# Patient Record
Sex: MALE | Employment: OTHER | URBAN - METROPOLITAN AREA
[De-identification: names, ages, dates, MRNs, and addresses within clinical notes are randomized per-mention and may not be internally consistent; named-entity substitution may affect disease eponyms.]

---

## 2019-01-01 ENCOUNTER — NURSE NAVIGATOR (OUTPATIENT)
Dept: OTHER | Age: 84
End: 2019-01-01

## 2019-01-22 ENCOUNTER — HOSPITAL ENCOUNTER (OUTPATIENT)
Dept: RADIATION THERAPY | Age: 84
Discharge: HOME | End: 2019-01-22
Payer: OTHER GOVERNMENT

## 2019-01-22 PROCEDURE — 99211 OFF/OP EST MAY X REQ PHY/QHP: CPT

## 2019-01-22 PROCEDURE — G0463 HOSPITAL OUTPT CLINIC VISIT: HCPCS

## 2019-01-31 ENCOUNTER — ANESTHESIA (OUTPATIENT)
Dept: RADIATION THERAPY | Age: 84
End: 2019-01-31
Payer: OTHER GOVERNMENT

## 2019-01-31 ENCOUNTER — HOSPITAL ENCOUNTER (OUTPATIENT)
Dept: RADIATION THERAPY | Age: 84
Discharge: HOME | End: 2019-01-31
Payer: OTHER GOVERNMENT

## 2019-01-31 ENCOUNTER — ANESTHESIA EVENT (OUTPATIENT)
Dept: RADIATION THERAPY | Age: 84
End: 2019-01-31
Payer: OTHER GOVERNMENT

## 2019-01-31 VITALS
RESPIRATION RATE: 18 BRPM | TEMPERATURE: 97.7 F | OXYGEN SATURATION: 100 % | SYSTOLIC BLOOD PRESSURE: 116 MMHG | BODY MASS INDEX: 28.49 KG/M2 | DIASTOLIC BLOOD PRESSURE: 54 MMHG | WEIGHT: 181.56 LBS | HEART RATE: 75 BPM | HEIGHT: 67 IN

## 2019-01-31 LAB
GLUCOSE BLD STRIP.AUTO-MCNC: 128 MG/DL (ref 70–110)
GLUCOSE BLD STRIP.AUTO-MCNC: 132 MG/DL (ref 70–110)

## 2019-01-31 PROCEDURE — 82962 GLUCOSE BLOOD TEST: CPT

## 2019-01-31 PROCEDURE — 74011250636 HC RX REV CODE- 250/636

## 2019-01-31 PROCEDURE — 73290000068 HC RAD ONC TIME 0.5 TO 1 HR

## 2019-01-31 PROCEDURE — 76060000032 HC ANESTHESIA 0.5 TO 1 HR

## 2019-01-31 PROCEDURE — 74011250636 HC RX REV CODE- 250/636: Performed by: RADIOLOGY

## 2019-01-31 PROCEDURE — 77030012510 HC MSK AIRWY LMA TELE -B: Performed by: RADIOLOGY

## 2019-01-31 PROCEDURE — 77030015736 HC BLLN ENDOCAV CIVC -B

## 2019-01-31 PROCEDURE — 77030018846 HC SOL IRR STRL H20 ICUM -A

## 2019-01-31 PROCEDURE — A4648 IMPLANTABLE TISSUE MARKER: HCPCS

## 2019-01-31 RX ORDER — TRAMADOL HYDROCHLORIDE 50 MG/1
50 TABLET ORAL 4 TIMES DAILY
COMMUNITY
End: 2020-01-01

## 2019-01-31 RX ORDER — LIDOCAINE HYDROCHLORIDE 20 MG/ML
INJECTION, SOLUTION EPIDURAL; INFILTRATION; INTRACAUDAL; PERINEURAL AS NEEDED
Status: DISCONTINUED | OUTPATIENT
Start: 2019-01-31 | End: 2019-01-31 | Stop reason: HOSPADM

## 2019-01-31 RX ORDER — FLUTICASONE PROPIONATE 50 MCG
2 SPRAY, SUSPENSION (ML) NASAL DAILY
COMMUNITY
End: 2020-01-01

## 2019-01-31 RX ORDER — LOSARTAN POTASSIUM 50 MG/1
100 TABLET ORAL DAILY
COMMUNITY
End: 2020-01-01

## 2019-01-31 RX ORDER — CEFAZOLIN SODIUM 2 G/50ML
SOLUTION INTRAVENOUS
Status: DISPENSED
Start: 2019-01-31 | End: 2019-01-31

## 2019-01-31 RX ORDER — ONDANSETRON 2 MG/ML
INJECTION INTRAMUSCULAR; INTRAVENOUS AS NEEDED
Status: DISCONTINUED | OUTPATIENT
Start: 2019-01-31 | End: 2019-01-31 | Stop reason: HOSPADM

## 2019-01-31 RX ORDER — CALCITRIOL 0.25 UG/1
0.25 CAPSULE ORAL DAILY
COMMUNITY
End: 2020-01-01

## 2019-01-31 RX ORDER — FAMOTIDINE 20 MG/1
20 TABLET, FILM COATED ORAL ONCE
Status: ACTIVE | OUTPATIENT
Start: 2019-01-31 | End: 2019-01-31

## 2019-01-31 RX ORDER — PROPOFOL 10 MG/ML
INJECTION, EMULSION INTRAVENOUS AS NEEDED
Status: DISCONTINUED | OUTPATIENT
Start: 2019-01-31 | End: 2019-01-31 | Stop reason: HOSPADM

## 2019-01-31 RX ORDER — FENTANYL CITRATE 50 UG/ML
INJECTION, SOLUTION INTRAMUSCULAR; INTRAVENOUS AS NEEDED
Status: DISCONTINUED | OUTPATIENT
Start: 2019-01-31 | End: 2019-01-31 | Stop reason: HOSPADM

## 2019-01-31 RX ORDER — ASPIRIN 325 MG
325 TABLET ORAL DAILY
COMMUNITY
End: 2020-01-01

## 2019-01-31 RX ORDER — SODIUM CHLORIDE 0.9 % (FLUSH) 0.9 %
5-10 SYRINGE (ML) INJECTION ONCE
Status: COMPLETED | OUTPATIENT
Start: 2019-01-31 | End: 2019-01-31

## 2019-01-31 RX ORDER — GLIPIZIDE 10 MG/1
10 TABLET ORAL 2 TIMES DAILY
COMMUNITY
End: 2020-01-01

## 2019-01-31 RX ORDER — FINASTERIDE 5 MG/1
5 TABLET, FILM COATED ORAL DAILY
COMMUNITY
End: 2020-01-01

## 2019-01-31 RX ORDER — BICALUTAMIDE 50 MG/1
50 TABLET, FILM COATED ORAL DAILY
COMMUNITY
End: 2020-01-01

## 2019-01-31 RX ORDER — OMEPRAZOLE 20 MG/1
20 CAPSULE, DELAYED RELEASE ORAL DAILY
COMMUNITY
End: 2020-01-01

## 2019-01-31 RX ORDER — SODIUM CHLORIDE 9 MG/ML
75 INJECTION, SOLUTION INTRAVENOUS CONTINUOUS
Status: DISCONTINUED | OUTPATIENT
Start: 2019-01-31 | End: 2019-02-04 | Stop reason: HOSPADM

## 2019-01-31 RX ORDER — ALBUTEROL SULFATE 90 UG/1
1 AEROSOL, METERED RESPIRATORY (INHALATION) DAILY
COMMUNITY
End: 2020-01-01

## 2019-01-31 RX ORDER — ATENOLOL 50 MG/1
50 TABLET ORAL DAILY
COMMUNITY
End: 2020-01-01

## 2019-01-31 RX ORDER — HYDROCHLOROTHIAZIDE 25 MG/1
25 TABLET ORAL DAILY
COMMUNITY
End: 2020-01-01

## 2019-01-31 RX ORDER — CEFAZOLIN SODIUM 2 G/50ML
2 SOLUTION INTRAVENOUS ONCE
Status: COMPLETED | OUTPATIENT
Start: 2019-01-31 | End: 2019-01-31

## 2019-01-31 RX ORDER — ATORVASTATIN CALCIUM 80 MG/1
80 TABLET, FILM COATED ORAL DAILY
COMMUNITY
End: 2020-01-01

## 2019-01-31 RX ORDER — FAMOTIDINE 20 MG/1
TABLET, FILM COATED ORAL
Status: DISPENSED
Start: 2019-01-31 | End: 2019-01-31

## 2019-01-31 RX ADMIN — PROPOFOL 100 MG: 10 INJECTION, EMULSION INTRAVENOUS at 08:26

## 2019-01-31 RX ADMIN — FENTANYL CITRATE 50 MCG: 50 INJECTION, SOLUTION INTRAMUSCULAR; INTRAVENOUS at 08:20

## 2019-01-31 RX ADMIN — SODIUM CHLORIDE 75 ML/HR: 900 INJECTION, SOLUTION INTRAVENOUS at 08:12

## 2019-01-31 RX ADMIN — LIDOCAINE HYDROCHLORIDE 40 MG: 20 INJECTION, SOLUTION EPIDURAL; INFILTRATION; INTRACAUDAL; PERINEURAL at 08:26

## 2019-01-31 RX ADMIN — CEFAZOLIN SODIUM 2 G: 2 SOLUTION INTRAVENOUS at 08:30

## 2019-01-31 RX ADMIN — Medication 10 ML: at 08:13

## 2019-01-31 RX ADMIN — ONDANSETRON 4 MG: 2 INJECTION INTRAMUSCULAR; INTRAVENOUS at 08:50

## 2019-01-31 RX ADMIN — FENTANYL CITRATE 50 MCG: 50 INJECTION, SOLUTION INTRAMUSCULAR; INTRAVENOUS at 08:26

## 2019-01-31 NOTE — ANESTHESIA POSTPROCEDURE EVALUATION
* No procedures listed *. Anesthesia Post Evaluation Multimodal analgesia: multimodal analgesia used between 6 hours prior to anesthesia start to PACU discharge Patient location during evaluation: bedside Patient participation: complete - patient participated Level of consciousness: awake Pain management: adequate Airway patency: patent Anesthetic complications: no 
Cardiovascular status: stable Respiratory status: acceptable Hydration status: acceptable Post anesthesia nausea and vomiting:  controlled Visit Vitals /54 (BP 1 Location: Right arm, BP Patient Position: Head of bed elevated (Comment degrees)) Pulse 75 Temp 36.5 °C (97.7 °F) Resp 18 Ht 5' 7\" (1.702 m) Wt 82.4 kg (181 lb 9 oz) SpO2 100% BMI 28.44 kg/m²

## 2019-01-31 NOTE — DISCHARGE INSTRUCTIONS
Fiducial Markers Instructions    During the first few days you may have some bruising on the perineum (the place between your anus and your scrotum) or on the scrotum itself. This is caused by the needles (usually 2-3) which are used to place the Fiducial Markers. This will resolve on its own and usually does not cause any discomfort. For about a week after treatment, you may have some pain or swelling in the area between your scrotum and rectum, and your urine may be reddish-brown. Rarely a larger hematoma may form on the perineum and this will cause some discomfort with sitting. This should be brought to the doctors attention, but it will resolve on its own. You can alternate between an ice pack and heat pack for 10 minutes interval to assist with the reduction of inflammation and pain to perineum. Side Effects    Immediately post procedure: blood in the urine, bruising/tenderness/discoloration at the implant site, and/or swelling at the implant site. These symptoms should subside after a few days. Diet:      Regular, unless you are on a special diet for other reasons. Activity:     Avoid heavy lifting or strenuous physical activity for the first two days after the procedure. At that time you may return to your normal activity level if the urine is clear and you feel fine. If the urine is still bloody you should rest and drink plenty of fluids until it is clear, and then you may resume normal activity. Depending on the demands of your job, you may return to work any time during the week after the procedure, noting the precaution about prolonged sitting. You may return to a regular diet as tolerated following the procedure. You will most likely experience a reddish color in your ejaculate for a few weeks following the procedure. This is normal and will improve as time  passes, if it persists, see your doctor.     Follow up     Your follow up imaging will be at [address] [place] [address]  at [facility]  on ______at _____ am.    Please call us if you have any questions: [phone]    Radiation Therapy       DISCHARGE SUMMARY from Nurse    PATIENT INSTRUCTIONS:    After general anesthesia or intravenous sedation, for 24 hours or while taking prescription Narcotics:  · Limit your activities  · Do not drive and operate hazardous machinery  · Do not make important personal or business decisions  · Do  not drink alcoholic beverages  · If you have not urinated within 8 hours after discharge, please contact your surgeon on call. Report the following to your surgeon:  · Excessive pain, swelling, redness or odor of or around the surgical area  · Temperature over 100.5  · Nausea and vomiting lasting longer than 4 hours or if unable to take medications  · Any signs of decreased circulation or nerve impairment to extremity: change in color, persistent  numbness, tingling, coldness or increase pain  · Any questions    What to do at Home:  Recommended activity: Activity as tolerated and no driving for today,     If you experience any of the following symptoms, please follow up with Emergency department. *  Please give a list of your current medications to your Primary Care Provider. *  Please update this list whenever your medications are discontinued, doses are      changed, or new medications (including over-the-counter products) are added. *  Please carry medication information at all times in case of emergency situations. These are general instructions for a healthy lifestyle:    No smoking/ No tobacco products/ Avoid exposure to second hand smoke  Surgeon General's Warning:  Quitting smoking now greatly reduces serious risk to your health.     Obesity, smoking, and sedentary lifestyle greatly increases your risk for illness    A healthy diet, regular physical exercise & weight monitoring are important for maintaining a healthy lifestyle    You may be retaining fluid if you have a history of heart failure or if you experience any of the following symptoms:  Weight gain of 3 pounds or more overnight or 5 pounds in a week, increased swelling in our hands or feet or shortness of breath while lying flat in bed. Please call your doctor as soon as you notice any of these symptoms; do not wait until your next office visit. Recognize signs and symptoms of STROKE:    F-face looks uneven    A-arms unable to move or move unevenly    S-speech slurred or non-existent    T-time-call 911 as soon as signs and symptoms begin-DO NOT go       Back to bed or wait to see if you get better-TIME IS BRAIN. Warning Signs of HEART ATTACK     Call 911 if you have these symptoms:   Chest discomfort. Most heart attacks involve discomfort in the center of the chest that lasts more than a few minutes, or that goes away and comes back. It can feel like uncomfortable pressure, squeezing, fullness, or pain.  Discomfort in other areas of the upper body. Symptoms can include pain or discomfort in one or both arms, the back, neck, jaw, or stomach.  Shortness of breath with or without chest discomfort.  Other signs may include breaking out in a cold sweat, nausea, or lightheadedness. Don't wait more than five minutes to call 911 - MINUTES MATTER! Fast action can save your life. Calling 911 is almost always the fastest way to get lifesaving treatment. Emergency Medical Services staff can begin treatment when they arrive -- up to an hour sooner than if someone gets to the hospital by car. The discharge information has been reviewed with the patient and spouse. The patient and spouse verbalized understanding. Discharge medications reviewed with the patient and spouse and appropriate educational materials and side effects teaching were provided.   ___________________________________________________________________________________________________________________________________

## 2019-01-31 NOTE — PROGRESS NOTES
PROCEDURE NOTE 
 
0700: Patient arrived for procedure. A&Ox4. Per patients report bowel prep complete. NPO since midnight. Reviewed Allergies and PTA medications. Consent verified and in chart. Denies pain or any other discomfort. Denies delusions, hallucinations, mood swings, depression, euphoria or suicidal ideation.

## 2019-01-31 NOTE — PROGRESS NOTES
POST PROCEDURE NOTE Pt arrived to post procedure area A at 0901, pt in supine with head of bed elevated, monitors placed. Patient voided 100ml of rojelio colored urine. Bladder scan performed with the patient having 0ml of residual urine in the bladder. Patient discharged from procedure area A: 1104

## 2019-01-31 NOTE — PROGRESS NOTES
PROCEDURE NOTE Pt arrived to procedure room at 0820, pt placed supine, monitors placed. Body aligned SCDs placed SKIP LE, Pt arms on armboard with eggcrate for pressure support, head remains aligned Right & Left Leg placed in Yellow Fin Stirrups, eggcrates & gelpads for pressure points Time-out performed: 4465 Procedure Start: 9084 Procedure stop: 9229

## 2019-01-31 NOTE — ANESTHESIA PREPROCEDURE EVALUATION
Anesthetic History No history of anesthetic complications Review of Systems / Medical History Patient summary reviewed, nursing notes reviewed and pertinent labs reviewed Pulmonary Within defined limits Neuro/Psych Within defined limits Cardiovascular Hypertension: well controlled Exercise tolerance: >4 METS 
  
GI/Hepatic/Renal 
Within defined limits Endo/Other Within defined limits Other Findings Physical Exam 
 
Airway Mallampati: II 
TM Distance: 4 - 6 cm Neck ROM: normal range of motion Mouth opening: Normal 
 
 Cardiovascular Rhythm: regular Rate: normal 
 
 
 
 Dental 
No notable dental hx Pulmonary Breath sounds clear to auscultation Abdominal 
Abdominal exam normal 
 
 
 Other Findings Anesthetic Plan ASA: 2 Anesthesia type: general 
 
 
 
 
Induction: Intravenous

## 2019-02-05 ENCOUNTER — HOSPITAL ENCOUNTER (OUTPATIENT)
Dept: RADIATION THERAPY | Age: 84
Discharge: HOME | End: 2019-02-05
Payer: OTHER GOVERNMENT

## 2019-02-05 ENCOUNTER — HOSPITAL ENCOUNTER (OUTPATIENT)
Dept: CT IMAGING | Age: 84
Discharge: HOME | End: 2019-02-05
Attending: RADIOLOGY
Payer: OTHER GOVERNMENT

## 2019-02-05 DIAGNOSIS — C61 MALIGNANT NEOPLASM OF PROSTATE (HCC): ICD-10-CM

## 2019-02-05 PROCEDURE — 77014 CT GUIDED THERAPY PLAN/PLACEMENT: CPT

## 2019-02-05 PROCEDURE — 77334 RADIATION TREATMENT AID(S): CPT

## 2019-02-06 ENCOUNTER — HOSPITAL ENCOUNTER (OUTPATIENT)
Dept: RADIATION THERAPY | Age: 84
Discharge: HOME | End: 2019-02-06
Payer: OTHER GOVERNMENT

## 2019-02-07 ENCOUNTER — HOSPITAL ENCOUNTER (OUTPATIENT)
Dept: RADIATION THERAPY | Age: 84
Discharge: HOME | End: 2019-02-07
Payer: OTHER GOVERNMENT

## 2019-02-08 ENCOUNTER — HOSPITAL ENCOUNTER (OUTPATIENT)
Dept: RADIATION THERAPY | Age: 84
Discharge: HOME | End: 2019-02-08
Payer: OTHER GOVERNMENT

## 2019-02-08 PROCEDURE — 77338 DESIGN MLC DEVICE FOR IMRT: CPT

## 2019-02-08 PROCEDURE — 77301 RADIOTHERAPY DOSE PLAN IMRT: CPT

## 2019-02-08 PROCEDURE — 77300 RADIATION THERAPY DOSE PLAN: CPT

## 2019-02-11 ENCOUNTER — HOSPITAL ENCOUNTER (OUTPATIENT)
Dept: RADIATION THERAPY | Age: 84
Discharge: HOME | End: 2019-02-11
Payer: OTHER GOVERNMENT

## 2019-02-11 PROCEDURE — 77385 HC IMRT TRMT DLVR SMPL: CPT

## 2019-02-12 ENCOUNTER — HOSPITAL ENCOUNTER (OUTPATIENT)
Dept: RADIATION THERAPY | Age: 84
Discharge: HOME | End: 2019-02-12
Payer: OTHER GOVERNMENT

## 2019-02-12 PROCEDURE — 77385 HC IMRT TRMT DLVR SMPL: CPT

## 2019-02-13 ENCOUNTER — HOSPITAL ENCOUNTER (OUTPATIENT)
Dept: RADIATION THERAPY | Age: 84
Discharge: HOME | End: 2019-02-13
Payer: OTHER GOVERNMENT

## 2019-02-13 PROCEDURE — 77385 HC IMRT TRMT DLVR SMPL: CPT

## 2019-02-14 ENCOUNTER — HOSPITAL ENCOUNTER (OUTPATIENT)
Dept: RADIATION THERAPY | Age: 84
Discharge: HOME | End: 2019-02-14
Payer: OTHER GOVERNMENT

## 2019-02-14 PROCEDURE — 77385 HC IMRT TRMT DLVR SMPL: CPT

## 2019-02-15 ENCOUNTER — HOSPITAL ENCOUNTER (OUTPATIENT)
Dept: RADIATION THERAPY | Age: 84
Discharge: HOME | End: 2019-02-15
Payer: OTHER GOVERNMENT

## 2019-02-15 PROCEDURE — 77336 RADIATION PHYSICS CONSULT: CPT

## 2019-02-15 PROCEDURE — 77385 HC IMRT TRMT DLVR SMPL: CPT

## 2019-02-18 ENCOUNTER — HOSPITAL ENCOUNTER (OUTPATIENT)
Dept: RADIATION THERAPY | Age: 84
Discharge: HOME | End: 2019-02-18
Payer: OTHER GOVERNMENT

## 2019-02-18 PROCEDURE — 77385 HC IMRT TRMT DLVR SMPL: CPT

## 2019-02-19 ENCOUNTER — HOSPITAL ENCOUNTER (OUTPATIENT)
Dept: RADIATION THERAPY | Age: 84
Discharge: HOME | End: 2019-02-19
Payer: OTHER GOVERNMENT

## 2019-02-19 PROCEDURE — 77385 HC IMRT TRMT DLVR SMPL: CPT

## 2019-02-20 ENCOUNTER — HOSPITAL ENCOUNTER (OUTPATIENT)
Dept: RADIATION THERAPY | Age: 84
Discharge: HOME | End: 2019-02-20
Payer: OTHER GOVERNMENT

## 2019-02-20 PROCEDURE — 77385 HC IMRT TRMT DLVR SMPL: CPT

## 2019-02-21 ENCOUNTER — HOSPITAL ENCOUNTER (OUTPATIENT)
Dept: RADIATION THERAPY | Age: 84
Discharge: HOME | End: 2019-02-21
Payer: OTHER GOVERNMENT

## 2019-02-21 PROCEDURE — 77338 DESIGN MLC DEVICE FOR IMRT: CPT

## 2019-02-21 PROCEDURE — 77385 HC IMRT TRMT DLVR SMPL: CPT

## 2019-02-21 PROCEDURE — 77300 RADIATION THERAPY DOSE PLAN: CPT

## 2019-02-22 ENCOUNTER — HOSPITAL ENCOUNTER (OUTPATIENT)
Dept: RADIATION THERAPY | Age: 84
Discharge: HOME | End: 2019-02-22
Payer: OTHER GOVERNMENT

## 2019-02-22 PROCEDURE — 77336 RADIATION PHYSICS CONSULT: CPT

## 2019-02-22 PROCEDURE — 77385 HC IMRT TRMT DLVR SMPL: CPT

## 2019-02-25 ENCOUNTER — HOSPITAL ENCOUNTER (OUTPATIENT)
Dept: RADIATION THERAPY | Age: 84
Discharge: HOME | End: 2019-02-25
Payer: OTHER GOVERNMENT

## 2019-02-25 PROCEDURE — 77385 HC IMRT TRMT DLVR SMPL: CPT

## 2019-02-26 ENCOUNTER — HOSPITAL ENCOUNTER (OUTPATIENT)
Dept: RADIATION THERAPY | Age: 84
Discharge: HOME | End: 2019-02-26
Payer: OTHER GOVERNMENT

## 2019-02-26 PROCEDURE — 77300 RADIATION THERAPY DOSE PLAN: CPT

## 2019-02-26 PROCEDURE — 77338 DESIGN MLC DEVICE FOR IMRT: CPT

## 2019-02-26 PROCEDURE — 77385 HC IMRT TRMT DLVR SMPL: CPT

## 2019-02-27 ENCOUNTER — HOSPITAL ENCOUNTER (OUTPATIENT)
Dept: RADIATION THERAPY | Age: 84
Discharge: HOME | End: 2019-02-27
Payer: OTHER GOVERNMENT

## 2019-02-27 PROCEDURE — 77385 HC IMRT TRMT DLVR SMPL: CPT

## 2019-02-28 ENCOUNTER — HOSPITAL ENCOUNTER (OUTPATIENT)
Dept: RADIATION THERAPY | Age: 84
Discharge: HOME | End: 2019-02-28
Payer: OTHER GOVERNMENT

## 2019-02-28 PROCEDURE — 77385 HC IMRT TRMT DLVR SMPL: CPT

## 2019-03-01 ENCOUNTER — HOSPITAL ENCOUNTER (OUTPATIENT)
Dept: RADIATION THERAPY | Age: 84
Discharge: HOME | End: 2019-03-01
Payer: OTHER GOVERNMENT

## 2019-03-01 PROCEDURE — 77336 RADIATION PHYSICS CONSULT: CPT

## 2019-03-01 PROCEDURE — 77385 HC IMRT TRMT DLVR SMPL: CPT

## 2019-03-04 ENCOUNTER — HOSPITAL ENCOUNTER (OUTPATIENT)
Dept: RADIATION THERAPY | Age: 84
Discharge: HOME | End: 2019-03-04
Payer: OTHER GOVERNMENT

## 2019-03-04 PROCEDURE — 77385 HC IMRT TRMT DLVR SMPL: CPT

## 2019-03-05 ENCOUNTER — HOSPITAL ENCOUNTER (OUTPATIENT)
Dept: RADIATION THERAPY | Age: 84
Discharge: HOME | End: 2019-03-05
Payer: OTHER GOVERNMENT

## 2019-03-05 PROCEDURE — 77385 HC IMRT TRMT DLVR SMPL: CPT

## 2019-03-06 ENCOUNTER — HOSPITAL ENCOUNTER (OUTPATIENT)
Dept: RADIATION THERAPY | Age: 84
Discharge: HOME | End: 2019-03-06
Payer: OTHER GOVERNMENT

## 2019-03-06 PROCEDURE — 77385 HC IMRT TRMT DLVR SMPL: CPT

## 2019-03-07 ENCOUNTER — HOSPITAL ENCOUNTER (OUTPATIENT)
Dept: RADIATION THERAPY | Age: 84
Discharge: HOME | End: 2019-03-07
Payer: OTHER GOVERNMENT

## 2019-03-07 PROCEDURE — 77385 HC IMRT TRMT DLVR SMPL: CPT

## 2019-03-08 ENCOUNTER — HOSPITAL ENCOUNTER (OUTPATIENT)
Dept: RADIATION THERAPY | Age: 84
Discharge: HOME | End: 2019-03-08
Payer: OTHER GOVERNMENT

## 2019-03-08 PROCEDURE — 77336 RADIATION PHYSICS CONSULT: CPT

## 2019-03-08 PROCEDURE — 77385 HC IMRT TRMT DLVR SMPL: CPT

## 2019-03-11 ENCOUNTER — HOSPITAL ENCOUNTER (OUTPATIENT)
Dept: RADIATION THERAPY | Age: 84
Discharge: HOME | End: 2019-03-11
Payer: OTHER GOVERNMENT

## 2019-03-11 PROCEDURE — 77385 HC IMRT TRMT DLVR SMPL: CPT

## 2019-03-12 ENCOUNTER — HOSPITAL ENCOUNTER (OUTPATIENT)
Dept: RADIATION THERAPY | Age: 84
Discharge: HOME | End: 2019-03-12
Payer: OTHER GOVERNMENT

## 2019-03-12 PROCEDURE — 77385 HC IMRT TRMT DLVR SMPL: CPT

## 2019-03-13 ENCOUNTER — HOSPITAL ENCOUNTER (OUTPATIENT)
Dept: RADIATION THERAPY | Age: 84
Discharge: HOME | End: 2019-03-13
Payer: OTHER GOVERNMENT

## 2019-03-13 PROCEDURE — 77385 HC IMRT TRMT DLVR SMPL: CPT

## 2019-03-14 ENCOUNTER — HOSPITAL ENCOUNTER (OUTPATIENT)
Dept: RADIATION THERAPY | Age: 84
Discharge: HOME | End: 2019-03-14
Payer: OTHER GOVERNMENT

## 2019-03-14 PROCEDURE — 77385 HC IMRT TRMT DLVR SMPL: CPT

## 2019-03-15 ENCOUNTER — HOSPITAL ENCOUNTER (OUTPATIENT)
Dept: RADIATION THERAPY | Age: 84
Discharge: HOME | End: 2019-03-15
Payer: OTHER GOVERNMENT

## 2019-03-15 PROCEDURE — 77336 RADIATION PHYSICS CONSULT: CPT

## 2019-03-15 PROCEDURE — 77385 HC IMRT TRMT DLVR SMPL: CPT

## 2019-03-18 ENCOUNTER — HOSPITAL ENCOUNTER (OUTPATIENT)
Dept: RADIATION THERAPY | Age: 84
Discharge: HOME | End: 2019-03-18
Payer: OTHER GOVERNMENT

## 2019-03-18 PROCEDURE — 77385 HC IMRT TRMT DLVR SMPL: CPT

## 2019-03-19 ENCOUNTER — HOSPITAL ENCOUNTER (OUTPATIENT)
Dept: RADIATION THERAPY | Age: 84
Discharge: HOME | End: 2019-03-19
Payer: OTHER GOVERNMENT

## 2019-03-19 PROCEDURE — 77385 HC IMRT TRMT DLVR SMPL: CPT

## 2019-03-20 ENCOUNTER — HOSPITAL ENCOUNTER (OUTPATIENT)
Dept: RADIATION THERAPY | Age: 84
Discharge: HOME | End: 2019-03-20
Payer: OTHER GOVERNMENT

## 2019-03-20 PROCEDURE — 77385 HC IMRT TRMT DLVR SMPL: CPT

## 2019-03-21 ENCOUNTER — HOSPITAL ENCOUNTER (OUTPATIENT)
Dept: RADIATION THERAPY | Age: 84
Discharge: HOME | End: 2019-03-21
Payer: OTHER GOVERNMENT

## 2019-03-21 PROCEDURE — 77385 HC IMRT TRMT DLVR SMPL: CPT

## 2019-03-22 ENCOUNTER — HOSPITAL ENCOUNTER (OUTPATIENT)
Dept: RADIATION THERAPY | Age: 84
Discharge: HOME | End: 2019-03-22
Payer: OTHER GOVERNMENT

## 2019-03-22 PROCEDURE — 77385 HC IMRT TRMT DLVR SMPL: CPT

## 2019-03-22 PROCEDURE — 77336 RADIATION PHYSICS CONSULT: CPT

## 2019-03-25 ENCOUNTER — HOSPITAL ENCOUNTER (OUTPATIENT)
Dept: RADIATION THERAPY | Age: 84
Discharge: HOME | End: 2019-03-25
Payer: OTHER GOVERNMENT

## 2019-03-25 PROCEDURE — 77385 HC IMRT TRMT DLVR SMPL: CPT

## 2019-03-26 ENCOUNTER — HOSPITAL ENCOUNTER (OUTPATIENT)
Dept: RADIATION THERAPY | Age: 84
Discharge: HOME | End: 2019-03-26
Payer: OTHER GOVERNMENT

## 2019-03-26 PROCEDURE — 77385 HC IMRT TRMT DLVR SMPL: CPT

## 2019-03-27 ENCOUNTER — HOSPITAL ENCOUNTER (OUTPATIENT)
Dept: RADIATION THERAPY | Age: 84
Discharge: HOME | End: 2019-03-27
Payer: OTHER GOVERNMENT

## 2019-03-27 PROCEDURE — 77385 HC IMRT TRMT DLVR SMPL: CPT

## 2019-03-28 ENCOUNTER — HOSPITAL ENCOUNTER (OUTPATIENT)
Dept: RADIATION THERAPY | Age: 84
Discharge: HOME | End: 2019-03-28
Payer: OTHER GOVERNMENT

## 2019-03-28 PROCEDURE — 77385 HC IMRT TRMT DLVR SMPL: CPT

## 2019-03-29 ENCOUNTER — HOSPITAL ENCOUNTER (OUTPATIENT)
Dept: RADIATION THERAPY | Age: 84
Discharge: HOME | End: 2019-03-29
Payer: OTHER GOVERNMENT

## 2019-03-29 PROCEDURE — 77336 RADIATION PHYSICS CONSULT: CPT

## 2019-03-29 PROCEDURE — 77385 HC IMRT TRMT DLVR SMPL: CPT

## 2019-04-01 ENCOUNTER — HOSPITAL ENCOUNTER (OUTPATIENT)
Dept: RADIATION THERAPY | Age: 84
Discharge: HOME | End: 2019-04-01
Payer: OTHER GOVERNMENT

## 2019-04-01 PROCEDURE — 77385 HC IMRT TRMT DLVR SMPL: CPT

## 2019-04-02 ENCOUNTER — HOSPITAL ENCOUNTER (OUTPATIENT)
Dept: RADIATION THERAPY | Age: 84
Discharge: HOME | End: 2019-04-02
Payer: OTHER GOVERNMENT

## 2019-04-02 PROCEDURE — 77385 HC IMRT TRMT DLVR SMPL: CPT

## 2019-04-03 ENCOUNTER — HOSPITAL ENCOUNTER (OUTPATIENT)
Dept: RADIATION THERAPY | Age: 84
Discharge: HOME | End: 2019-04-03
Payer: OTHER GOVERNMENT

## 2019-04-03 PROCEDURE — 77385 HC IMRT TRMT DLVR SMPL: CPT

## 2019-04-04 ENCOUNTER — HOSPITAL ENCOUNTER (OUTPATIENT)
Dept: RADIATION THERAPY | Age: 84
Discharge: HOME | End: 2019-04-04
Payer: OTHER GOVERNMENT

## 2019-04-04 PROCEDURE — 77385 HC IMRT TRMT DLVR SMPL: CPT

## 2019-04-05 ENCOUNTER — HOSPITAL ENCOUNTER (OUTPATIENT)
Dept: RADIATION THERAPY | Age: 84
Discharge: HOME | End: 2019-04-05
Payer: OTHER GOVERNMENT

## 2019-04-05 PROCEDURE — 77385 HC IMRT TRMT DLVR SMPL: CPT

## 2019-04-05 PROCEDURE — 77336 RADIATION PHYSICS CONSULT: CPT

## 2019-04-08 ENCOUNTER — HOSPITAL ENCOUNTER (OUTPATIENT)
Dept: RADIATION THERAPY | Age: 84
Discharge: HOME | End: 2019-04-08
Payer: OTHER GOVERNMENT

## 2019-04-08 PROCEDURE — 77385 HC IMRT TRMT DLVR SMPL: CPT

## 2019-04-09 ENCOUNTER — HOSPITAL ENCOUNTER (OUTPATIENT)
Dept: RADIATION THERAPY | Age: 84
Discharge: HOME | End: 2019-04-09
Payer: OTHER GOVERNMENT

## 2019-04-09 PROCEDURE — 77385 HC IMRT TRMT DLVR SMPL: CPT

## 2019-04-10 ENCOUNTER — HOSPITAL ENCOUNTER (OUTPATIENT)
Dept: RADIATION THERAPY | Age: 84
Discharge: HOME | End: 2019-04-10
Payer: OTHER GOVERNMENT

## 2019-04-10 PROCEDURE — 77385 HC IMRT TRMT DLVR SMPL: CPT

## 2019-04-11 ENCOUNTER — HOSPITAL ENCOUNTER (OUTPATIENT)
Dept: RADIATION THERAPY | Age: 84
Discharge: HOME | End: 2019-04-11
Payer: OTHER GOVERNMENT

## 2019-04-11 PROCEDURE — 77385 HC IMRT TRMT DLVR SMPL: CPT

## 2019-04-12 ENCOUNTER — HOSPITAL ENCOUNTER (OUTPATIENT)
Dept: RADIATION THERAPY | Age: 84
Discharge: HOME | End: 2019-04-12
Payer: OTHER GOVERNMENT

## 2019-04-12 PROCEDURE — 77385 HC IMRT TRMT DLVR SMPL: CPT

## 2019-04-12 PROCEDURE — 77336 RADIATION PHYSICS CONSULT: CPT

## 2019-04-15 ENCOUNTER — HOSPITAL ENCOUNTER (OUTPATIENT)
Dept: RADIATION THERAPY | Age: 84
Discharge: HOME | End: 2019-04-15
Payer: OTHER GOVERNMENT

## 2019-05-24 ENCOUNTER — HOSPITAL ENCOUNTER (OUTPATIENT)
Dept: RADIATION THERAPY | Age: 84
Discharge: HOME | End: 2019-05-24
Payer: OTHER GOVERNMENT

## 2019-05-24 PROCEDURE — G0463 HOSPITAL OUTPT CLINIC VISIT: HCPCS

## 2019-05-24 PROCEDURE — 99211 OFF/OP EST MAY X REQ PHY/QHP: CPT

## 2020-01-01 ENCOUNTER — HOME CARE VISIT (OUTPATIENT)
Dept: HOSPICE | Facility: HOSPICE | Age: 85
End: 2020-01-01
Payer: MEDICARE

## 2020-01-01 ENCOUNTER — APPOINTMENT (OUTPATIENT)
Dept: GENERAL RADIOLOGY | Age: 85
DRG: 329 | End: 2020-01-01
Attending: SURGERY
Payer: MEDICARE

## 2020-01-01 ENCOUNTER — APPOINTMENT (OUTPATIENT)
Dept: GENERAL RADIOLOGY | Age: 85
DRG: 870 | End: 2020-01-01
Attending: EMERGENCY MEDICINE
Payer: MEDICARE

## 2020-01-01 ENCOUNTER — APPOINTMENT (OUTPATIENT)
Dept: GENERAL RADIOLOGY | Age: 85
DRG: 870 | End: 2020-01-01
Attending: HOSPITALIST
Payer: MEDICARE

## 2020-01-01 ENCOUNTER — APPOINTMENT (OUTPATIENT)
Dept: GENERAL RADIOLOGY | Age: 85
DRG: 870 | End: 2020-01-01
Attending: INTERNAL MEDICINE
Payer: MEDICARE

## 2020-01-01 ENCOUNTER — HOSPITAL ENCOUNTER (INPATIENT)
Age: 85
LOS: 9 days | Discharge: HOSPICE | DRG: 870 | End: 2020-07-25
Attending: EMERGENCY MEDICINE | Admitting: HOSPITALIST
Payer: MEDICARE

## 2020-01-01 ENCOUNTER — HOSPICE ADMISSION (OUTPATIENT)
Dept: HOSPICE | Facility: HOSPICE | Age: 85
End: 2020-01-01
Payer: MEDICARE

## 2020-01-01 ENCOUNTER — ANESTHESIA (OUTPATIENT)
Dept: ICU | Age: 85
DRG: 870 | End: 2020-01-01
Payer: MEDICARE

## 2020-01-01 ENCOUNTER — APPOINTMENT (OUTPATIENT)
Dept: CT IMAGING | Age: 85
DRG: 870 | End: 2020-01-01
Attending: EMERGENCY MEDICINE
Payer: MEDICARE

## 2020-01-01 ENCOUNTER — APPOINTMENT (OUTPATIENT)
Dept: CT IMAGING | Age: 85
DRG: 329 | End: 2020-01-01
Attending: EMERGENCY MEDICINE
Payer: MEDICARE

## 2020-01-01 ENCOUNTER — HOSPITAL ENCOUNTER (INPATIENT)
Dept: CARDIAC CATH/INVASIVE PROCEDURES | Age: 85
Discharge: HOME | DRG: 329 | End: 2020-07-09
Attending: HOSPITALIST
Payer: MEDICARE

## 2020-01-01 ENCOUNTER — APPOINTMENT (OUTPATIENT)
Dept: GENERAL RADIOLOGY | Age: 85
DRG: 329 | End: 2020-01-01
Attending: EMERGENCY MEDICINE
Payer: MEDICARE

## 2020-01-01 ENCOUNTER — APPOINTMENT (OUTPATIENT)
Dept: NON INVASIVE DIAGNOSTICS | Age: 85
DRG: 329 | End: 2020-01-01
Attending: HOSPITALIST
Payer: MEDICARE

## 2020-01-01 ENCOUNTER — ANESTHESIA (OUTPATIENT)
Dept: SURGERY | Age: 85
DRG: 329 | End: 2020-01-01
Payer: MEDICARE

## 2020-01-01 ENCOUNTER — ANESTHESIA EVENT (OUTPATIENT)
Dept: ICU | Age: 85
DRG: 870 | End: 2020-01-01
Payer: MEDICARE

## 2020-01-01 ENCOUNTER — APPOINTMENT (OUTPATIENT)
Dept: GENERAL RADIOLOGY | Age: 85
DRG: 870 | End: 2020-01-01
Attending: SURGERY
Payer: MEDICARE

## 2020-01-01 ENCOUNTER — HOSPITAL ENCOUNTER (INPATIENT)
Age: 85
LOS: 10 days | Discharge: NURSING, REHAB OR LONG-TERM CARE | DRG: 329 | End: 2020-07-13
Attending: EMERGENCY MEDICINE | Admitting: HOSPITALIST
Payer: MEDICARE

## 2020-01-01 ENCOUNTER — HOME CARE VISIT (OUTPATIENT)
Dept: SCHEDULING | Facility: HOME HEALTH | Age: 85
End: 2020-01-01
Payer: MEDICARE

## 2020-01-01 ENCOUNTER — ANESTHESIA EVENT (OUTPATIENT)
Dept: SURGERY | Age: 85
DRG: 329 | End: 2020-01-01
Payer: MEDICARE

## 2020-01-01 VITALS
WEIGHT: 183.86 LBS | HEIGHT: 67 IN | HEART RATE: 91 BPM | OXYGEN SATURATION: 97 % | RESPIRATION RATE: 20 BRPM | BODY MASS INDEX: 28.86 KG/M2 | DIASTOLIC BLOOD PRESSURE: 76 MMHG | TEMPERATURE: 98.7 F | SYSTOLIC BLOOD PRESSURE: 150 MMHG

## 2020-01-01 VITALS
SYSTOLIC BLOOD PRESSURE: 134 MMHG | TEMPERATURE: 98.3 F | HEART RATE: 123 BPM | OXYGEN SATURATION: 100 % | HEIGHT: 67 IN | WEIGHT: 206.9 LBS | RESPIRATION RATE: 19 BRPM | DIASTOLIC BLOOD PRESSURE: 35 MMHG | BODY MASS INDEX: 32.47 KG/M2

## 2020-01-01 VITALS
SYSTOLIC BLOOD PRESSURE: 100 MMHG | DIASTOLIC BLOOD PRESSURE: 62 MMHG | HEART RATE: 90 BPM | TEMPERATURE: 97.2 F | RESPIRATION RATE: 16 BRPM

## 2020-01-01 VITALS
WEIGHT: 182 LBS | HEIGHT: 68 IN | RESPIRATION RATE: 30 BRPM | OXYGEN SATURATION: 96 % | HEART RATE: 108 BPM | BODY MASS INDEX: 27.58 KG/M2 | SYSTOLIC BLOOD PRESSURE: 154 MMHG | TEMPERATURE: 96.2 F | DIASTOLIC BLOOD PRESSURE: 62 MMHG

## 2020-01-01 VITALS
RESPIRATION RATE: 18 BRPM | OXYGEN SATURATION: 98 % | TEMPERATURE: 96.8 F | SYSTOLIC BLOOD PRESSURE: 188 MMHG | DIASTOLIC BLOOD PRESSURE: 64 MMHG | HEART RATE: 100 BPM

## 2020-01-01 DIAGNOSIS — K56.609 SBO (SMALL BOWEL OBSTRUCTION) (HCC): Primary | ICD-10-CM

## 2020-01-01 DIAGNOSIS — K56.7 ILEUS (HCC): ICD-10-CM

## 2020-01-01 DIAGNOSIS — N17.9 ACUTE KIDNEY INJURY (HCC): ICD-10-CM

## 2020-01-01 DIAGNOSIS — R11.2 NON-INTRACTABLE VOMITING WITH NAUSEA, UNSPECIFIED VOMITING TYPE: ICD-10-CM

## 2020-01-01 DIAGNOSIS — Z85.46 HISTORY OF PROSTATE CANCER: ICD-10-CM

## 2020-01-01 DIAGNOSIS — R10.84 ABDOMINAL PAIN, GENERALIZED: Primary | ICD-10-CM

## 2020-01-01 DIAGNOSIS — E16.2 HYPOGLYCEMIA: ICD-10-CM

## 2020-01-01 DIAGNOSIS — J69.0 ASPIRATION PNEUMONIA OF RIGHT LOWER LOBE, UNSPECIFIED ASPIRATION PNEUMONIA TYPE (HCC): ICD-10-CM

## 2020-01-01 DIAGNOSIS — N30.90 CYSTITIS: ICD-10-CM

## 2020-01-01 DIAGNOSIS — K56.1 INTUSSUSCEPTION OF INTESTINE (HCC): ICD-10-CM

## 2020-01-01 DIAGNOSIS — K56.609 SBO (SMALL BOWEL OBSTRUCTION) (HCC): ICD-10-CM

## 2020-01-01 DIAGNOSIS — Z51.5 HOSPICE CARE: ICD-10-CM

## 2020-01-01 LAB
ABO + RH BLD: NORMAL
ALBUMIN SERPL-MCNC: 1.6 G/DL (ref 3.4–5)
ALBUMIN SERPL-MCNC: 1.6 G/DL (ref 3.4–5)
ALBUMIN SERPL-MCNC: 1.8 G/DL (ref 3.4–5)
ALBUMIN SERPL-MCNC: 2 G/DL (ref 3.4–5)
ALBUMIN SERPL-MCNC: 2.1 G/DL (ref 3.4–5)
ALBUMIN SERPL-MCNC: 2.2 G/DL (ref 3.4–5)
ALBUMIN SERPL-MCNC: 2.3 G/DL (ref 3.4–5)
ALBUMIN SERPL-MCNC: 4.3 G/DL (ref 3.4–5)
ALBUMIN/GLOB SERPL: 0.5 {RATIO} (ref 0.8–1.7)
ALBUMIN/GLOB SERPL: 0.5 {RATIO} (ref 0.8–1.7)
ALBUMIN/GLOB SERPL: 0.6 {RATIO} (ref 0.8–1.7)
ALBUMIN/GLOB SERPL: 0.6 {RATIO} (ref 0.8–1.7)
ALBUMIN/GLOB SERPL: 0.9 {RATIO} (ref 0.8–1.7)
ALP SERPL-CCNC: 64 U/L (ref 45–117)
ALP SERPL-CCNC: 74 U/L (ref 45–117)
ALP SERPL-CCNC: 78 U/L (ref 45–117)
ALP SERPL-CCNC: 80 U/L (ref 45–117)
ALP SERPL-CCNC: 82 U/L (ref 45–117)
ALT SERPL-CCNC: 15 U/L (ref 16–61)
ALT SERPL-CCNC: 15 U/L (ref 16–61)
ALT SERPL-CCNC: 17 U/L (ref 16–61)
ALT SERPL-CCNC: 17 U/L (ref 16–61)
ALT SERPL-CCNC: 22 U/L (ref 16–61)
AMORPH CRY URNS QL MICRO: ABNORMAL
AMORPH CRY URNS QL MICRO: ABNORMAL
ANION GAP SERPL CALC-SCNC: 10 MMOL/L (ref 3–18)
ANION GAP SERPL CALC-SCNC: 3 MMOL/L (ref 3–18)
ANION GAP SERPL CALC-SCNC: 3 MMOL/L (ref 3–18)
ANION GAP SERPL CALC-SCNC: 4 MMOL/L (ref 3–18)
ANION GAP SERPL CALC-SCNC: 5 MMOL/L (ref 3–18)
ANION GAP SERPL CALC-SCNC: 5 MMOL/L (ref 3–18)
ANION GAP SERPL CALC-SCNC: 6 MMOL/L (ref 3–18)
ANION GAP SERPL CALC-SCNC: 7 MMOL/L (ref 3–18)
ANION GAP SERPL CALC-SCNC: 8 MMOL/L (ref 3–18)
ANION GAP SERPL CALC-SCNC: 9 MMOL/L (ref 3–18)
APPEARANCE UR: ABNORMAL
APPEARANCE UR: ABNORMAL
ARTERIAL PATENCY WRIST A: YES
AST SERPL-CCNC: 24 U/L (ref 10–38)
AST SERPL-CCNC: 29 U/L (ref 10–38)
AST SERPL-CCNC: 30 U/L (ref 10–38)
AST SERPL-CCNC: 31 U/L (ref 10–38)
AST SERPL-CCNC: 32 U/L (ref 10–38)
ATRIAL RATE: 104 BPM
ATRIAL RATE: 170 BPM
ATRIAL RATE: 88 BPM
AV PEAK GRADIENT: 69.46 MMHG
BACTERIA SPEC CULT: NORMAL
BACTERIA URNS QL MICRO: ABNORMAL /HPF
BACTERIA URNS QL MICRO: NEGATIVE /HPF
BASE DEFICIT BLD-SCNC: 10 MMOL/L
BASE DEFICIT BLD-SCNC: 10 MMOL/L
BASE DEFICIT BLD-SCNC: 12 MMOL/L
BASE DEFICIT BLD-SCNC: 3 MMOL/L
BASE DEFICIT BLD-SCNC: 7 MMOL/L
BASE DEFICIT BLD-SCNC: 9 MMOL/L
BASE DEFICIT BLD-SCNC: 9 MMOL/L
BASOPHILS # BLD: 0 K/UL (ref 0–0.1)
BASOPHILS NFR BLD: 0 % (ref 0–2)
BDY SITE: ABNORMAL
BILIRUB DIRECT SERPL-MCNC: 0.2 MG/DL (ref 0–0.2)
BILIRUB SERPL-MCNC: 0.3 MG/DL (ref 0.2–1)
BILIRUB SERPL-MCNC: 0.4 MG/DL (ref 0.2–1)
BILIRUB SERPL-MCNC: 0.9 MG/DL (ref 0.2–1)
BILIRUB UR QL: ABNORMAL
BILIRUB UR QL: ABNORMAL
BLD PROD TYP BPU: NORMAL
BLOOD GROUP ANTIBODIES SERPL: NORMAL
BNP SERPL-MCNC: 2716 PG/ML (ref 0–1800)
BNP SERPL-MCNC: 3903 PG/ML (ref 0–1800)
BNP SERPL-MCNC: ABNORMAL PG/ML (ref 0–1800)
BODY TEMPERATURE: 101.3
BODY TEMPERATURE: 97.6
BODY TEMPERATURE: 98
BODY TEMPERATURE: 98.1
BODY TEMPERATURE: 98.2
BPU ID: NORMAL
BUN SERPL-MCNC: 25 MG/DL (ref 7–18)
BUN SERPL-MCNC: 27 MG/DL (ref 7–18)
BUN SERPL-MCNC: 32 MG/DL (ref 7–18)
BUN SERPL-MCNC: 35 MG/DL (ref 7–18)
BUN SERPL-MCNC: 37 MG/DL (ref 7–18)
BUN SERPL-MCNC: 37 MG/DL (ref 7–18)
BUN SERPL-MCNC: 43 MG/DL (ref 7–18)
BUN SERPL-MCNC: 43 MG/DL (ref 7–18)
BUN SERPL-MCNC: 45 MG/DL (ref 7–18)
BUN SERPL-MCNC: 45 MG/DL (ref 7–18)
BUN SERPL-MCNC: 46 MG/DL (ref 7–18)
BUN SERPL-MCNC: 49 MG/DL (ref 7–18)
BUN SERPL-MCNC: 49 MG/DL (ref 7–18)
BUN SERPL-MCNC: 50 MG/DL (ref 7–18)
BUN SERPL-MCNC: 52 MG/DL (ref 7–18)
BUN SERPL-MCNC: 57 MG/DL (ref 7–18)
BUN SERPL-MCNC: 61 MG/DL (ref 7–18)
BUN SERPL-MCNC: 63 MG/DL (ref 7–18)
BUN SERPL-MCNC: 68 MG/DL (ref 7–18)
BUN SERPL-MCNC: 71 MG/DL (ref 7–18)
BUN SERPL-MCNC: 80 MG/DL (ref 7–18)
BUN SERPL-MCNC: 82 MG/DL (ref 7–18)
BUN/CREAT SERPL: 11 (ref 12–20)
BUN/CREAT SERPL: 12 (ref 12–20)
BUN/CREAT SERPL: 14 (ref 12–20)
BUN/CREAT SERPL: 14 (ref 12–20)
BUN/CREAT SERPL: 15 (ref 12–20)
BUN/CREAT SERPL: 18 (ref 12–20)
BUN/CREAT SERPL: 18 (ref 12–20)
BUN/CREAT SERPL: 23 (ref 12–20)
BUN/CREAT SERPL: 24 (ref 12–20)
BUN/CREAT SERPL: 24 (ref 12–20)
BUN/CREAT SERPL: 26 (ref 12–20)
BUN/CREAT SERPL: 28 (ref 12–20)
BUN/CREAT SERPL: 31 (ref 12–20)
BUN/CREAT SERPL: 34 (ref 12–20)
BUN/CREAT SERPL: 35 (ref 12–20)
BUN/CREAT SERPL: 36 (ref 12–20)
BUN/CREAT SERPL: 9 (ref 12–20)
CALCIUM SERPL-MCNC: 6.6 MG/DL (ref 8.5–10.1)
CALCIUM SERPL-MCNC: 6.7 MG/DL (ref 8.5–10.1)
CALCIUM SERPL-MCNC: 6.7 MG/DL (ref 8.5–10.1)
CALCIUM SERPL-MCNC: 6.9 MG/DL (ref 8.5–10.1)
CALCIUM SERPL-MCNC: 7.1 MG/DL (ref 8.5–10.1)
CALCIUM SERPL-MCNC: 7.1 MG/DL (ref 8.5–10.1)
CALCIUM SERPL-MCNC: 7.3 MG/DL (ref 8.5–10.1)
CALCIUM SERPL-MCNC: 7.4 MG/DL (ref 8.5–10.1)
CALCIUM SERPL-MCNC: 7.4 MG/DL (ref 8.5–10.1)
CALCIUM SERPL-MCNC: 7.6 MG/DL (ref 8.5–10.1)
CALCIUM SERPL-MCNC: 7.7 MG/DL (ref 8.5–10.1)
CALCIUM SERPL-MCNC: 7.7 MG/DL (ref 8.5–10.1)
CALCIUM SERPL-MCNC: 7.8 MG/DL (ref 8.5–10.1)
CALCIUM SERPL-MCNC: 7.9 MG/DL (ref 8.5–10.1)
CALCIUM SERPL-MCNC: 8.2 MG/DL (ref 8.5–10.1)
CALCIUM SERPL-MCNC: 8.3 MG/DL (ref 8.5–10.1)
CALCIUM SERPL-MCNC: 8.3 MG/DL (ref 8.5–10.1)
CALCIUM SERPL-MCNC: 8.7 MG/DL (ref 8.5–10.1)
CALCIUM SERPL-MCNC: 9 MG/DL (ref 8.5–10.1)
CALCIUM SERPL-MCNC: 9.1 MG/DL (ref 8.5–10.1)
CALCIUM SERPL-MCNC: 9.1 MG/DL (ref 8.5–10.1)
CALCIUM SERPL-MCNC: 9.9 MG/DL (ref 8.5–10.1)
CALCULATED P AXIS, ECG09: 33 DEGREES
CALCULATED P AXIS, ECG09: 41 DEGREES
CALCULATED R AXIS, ECG10: -2 DEGREES
CALCULATED R AXIS, ECG10: 35 DEGREES
CALCULATED R AXIS, ECG10: 49 DEGREES
CALCULATED T AXIS, ECG11: -128 DEGREES
CALCULATED T AXIS, ECG11: 107 DEGREES
CALCULATED T AXIS, ECG11: 108 DEGREES
CALLED TO:,BCALL1: NORMAL
CALLED TO:,BCALL2: NORMAL
CHLORIDE SERPL-SCNC: 103 MMOL/L (ref 100–111)
CHLORIDE SERPL-SCNC: 105 MMOL/L (ref 100–111)
CHLORIDE SERPL-SCNC: 106 MMOL/L (ref 100–111)
CHLORIDE SERPL-SCNC: 108 MMOL/L (ref 100–111)
CHLORIDE SERPL-SCNC: 110 MMOL/L (ref 100–111)
CHLORIDE SERPL-SCNC: 111 MMOL/L (ref 100–111)
CHLORIDE SERPL-SCNC: 112 MMOL/L (ref 100–111)
CHLORIDE SERPL-SCNC: 113 MMOL/L (ref 100–111)
CHLORIDE SERPL-SCNC: 114 MMOL/L (ref 100–111)
CHLORIDE SERPL-SCNC: 114 MMOL/L (ref 100–111)
CHLORIDE SERPL-SCNC: 115 MMOL/L (ref 100–111)
CHLORIDE SERPL-SCNC: 115 MMOL/L (ref 100–111)
CHLORIDE SERPL-SCNC: 98 MMOL/L (ref 100–111)
CK MB CFR SERPL CALC: 0.5 % (ref 0–4)
CK MB CFR SERPL CALC: 0.5 % (ref 0–4)
CK MB CFR SERPL CALC: 0.7 % (ref 0–4)
CK MB CFR SERPL CALC: 1.1 % (ref 0–4)
CK MB CFR SERPL CALC: 1.2 % (ref 0–4)
CK MB CFR SERPL CALC: 1.3 % (ref 0–4)
CK MB CFR SERPL CALC: 3.6 % (ref 0–4)
CK MB SERPL-MCNC: 1.1 NG/ML (ref 5–25)
CK MB SERPL-MCNC: 3 NG/ML (ref 5–25)
CK MB SERPL-MCNC: 3.1 NG/ML (ref 5–25)
CK MB SERPL-MCNC: 3.2 NG/ML (ref 5–25)
CK MB SERPL-MCNC: 3.3 NG/ML (ref 5–25)
CK MB SERPL-MCNC: 4.2 NG/ML (ref 5–25)
CK MB SERPL-MCNC: 4.5 NG/ML (ref 5–25)
CK SERPL-CCNC: 215 U/L (ref 39–308)
CK SERPL-CCNC: 289 U/L (ref 39–308)
CK SERPL-CCNC: 337 U/L (ref 39–308)
CK SERPL-CCNC: 352 U/L (ref 39–308)
CK SERPL-CCNC: 418 U/L (ref 39–308)
CK SERPL-CCNC: 640 U/L (ref 39–308)
CK SERPL-CCNC: 90 U/L (ref 39–308)
CO2 SERPL-SCNC: 18 MMOL/L (ref 21–32)
CO2 SERPL-SCNC: 18 MMOL/L (ref 21–32)
CO2 SERPL-SCNC: 19 MMOL/L (ref 21–32)
CO2 SERPL-SCNC: 20 MMOL/L (ref 21–32)
CO2 SERPL-SCNC: 21 MMOL/L (ref 21–32)
CO2 SERPL-SCNC: 23 MMOL/L (ref 21–32)
CO2 SERPL-SCNC: 23 MMOL/L (ref 21–32)
CO2 SERPL-SCNC: 24 MMOL/L (ref 21–32)
CO2 SERPL-SCNC: 25 MMOL/L (ref 21–32)
CO2 SERPL-SCNC: 26 MMOL/L (ref 21–32)
CO2 SERPL-SCNC: 27 MMOL/L (ref 21–32)
CO2 SERPL-SCNC: 27 MMOL/L (ref 21–32)
CO2 SERPL-SCNC: 28 MMOL/L (ref 21–32)
CO2 SERPL-SCNC: 29 MMOL/L (ref 21–32)
CO2 SERPL-SCNC: 29 MMOL/L (ref 21–32)
COLOR UR: ABNORMAL
COLOR UR: ABNORMAL
COVID-19 RAPID TEST, COVR: NOT DETECTED
CREAT SERPL-MCNC: 1.11 MG/DL (ref 0.6–1.3)
CREAT SERPL-MCNC: 1.13 MG/DL (ref 0.6–1.3)
CREAT SERPL-MCNC: 1.14 MG/DL (ref 0.6–1.3)
CREAT SERPL-MCNC: 1.38 MG/DL (ref 0.6–1.3)
CREAT SERPL-MCNC: 1.39 MG/DL (ref 0.6–1.3)
CREAT SERPL-MCNC: 1.84 MG/DL (ref 0.6–1.3)
CREAT SERPL-MCNC: 1.94 MG/DL (ref 0.6–1.3)
CREAT SERPL-MCNC: 1.95 MG/DL (ref 0.6–1.3)
CREAT SERPL-MCNC: 1.98 MG/DL (ref 0.6–1.3)
CREAT SERPL-MCNC: 2.4 MG/DL (ref 0.6–1.3)
CREAT SERPL-MCNC: 2.7 MG/DL (ref 0.6–1.3)
CREAT SERPL-MCNC: 3.24 MG/DL (ref 0.6–1.3)
CREAT SERPL-MCNC: 3.38 MG/DL (ref 0.6–1.3)
CREAT SERPL-MCNC: 3.49 MG/DL (ref 0.6–1.3)
CREAT SERPL-MCNC: 3.56 MG/DL (ref 0.6–1.3)
CREAT SERPL-MCNC: 4.07 MG/DL (ref 0.6–1.3)
CREAT SERPL-MCNC: 4.88 MG/DL (ref 0.6–1.3)
CREAT SERPL-MCNC: 5.07 MG/DL (ref 0.6–1.3)
CREAT SERPL-MCNC: 5.45 MG/DL (ref 0.6–1.3)
CREAT SERPL-MCNC: 5.92 MG/DL (ref 0.6–1.3)
CREAT SERPL-MCNC: 6.7 MG/DL (ref 0.6–1.3)
CREAT SERPL-MCNC: 7.37 MG/DL (ref 0.6–1.3)
CROSSMATCH RESULT,%XM: NORMAL
CRP SERPL-MCNC: 22.6 MG/DL (ref 0–0.3)
DIAGNOSIS, 93000: NORMAL
DIFFERENTIAL METHOD BLD: ABNORMAL
ECHO AO ASC DIAM: 3.58 CM
ECHO AO ROOT DIAM: 3.08 CM
ECHO AR MAX VEL PISA: 416.72 CM/S
ECHO AV REGURGITANT PHT: 482.9 CM
ECHO IVC PROX: 1.73 CM
ECHO IVC SNIFF: 1.73 CM
ECHO LA MAJOR AXIS: 2.32 CM
ECHO LA MINOR AXIS: 1.16 CM
ECHO LA TO AORTIC ROOT RATIO: 0.75
ECHO LV EDV TEICHHOLZ: 0.5 ML
ECHO LV ESV TEICHHOLZ: 0.25 ML
ECHO LV INTERNAL DIMENSION DIASTOLIC: 4.34 CM (ref 4.2–5.9)
ECHO LV INTERNAL DIMENSION SYSTOLIC: 3.25 CM
ECHO LV IVSD: 1.38 CM (ref 0.6–1)
ECHO LV MASS 2D: 181.7 G (ref 88–224)
ECHO LV MASS INDEX 2D: 91.2 G/M2 (ref 49–115)
ECHO LV POSTERIOR WALL DIASTOLIC: 0.97 CM (ref 0.6–1)
ECHO LVOT DIAM: 2.26 CM
ECHO RA MINOR AXIS: 3.02 CM
EOSINOPHIL # BLD: 0 K/UL (ref 0–0.4)
EOSINOPHIL # BLD: 0.1 K/UL (ref 0–0.4)
EOSINOPHIL # BLD: 0.2 K/UL (ref 0–0.4)
EOSINOPHIL NFR BLD: 0 % (ref 0–5)
EOSINOPHIL NFR BLD: 1 % (ref 0–5)
EOSINOPHIL NFR BLD: 2 % (ref 0–5)
EPITH CASTS URNS QL MICRO: ABNORMAL /LPF (ref 0–5)
EPITH CASTS URNS QL MICRO: ABNORMAL /LPF (ref 0–5)
ERYTHROCYTE [DISTWIDTH] IN BLOOD BY AUTOMATED COUNT: 11.3 % (ref 11.6–14.5)
ERYTHROCYTE [DISTWIDTH] IN BLOOD BY AUTOMATED COUNT: 11.6 % (ref 11.6–14.5)
ERYTHROCYTE [DISTWIDTH] IN BLOOD BY AUTOMATED COUNT: 11.6 % (ref 11.6–14.5)
ERYTHROCYTE [DISTWIDTH] IN BLOOD BY AUTOMATED COUNT: 11.7 % (ref 11.6–14.5)
ERYTHROCYTE [DISTWIDTH] IN BLOOD BY AUTOMATED COUNT: 11.9 % (ref 11.6–14.5)
ERYTHROCYTE [DISTWIDTH] IN BLOOD BY AUTOMATED COUNT: 12 % (ref 11.6–14.5)
ERYTHROCYTE [DISTWIDTH] IN BLOOD BY AUTOMATED COUNT: 12 % (ref 11.6–14.5)
ERYTHROCYTE [DISTWIDTH] IN BLOOD BY AUTOMATED COUNT: 12.1 % (ref 11.6–14.5)
ERYTHROCYTE [DISTWIDTH] IN BLOOD BY AUTOMATED COUNT: 12.2 % (ref 11.6–14.5)
ERYTHROCYTE [DISTWIDTH] IN BLOOD BY AUTOMATED COUNT: 12.3 % (ref 11.6–14.5)
ERYTHROCYTE [DISTWIDTH] IN BLOOD BY AUTOMATED COUNT: 12.3 % (ref 11.6–14.5)
ERYTHROCYTE [DISTWIDTH] IN BLOOD BY AUTOMATED COUNT: 12.4 % (ref 11.6–14.5)
ERYTHROCYTE [DISTWIDTH] IN BLOOD BY AUTOMATED COUNT: 12.7 % (ref 11.6–14.5)
ERYTHROCYTE [DISTWIDTH] IN BLOOD BY AUTOMATED COUNT: 15.3 % (ref 11.6–14.5)
ERYTHROCYTE [DISTWIDTH] IN BLOOD BY AUTOMATED COUNT: 15.7 % (ref 11.6–14.5)
EST. AVERAGE GLUCOSE BLD GHB EST-MCNC: 166 MG/DL
GAS FLOW.O2 O2 DELIVERY SYS: ABNORMAL L/MIN
GAS FLOW.O2 SETTING OXYMISER: 16 BPM
GAS FLOW.O2 SETTING OXYMISER: 20 BPM
GAS FLOW.O2 SETTING OXYMISER: 5 L/M
GLOBULIN SER CALC-MCNC: 3 G/DL (ref 2–4)
GLOBULIN SER CALC-MCNC: 3.3 G/DL (ref 2–4)
GLOBULIN SER CALC-MCNC: 3.4 G/DL (ref 2–4)
GLOBULIN SER CALC-MCNC: 4 G/DL (ref 2–4)
GLOBULIN SER CALC-MCNC: 4.7 G/DL (ref 2–4)
GLUCOSE BLD STRIP.AUTO-MCNC: 101 MG/DL (ref 70–110)
GLUCOSE BLD STRIP.AUTO-MCNC: 111 MG/DL (ref 70–110)
GLUCOSE BLD STRIP.AUTO-MCNC: 114 MG/DL (ref 70–110)
GLUCOSE BLD STRIP.AUTO-MCNC: 114 MG/DL (ref 70–110)
GLUCOSE BLD STRIP.AUTO-MCNC: 115 MG/DL (ref 70–110)
GLUCOSE BLD STRIP.AUTO-MCNC: 115 MG/DL (ref 70–110)
GLUCOSE BLD STRIP.AUTO-MCNC: 116 MG/DL (ref 70–110)
GLUCOSE BLD STRIP.AUTO-MCNC: 116 MG/DL (ref 70–110)
GLUCOSE BLD STRIP.AUTO-MCNC: 117 MG/DL (ref 70–110)
GLUCOSE BLD STRIP.AUTO-MCNC: 118 MG/DL (ref 70–110)
GLUCOSE BLD STRIP.AUTO-MCNC: 119 MG/DL (ref 70–110)
GLUCOSE BLD STRIP.AUTO-MCNC: 120 MG/DL (ref 70–110)
GLUCOSE BLD STRIP.AUTO-MCNC: 122 MG/DL (ref 70–110)
GLUCOSE BLD STRIP.AUTO-MCNC: 123 MG/DL (ref 70–110)
GLUCOSE BLD STRIP.AUTO-MCNC: 125 MG/DL (ref 70–110)
GLUCOSE BLD STRIP.AUTO-MCNC: 126 MG/DL (ref 70–110)
GLUCOSE BLD STRIP.AUTO-MCNC: 127 MG/DL (ref 70–110)
GLUCOSE BLD STRIP.AUTO-MCNC: 128 MG/DL (ref 70–110)
GLUCOSE BLD STRIP.AUTO-MCNC: 131 MG/DL (ref 70–110)
GLUCOSE BLD STRIP.AUTO-MCNC: 132 MG/DL (ref 70–110)
GLUCOSE BLD STRIP.AUTO-MCNC: 136 MG/DL (ref 70–110)
GLUCOSE BLD STRIP.AUTO-MCNC: 137 MG/DL (ref 70–110)
GLUCOSE BLD STRIP.AUTO-MCNC: 137 MG/DL (ref 70–110)
GLUCOSE BLD STRIP.AUTO-MCNC: 138 MG/DL (ref 70–110)
GLUCOSE BLD STRIP.AUTO-MCNC: 138 MG/DL (ref 70–110)
GLUCOSE BLD STRIP.AUTO-MCNC: 140 MG/DL (ref 70–110)
GLUCOSE BLD STRIP.AUTO-MCNC: 140 MG/DL (ref 70–110)
GLUCOSE BLD STRIP.AUTO-MCNC: 144 MG/DL (ref 70–110)
GLUCOSE BLD STRIP.AUTO-MCNC: 145 MG/DL (ref 70–110)
GLUCOSE BLD STRIP.AUTO-MCNC: 146 MG/DL (ref 70–110)
GLUCOSE BLD STRIP.AUTO-MCNC: 146 MG/DL (ref 70–110)
GLUCOSE BLD STRIP.AUTO-MCNC: 147 MG/DL (ref 70–110)
GLUCOSE BLD STRIP.AUTO-MCNC: 148 MG/DL (ref 70–110)
GLUCOSE BLD STRIP.AUTO-MCNC: 150 MG/DL (ref 70–110)
GLUCOSE BLD STRIP.AUTO-MCNC: 151 MG/DL (ref 70–110)
GLUCOSE BLD STRIP.AUTO-MCNC: 152 MG/DL (ref 70–110)
GLUCOSE BLD STRIP.AUTO-MCNC: 153 MG/DL (ref 70–110)
GLUCOSE BLD STRIP.AUTO-MCNC: 153 MG/DL (ref 70–110)
GLUCOSE BLD STRIP.AUTO-MCNC: 154 MG/DL (ref 70–110)
GLUCOSE BLD STRIP.AUTO-MCNC: 155 MG/DL (ref 70–110)
GLUCOSE BLD STRIP.AUTO-MCNC: 157 MG/DL (ref 70–110)
GLUCOSE BLD STRIP.AUTO-MCNC: 158 MG/DL (ref 70–110)
GLUCOSE BLD STRIP.AUTO-MCNC: 160 MG/DL (ref 70–110)
GLUCOSE BLD STRIP.AUTO-MCNC: 160 MG/DL (ref 70–110)
GLUCOSE BLD STRIP.AUTO-MCNC: 161 MG/DL (ref 70–110)
GLUCOSE BLD STRIP.AUTO-MCNC: 163 MG/DL (ref 70–110)
GLUCOSE BLD STRIP.AUTO-MCNC: 165 MG/DL (ref 70–110)
GLUCOSE BLD STRIP.AUTO-MCNC: 166 MG/DL (ref 70–110)
GLUCOSE BLD STRIP.AUTO-MCNC: 168 MG/DL (ref 70–110)
GLUCOSE BLD STRIP.AUTO-MCNC: 171 MG/DL (ref 70–110)
GLUCOSE BLD STRIP.AUTO-MCNC: 171 MG/DL (ref 70–110)
GLUCOSE BLD STRIP.AUTO-MCNC: 174 MG/DL (ref 70–110)
GLUCOSE BLD STRIP.AUTO-MCNC: 174 MG/DL (ref 70–110)
GLUCOSE BLD STRIP.AUTO-MCNC: 175 MG/DL (ref 70–110)
GLUCOSE BLD STRIP.AUTO-MCNC: 176 MG/DL (ref 70–110)
GLUCOSE BLD STRIP.AUTO-MCNC: 178 MG/DL (ref 70–110)
GLUCOSE BLD STRIP.AUTO-MCNC: 208 MG/DL (ref 70–110)
GLUCOSE BLD STRIP.AUTO-MCNC: 225 MG/DL (ref 70–110)
GLUCOSE BLD STRIP.AUTO-MCNC: 227 MG/DL (ref 70–110)
GLUCOSE BLD STRIP.AUTO-MCNC: 230 MG/DL (ref 70–110)
GLUCOSE BLD STRIP.AUTO-MCNC: 234 MG/DL (ref 70–110)
GLUCOSE BLD STRIP.AUTO-MCNC: 237 MG/DL (ref 70–110)
GLUCOSE BLD STRIP.AUTO-MCNC: 242 MG/DL (ref 70–110)
GLUCOSE BLD STRIP.AUTO-MCNC: 247 MG/DL (ref 70–110)
GLUCOSE BLD STRIP.AUTO-MCNC: 248 MG/DL (ref 70–110)
GLUCOSE BLD STRIP.AUTO-MCNC: 250 MG/DL (ref 70–110)
GLUCOSE BLD STRIP.AUTO-MCNC: 260 MG/DL (ref 70–110)
GLUCOSE BLD STRIP.AUTO-MCNC: 88 MG/DL (ref 70–110)
GLUCOSE SERPL-MCNC: 115 MG/DL (ref 74–99)
GLUCOSE SERPL-MCNC: 127 MG/DL (ref 74–99)
GLUCOSE SERPL-MCNC: 129 MG/DL (ref 74–99)
GLUCOSE SERPL-MCNC: 129 MG/DL (ref 74–99)
GLUCOSE SERPL-MCNC: 131 MG/DL (ref 74–99)
GLUCOSE SERPL-MCNC: 132 MG/DL (ref 74–99)
GLUCOSE SERPL-MCNC: 134 MG/DL (ref 74–99)
GLUCOSE SERPL-MCNC: 141 MG/DL (ref 74–99)
GLUCOSE SERPL-MCNC: 143 MG/DL (ref 74–99)
GLUCOSE SERPL-MCNC: 146 MG/DL (ref 74–99)
GLUCOSE SERPL-MCNC: 149 MG/DL (ref 74–99)
GLUCOSE SERPL-MCNC: 155 MG/DL (ref 74–99)
GLUCOSE SERPL-MCNC: 169 MG/DL (ref 74–99)
GLUCOSE SERPL-MCNC: 194 MG/DL (ref 74–99)
GLUCOSE SERPL-MCNC: 203 MG/DL (ref 74–99)
GLUCOSE SERPL-MCNC: 208 MG/DL (ref 74–99)
GLUCOSE SERPL-MCNC: 215 MG/DL (ref 74–99)
GLUCOSE SERPL-MCNC: 314 MG/DL (ref 74–99)
GLUCOSE SERPL-MCNC: 52 MG/DL (ref 74–99)
GLUCOSE SERPL-MCNC: 98 MG/DL (ref 74–99)
GLUCOSE UR STRIP.AUTO-MCNC: NEGATIVE MG/DL
GLUCOSE UR STRIP.AUTO-MCNC: NEGATIVE MG/DL
GRAN CASTS URNS QL MICRO: ABNORMAL /LPF
HBA1C MFR BLD: 7.4 % (ref 4.2–5.6)
HBV SURFACE AB SER QL IA: POSITIVE
HBV SURFACE AB SERPL IA-ACNC: 31.71 MIU/ML
HBV SURFACE AG SER QL: <0.1 INDEX
HBV SURFACE AG SER QL: NEGATIVE
HCO3 BLD-SCNC: 16.2 MMOL/L (ref 22–26)
HCO3 BLD-SCNC: 17 MMOL/L (ref 22–26)
HCO3 BLD-SCNC: 17.3 MMOL/L (ref 22–26)
HCO3 BLD-SCNC: 17.5 MMOL/L (ref 22–26)
HCO3 BLD-SCNC: 18.6 MMOL/L (ref 22–26)
HCO3 BLD-SCNC: 22.6 MMOL/L (ref 22–26)
HCO3 BLD-SCNC: 26.2 MMOL/L (ref 22–26)
HCT VFR BLD AUTO: 14.2 % (ref 36–48)
HCT VFR BLD AUTO: 20 % (ref 36–48)
HCT VFR BLD AUTO: 20.5 % (ref 36–48)
HCT VFR BLD AUTO: 22.5 % (ref 36–48)
HCT VFR BLD AUTO: 24 % (ref 36–48)
HCT VFR BLD AUTO: 24.1 % (ref 36–48)
HCT VFR BLD AUTO: 24.7 % (ref 36–48)
HCT VFR BLD AUTO: 25 % (ref 36–48)
HCT VFR BLD AUTO: 25.5 % (ref 36–48)
HCT VFR BLD AUTO: 27.1 % (ref 36–48)
HCT VFR BLD AUTO: 28.1 % (ref 36–48)
HCT VFR BLD AUTO: 29.8 % (ref 36–48)
HCT VFR BLD AUTO: 30.2 % (ref 36–48)
HCT VFR BLD AUTO: 30.7 % (ref 36–48)
HCT VFR BLD AUTO: 31.5 % (ref 36–48)
HCT VFR BLD AUTO: 31.7 % (ref 36–48)
HCT VFR BLD AUTO: 32.2 % (ref 36–48)
HCT VFR BLD AUTO: 32.5 % (ref 36–48)
HCT VFR BLD AUTO: 36 % (ref 36–48)
HCT VFR BLD AUTO: 40 % (ref 36–48)
HCT VFR BLD AUTO: 40.7 % (ref 36–48)
HCT VFR BLD AUTO: 40.7 % (ref 36–48)
HCT VFR BLD AUTO: 41.4 % (ref 36–48)
HCT VFR BLD AUTO: 42.5 % (ref 36–48)
HEP BS AB COMMENT,HBSAC: NORMAL
HGB BLD-MCNC: 10 G/DL (ref 13–16)
HGB BLD-MCNC: 10.4 G/DL (ref 13–16)
HGB BLD-MCNC: 10.6 G/DL (ref 13–16)
HGB BLD-MCNC: 11.1 G/DL (ref 13–16)
HGB BLD-MCNC: 11.9 G/DL (ref 13–16)
HGB BLD-MCNC: 13.6 G/DL (ref 13–16)
HGB BLD-MCNC: 13.7 G/DL (ref 13–16)
HGB BLD-MCNC: 13.7 G/DL (ref 13–16)
HGB BLD-MCNC: 13.8 G/DL (ref 13–16)
HGB BLD-MCNC: 14.4 G/DL (ref 13–16)
HGB BLD-MCNC: 4.6 G/DL (ref 13–16)
HGB BLD-MCNC: 6.6 G/DL (ref 13–16)
HGB BLD-MCNC: 6.9 G/DL (ref 13–16)
HGB BLD-MCNC: 7.2 G/DL (ref 13–16)
HGB BLD-MCNC: 7.7 G/DL (ref 13–16)
HGB BLD-MCNC: 8 G/DL (ref 13–16)
HGB BLD-MCNC: 8.1 G/DL (ref 13–16)
HGB BLD-MCNC: 8.5 G/DL (ref 13–16)
HGB BLD-MCNC: 9.2 G/DL (ref 13–16)
HGB BLD-MCNC: 9.2 G/DL (ref 13–16)
HGB BLD-MCNC: 9.7 G/DL (ref 13–16)
HGB BLD-MCNC: 9.8 G/DL (ref 13–16)
HGB UR QL STRIP: NEGATIVE
HGB UR QL STRIP: NEGATIVE
HYALINE CASTS URNS QL MICRO: ABNORMAL /LPF (ref 0–2)
INR PPP: 1.2 (ref 0.8–1.2)
INSPIRATION.DURATION SETTING TIME VENT: 0.9 SEC
INSPIRATION.DURATION SETTING TIME VENT: 1.25 SEC
INSPIRATION.DURATION SETTING TIME VENT: 1024 SEC
KETONES UR QL STRIP.AUTO: 15 MG/DL
KETONES UR QL STRIP.AUTO: ABNORMAL MG/DL
LACTATE SERPL-SCNC: 0.4 MMOL/L (ref 0.4–2)
LACTATE SERPL-SCNC: 0.8 MMOL/L (ref 0.4–2)
LACTATE SERPL-SCNC: 1.4 MMOL/L (ref 0.4–2)
LEUKOCYTE ESTERASE UR QL STRIP.AUTO: ABNORMAL
LEUKOCYTE ESTERASE UR QL STRIP.AUTO: NEGATIVE
LIPASE SERPL-CCNC: 409 U/L (ref 73–393)
LIPASE SERPL-CCNC: 528 U/L (ref 73–393)
LIPASE SERPL-CCNC: 882 U/L (ref 73–393)
LVFS 2D: 25.28 %
LVSV (TEICH): 20.99 ML
LYMPHOCYTES # BLD: 0.4 K/UL (ref 0.9–3.6)
LYMPHOCYTES # BLD: 0.5 K/UL (ref 0.9–3.6)
LYMPHOCYTES # BLD: 0.6 K/UL (ref 0.9–3.6)
LYMPHOCYTES # BLD: 0.7 K/UL (ref 0.9–3.6)
LYMPHOCYTES # BLD: 0.8 K/UL (ref 0.9–3.6)
LYMPHOCYTES # BLD: 0.9 K/UL (ref 0.9–3.6)
LYMPHOCYTES # BLD: 1.3 K/UL (ref 0.9–3.6)
LYMPHOCYTES NFR BLD: 10 % (ref 21–52)
LYMPHOCYTES NFR BLD: 13 % (ref 21–52)
LYMPHOCYTES NFR BLD: 13 % (ref 21–52)
LYMPHOCYTES NFR BLD: 16 % (ref 21–52)
LYMPHOCYTES NFR BLD: 4 % (ref 21–52)
LYMPHOCYTES NFR BLD: 5 % (ref 21–52)
LYMPHOCYTES NFR BLD: 6 % (ref 21–52)
LYMPHOCYTES NFR BLD: 7 % (ref 21–52)
LYMPHOCYTES NFR BLD: 7 % (ref 21–52)
LYMPHOCYTES NFR BLD: 8 % (ref 21–52)
LYMPHOCYTES NFR BLD: 9 % (ref 21–52)
MAGNESIUM SERPL-MCNC: 1.4 MG/DL (ref 1.6–2.6)
MAGNESIUM SERPL-MCNC: 1.6 MG/DL (ref 1.6–2.6)
MAGNESIUM SERPL-MCNC: 1.8 MG/DL (ref 1.6–2.6)
MAGNESIUM SERPL-MCNC: 1.8 MG/DL (ref 1.6–2.6)
MAGNESIUM SERPL-MCNC: 2.2 MG/DL (ref 1.6–2.6)
MAGNESIUM SERPL-MCNC: 2.3 MG/DL (ref 1.6–2.6)
MCH RBC QN AUTO: 29.5 PG (ref 24–34)
MCH RBC QN AUTO: 30.1 PG (ref 24–34)
MCH RBC QN AUTO: 31.7 PG (ref 24–34)
MCH RBC QN AUTO: 31.8 PG (ref 24–34)
MCH RBC QN AUTO: 31.9 PG (ref 24–34)
MCH RBC QN AUTO: 31.9 PG (ref 24–34)
MCH RBC QN AUTO: 32 PG (ref 24–34)
MCH RBC QN AUTO: 32.1 PG (ref 24–34)
MCH RBC QN AUTO: 32.3 PG (ref 24–34)
MCH RBC QN AUTO: 32.4 PG (ref 24–34)
MCH RBC QN AUTO: 32.6 PG (ref 24–34)
MCH RBC QN AUTO: 32.6 PG (ref 24–34)
MCH RBC QN AUTO: 32.7 PG (ref 24–34)
MCH RBC QN AUTO: 32.7 PG (ref 24–34)
MCH RBC QN AUTO: 32.8 PG (ref 24–34)
MCH RBC QN AUTO: 32.9 PG (ref 24–34)
MCH RBC QN AUTO: 33 PG (ref 24–34)
MCH RBC QN AUTO: 33.2 PG (ref 24–34)
MCH RBC QN AUTO: 33.6 PG (ref 24–34)
MCHC RBC AUTO-ENTMCNC: 30.6 G/DL (ref 31–37)
MCHC RBC AUTO-ENTMCNC: 30.9 G/DL (ref 31–37)
MCHC RBC AUTO-ENTMCNC: 31.4 G/DL (ref 31–37)
MCHC RBC AUTO-ENTMCNC: 31.8 G/DL (ref 31–37)
MCHC RBC AUTO-ENTMCNC: 31.9 G/DL (ref 31–37)
MCHC RBC AUTO-ENTMCNC: 32 G/DL (ref 31–37)
MCHC RBC AUTO-ENTMCNC: 32.4 G/DL (ref 31–37)
MCHC RBC AUTO-ENTMCNC: 32.4 G/DL (ref 31–37)
MCHC RBC AUTO-ENTMCNC: 32.7 G/DL (ref 31–37)
MCHC RBC AUTO-ENTMCNC: 32.9 G/DL (ref 31–37)
MCHC RBC AUTO-ENTMCNC: 33 G/DL (ref 31–37)
MCHC RBC AUTO-ENTMCNC: 33.1 G/DL (ref 31–37)
MCHC RBC AUTO-ENTMCNC: 33.1 G/DL (ref 31–37)
MCHC RBC AUTO-ENTMCNC: 33.3 G/DL (ref 31–37)
MCHC RBC AUTO-ENTMCNC: 33.3 G/DL (ref 31–37)
MCHC RBC AUTO-ENTMCNC: 33.7 G/DL (ref 31–37)
MCHC RBC AUTO-ENTMCNC: 33.9 G/DL (ref 31–37)
MCHC RBC AUTO-ENTMCNC: 34 G/DL (ref 31–37)
MCHC RBC AUTO-ENTMCNC: 34.2 G/DL (ref 31–37)
MCV RBC AUTO: 100 FL (ref 74–97)
MCV RBC AUTO: 100 FL (ref 74–97)
MCV RBC AUTO: 101.3 FL (ref 74–97)
MCV RBC AUTO: 101.5 FL (ref 74–97)
MCV RBC AUTO: 101.6 FL (ref 74–97)
MCV RBC AUTO: 105.7 FL (ref 74–97)
MCV RBC AUTO: 106 FL (ref 74–97)
MCV RBC AUTO: 90.2 FL (ref 74–97)
MCV RBC AUTO: 91 FL (ref 74–97)
MCV RBC AUTO: 94.9 FL (ref 74–97)
MCV RBC AUTO: 96.4 FL (ref 74–97)
MCV RBC AUTO: 96.6 FL (ref 74–97)
MCV RBC AUTO: 97.3 FL (ref 74–97)
MCV RBC AUTO: 98.3 FL (ref 74–97)
MCV RBC AUTO: 98.4 FL (ref 74–97)
MCV RBC AUTO: 98.5 FL (ref 74–97)
MCV RBC AUTO: 98.7 FL (ref 74–97)
MCV RBC AUTO: 98.9 FL (ref 74–97)
MCV RBC AUTO: 98.9 FL (ref 74–97)
MCV RBC AUTO: 99.2 FL (ref 74–97)
MCV RBC AUTO: 99.2 FL (ref 74–97)
MCV RBC AUTO: 99.6 FL (ref 74–97)
MCV RBC AUTO: 99.8 FL (ref 74–97)
MONOCYTES # BLD: 0.2 K/UL (ref 0.05–1.2)
MONOCYTES # BLD: 0.3 K/UL (ref 0.05–1.2)
MONOCYTES # BLD: 0.3 K/UL (ref 0.05–1.2)
MONOCYTES # BLD: 0.4 K/UL (ref 0.05–1.2)
MONOCYTES # BLD: 0.4 K/UL (ref 0.05–1.2)
MONOCYTES # BLD: 0.5 K/UL (ref 0.05–1.2)
MONOCYTES # BLD: 0.5 K/UL (ref 0.05–1.2)
MONOCYTES # BLD: 0.7 K/UL (ref 0.05–1.2)
MONOCYTES # BLD: 0.7 K/UL (ref 0.05–1.2)
MONOCYTES # BLD: 0.8 K/UL (ref 0.05–1.2)
MONOCYTES # BLD: 0.9 K/UL (ref 0.05–1.2)
MONOCYTES # BLD: 1 K/UL (ref 0.05–1.2)
MONOCYTES # BLD: 1.1 K/UL (ref 0.05–1.2)
MONOCYTES # BLD: 1.2 K/UL (ref 0.05–1.2)
MONOCYTES # BLD: 1.2 K/UL (ref 0.05–1.2)
MONOCYTES # BLD: 1.3 K/UL (ref 0.05–1.2)
MONOCYTES # BLD: 1.3 K/UL (ref 0.05–1.2)
MONOCYTES # BLD: 1.4 K/UL (ref 0.05–1.2)
MONOCYTES # BLD: 1.5 K/UL (ref 0.05–1.2)
MONOCYTES NFR BLD: 10 % (ref 3–10)
MONOCYTES NFR BLD: 11 % (ref 3–10)
MONOCYTES NFR BLD: 12 % (ref 3–10)
MONOCYTES NFR BLD: 16 % (ref 3–10)
MONOCYTES NFR BLD: 17 % (ref 3–10)
MONOCYTES NFR BLD: 19 % (ref 3–10)
MONOCYTES NFR BLD: 2 % (ref 3–10)
MONOCYTES NFR BLD: 2 % (ref 3–10)
MONOCYTES NFR BLD: 20 % (ref 3–10)
MONOCYTES NFR BLD: 3 % (ref 3–10)
MONOCYTES NFR BLD: 4 % (ref 3–10)
MONOCYTES NFR BLD: 4 % (ref 3–10)
MONOCYTES NFR BLD: 5 % (ref 3–10)
MONOCYTES NFR BLD: 6 % (ref 3–10)
MONOCYTES NFR BLD: 7 % (ref 3–10)
MONOCYTES NFR BLD: 8 % (ref 3–10)
MONOCYTES NFR BLD: 8 % (ref 3–10)
MONOCYTES NFR BLD: 9 % (ref 3–10)
MONOCYTES NFR BLD: 9 % (ref 3–10)
NEUTS BAND NFR BLD MANUAL: 6 %
NEUTS SEG # BLD: 10.3 K/UL (ref 1.8–8)
NEUTS SEG # BLD: 10.5 K/UL (ref 1.8–8)
NEUTS SEG # BLD: 11.2 K/UL (ref 1.8–8)
NEUTS SEG # BLD: 11.4 K/UL (ref 1.8–8)
NEUTS SEG # BLD: 11.4 K/UL (ref 1.8–8)
NEUTS SEG # BLD: 13.4 K/UL (ref 1.8–8)
NEUTS SEG # BLD: 19.4 K/UL (ref 1.8–8)
NEUTS SEG # BLD: 3.4 K/UL (ref 1.8–8)
NEUTS SEG # BLD: 3.9 K/UL (ref 1.8–8)
NEUTS SEG # BLD: 5.4 K/UL (ref 1.8–8)
NEUTS SEG # BLD: 5.7 K/UL (ref 1.8–8)
NEUTS SEG # BLD: 6.1 K/UL (ref 1.8–8)
NEUTS SEG # BLD: 6.5 K/UL (ref 1.8–8)
NEUTS SEG # BLD: 6.6 K/UL (ref 1.8–8)
NEUTS SEG # BLD: 7 K/UL (ref 1.8–8)
NEUTS SEG # BLD: 7.1 K/UL (ref 1.8–8)
NEUTS SEG # BLD: 7.7 K/UL (ref 1.8–8)
NEUTS SEG # BLD: 8.3 K/UL (ref 1.8–8)
NEUTS SEG # BLD: 9.1 K/UL (ref 1.8–8)
NEUTS SEG NFR BLD: 65 % (ref 40–73)
NEUTS SEG NFR BLD: 67 % (ref 40–73)
NEUTS SEG NFR BLD: 75 % (ref 40–73)
NEUTS SEG NFR BLD: 75 % (ref 40–73)
NEUTS SEG NFR BLD: 78 % (ref 40–73)
NEUTS SEG NFR BLD: 83 % (ref 40–73)
NEUTS SEG NFR BLD: 84 % (ref 40–73)
NEUTS SEG NFR BLD: 84 % (ref 40–73)
NEUTS SEG NFR BLD: 85 % (ref 40–73)
NEUTS SEG NFR BLD: 85 % (ref 40–73)
NEUTS SEG NFR BLD: 86 % (ref 40–73)
NEUTS SEG NFR BLD: 87 % (ref 40–73)
NEUTS SEG NFR BLD: 90 % (ref 40–73)
NEUTS SEG NFR BLD: 91 % (ref 40–73)
NEUTS SEG NFR BLD: 91 % (ref 40–73)
NEUTS SEG NFR BLD: 94 % (ref 40–73)
NEUTS SEG NFR BLD: 94 % (ref 40–73)
NITRIC:PPM ISTAT,INITR: 0 PPM
NITRIC:PPM ISTAT,INITR: 0 PPM
NITRITE UR QL STRIP.AUTO: NEGATIVE
NITRITE UR QL STRIP.AUTO: NEGATIVE
O2/TOTAL GAS SETTING VFR VENT: 0.3 %
O2/TOTAL GAS SETTING VFR VENT: 100 %
O2/TOTAL GAS SETTING VFR VENT: 40 %
O2/TOTAL GAS SETTING VFR VENT: 80 %
P-R INTERVAL, ECG05: 150 MS
P-R INTERVAL, ECG05: 156 MS
PCO2 BLD: 34 MMHG (ref 35–45)
PCO2 BLD: 36.6 MMHG (ref 35–45)
PCO2 BLD: 38.6 MMHG (ref 35–45)
PCO2 BLD: 41.2 MMHG (ref 35–45)
PCO2 BLD: 41.8 MMHG (ref 35–45)
PCO2 BLD: 77.8 MMHG (ref 35–45)
PCO2 BLD: 92 MMHG (ref 35–45)
PEEP RESPIRATORY: 8 CMH2O
PH BLD: 7 [PH] (ref 7.35–7.45)
PH BLD: 7.13 [PH] (ref 7.35–7.45)
PH BLD: 7.2 [PH] (ref 7.35–7.45)
PH BLD: 7.24 [PH] (ref 7.35–7.45)
PH BLD: 7.25 [PH] (ref 7.35–7.45)
PH BLD: 7.28 [PH] (ref 7.35–7.45)
PH BLD: 7.35 [PH] (ref 7.35–7.45)
PH UR STRIP: 5 [PH] (ref 5–8)
PH UR STRIP: 5 [PH] (ref 5–8)
PHOSPHATE SERPL-MCNC: 1.2 MG/DL (ref 2.5–4.9)
PHOSPHATE SERPL-MCNC: 2.4 MG/DL (ref 2.5–4.9)
PHOSPHATE SERPL-MCNC: 4 MG/DL (ref 2.5–4.9)
PHOSPHATE SERPL-MCNC: 4.3 MG/DL (ref 2.5–4.9)
PHOSPHATE SERPL-MCNC: 5.9 MG/DL (ref 2.5–4.9)
PHOSPHATE SERPL-MCNC: 6.4 MG/DL (ref 2.5–4.9)
PHOSPHATE SERPL-MCNC: 6.4 MG/DL (ref 2.5–4.9)
PHOSPHATE SERPL-MCNC: 6.9 MG/DL (ref 2.5–4.9)
PHOSPHATE SERPL-MCNC: 7.1 MG/DL (ref 2.5–4.9)
PHOSPHATE SERPL-MCNC: 8.1 MG/DL (ref 2.5–4.9)
PIP ISTAT,IPIP: 25
PIP ISTAT,IPIP: 27
PIP ISTAT,IPIP: 29
PIP ISTAT,IPIP: 34
PIP ISTAT,IPIP: 35
PISA AR MAX VEL: 416.72 CM/S
PLATELET # BLD AUTO: 150 K/UL (ref 135–420)
PLATELET # BLD AUTO: 176 K/UL (ref 135–420)
PLATELET # BLD AUTO: 183 K/UL (ref 135–420)
PLATELET # BLD AUTO: 184 K/UL (ref 135–420)
PLATELET # BLD AUTO: 188 K/UL (ref 135–420)
PLATELET # BLD AUTO: 193 K/UL (ref 135–420)
PLATELET # BLD AUTO: 209 K/UL (ref 135–420)
PLATELET # BLD AUTO: 210 K/UL (ref 135–420)
PLATELET # BLD AUTO: 212 K/UL (ref 135–420)
PLATELET # BLD AUTO: 213 K/UL (ref 135–420)
PLATELET # BLD AUTO: 219 K/UL (ref 135–420)
PLATELET # BLD AUTO: 224 K/UL (ref 135–420)
PLATELET # BLD AUTO: 229 K/UL (ref 135–420)
PLATELET # BLD AUTO: 262 K/UL (ref 135–420)
PLATELET # BLD AUTO: 281 K/UL (ref 135–420)
PLATELET # BLD AUTO: 283 K/UL (ref 135–420)
PLATELET # BLD AUTO: 285 K/UL (ref 135–420)
PLATELET # BLD AUTO: 294 K/UL (ref 135–420)
PLATELET # BLD AUTO: 320 K/UL (ref 135–420)
PLATELET # BLD AUTO: 331 K/UL (ref 135–420)
PLATELET # BLD AUTO: 356 K/UL (ref 135–420)
PLATELET # BLD AUTO: 383 K/UL (ref 135–420)
PLATELET # BLD AUTO: 398 K/UL (ref 135–420)
PLATELET COMMENTS,PCOM: ABNORMAL
PLATELET COMMENTS,PCOM: ADEQUATE
PMV BLD AUTO: 10 FL (ref 9.2–11.8)
PMV BLD AUTO: 10.1 FL (ref 9.2–11.8)
PMV BLD AUTO: 10.2 FL (ref 9.2–11.8)
PMV BLD AUTO: 10.2 FL (ref 9.2–11.8)
PMV BLD AUTO: 10.5 FL (ref 9.2–11.8)
PMV BLD AUTO: 10.7 FL (ref 9.2–11.8)
PMV BLD AUTO: 10.8 FL (ref 9.2–11.8)
PMV BLD AUTO: 11 FL (ref 9.2–11.8)
PMV BLD AUTO: 11.1 FL (ref 9.2–11.8)
PMV BLD AUTO: 11.2 FL (ref 9.2–11.8)
PMV BLD AUTO: 11.3 FL (ref 9.2–11.8)
PMV BLD AUTO: 11.4 FL (ref 9.2–11.8)
PMV BLD AUTO: 9.7 FL (ref 9.2–11.8)
PMV BLD AUTO: 9.9 FL (ref 9.2–11.8)
PO2 BLD: 105 MMHG (ref 80–100)
PO2 BLD: 110 MMHG (ref 80–100)
PO2 BLD: 117 MMHG (ref 80–100)
PO2 BLD: 117 MMHG (ref 80–100)
PO2 BLD: 138 MMHG (ref 80–100)
PO2 BLD: 75 MMHG (ref 80–100)
PO2 BLD: 90 MMHG (ref 80–100)
POTASSIUM SERPL-SCNC: 3.3 MMOL/L (ref 3.5–5.5)
POTASSIUM SERPL-SCNC: 3.4 MMOL/L (ref 3.5–5.5)
POTASSIUM SERPL-SCNC: 3.5 MMOL/L (ref 3.5–5.5)
POTASSIUM SERPL-SCNC: 3.5 MMOL/L (ref 3.5–5.5)
POTASSIUM SERPL-SCNC: 3.6 MMOL/L (ref 3.5–5.5)
POTASSIUM SERPL-SCNC: 3.7 MMOL/L (ref 3.5–5.5)
POTASSIUM SERPL-SCNC: 3.9 MMOL/L (ref 3.5–5.5)
POTASSIUM SERPL-SCNC: 4 MMOL/L (ref 3.5–5.5)
POTASSIUM SERPL-SCNC: 4 MMOL/L (ref 3.5–5.5)
POTASSIUM SERPL-SCNC: 4.2 MMOL/L (ref 3.5–5.5)
POTASSIUM SERPL-SCNC: 4.2 MMOL/L (ref 3.5–5.5)
POTASSIUM SERPL-SCNC: 4.3 MMOL/L (ref 3.5–5.5)
POTASSIUM SERPL-SCNC: 4.4 MMOL/L (ref 3.5–5.5)
POTASSIUM SERPL-SCNC: 4.7 MMOL/L (ref 3.5–5.5)
POTASSIUM SERPL-SCNC: 4.8 MMOL/L (ref 3.5–5.5)
POTASSIUM SERPL-SCNC: 4.8 MMOL/L (ref 3.5–5.5)
POTASSIUM SERPL-SCNC: 5 MMOL/L (ref 3.5–5.5)
POTASSIUM SERPL-SCNC: 5.1 MMOL/L (ref 3.5–5.5)
POTASSIUM SERPL-SCNC: 5.1 MMOL/L (ref 3.5–5.5)
POTASSIUM SERPL-SCNC: 5.2 MMOL/L (ref 3.5–5.5)
PREALB SERPL-MCNC: 7.3 MG/DL (ref 20–40)
PROCALCITONIN SERPL-MCNC: 24.01 NG/ML
PROT SERPL-MCNC: 4.8 G/DL (ref 6.4–8.2)
PROT SERPL-MCNC: 4.9 G/DL (ref 6.4–8.2)
PROT SERPL-MCNC: 5 G/DL (ref 6.4–8.2)
PROT SERPL-MCNC: 6.3 G/DL (ref 6.4–8.2)
PROT SERPL-MCNC: 9 G/DL (ref 6.4–8.2)
PROT UR STRIP-MCNC: 100 MG/DL
PROT UR STRIP-MCNC: 300 MG/DL
PROTHROMBIN TIME: 15.1 SEC (ref 11.5–15.2)
Q-T INTERVAL, ECG07: 278 MS
Q-T INTERVAL, ECG07: 340 MS
Q-T INTERVAL, ECG07: 380 MS
QRS DURATION, ECG06: 88 MS
QRS DURATION, ECG06: 88 MS
QRS DURATION, ECG06: 90 MS
QTC CALCULATION (BEZET), ECG08: 447 MS
QTC CALCULATION (BEZET), ECG08: 459 MS
QTC CALCULATION (BEZET), ECG08: 468 MS
RBC # BLD AUTO: 1.56 M/UL (ref 4.7–5.5)
RBC # BLD AUTO: 2.16 M/UL (ref 4.7–5.5)
RBC # BLD AUTO: 2.26 M/UL (ref 4.7–5.5)
RBC # BLD AUTO: 2.43 M/UL (ref 4.7–5.5)
RBC # BLD AUTO: 2.49 M/UL (ref 4.7–5.5)
RBC # BLD AUTO: 2.5 M/UL (ref 4.7–5.5)
RBC # BLD AUTO: 2.51 M/UL (ref 4.7–5.5)
RBC # BLD AUTO: 2.66 M/UL (ref 4.7–5.5)
RBC # BLD AUTO: 2.67 M/UL (ref 4.7–5.5)
RBC # BLD AUTO: 2.81 M/UL (ref 4.7–5.5)
RBC # BLD AUTO: 2.84 M/UL (ref 4.7–5.5)
RBC # BLD AUTO: 3 M/UL (ref 4.7–5.5)
RBC # BLD AUTO: 3.03 M/UL (ref 4.7–5.5)
RBC # BLD AUTO: 3.07 M/UL (ref 4.7–5.5)
RBC # BLD AUTO: 3.19 M/UL (ref 4.7–5.5)
RBC # BLD AUTO: 3.22 M/UL (ref 4.7–5.5)
RBC # BLD AUTO: 3.37 M/UL (ref 4.7–5.5)
RBC # BLD AUTO: 3.64 M/UL (ref 4.7–5.5)
RBC # BLD AUTO: 4.08 M/UL (ref 4.7–5.5)
RBC # BLD AUTO: 4.13 M/UL (ref 4.7–5.5)
RBC # BLD AUTO: 4.14 M/UL (ref 4.7–5.5)
RBC # BLD AUTO: 4.21 M/UL (ref 4.7–5.5)
RBC # BLD AUTO: 4.37 M/UL (ref 4.7–5.5)
RBC #/AREA URNS HPF: ABNORMAL /HPF (ref 0–5)
RBC #/AREA URNS HPF: NEGATIVE /HPF (ref 0–5)
RBC MORPH BLD: ABNORMAL
SAO2 % BLD: 89 % (ref 92–97)
SAO2 % BLD: 90 % (ref 92–97)
SAO2 % BLD: 96 % (ref 92–97)
SAO2 % BLD: 97 % (ref 92–97)
SAO2 % BLD: 98 % (ref 92–97)
SAO2 % BLD: 98 % (ref 92–97)
SAO2 % BLD: 99 % (ref 92–97)
SARS-COV-2, COV2NT: NOT DETECTED
SERVICE CMNT-IMP: ABNORMAL
SERVICE CMNT-IMP: NORMAL
SODIUM SERPL-SCNC: 134 MMOL/L (ref 136–145)
SODIUM SERPL-SCNC: 139 MMOL/L (ref 136–145)
SODIUM SERPL-SCNC: 140 MMOL/L (ref 136–145)
SODIUM SERPL-SCNC: 141 MMOL/L (ref 136–145)
SODIUM SERPL-SCNC: 142 MMOL/L (ref 136–145)
SODIUM SERPL-SCNC: 143 MMOL/L (ref 136–145)
SODIUM SERPL-SCNC: 144 MMOL/L (ref 136–145)
SODIUM SERPL-SCNC: 145 MMOL/L (ref 136–145)
SOURCE, COVRS: NORMAL
SOURCE, COVRS: NORMAL
SP GR UR REFRACTOMETRY: 1.02 (ref 1–1.03)
SP GR UR REFRACTOMETRY: 1.03 (ref 1–1.03)
SPECIMEN EXP DATE BLD: NORMAL
SPECIMEN TYPE: ABNORMAL
STATUS OF UNIT,%ST: NORMAL
TOTAL RESP. RATE, ITRR: 16
TOTAL RESP. RATE, ITRR: 17
TOTAL RESP. RATE, ITRR: 20
TOTAL RESP. RATE, ITRR: 32
TROPONIN I SERPL-MCNC: 0.05 NG/ML (ref 0–0.04)
TROPONIN I SERPL-MCNC: 0.06 NG/ML (ref 0–0.04)
TROPONIN I SERPL-MCNC: 0.07 NG/ML (ref 0–0.04)
TROPONIN I SERPL-MCNC: 0.08 NG/ML (ref 0–0.04)
TROPONIN I SERPL-MCNC: 0.08 NG/ML (ref 0–0.04)
TROPONIN I SERPL-MCNC: 0.09 NG/ML (ref 0–0.04)
TROPONIN I SERPL-MCNC: 0.13 NG/ML (ref 0–0.04)
TROPONIN I SERPL-MCNC: 0.17 NG/ML (ref 0–0.04)
TROPONIN I SERPL-MCNC: NORMAL NG/ML (ref 0–0.04)
UNIT DIVISION, %UDIV: 0
UROBILINOGEN UR QL STRIP.AUTO: 1 EU/DL (ref 0.2–1)
UROBILINOGEN UR QL STRIP.AUTO: 1 EU/DL (ref 0.2–1)
VENTILATION MODE VENT: ABNORMAL
VENTRICULAR RATE, ECG03: 104 BPM
VENTRICULAR RATE, ECG03: 171 BPM
VENTRICULAR RATE, ECG03: 88 BPM
VOLUME CONTROL PLUS IVLCP: YES
VT SETTING VENT: 500 ML
WBC # BLD AUTO: 10 K/UL (ref 4.6–13.2)
WBC # BLD AUTO: 10.2 K/UL (ref 4.6–13.2)
WBC # BLD AUTO: 10.6 K/UL (ref 4.6–13.2)
WBC # BLD AUTO: 11.5 K/UL (ref 4.6–13.2)
WBC # BLD AUTO: 11.8 K/UL (ref 4.6–13.2)
WBC # BLD AUTO: 12.2 K/UL (ref 4.6–13.2)
WBC # BLD AUTO: 13.3 K/UL (ref 4.6–13.2)
WBC # BLD AUTO: 13.3 K/UL (ref 4.6–13.2)
WBC # BLD AUTO: 14.3 K/UL (ref 4.6–13.2)
WBC # BLD AUTO: 21.6 K/UL (ref 4.6–13.2)
WBC # BLD AUTO: 5.3 K/UL (ref 4.6–13.2)
WBC # BLD AUTO: 5.9 K/UL (ref 4.6–13.2)
WBC # BLD AUTO: 6.5 K/UL (ref 4.6–13.2)
WBC # BLD AUTO: 7.3 K/UL (ref 4.6–13.2)
WBC # BLD AUTO: 7.4 K/UL (ref 4.6–13.2)
WBC # BLD AUTO: 7.7 K/UL (ref 4.6–13.2)
WBC # BLD AUTO: 7.9 K/UL (ref 4.6–13.2)
WBC # BLD AUTO: 8 K/UL (ref 4.6–13.2)
WBC # BLD AUTO: 8.4 K/UL (ref 4.6–13.2)
WBC # BLD AUTO: 8.6 K/UL (ref 4.6–13.2)
WBC # BLD AUTO: 8.7 K/UL (ref 4.6–13.2)
WBC # BLD AUTO: 9.4 K/UL (ref 4.6–13.2)
WBC # BLD AUTO: 9.9 K/UL (ref 4.6–13.2)
WBC URNS QL MICRO: ABNORMAL /HPF (ref 0–4)
WBC URNS QL MICRO: ABNORMAL /HPF (ref 0–4)

## 2020-01-01 PROCEDURE — 74011636637 HC RX REV CODE- 636/637: Performed by: SURGERY

## 2020-01-01 PROCEDURE — 85018 HEMOGLOBIN: CPT

## 2020-01-01 PROCEDURE — 77030018842 HC SOL IRR SOD CL 9% BAXT -A: Performed by: SURGERY

## 2020-01-01 PROCEDURE — 94762 N-INVAS EAR/PLS OXIMTRY CONT: CPT

## 2020-01-01 PROCEDURE — 82803 BLOOD GASES ANY COMBINATION: CPT

## 2020-01-01 PROCEDURE — 77030040829 HC CATH EXT URINE MDII -B

## 2020-01-01 PROCEDURE — 93005 ELECTROCARDIOGRAM TRACING: CPT

## 2020-01-01 PROCEDURE — 71045 X-RAY EXAM CHEST 1 VIEW: CPT

## 2020-01-01 PROCEDURE — 77030008771 HC TU NG SALEM SUMP -A

## 2020-01-01 PROCEDURE — 74011250636 HC RX REV CODE- 250/636: Performed by: INTERNAL MEDICINE

## 2020-01-01 PROCEDURE — 0DNU0ZZ RELEASE OMENTUM, OPEN APPROACH: ICD-10-PCS | Performed by: SURGERY

## 2020-01-01 PROCEDURE — 74011250636 HC RX REV CODE- 250/636: Performed by: PHYSICIAN ASSISTANT

## 2020-01-01 PROCEDURE — 77030008477 HC STYL SATN SLP COVD -A: Performed by: NURSE ANESTHETIST, CERTIFIED REGISTERED

## 2020-01-01 PROCEDURE — 74011000258 HC RX REV CODE- 258: Performed by: NURSE ANESTHETIST, CERTIFIED REGISTERED

## 2020-01-01 PROCEDURE — 83735 ASSAY OF MAGNESIUM: CPT

## 2020-01-01 PROCEDURE — 77030040361 HC SLV COMPR DVT MDII -B

## 2020-01-01 PROCEDURE — 36600 WITHDRAWAL OF ARTERIAL BLOOD: CPT

## 2020-01-01 PROCEDURE — 80069 RENAL FUNCTION PANEL: CPT

## 2020-01-01 PROCEDURE — HHS10554 SHAMPOO/BODY WASH 8 OZ ALOE VESTA

## 2020-01-01 PROCEDURE — 65610000006 HC RM INTENSIVE CARE

## 2020-01-01 PROCEDURE — 74011636637 HC RX REV CODE- 636/637: Performed by: PHYSICIAN ASSISTANT

## 2020-01-01 PROCEDURE — 0YHH33Z INSERTION OF INFUSION DEVICE INTO RIGHT LOWER LEG, PERCUTANEOUS APPROACH: ICD-10-PCS | Performed by: EMERGENCY MEDICINE

## 2020-01-01 PROCEDURE — 74011000258 HC RX REV CODE- 258: Performed by: INTERNAL MEDICINE

## 2020-01-01 PROCEDURE — 80048 BASIC METABOLIC PNL TOTAL CA: CPT

## 2020-01-01 PROCEDURE — 84100 ASSAY OF PHOSPHORUS: CPT

## 2020-01-01 PROCEDURE — 74011000250 HC RX REV CODE- 250: Performed by: EMERGENCY MEDICINE

## 2020-01-01 PROCEDURE — 0651 HSPC ROUTINE HOME CARE

## 2020-01-01 PROCEDURE — 74011000258 HC RX REV CODE- 258: Performed by: SURGERY

## 2020-01-01 PROCEDURE — 5A1955Z RESPIRATORY VENTILATION, GREATER THAN 96 CONSECUTIVE HOURS: ICD-10-PCS | Performed by: HOSPITALIST

## 2020-01-01 PROCEDURE — 74011000250 HC RX REV CODE- 250: Performed by: INTERNAL MEDICINE

## 2020-01-01 PROCEDURE — 77010033678 HC OXYGEN DAILY

## 2020-01-01 PROCEDURE — 64450 NJX AA&/STRD OTHER PN/BRANCH: CPT | Performed by: ANESTHESIOLOGY

## 2020-01-01 PROCEDURE — 85027 COMPLETE CBC AUTOMATED: CPT

## 2020-01-01 PROCEDURE — 36573 INSJ PICC RS&I 5 YR+: CPT

## 2020-01-01 PROCEDURE — T4541 LARGE DISPOSABLE UNDERPAD: HCPCS

## 2020-01-01 PROCEDURE — 80053 COMPREHEN METABOLIC PANEL: CPT

## 2020-01-01 PROCEDURE — 82550 ASSAY OF CK (CPK): CPT

## 2020-01-01 PROCEDURE — 85025 COMPLETE CBC W/AUTO DIFF WBC: CPT

## 2020-01-01 PROCEDURE — 94640 AIRWAY INHALATION TREATMENT: CPT

## 2020-01-01 PROCEDURE — 74011250636 HC RX REV CODE- 250/636: Performed by: HOSPITALIST

## 2020-01-01 PROCEDURE — 0BH17EZ INSERTION OF ENDOTRACHEAL AIRWAY INTO TRACHEA, VIA NATURAL OR ARTIFICIAL OPENING: ICD-10-PCS | Performed by: EMERGENCY MEDICINE

## 2020-01-01 PROCEDURE — 74011636637 HC RX REV CODE- 636/637: Performed by: INTERNAL MEDICINE

## 2020-01-01 PROCEDURE — 86140 C-REACTIVE PROTEIN: CPT

## 2020-01-01 PROCEDURE — A6216 NON-STERILE GAUZE<=16 SQ IN: HCPCS

## 2020-01-01 PROCEDURE — 65660000000 HC RM CCU STEPDOWN

## 2020-01-01 PROCEDURE — 74011250636 HC RX REV CODE- 250/636: Performed by: EMERGENCY MEDICINE

## 2020-01-01 PROCEDURE — 84484 ASSAY OF TROPONIN QUANT: CPT

## 2020-01-01 PROCEDURE — 74011250637 HC RX REV CODE- 250/637: Performed by: INTERNAL MEDICINE

## 2020-01-01 PROCEDURE — 94003 VENT MGMT INPAT SUBQ DAY: CPT

## 2020-01-01 PROCEDURE — 87635 SARS-COV-2 COVID-19 AMP PRB: CPT

## 2020-01-01 PROCEDURE — 86900 BLOOD TYPING SEROLOGIC ABO: CPT

## 2020-01-01 PROCEDURE — 74011250637 HC RX REV CODE- 250/637: Performed by: HOSPITALIST

## 2020-01-01 PROCEDURE — HOSPICE MEDICATION HC HH HOSPICE MEDICATION

## 2020-01-01 PROCEDURE — 74011000250 HC RX REV CODE- 250: Performed by: HOSPITALIST

## 2020-01-01 PROCEDURE — 90935 HEMODIALYSIS ONE EVALUATION: CPT

## 2020-01-01 PROCEDURE — P9016 RBC LEUKOCYTES REDUCED: HCPCS

## 2020-01-01 PROCEDURE — 83036 HEMOGLOBIN GLYCOSYLATED A1C: CPT

## 2020-01-01 PROCEDURE — 77030040392 HC DRSG OPTIFOAM MDII -A

## 2020-01-01 PROCEDURE — 82962 GLUCOSE BLOOD TEST: CPT

## 2020-01-01 PROCEDURE — 99291 CRITICAL CARE FIRST HOUR: CPT

## 2020-01-01 PROCEDURE — 3331090004 HSPC SERVICE INTENSITY ADD-ON

## 2020-01-01 PROCEDURE — 77030003028 HC SUT VCRL J&J -A: Performed by: SURGERY

## 2020-01-01 PROCEDURE — T4524 ADULT SIZE BRIEF/DIAPER XL: HCPCS

## 2020-01-01 PROCEDURE — 36430 TRANSFUSION BLD/BLD COMPNT: CPT

## 2020-01-01 PROCEDURE — 65270000029 HC RM PRIVATE

## 2020-01-01 PROCEDURE — P9047 ALBUMIN (HUMAN), 25%, 50ML: HCPCS | Performed by: INTERNAL MEDICINE

## 2020-01-01 PROCEDURE — 77030040831 HC BAG URINE DRNG MDII -A

## 2020-01-01 PROCEDURE — 36415 COLL VENOUS BLD VENIPUNCTURE: CPT

## 2020-01-01 PROCEDURE — 02HV33Z INSERTION OF INFUSION DEVICE INTO SUPERIOR VENA CAVA, PERCUTANEOUS APPROACH: ICD-10-PCS | Performed by: EMERGENCY MEDICINE

## 2020-01-01 PROCEDURE — 77030018846 HC SOL IRR STRL H20 ICUM -A: Performed by: SURGERY

## 2020-01-01 PROCEDURE — 77030018846 HC SOL IRR STRL H20 ICUM -A

## 2020-01-01 PROCEDURE — 74011250636 HC RX REV CODE- 250/636: Performed by: SURGERY

## 2020-01-01 PROCEDURE — 97535 SELF CARE MNGMENT TRAINING: CPT

## 2020-01-01 PROCEDURE — 75810000455 HC PLCMT CENT VENOUS CATH LVL 2 5182

## 2020-01-01 PROCEDURE — C9113 INJ PANTOPRAZOLE SODIUM, VIA: HCPCS | Performed by: HOSPITALIST

## 2020-01-01 PROCEDURE — 74018 RADEX ABDOMEN 1 VIEW: CPT

## 2020-01-01 PROCEDURE — 96375 TX/PRO/DX INJ NEW DRUG ADDON: CPT

## 2020-01-01 PROCEDURE — G0299 HHS/HOSPICE OF RN EA 15 MIN: HCPCS

## 2020-01-01 PROCEDURE — 77030002986 HC SUT PROL J&J -A: Performed by: SURGERY

## 2020-01-01 PROCEDURE — 77030036731 HC STPLR ENDOSC J&J -F: Performed by: SURGERY

## 2020-01-01 PROCEDURE — 77030009979 HC RELD STPLR TCR J&J -C: Performed by: SURGERY

## 2020-01-01 PROCEDURE — 74011636637 HC RX REV CODE- 636/637: Performed by: HOSPITALIST

## 2020-01-01 PROCEDURE — 74011000250 HC RX REV CODE- 250

## 2020-01-01 PROCEDURE — A4927 NON-STERILE GLOVES: HCPCS

## 2020-01-01 PROCEDURE — 81001 URINALYSIS AUTO W/SCOPE: CPT

## 2020-01-01 PROCEDURE — 74011250636 HC RX REV CODE- 250/636: Performed by: NURSE ANESTHETIST, CERTIFIED REGISTERED

## 2020-01-01 PROCEDURE — 74011250636 HC RX REV CODE- 250/636

## 2020-01-01 PROCEDURE — A6260 WOUND CLEANSER ANY TYPE/SIZE: HCPCS

## 2020-01-01 PROCEDURE — 77030041247 HC PROTECTOR HEEL HEELMEDIX MDII -B

## 2020-01-01 PROCEDURE — 74011250637 HC RX REV CODE- 250/637: Performed by: SURGERY

## 2020-01-01 PROCEDURE — 0DB80ZZ EXCISION OF SMALL INTESTINE, OPEN APPROACH: ICD-10-PCS | Performed by: SURGERY

## 2020-01-01 PROCEDURE — 74019 RADEX ABDOMEN 2 VIEWS: CPT

## 2020-01-01 PROCEDURE — 83880 ASSAY OF NATRIURETIC PEPTIDE: CPT

## 2020-01-01 PROCEDURE — 99285 EMERGENCY DEPT VISIT HI MDM: CPT

## 2020-01-01 PROCEDURE — 87340 HEPATITIS B SURFACE AG IA: CPT

## 2020-01-01 PROCEDURE — A4649 SURGICAL SUPPLIES: HCPCS

## 2020-01-01 PROCEDURE — 74011000272 HC RX REV CODE- 272: Performed by: SURGERY

## 2020-01-01 PROCEDURE — 76450000000

## 2020-01-01 PROCEDURE — 74011636637 HC RX REV CODE- 636/637

## 2020-01-01 PROCEDURE — 83690 ASSAY OF LIPASE: CPT

## 2020-01-01 PROCEDURE — 77030011278 HC ELECTRD LIG IMPT COVD -F: Performed by: SURGERY

## 2020-01-01 PROCEDURE — 86706 HEP B SURFACE ANTIBODY: CPT

## 2020-01-01 PROCEDURE — 51798 US URINE CAPACITY MEASURE: CPT

## 2020-01-01 PROCEDURE — 77030021352 HC CBL LD SYS DISP COVD -B

## 2020-01-01 PROCEDURE — 06HY33Z INSERTION OF INFUSION DEVICE INTO LOWER VEIN, PERCUTANEOUS APPROACH: ICD-10-PCS | Performed by: HOSPITALIST

## 2020-01-01 PROCEDURE — 76010000153 HC OR TIME 1.5 TO 2 HR: Performed by: SURGERY

## 2020-01-01 PROCEDURE — 74011000250 HC RX REV CODE- 250: Performed by: SURGERY

## 2020-01-01 PROCEDURE — 87040 BLOOD CULTURE FOR BACTERIA: CPT

## 2020-01-01 PROCEDURE — C1751 CATH, INF, PER/CENT/MIDLINE: HCPCS

## 2020-01-01 PROCEDURE — 31500 INSERT EMERGENCY AIRWAY: CPT

## 2020-01-01 PROCEDURE — 86923 COMPATIBILITY TEST ELECTRIC: CPT

## 2020-01-01 PROCEDURE — 97116 GAIT TRAINING THERAPY: CPT

## 2020-01-01 PROCEDURE — 83605 ASSAY OF LACTIC ACID: CPT

## 2020-01-01 PROCEDURE — 30233N1 TRANSFUSION OF NONAUTOLOGOUS RED BLOOD CELLS INTO PERIPHERAL VEIN, PERCUTANEOUS APPROACH: ICD-10-PCS | Performed by: HOSPITALIST

## 2020-01-01 PROCEDURE — 77030011267 HC ELECTRD BLD COVD -A: Performed by: SURGERY

## 2020-01-01 PROCEDURE — 74176 CT ABD & PELVIS W/O CONTRAST: CPT

## 2020-01-01 PROCEDURE — 77030018836 HC SOL IRR NACL ICUM -A

## 2020-01-01 PROCEDURE — G0155 HHCP-SVS OF CSW,EA 15 MIN: HCPCS

## 2020-01-01 PROCEDURE — 0DN80ZZ RELEASE SMALL INTESTINE, OPEN APPROACH: ICD-10-PCS | Performed by: SURGERY

## 2020-01-01 PROCEDURE — A9270 NON-COVERED ITEM OR SERVICE: HCPCS

## 2020-01-01 PROCEDURE — 3E0436Z INTRODUCTION OF NUTRITIONAL SUBSTANCE INTO CENTRAL VEIN, PERCUTANEOUS APPROACH: ICD-10-PCS | Performed by: HOSPITALIST

## 2020-01-01 PROCEDURE — 0D9670Z DRAINAGE OF STOMACH WITH DRAINAGE DEVICE, VIA NATURAL OR ARTIFICIAL OPENING: ICD-10-PCS | Performed by: EMERGENCY MEDICINE

## 2020-01-01 PROCEDURE — A4452 WATERPROOF TAPE: HCPCS

## 2020-01-01 PROCEDURE — 84145 PROCALCITONIN (PCT): CPT

## 2020-01-01 PROCEDURE — 77030033827 HC SLV COMPR SCD THGH COVD -B: Performed by: SURGERY

## 2020-01-01 PROCEDURE — 94002 VENT MGMT INPAT INIT DAY: CPT

## 2020-01-01 PROCEDURE — 77030003029 HC SUT VCRL J&J -B: Performed by: SURGERY

## 2020-01-01 PROCEDURE — 76210000017 HC OR PH I REC 1.5 TO 2 HR: Performed by: SURGERY

## 2020-01-01 PROCEDURE — 77030034850: Performed by: SURGERY

## 2020-01-01 PROCEDURE — 36591 DRAW BLOOD OFF VENOUS DEVICE: CPT

## 2020-01-01 PROCEDURE — 0BH17EZ INSERTION OF ENDOTRACHEAL AIRWAY INTO TRACHEA, VIA NATURAL OR ARTIFICIAL OPENING: ICD-10-PCS | Performed by: HOSPITALIST

## 2020-01-01 PROCEDURE — 5A1D70Z PERFORMANCE OF URINARY FILTRATION, INTERMITTENT, LESS THAN 6 HOURS PER DAY: ICD-10-PCS | Performed by: HOSPITALIST

## 2020-01-01 PROCEDURE — 74011000250 HC RX REV CODE- 250: Performed by: PHYSICIAN ASSISTANT

## 2020-01-01 PROCEDURE — 80076 HEPATIC FUNCTION PANEL: CPT

## 2020-01-01 PROCEDURE — 02HV33Z INSERTION OF INFUSION DEVICE INTO SUPERIOR VENA CAVA, PERCUTANEOUS APPROACH: ICD-10-PCS | Performed by: PHYSICIAN ASSISTANT

## 2020-01-01 PROCEDURE — 74011000250 HC RX REV CODE- 250: Performed by: NURSE ANESTHETIST, CERTIFIED REGISTERED

## 2020-01-01 PROCEDURE — 89220 SPUTUM SPECIMEN COLLECTION: CPT

## 2020-01-01 PROCEDURE — 77030008462 HC STPLR SKN PROX J&J -A: Performed by: SURGERY

## 2020-01-01 PROCEDURE — 77030002966 HC SUT PDS J&J -A: Performed by: SURGERY

## 2020-01-01 PROCEDURE — 3336500001 HSPC ELECTION

## 2020-01-01 PROCEDURE — 88307 TISSUE EXAM BY PATHOLOGIST: CPT

## 2020-01-01 PROCEDURE — 77030031139 HC SUT VCRL2 J&J -A: Performed by: SURGERY

## 2020-01-01 PROCEDURE — 96374 THER/PROPH/DIAG INJ IV PUSH: CPT

## 2020-01-01 PROCEDURE — 85610 PROTHROMBIN TIME: CPT

## 2020-01-01 PROCEDURE — 77030008771 HC TU NG SALEM SUMP -A: Performed by: NURSE ANESTHETIST, CERTIFIED REGISTERED

## 2020-01-01 PROCEDURE — 77030008683 HC TU ET CUF COVD -A: Performed by: NURSE ANESTHETIST, CERTIFIED REGISTERED

## 2020-01-01 PROCEDURE — 3E0A3GC INTRODUCTION OF OTHER THERAPEUTIC SUBSTANCE INTO BONE MARROW, PERCUTANEOUS APPROACH: ICD-10-PCS | Performed by: EMERGENCY MEDICINE

## 2020-01-01 PROCEDURE — 70450 CT HEAD/BRAIN W/O DYE: CPT

## 2020-01-01 PROCEDURE — 74011250636 HC RX REV CODE- 250/636: Performed by: ANESTHESIOLOGY

## 2020-01-01 PROCEDURE — 76060000034 HC ANESTHESIA 1.5 TO 2 HR: Performed by: SURGERY

## 2020-01-01 PROCEDURE — 74011000258 HC RX REV CODE- 258: Performed by: HOSPITALIST

## 2020-01-01 PROCEDURE — C8929 TTE W OR WO FOL WCON,DOPPLER: HCPCS

## 2020-01-01 PROCEDURE — 84134 ASSAY OF PREALBUMIN: CPT

## 2020-01-01 PROCEDURE — 97166 OT EVAL MOD COMPLEX 45 MIN: CPT

## 2020-01-01 PROCEDURE — 77030018723 HC ELCTRD BLD COVD -A: Performed by: SURGERY

## 2020-01-01 PROCEDURE — 74011636637 HC RX REV CODE- 636/637: Performed by: NURSE ANESTHETIST, CERTIFIED REGISTERED

## 2020-01-01 PROCEDURE — 97162 PT EVAL MOD COMPLEX 30 MIN: CPT

## 2020-01-01 RX ORDER — MORPHINE SULFATE 2 MG/ML
1 INJECTION, SOLUTION INTRAMUSCULAR; INTRAVENOUS
Status: DISCONTINUED | OUTPATIENT
Start: 2020-01-01 | End: 2020-01-01

## 2020-01-01 RX ORDER — KETAMINE HYDROCHLORIDE 50 MG/ML
80 INJECTION, SOLUTION INTRAMUSCULAR; INTRAVENOUS ONCE
Status: COMPLETED | OUTPATIENT
Start: 2020-01-01 | End: 2020-01-01

## 2020-01-01 RX ORDER — SODIUM CHLORIDE 0.9 % (FLUSH) 0.9 %
5-40 SYRINGE (ML) INJECTION EVERY 8 HOURS
Status: DISCONTINUED | OUTPATIENT
Start: 2020-01-01 | End: 2020-01-01

## 2020-01-01 RX ORDER — SODIUM CHLORIDE 9 MG/ML
250 INJECTION, SOLUTION INTRAVENOUS AS NEEDED
Status: DISCONTINUED | OUTPATIENT
Start: 2020-01-01 | End: 2020-01-01

## 2020-01-01 RX ORDER — CEFAZOLIN SODIUM 1 G/3ML
INJECTION, POWDER, FOR SOLUTION INTRAMUSCULAR; INTRAVENOUS AS NEEDED
Status: DISCONTINUED | OUTPATIENT
Start: 2020-01-01 | End: 2020-01-01 | Stop reason: HOSPADM

## 2020-01-01 RX ORDER — MAGNESIUM SULFATE 100 %
4 CRYSTALS MISCELLANEOUS AS NEEDED
Status: DISCONTINUED | OUTPATIENT
Start: 2020-01-01 | End: 2020-01-01

## 2020-01-01 RX ORDER — INSULIN LISPRO 100 [IU]/ML
INJECTION, SOLUTION INTRAVENOUS; SUBCUTANEOUS EVERY 6 HOURS
Status: DISCONTINUED | OUTPATIENT
Start: 2020-01-01 | End: 2020-01-01

## 2020-01-01 RX ORDER — MIDAZOLAM HYDROCHLORIDE 1 MG/ML
4 INJECTION, SOLUTION INTRAMUSCULAR; INTRAVENOUS ONCE
Status: COMPLETED | OUTPATIENT
Start: 2020-01-01 | End: 2020-01-01

## 2020-01-01 RX ORDER — HEPARIN SODIUM 1000 [USP'U]/ML
1000 INJECTION, SOLUTION INTRAVENOUS; SUBCUTANEOUS
Status: DISCONTINUED | OUTPATIENT
Start: 2020-01-01 | End: 2020-01-01

## 2020-01-01 RX ORDER — ONDANSETRON 2 MG/ML
4 INJECTION INTRAMUSCULAR; INTRAVENOUS
Status: COMPLETED | OUTPATIENT
Start: 2020-01-01 | End: 2020-01-01

## 2020-01-01 RX ORDER — NALOXONE HYDROCHLORIDE 0.4 MG/ML
0.4 INJECTION, SOLUTION INTRAMUSCULAR; INTRAVENOUS; SUBCUTANEOUS AS NEEDED
Status: DISCONTINUED | OUTPATIENT
Start: 2020-01-01 | End: 2020-01-01

## 2020-01-01 RX ORDER — MORPHINE SULFATE 2 MG/ML
2-4 INJECTION, SOLUTION INTRAMUSCULAR; INTRAVENOUS
Status: DISCONTINUED | OUTPATIENT
Start: 2020-01-01 | End: 2020-01-01 | Stop reason: SDUPTHER

## 2020-01-01 RX ORDER — HALOPERIDOL 5 MG/ML
2.5 INJECTION INTRAMUSCULAR
Status: DISCONTINUED | OUTPATIENT
Start: 2020-01-01 | End: 2020-01-01

## 2020-01-01 RX ORDER — SUCCINYLCHOLINE CHLORIDE 100 MG/5ML
SYRINGE (ML) INTRAVENOUS AS NEEDED
Status: DISCONTINUED | OUTPATIENT
Start: 2020-01-01 | End: 2020-01-01 | Stop reason: HOSPADM

## 2020-01-01 RX ORDER — DEXTROSE MONOHYDRATE 100 MG/ML
125-250 INJECTION, SOLUTION INTRAVENOUS AS NEEDED
Status: DISCONTINUED | OUTPATIENT
Start: 2020-01-01 | End: 2020-01-01

## 2020-01-01 RX ORDER — POTASSIUM CHLORIDE 20 MEQ/1
40 TABLET, EXTENDED RELEASE ORAL
Status: COMPLETED | OUTPATIENT
Start: 2020-01-01 | End: 2020-01-01

## 2020-01-01 RX ORDER — INSULIN GLARGINE 100 [IU]/ML
5 INJECTION, SOLUTION SUBCUTANEOUS DAILY
Status: DISCONTINUED | OUTPATIENT
Start: 2020-01-01 | End: 2020-01-01

## 2020-01-01 RX ORDER — LORAZEPAM 2 MG/ML
INJECTION INTRAMUSCULAR
Status: DISPENSED
Start: 2020-01-01 | End: 2020-01-01

## 2020-01-01 RX ORDER — SCOLOPAMINE TRANSDERMAL SYSTEM 1 MG/1
1 PATCH, EXTENDED RELEASE TRANSDERMAL
Qty: 3 PATCH | Refills: 0 | Status: SHIPPED | OUTPATIENT
Start: 2020-01-01

## 2020-01-01 RX ORDER — HYDROMORPHONE HYDROCHLORIDE 1 MG/ML
INJECTION, SOLUTION INTRAMUSCULAR; INTRAVENOUS; SUBCUTANEOUS
Status: DISPENSED
Start: 2020-01-01 | End: 2020-01-01

## 2020-01-01 RX ORDER — NEOSTIGMINE METHYLSULFATE 1 MG/ML
INJECTION, SOLUTION INTRAVENOUS AS NEEDED
Status: DISCONTINUED | OUTPATIENT
Start: 2020-01-01 | End: 2020-01-01 | Stop reason: HOSPADM

## 2020-01-01 RX ORDER — NOREPINEPHRINE BIT/0.9 % NACL 8 MG/250ML
.5-3 INFUSION BOTTLE (ML) INTRAVENOUS
Status: DISCONTINUED | OUTPATIENT
Start: 2020-01-01 | End: 2020-01-01

## 2020-01-01 RX ORDER — DEXTROSE MONOHYDRATE 100 MG/ML
125-250 INJECTION, SOLUTION INTRAVENOUS AS NEEDED
Status: DISCONTINUED | OUTPATIENT
Start: 2020-01-01 | End: 2020-01-01 | Stop reason: HOSPADM

## 2020-01-01 RX ORDER — DEXAMETHASONE SODIUM PHOSPHATE 4 MG/ML
INJECTION, SOLUTION INTRA-ARTICULAR; INTRALESIONAL; INTRAMUSCULAR; INTRAVENOUS; SOFT TISSUE
Status: DISCONTINUED | OUTPATIENT
Start: 2020-01-01 | End: 2020-01-01 | Stop reason: HOSPADM

## 2020-01-01 RX ORDER — INSULIN LISPRO 100 [IU]/ML
INJECTION, SOLUTION INTRAVENOUS; SUBCUTANEOUS
Status: COMPLETED
Start: 2020-01-01 | End: 2020-01-01

## 2020-01-01 RX ORDER — MORPHINE SULFATE 4 MG/ML
4 INJECTION, SOLUTION INTRAMUSCULAR; INTRAVENOUS
Status: COMPLETED | OUTPATIENT
Start: 2020-01-01 | End: 2020-01-01

## 2020-01-01 RX ORDER — SUCCINYLCHOLINE CHLORIDE 20 MG/ML
INJECTION INTRAMUSCULAR; INTRAVENOUS
Status: COMPLETED
Start: 2020-01-01 | End: 2020-01-01

## 2020-01-01 RX ORDER — SODIUM CHLORIDE 0.9 % (FLUSH) 0.9 %
5-10 SYRINGE (ML) INJECTION AS NEEDED
Status: DISCONTINUED | OUTPATIENT
Start: 2020-01-01 | End: 2020-01-01

## 2020-01-01 RX ORDER — SUCCINYLCHOLINE CHLORIDE 20 MG/ML
120 INJECTION INTRAMUSCULAR; INTRAVENOUS
Status: COMPLETED | OUTPATIENT
Start: 2020-01-01 | End: 2020-01-01

## 2020-01-01 RX ORDER — INSULIN GLARGINE 100 [IU]/ML
3 INJECTION, SOLUTION SUBCUTANEOUS ONCE
Status: COMPLETED | OUTPATIENT
Start: 2020-01-01 | End: 2020-01-01

## 2020-01-01 RX ORDER — METOPROLOL TARTRATE 25 MG/1
25 TABLET, FILM COATED ORAL EVERY 12 HOURS
Status: DISCONTINUED | OUTPATIENT
Start: 2020-01-01 | End: 2020-01-01

## 2020-01-01 RX ORDER — SODIUM CHLORIDE 9 MG/ML
75 INJECTION, SOLUTION INTRAVENOUS CONTINUOUS
Status: DISCONTINUED | OUTPATIENT
Start: 2020-01-01 | End: 2020-01-01

## 2020-01-01 RX ORDER — MAGNESIUM SULFATE HEPTAHYDRATE 40 MG/ML
2 INJECTION, SOLUTION INTRAVENOUS
Status: COMPLETED | OUTPATIENT
Start: 2020-01-01 | End: 2020-01-01

## 2020-01-01 RX ORDER — MAGNESIUM SULFATE 100 %
4 CRYSTALS MISCELLANEOUS AS NEEDED
Status: DISCONTINUED | OUTPATIENT
Start: 2020-01-01 | End: 2020-01-01 | Stop reason: HOSPADM

## 2020-01-01 RX ORDER — INSULIN GLARGINE 100 [IU]/ML
5 INJECTION, SOLUTION SUBCUTANEOUS
Status: DISCONTINUED | OUTPATIENT
Start: 2020-01-01 | End: 2020-01-01

## 2020-01-01 RX ORDER — ONDANSETRON 2 MG/ML
6 INJECTION INTRAMUSCULAR; INTRAVENOUS
Status: DISCONTINUED | OUTPATIENT
Start: 2020-01-01 | End: 2020-01-01 | Stop reason: HOSPADM

## 2020-01-01 RX ORDER — HEPARIN SODIUM 200 [USP'U]/100ML
500 INJECTION, SOLUTION INTRAVENOUS ONCE
Status: COMPLETED | OUTPATIENT
Start: 2020-01-01 | End: 2020-01-01

## 2020-01-01 RX ORDER — VECURONIUM BROMIDE FOR INJECTION 1 MG/ML
INJECTION, POWDER, LYOPHILIZED, FOR SOLUTION INTRAVENOUS AS NEEDED
Status: DISCONTINUED | OUTPATIENT
Start: 2020-01-01 | End: 2020-01-01 | Stop reason: HOSPADM

## 2020-01-01 RX ORDER — POTASSIUM CHLORIDE 7.45 MG/ML
10 INJECTION INTRAVENOUS
Status: COMPLETED | OUTPATIENT
Start: 2020-01-01 | End: 2020-01-01

## 2020-01-01 RX ORDER — HYDROMORPHONE HYDROCHLORIDE 1 MG/ML
1 INJECTION, SOLUTION INTRAMUSCULAR; INTRAVENOUS; SUBCUTANEOUS
Status: DISCONTINUED | OUTPATIENT
Start: 2020-01-01 | End: 2020-01-01

## 2020-01-01 RX ORDER — HEPARIN SODIUM 5000 [USP'U]/ML
5000 INJECTION, SOLUTION INTRAVENOUS; SUBCUTANEOUS EVERY 8 HOURS
Status: DISCONTINUED | OUTPATIENT
Start: 2020-01-01 | End: 2020-01-01

## 2020-01-01 RX ORDER — METOPROLOL TARTRATE 5 MG/5ML
INJECTION INTRAVENOUS
Status: COMPLETED
Start: 2020-01-01 | End: 2020-01-01

## 2020-01-01 RX ORDER — LIDOCAINE HYDROCHLORIDE 10 MG/ML
1-90 INJECTION, SOLUTION EPIDURAL; INFILTRATION; INTRACAUDAL; PERINEURAL
Status: DISCONTINUED | OUTPATIENT
Start: 2020-01-01 | End: 2020-01-01 | Stop reason: HOSPADM

## 2020-01-01 RX ORDER — METOPROLOL TARTRATE 5 MG/5ML
5 INJECTION INTRAVENOUS ONCE
Status: DISPENSED | OUTPATIENT
Start: 2020-01-01 | End: 2020-01-01

## 2020-01-01 RX ORDER — SODIUM CHLORIDE 9 MG/ML
1000 INJECTION, SOLUTION INTRAVENOUS ONCE
Status: COMPLETED | OUTPATIENT
Start: 2020-01-01 | End: 2020-01-01

## 2020-01-01 RX ORDER — NALOXONE HYDROCHLORIDE 1 MG/ML
INJECTION INTRAMUSCULAR; INTRAVENOUS; SUBCUTANEOUS
Status: COMPLETED
Start: 2020-01-01 | End: 2020-01-01

## 2020-01-01 RX ORDER — INSULIN GLARGINE 100 [IU]/ML
5 INJECTION, SOLUTION SUBCUTANEOUS ONCE
Status: COMPLETED | OUTPATIENT
Start: 2020-01-01 | End: 2020-01-01

## 2020-01-01 RX ORDER — INSULIN GLARGINE 100 [IU]/ML
10 INJECTION, SOLUTION SUBCUTANEOUS
Status: DISCONTINUED | OUTPATIENT
Start: 2020-01-01 | End: 2020-01-01

## 2020-01-01 RX ORDER — LIDOCAINE HYDROCHLORIDE 20 MG/ML
JELLY TOPICAL AS NEEDED
Status: DISCONTINUED | OUTPATIENT
Start: 2020-01-01 | End: 2020-01-01

## 2020-01-01 RX ORDER — GLYCOPYRROLATE 0.2 MG/ML
INJECTION INTRAMUSCULAR; INTRAVENOUS AS NEEDED
Status: DISCONTINUED | OUTPATIENT
Start: 2020-01-01 | End: 2020-01-01 | Stop reason: HOSPADM

## 2020-01-01 RX ORDER — SODIUM CHLORIDE 450 MG/100ML
75 INJECTION, SOLUTION INTRAVENOUS CONTINUOUS
Status: DISCONTINUED | OUTPATIENT
Start: 2020-01-01 | End: 2020-01-01

## 2020-01-01 RX ORDER — IPRATROPIUM BROMIDE AND ALBUTEROL SULFATE 2.5; .5 MG/3ML; MG/3ML
3 SOLUTION RESPIRATORY (INHALATION)
Status: DISCONTINUED | OUTPATIENT
Start: 2020-01-01 | End: 2020-01-01

## 2020-01-01 RX ORDER — METOPROLOL TARTRATE 25 MG/1
25 TABLET, FILM COATED ORAL
Status: DISCONTINUED | OUTPATIENT
Start: 2020-01-01 | End: 2020-01-01

## 2020-01-01 RX ORDER — MIDAZOLAM HYDROCHLORIDE 1 MG/ML
2 INJECTION, SOLUTION INTRAMUSCULAR; INTRAVENOUS
Status: DISCONTINUED | OUTPATIENT
Start: 2020-01-01 | End: 2020-01-01

## 2020-01-01 RX ORDER — FENTANYL CITRATE 50 UG/ML
50 INJECTION, SOLUTION INTRAMUSCULAR; INTRAVENOUS
Status: DISCONTINUED | OUTPATIENT
Start: 2020-01-01 | End: 2020-01-01 | Stop reason: HOSPADM

## 2020-01-01 RX ORDER — FENTANYL CITRATE 50 UG/ML
50 INJECTION, SOLUTION INTRAMUSCULAR; INTRAVENOUS
Status: DISCONTINUED | OUTPATIENT
Start: 2020-01-01 | End: 2020-01-01 | Stop reason: SDUPTHER

## 2020-01-01 RX ORDER — MORPHINE SULFATE ORAL SOLUTION 10 MG/5ML
5 SOLUTION ORAL
Qty: 1 BOTTLE | Refills: 0 | Status: SHIPPED | OUTPATIENT
Start: 2020-01-01 | End: 2020-01-01

## 2020-01-01 RX ORDER — FAMOTIDINE 20 MG/1
20 TABLET, FILM COATED ORAL ONCE
Status: DISCONTINUED | OUTPATIENT
Start: 2020-01-01 | End: 2020-01-01 | Stop reason: HOSPADM

## 2020-01-01 RX ORDER — SODIUM CHLORIDE, SODIUM LACTATE, POTASSIUM CHLORIDE, CALCIUM CHLORIDE 600; 310; 30; 20 MG/100ML; MG/100ML; MG/100ML; MG/100ML
50 INJECTION, SOLUTION INTRAVENOUS CONTINUOUS
Status: DISCONTINUED | OUTPATIENT
Start: 2020-01-01 | End: 2020-01-01

## 2020-01-01 RX ORDER — ONDANSETRON 2 MG/ML
8 INJECTION INTRAMUSCULAR; INTRAVENOUS
Status: COMPLETED | OUTPATIENT
Start: 2020-01-01 | End: 2020-01-01

## 2020-01-01 RX ORDER — ACETAMINOPHEN 650 MG/1
650 SUPPOSITORY RECTAL
Status: DISCONTINUED | OUTPATIENT
Start: 2020-01-01 | End: 2020-01-01 | Stop reason: HOSPADM

## 2020-01-01 RX ORDER — FENTANYL CITRATE 50 UG/ML
INJECTION, SOLUTION INTRAMUSCULAR; INTRAVENOUS AS NEEDED
Status: DISCONTINUED | OUTPATIENT
Start: 2020-01-01 | End: 2020-01-01 | Stop reason: HOSPADM

## 2020-01-01 RX ORDER — GUAIFENESIN 100 MG/5ML
81 LIQUID (ML) ORAL DAILY
Qty: 30 TAB | Refills: 0 | Status: SHIPPED
Start: 2020-01-01 | End: 2020-01-01

## 2020-01-01 RX ORDER — SODIUM CHLORIDE 0.9 % (FLUSH) 0.9 %
5-40 SYRINGE (ML) INJECTION AS NEEDED
Status: DISCONTINUED | OUTPATIENT
Start: 2020-01-01 | End: 2020-01-01 | Stop reason: HOSPADM

## 2020-01-01 RX ORDER — SODIUM CHLORIDE, SODIUM LACTATE, POTASSIUM CHLORIDE, CALCIUM CHLORIDE 600; 310; 30; 20 MG/100ML; MG/100ML; MG/100ML; MG/100ML
25 INJECTION, SOLUTION INTRAVENOUS CONTINUOUS
Status: DISCONTINUED | OUTPATIENT
Start: 2020-01-01 | End: 2020-01-01 | Stop reason: HOSPADM

## 2020-01-01 RX ORDER — DEXTROSE, SODIUM CHLORIDE, AND POTASSIUM CHLORIDE 5; .45; .15 G/100ML; G/100ML; G/100ML
100 INJECTION INTRAVENOUS CONTINUOUS
Status: CANCELLED | OUTPATIENT
Start: 2020-01-01

## 2020-01-01 RX ORDER — LIDOCAINE HYDROCHLORIDE 10 MG/ML
0.1 INJECTION, SOLUTION EPIDURAL; INFILTRATION; INTRACAUDAL; PERINEURAL AS NEEDED
Status: DISCONTINUED | OUTPATIENT
Start: 2020-01-01 | End: 2020-01-01 | Stop reason: HOSPADM

## 2020-01-01 RX ORDER — POTASSIUM CHLORIDE AND SODIUM CHLORIDE 450; 150 MG/100ML; MG/100ML
INJECTION, SOLUTION INTRAVENOUS CONTINUOUS
Status: DISCONTINUED | OUTPATIENT
Start: 2020-01-01 | End: 2020-01-01

## 2020-01-01 RX ORDER — INSULIN LISPRO 100 [IU]/ML
INJECTION, SOLUTION INTRAVENOUS; SUBCUTANEOUS ONCE
Status: COMPLETED | OUTPATIENT
Start: 2020-01-01 | End: 2020-01-01

## 2020-01-01 RX ORDER — HYDROMORPHONE HYDROCHLORIDE 1 MG/ML
0.2 INJECTION, SOLUTION INTRAMUSCULAR; INTRAVENOUS; SUBCUTANEOUS AS NEEDED
Status: DISCONTINUED | OUTPATIENT
Start: 2020-01-01 | End: 2020-01-01 | Stop reason: HOSPADM

## 2020-01-01 RX ORDER — DIPHENHYDRAMINE HYDROCHLORIDE 50 MG/ML
12.5 INJECTION, SOLUTION INTRAMUSCULAR; INTRAVENOUS
Status: DISCONTINUED | OUTPATIENT
Start: 2020-01-01 | End: 2020-01-01 | Stop reason: HOSPADM

## 2020-01-01 RX ORDER — LORAZEPAM 2 MG/ML
1 CONCENTRATE ORAL
Qty: 1 BOTTLE | Refills: 0 | Status: SHIPPED | OUTPATIENT
Start: 2020-01-01

## 2020-01-01 RX ORDER — PROPOFOL 10 MG/ML
10 VIAL (ML) INTRAVENOUS
Status: DISCONTINUED | OUTPATIENT
Start: 2020-01-01 | End: 2020-01-01

## 2020-01-01 RX ORDER — NALOXONE HYDROCHLORIDE 0.4 MG/ML
0.4 INJECTION, SOLUTION INTRAMUSCULAR; INTRAVENOUS; SUBCUTANEOUS AS NEEDED
Status: DISCONTINUED | OUTPATIENT
Start: 2020-01-01 | End: 2020-01-01 | Stop reason: HOSPADM

## 2020-01-01 RX ORDER — ZIPRASIDONE MESYLATE 20 MG/ML
INJECTION, POWDER, LYOPHILIZED, FOR SOLUTION INTRAMUSCULAR
Status: COMPLETED
Start: 2020-01-01 | End: 2020-01-01

## 2020-01-01 RX ORDER — VASOPRESSIN 20 U/ML
INJECTION PARENTERAL AS NEEDED
Status: DISCONTINUED | OUTPATIENT
Start: 2020-01-01 | End: 2020-01-01 | Stop reason: HOSPADM

## 2020-01-01 RX ORDER — LIDOCAINE HYDROCHLORIDE 10 MG/ML
5 INJECTION INFILTRATION; PERINEURAL ONCE
Status: ACTIVE | OUTPATIENT
Start: 2020-01-01 | End: 2020-01-01

## 2020-01-01 RX ORDER — GUAIFENESIN 100 MG/5ML
81 LIQUID (ML) ORAL DAILY
Status: DISCONTINUED | OUTPATIENT
Start: 2020-01-01 | End: 2020-01-01

## 2020-01-01 RX ORDER — PROPOFOL 10 MG/ML
0-50 INJECTION, EMULSION INTRAVENOUS
Status: DISCONTINUED | OUTPATIENT
Start: 2020-01-01 | End: 2020-01-01

## 2020-01-01 RX ORDER — INSULIN LISPRO 100 [IU]/ML
INJECTION, SOLUTION INTRAVENOUS; SUBCUTANEOUS
Status: DISCONTINUED | OUTPATIENT
Start: 2020-01-01 | End: 2020-01-01

## 2020-01-01 RX ORDER — BENZONATATE 100 MG/1
100 CAPSULE ORAL
COMMUNITY
End: 2020-01-01

## 2020-01-01 RX ORDER — METOPROLOL TARTRATE 5 MG/5ML
5 INJECTION INTRAVENOUS
Status: DISCONTINUED | OUTPATIENT
Start: 2020-01-01 | End: 2020-01-01 | Stop reason: HOSPADM

## 2020-01-01 RX ORDER — SODIUM CHLORIDE 450 MG/100ML
50 INJECTION, SOLUTION INTRAVENOUS CONTINUOUS
Status: DISCONTINUED | OUTPATIENT
Start: 2020-01-01 | End: 2020-01-01

## 2020-01-01 RX ORDER — INSULIN GLARGINE 100 [IU]/ML
14 INJECTION, SOLUTION SUBCUTANEOUS
Status: DISCONTINUED | OUTPATIENT
Start: 2020-01-01 | End: 2020-01-01

## 2020-01-01 RX ORDER — MAGNESIUM SULFATE 1 G/100ML
1 INJECTION INTRAVENOUS ONCE
Status: COMPLETED | OUTPATIENT
Start: 2020-01-01 | End: 2020-01-01

## 2020-01-01 RX ORDER — SODIUM CHLORIDE 0.9 % (FLUSH) 0.9 %
5-40 SYRINGE (ML) INJECTION EVERY 8 HOURS
Status: DISCONTINUED | OUTPATIENT
Start: 2020-01-01 | End: 2020-01-01 | Stop reason: HOSPADM

## 2020-01-01 RX ORDER — INSULIN LISPRO 100 [IU]/ML
INJECTION, SOLUTION INTRAVENOUS; SUBCUTANEOUS ONCE
Status: DISCONTINUED | OUTPATIENT
Start: 2020-01-01 | End: 2020-01-01 | Stop reason: HOSPADM

## 2020-01-01 RX ORDER — HYDROMORPHONE HYDROCHLORIDE 1 MG/ML
INJECTION, SOLUTION INTRAMUSCULAR; INTRAVENOUS; SUBCUTANEOUS AS NEEDED
Status: DISCONTINUED | OUTPATIENT
Start: 2020-01-01 | End: 2020-01-01 | Stop reason: HOSPADM

## 2020-01-01 RX ORDER — SODIUM CHLORIDE 9 MG/ML
250 INJECTION, SOLUTION INTRAVENOUS AS NEEDED
Status: DISCONTINUED | OUTPATIENT
Start: 2020-01-01 | End: 2020-01-01 | Stop reason: SDUPTHER

## 2020-01-01 RX ORDER — SODIUM CHLORIDE 0.9 % (FLUSH) 0.9 %
5-40 SYRINGE (ML) INJECTION AS NEEDED
Status: DISCONTINUED | OUTPATIENT
Start: 2020-01-01 | End: 2020-01-01

## 2020-01-01 RX ORDER — NALOXONE HYDROCHLORIDE 0.4 MG/ML
2 INJECTION, SOLUTION INTRAMUSCULAR; INTRAVENOUS; SUBCUTANEOUS ONCE
Status: DISPENSED | OUTPATIENT
Start: 2020-01-01 | End: 2020-01-01

## 2020-01-01 RX ORDER — INSULIN LISPRO 100 [IU]/ML
INJECTION, SOLUTION INTRAVENOUS; SUBCUTANEOUS EVERY 6 HOURS
Status: DISCONTINUED | OUTPATIENT
Start: 2020-01-01 | End: 2020-01-01 | Stop reason: HOSPADM

## 2020-01-01 RX ORDER — ETOMIDATE 2 MG/ML
INJECTION INTRAVENOUS
Status: COMPLETED
Start: 2020-01-01 | End: 2020-01-01

## 2020-01-01 RX ORDER — EPINEPHRINE 0.1 MG/ML
0.5 INJECTION INTRACARDIAC; INTRAVENOUS
Status: COMPLETED | OUTPATIENT
Start: 2020-01-01 | End: 2020-01-01

## 2020-01-01 RX ORDER — SODIUM CHLORIDE 9 MG/ML
100 INJECTION, SOLUTION INTRAVENOUS CONTINUOUS
Status: DISCONTINUED | OUTPATIENT
Start: 2020-01-01 | End: 2020-01-01

## 2020-01-01 RX ORDER — CLINDAMYCIN PHOSPHATE 600 MG/50ML
600 INJECTION INTRAVENOUS EVERY 8 HOURS
Status: DISCONTINUED | OUTPATIENT
Start: 2020-01-01 | End: 2020-01-01 | Stop reason: SDUPTHER

## 2020-01-01 RX ORDER — METOPROLOL TARTRATE 5 MG/5ML
5 INJECTION INTRAVENOUS EVERY 6 HOURS
Status: DISCONTINUED | OUTPATIENT
Start: 2020-01-01 | End: 2020-01-01

## 2020-01-01 RX ORDER — SODIUM CHLORIDE 9 MG/ML
50 INJECTION, SOLUTION INTRAVENOUS
Status: DISCONTINUED | OUTPATIENT
Start: 2020-01-01 | End: 2020-01-01

## 2020-01-01 RX ORDER — LIDOCAINE HYDROCHLORIDE 10 MG/ML
50 INJECTION, SOLUTION EPIDURAL; INFILTRATION; INTRACAUDAL; PERINEURAL ONCE
Status: COMPLETED | OUTPATIENT
Start: 2020-01-01 | End: 2020-01-01

## 2020-01-01 RX ORDER — LORAZEPAM 2 MG/ML
1 INJECTION INTRAMUSCULAR
Status: COMPLETED | OUTPATIENT
Start: 2020-01-01 | End: 2020-01-01

## 2020-01-01 RX ORDER — ALBUMIN HUMAN 250 G/1000ML
50 SOLUTION INTRAVENOUS ONCE
Status: COMPLETED | OUTPATIENT
Start: 2020-01-01 | End: 2020-01-01

## 2020-01-01 RX ORDER — METOPROLOL TARTRATE 25 MG/1
25 TABLET, FILM COATED ORAL EVERY 12 HOURS
Qty: 60 TAB | Refills: 0 | Status: SHIPPED
Start: 2020-01-01 | End: 2020-01-01

## 2020-01-01 RX ORDER — ROPIVACAINE HYDROCHLORIDE 2 MG/ML
INJECTION, SOLUTION EPIDURAL; INFILTRATION; PERINEURAL
Status: DISCONTINUED | OUTPATIENT
Start: 2020-01-01 | End: 2020-01-01 | Stop reason: HOSPADM

## 2020-01-01 RX ORDER — OXYCODONE AND ACETAMINOPHEN 5; 325 MG/1; MG/1
1 TABLET ORAL AS NEEDED
Status: DISCONTINUED | OUTPATIENT
Start: 2020-01-01 | End: 2020-01-01 | Stop reason: HOSPADM

## 2020-01-01 RX ORDER — METOPROLOL TARTRATE 5 MG/5ML
5 INJECTION INTRAVENOUS
Status: COMPLETED | OUTPATIENT
Start: 2020-01-01 | End: 2020-01-01

## 2020-01-01 RX ORDER — NOREPINEPHRINE BIT/0.9 % NACL 8 MG/250ML
.5-16 INFUSION BOTTLE (ML) INTRAVENOUS
Status: DISCONTINUED | OUTPATIENT
Start: 2020-01-01 | End: 2020-01-01

## 2020-01-01 RX ORDER — MIDAZOLAM HYDROCHLORIDE 1 MG/ML
1 INJECTION, SOLUTION INTRAMUSCULAR; INTRAVENOUS
Status: DISCONTINUED | OUTPATIENT
Start: 2020-01-01 | End: 2020-01-01

## 2020-01-01 RX ORDER — HYDROMORPHONE HYDROCHLORIDE 1 MG/ML
0.5 INJECTION, SOLUTION INTRAMUSCULAR; INTRAVENOUS; SUBCUTANEOUS
Status: DISCONTINUED | OUTPATIENT
Start: 2020-01-01 | End: 2020-01-01 | Stop reason: HOSPADM

## 2020-01-01 RX ORDER — ETOMIDATE 2 MG/ML
20 INJECTION INTRAVENOUS
Status: COMPLETED | OUTPATIENT
Start: 2020-01-01 | End: 2020-01-01

## 2020-01-01 RX ORDER — NOREPINEPHRINE BITARTRATE/D5W 8 MG/250ML
.5-3 PLASTIC BAG, INJECTION (ML) INTRAVENOUS
Status: DISCONTINUED | OUTPATIENT
Start: 2020-01-01 | End: 2020-01-01

## 2020-01-01 RX ORDER — ATORVASTATIN CALCIUM 80 MG/1
80 TABLET, FILM COATED ORAL DAILY
Status: DISCONTINUED | OUTPATIENT
Start: 2020-01-01 | End: 2020-01-01

## 2020-01-01 RX ORDER — SODIUM CHLORIDE, SODIUM LACTATE, POTASSIUM CHLORIDE, CALCIUM CHLORIDE 600; 310; 30; 20 MG/100ML; MG/100ML; MG/100ML; MG/100ML
100 INJECTION, SOLUTION INTRAVENOUS CONTINUOUS
Status: DISCONTINUED | OUTPATIENT
Start: 2020-01-01 | End: 2020-01-01 | Stop reason: HOSPADM

## 2020-01-01 RX ORDER — PROPOFOL 10 MG/ML
INJECTION, EMULSION INTRAVENOUS AS NEEDED
Status: DISCONTINUED | OUTPATIENT
Start: 2020-01-01 | End: 2020-01-01 | Stop reason: HOSPADM

## 2020-01-01 RX ORDER — CEFAZOLIN SODIUM 2 G/50ML
2 SOLUTION INTRAVENOUS
Status: DISCONTINUED | OUTPATIENT
Start: 2020-01-01 | End: 2020-01-01 | Stop reason: HOSPADM

## 2020-01-01 RX ORDER — INSULIN GLARGINE 100 [IU]/ML
8 INJECTION, SOLUTION SUBCUTANEOUS DAILY
Status: DISCONTINUED | OUTPATIENT
Start: 2020-01-01 | End: 2020-01-01

## 2020-01-01 RX ORDER — DEXTROSE, SODIUM CHLORIDE, AND POTASSIUM CHLORIDE 5; .45; .15 G/100ML; G/100ML; G/100ML
75 INJECTION INTRAVENOUS CONTINUOUS
Status: DISCONTINUED | OUTPATIENT
Start: 2020-01-01 | End: 2020-01-01

## 2020-01-01 RX ORDER — ONDANSETRON 2 MG/ML
4 INJECTION INTRAMUSCULAR; INTRAVENOUS
Status: DISCONTINUED | OUTPATIENT
Start: 2020-01-01 | End: 2020-01-01 | Stop reason: HOSPADM

## 2020-01-01 RX ORDER — NALOXONE HYDROCHLORIDE 4 MG/.1ML
SPRAY NASAL
Qty: 3 EACH | Refills: 0 | Status: SHIPPED | OUTPATIENT
Start: 2020-01-01

## 2020-01-01 RX ORDER — HYDRALAZINE HYDROCHLORIDE 20 MG/ML
10 INJECTION INTRAMUSCULAR; INTRAVENOUS
Status: DISCONTINUED | OUTPATIENT
Start: 2020-01-01 | End: 2020-01-01 | Stop reason: HOSPADM

## 2020-01-01 RX ORDER — OXYCODONE AND ACETAMINOPHEN 5; 325 MG/1; MG/1
1 TABLET ORAL
Status: DISCONTINUED | OUTPATIENT
Start: 2020-01-01 | End: 2020-01-01 | Stop reason: HOSPADM

## 2020-01-01 RX ORDER — FENTANYL CITRATE 50 UG/ML
50 INJECTION, SOLUTION INTRAMUSCULAR; INTRAVENOUS
Status: DISCONTINUED | OUTPATIENT
Start: 2020-01-01 | End: 2020-01-01

## 2020-01-01 RX ORDER — MAGNESIUM SULFATE HEPTAHYDRATE 40 MG/ML
2 INJECTION, SOLUTION INTRAVENOUS ONCE
Status: COMPLETED | OUTPATIENT
Start: 2020-01-01 | End: 2020-01-01

## 2020-01-01 RX ORDER — METOPROLOL TARTRATE 5 MG/5ML
5 INJECTION INTRAVENOUS
Status: DISCONTINUED | OUTPATIENT
Start: 2020-01-01 | End: 2020-01-01

## 2020-01-01 RX ORDER — DIGOXIN 0.25 MG/ML
500 INJECTION INTRAMUSCULAR; INTRAVENOUS
Status: COMPLETED | OUTPATIENT
Start: 2020-01-01 | End: 2020-01-01

## 2020-01-01 RX ORDER — ONDANSETRON 2 MG/ML
INJECTION INTRAMUSCULAR; INTRAVENOUS AS NEEDED
Status: DISCONTINUED | OUTPATIENT
Start: 2020-01-01 | End: 2020-01-01 | Stop reason: HOSPADM

## 2020-01-01 RX ORDER — SODIUM CHLORIDE, SODIUM LACTATE, POTASSIUM CHLORIDE, CALCIUM CHLORIDE 600; 310; 30; 20 MG/100ML; MG/100ML; MG/100ML; MG/100ML
INJECTION, SOLUTION INTRAVENOUS
Status: DISCONTINUED | OUTPATIENT
Start: 2020-01-01 | End: 2020-01-01 | Stop reason: HOSPADM

## 2020-01-01 RX ORDER — PROPOFOL 10 MG/ML
INJECTION, EMULSION INTRAVENOUS
Status: DISPENSED
Start: 2020-01-01 | End: 2020-01-01

## 2020-01-01 RX ORDER — SODIUM CHLORIDE 450 MG/100ML
50 INJECTION, SOLUTION INTRAVENOUS CONTINUOUS
Status: DISCONTINUED | OUTPATIENT
Start: 2020-01-01 | End: 2020-01-01 | Stop reason: HOSPADM

## 2020-01-01 RX ORDER — ZOLPIDEM TARTRATE 5 MG/1
5 TABLET ORAL
Status: CANCELLED | OUTPATIENT
Start: 2020-01-01

## 2020-01-01 RX ORDER — LIDOCAINE HYDROCHLORIDE 20 MG/ML
INJECTION, SOLUTION EPIDURAL; INFILTRATION; INTRACAUDAL; PERINEURAL AS NEEDED
Status: DISCONTINUED | OUTPATIENT
Start: 2020-01-01 | End: 2020-01-01 | Stop reason: HOSPADM

## 2020-01-01 RX ORDER — DEXTROSE, SODIUM CHLORIDE, AND POTASSIUM CHLORIDE 5; .45; .15 G/100ML; G/100ML; G/100ML
50 INJECTION INTRAVENOUS CONTINUOUS
Status: DISCONTINUED | OUTPATIENT
Start: 2020-01-01 | End: 2020-01-01 | Stop reason: SDUPTHER

## 2020-01-01 RX ORDER — ALBUMIN HUMAN 250 G/1000ML
25 SOLUTION INTRAVENOUS ONCE
Status: COMPLETED | OUTPATIENT
Start: 2020-01-01 | End: 2020-01-01

## 2020-01-01 RX ORDER — METOPROLOL TARTRATE 25 MG/1
25 TABLET, FILM COATED ORAL EVERY 12 HOURS
Status: DISCONTINUED | OUTPATIENT
Start: 2020-01-01 | End: 2020-01-01 | Stop reason: HOSPADM

## 2020-01-01 RX ADMIN — ALBUMIN (HUMAN) 25 G: 0.25 INJECTION, SOLUTION INTRAVENOUS at 20:58

## 2020-01-01 RX ADMIN — MIDAZOLAM HYDROCHLORIDE 1 MG: 2 INJECTION, SOLUTION INTRAMUSCULAR; INTRAVENOUS at 00:00

## 2020-01-01 RX ADMIN — HEPARIN SODIUM 5000 UNITS: 5000 INJECTION INTRAVENOUS; SUBCUTANEOUS at 04:38

## 2020-01-01 RX ADMIN — ASPIRIN 81 MG 81 MG: 81 TABLET ORAL at 09:04

## 2020-01-01 RX ADMIN — SODIUM CHLORIDE 150 ML/HR: 900 INJECTION, SOLUTION INTRAVENOUS at 20:28

## 2020-01-01 RX ADMIN — INSULIN LISPRO 3 UNITS: 100 INJECTION, SOLUTION INTRAVENOUS; SUBCUTANEOUS at 18:00

## 2020-01-01 RX ADMIN — PIPERACILLIN AND TAZOBACTAM 3.38 G: 3; .375 INJECTION, POWDER, LYOPHILIZED, FOR SOLUTION INTRAVENOUS at 15:00

## 2020-01-01 RX ADMIN — MIDAZOLAM HYDROCHLORIDE 2 MG: 2 INJECTION, SOLUTION INTRAMUSCULAR; INTRAVENOUS at 17:03

## 2020-01-01 RX ADMIN — IPRATROPIUM BROMIDE AND ALBUTEROL SULFATE 3 ML: .5; 3 SOLUTION RESPIRATORY (INHALATION) at 20:43

## 2020-01-01 RX ADMIN — METOPROLOL TARTRATE 25 MG: 25 TABLET, FILM COATED ORAL at 18:55

## 2020-01-01 RX ADMIN — HEPARIN SODIUM 5000 UNITS: 5000 INJECTION INTRAVENOUS; SUBCUTANEOUS at 12:06

## 2020-01-01 RX ADMIN — INSULIN LISPRO 2 UNITS: 100 INJECTION, SOLUTION INTRAVENOUS; SUBCUTANEOUS at 01:30

## 2020-01-01 RX ADMIN — HEPARIN SODIUM 5000 UNITS: 5000 INJECTION INTRAVENOUS; SUBCUTANEOUS at 11:26

## 2020-01-01 RX ADMIN — METOPROLOL TARTRATE 5 MG: 5 INJECTION INTRAVENOUS at 09:49

## 2020-01-01 RX ADMIN — MORPHINE SULFATE 1 MG: 2 INJECTION, SOLUTION INTRAMUSCULAR; INTRAVENOUS at 09:04

## 2020-01-01 RX ADMIN — SODIUM CHLORIDE 10 MCG/MIN: 900 INJECTION, SOLUTION INTRAVENOUS at 06:17

## 2020-01-01 RX ADMIN — METOROPROLOL TARTRATE 5 MG: 5 INJECTION, SOLUTION INTRAVENOUS at 08:14

## 2020-01-01 RX ADMIN — IPRATROPIUM BROMIDE AND ALBUTEROL SULFATE 3 ML: .5; 3 SOLUTION RESPIRATORY (INHALATION) at 09:41

## 2020-01-01 RX ADMIN — HYDROMORPHONE HYDROCHLORIDE 1 MG: 1 INJECTION, SOLUTION INTRAMUSCULAR; INTRAVENOUS; SUBCUTANEOUS at 22:29

## 2020-01-01 RX ADMIN — FAMOTIDINE 20 MG: 10 INJECTION, SOLUTION INTRAVENOUS at 08:17

## 2020-01-01 RX ADMIN — WATER 30 MG: 1 INJECTION INTRAMUSCULAR; INTRAVENOUS; SUBCUTANEOUS at 10:57

## 2020-01-01 RX ADMIN — ACETAMINOPHEN 650 MG: 160 SOLUTION ORAL at 04:38

## 2020-01-01 RX ADMIN — SODIUM CHLORIDE 150 ML/HR: 900 INJECTION, SOLUTION INTRAVENOUS at 11:28

## 2020-01-01 RX ADMIN — METHYLPREDNISOLONE SODIUM SUCCINATE 40 MG: 40 INJECTION, POWDER, FOR SOLUTION INTRAMUSCULAR; INTRAVENOUS at 08:19

## 2020-01-01 RX ADMIN — INSULIN LISPRO 3 UNITS: 100 INJECTION, SOLUTION INTRAVENOUS; SUBCUTANEOUS at 06:00

## 2020-01-01 RX ADMIN — HEPARIN SODIUM 5000 UNITS: 5000 INJECTION INTRAVENOUS; SUBCUTANEOUS at 05:20

## 2020-01-01 RX ADMIN — SODIUM CHLORIDE 100 ML/HR: 900 INJECTION, SOLUTION INTRAVENOUS at 00:46

## 2020-01-01 RX ADMIN — ALBUMIN (HUMAN) 50 G: 0.25 INJECTION, SOLUTION INTRAVENOUS at 11:42

## 2020-01-01 RX ADMIN — INSULIN GLARGINE 3 UNITS: 100 INJECTION, SOLUTION SUBCUTANEOUS at 22:26

## 2020-01-01 RX ADMIN — INSULIN LISPRO 8 UNITS: 100 INJECTION, SOLUTION INTRAVENOUS; SUBCUTANEOUS at 14:46

## 2020-01-01 RX ADMIN — FAMOTIDINE 20 MG: 10 INJECTION, SOLUTION INTRAVENOUS at 21:00

## 2020-01-01 RX ADMIN — KETAMINE HYDROCHLORIDE 80 MG: 50 INJECTION, SOLUTION INTRAMUSCULAR; INTRAVENOUS at 20:46

## 2020-01-01 RX ADMIN — SODIUM CHLORIDE 1000 ML: 900 INJECTION, SOLUTION INTRAVENOUS at 10:04

## 2020-01-01 RX ADMIN — METOROPROLOL TARTRATE 5 MG: 5 INJECTION, SOLUTION INTRAVENOUS at 15:48

## 2020-01-01 RX ADMIN — INSULIN GLARGINE 5 UNITS: 100 INJECTION, SOLUTION SUBCUTANEOUS at 08:37

## 2020-01-01 RX ADMIN — SODIUM CHLORIDE: 234 INJECTION INTRAMUSCULAR; INTRAVENOUS; SUBCUTANEOUS at 20:24

## 2020-01-01 RX ADMIN — DEXTROSE MONOHYDRATE, SODIUM CHLORIDE, AND POTASSIUM CHLORIDE 125 ML/HR: 50; 4.5; 1.49 INJECTION, SOLUTION INTRAVENOUS at 18:19

## 2020-01-01 RX ADMIN — MAGNESIUM SULFATE HEPTAHYDRATE 1 G: 1 INJECTION, SOLUTION INTRAVENOUS at 00:03

## 2020-01-01 RX ADMIN — DEXTROSE MONOHYDRATE, SODIUM CHLORIDE, AND POTASSIUM CHLORIDE 125 ML/HR: 50; 4.5; 1.49 INJECTION, SOLUTION INTRAVENOUS at 03:22

## 2020-01-01 RX ADMIN — LORAZEPAM 1 MG: 2 INJECTION, SOLUTION INTRAMUSCULAR; INTRAVENOUS at 04:36

## 2020-01-01 RX ADMIN — SODIUM CHLORIDE AND POTASSIUM CHLORIDE: 4.5; 1.49 INJECTION, SOLUTION INTRAVENOUS at 23:31

## 2020-01-01 RX ADMIN — IPRATROPIUM BROMIDE AND ALBUTEROL SULFATE 3 ML: .5; 3 SOLUTION RESPIRATORY (INHALATION) at 09:20

## 2020-01-01 RX ADMIN — METHYLPREDNISOLONE SODIUM SUCCINATE 40 MG: 40 INJECTION, POWDER, FOR SOLUTION INTRAMUSCULAR; INTRAVENOUS at 20:01

## 2020-01-01 RX ADMIN — OXYCODONE HYDROCHLORIDE AND ACETAMINOPHEN 1 TABLET: 5; 325 TABLET ORAL at 21:40

## 2020-01-01 RX ADMIN — SODIUM CHLORIDE 150 ML/HR: 900 INJECTION, SOLUTION INTRAVENOUS at 09:44

## 2020-01-01 RX ADMIN — ASPIRIN 81 MG 81 MG: 81 TABLET ORAL at 08:17

## 2020-01-01 RX ADMIN — FENTANYL CITRATE 175 MCG/HR: 50 INJECTION, SOLUTION INTRAMUSCULAR; INTRAVENOUS at 18:05

## 2020-01-01 RX ADMIN — ATORVASTATIN CALCIUM 80 MG: 80 TABLET, FILM COATED ORAL at 09:04

## 2020-01-01 RX ADMIN — METOPROLOL TARTRATE 25 MG: 25 TABLET, FILM COATED ORAL at 09:10

## 2020-01-01 RX ADMIN — INSULIN GLARGINE 8 UNITS: 100 INJECTION, SOLUTION SUBCUTANEOUS at 09:00

## 2020-01-01 RX ADMIN — ETOMIDATE 20 MG: 2 INJECTION INTRAVENOUS at 19:37

## 2020-01-01 RX ADMIN — IPRATROPIUM BROMIDE AND ALBUTEROL SULFATE 3 ML: .5; 3 SOLUTION RESPIRATORY (INHALATION) at 09:58

## 2020-01-01 RX ADMIN — LIDOCAINE HYDROCHLORIDE 4 ML: 10 INJECTION, SOLUTION EPIDURAL; INFILTRATION; INTRACAUDAL; PERINEURAL at 11:25

## 2020-01-01 RX ADMIN — HEPARIN SODIUM 5000 UNITS: 5000 INJECTION INTRAVENOUS; SUBCUTANEOUS at 12:52

## 2020-01-01 RX ADMIN — PHENYLEPHRINE HYDROCHLORIDE 100 MCG: 10 INJECTION INTRAVENOUS at 12:32

## 2020-01-01 RX ADMIN — SUGAMMADEX 400 MG: 100 INJECTION, SOLUTION INTRAVENOUS at 13:31

## 2020-01-01 RX ADMIN — FENTANYL CITRATE 50 MCG: 50 INJECTION, SOLUTION INTRAMUSCULAR; INTRAVENOUS at 04:39

## 2020-01-01 RX ADMIN — SODIUM CHLORIDE 50 ML/HR: 450 INJECTION, SOLUTION INTRAVENOUS at 19:03

## 2020-01-01 RX ADMIN — PIPERACILLIN AND TAZOBACTAM 3.38 G: 3; .375 INJECTION, POWDER, LYOPHILIZED, FOR SOLUTION INTRAVENOUS at 05:34

## 2020-01-01 RX ADMIN — Medication 3 MG: at 13:31

## 2020-01-01 RX ADMIN — SODIUM CHLORIDE 29 MCG/MIN: 900 INJECTION, SOLUTION INTRAVENOUS at 00:15

## 2020-01-01 RX ADMIN — VECURONIUM BROMIDE FOR INJECTION 3 MG: 1 INJECTION, POWDER, LYOPHILIZED, FOR SOLUTION INTRAVENOUS at 12:52

## 2020-01-01 RX ADMIN — SODIUM CHLORIDE 75 ML/HR: 450 INJECTION, SOLUTION INTRAVENOUS at 23:02

## 2020-01-01 RX ADMIN — INSULIN LISPRO 3 UNITS: 100 INJECTION, SOLUTION INTRAVENOUS; SUBCUTANEOUS at 06:45

## 2020-01-01 RX ADMIN — MORPHINE SULFATE 4 MG: 4 INJECTION, SOLUTION INTRAMUSCULAR; INTRAVENOUS at 16:15

## 2020-01-01 RX ADMIN — METOROPROLOL TARTRATE 5 MG: 5 INJECTION, SOLUTION INTRAVENOUS at 14:50

## 2020-01-01 RX ADMIN — PIPERACILLIN AND TAZOBACTAM 3.38 G: 3; .375 INJECTION, POWDER, LYOPHILIZED, FOR SOLUTION INTRAVENOUS at 02:32

## 2020-01-01 RX ADMIN — IPRATROPIUM BROMIDE AND ALBUTEROL SULFATE 3 ML: .5; 3 SOLUTION RESPIRATORY (INHALATION) at 09:38

## 2020-01-01 RX ADMIN — ACETAMINOPHEN 650 MG: 650 SUPPOSITORY RECTAL at 00:14

## 2020-01-01 RX ADMIN — FAMOTIDINE 20 MG: 10 INJECTION, SOLUTION INTRAVENOUS at 09:00

## 2020-01-01 RX ADMIN — SODIUM CHLORIDE 75 ML/HR: 900 INJECTION, SOLUTION INTRAVENOUS at 09:26

## 2020-01-01 RX ADMIN — INSULIN LISPRO 2 UNITS: 100 INJECTION, SOLUTION INTRAVENOUS; SUBCUTANEOUS at 06:24

## 2020-01-01 RX ADMIN — IPRATROPIUM BROMIDE AND ALBUTEROL SULFATE 3 ML: .5; 3 SOLUTION RESPIRATORY (INHALATION) at 11:37

## 2020-01-01 RX ADMIN — PHENYLEPHRINE HYDROCHLORIDE 100 MCG: 10 INJECTION INTRAVENOUS at 12:15

## 2020-01-01 RX ADMIN — FENTANYL CITRATE 50 MCG: 50 INJECTION, SOLUTION INTRAMUSCULAR; INTRAVENOUS at 22:08

## 2020-01-01 RX ADMIN — POTASSIUM CHLORIDE 10 MEQ: 10 INJECTION, SOLUTION INTRAVENOUS at 20:32

## 2020-01-01 RX ADMIN — DEXTROSE MONOHYDRATE 250 ML: 10 INJECTION, SOLUTION INTRAVENOUS at 17:00

## 2020-01-01 RX ADMIN — HYDROMORPHONE HYDROCHLORIDE 1 MG: 1 INJECTION, SOLUTION INTRAMUSCULAR; INTRAVENOUS; SUBCUTANEOUS at 22:21

## 2020-01-01 RX ADMIN — SODIUM CHLORIDE 100 ML/HR: 900 INJECTION, SOLUTION INTRAVENOUS at 13:51

## 2020-01-01 RX ADMIN — FENTANYL CITRATE 200 MCG/HR: 50 INJECTION, SOLUTION INTRAMUSCULAR; INTRAVENOUS at 02:07

## 2020-01-01 RX ADMIN — INSULIN LISPRO 6 UNITS: 100 INJECTION, SOLUTION INTRAVENOUS; SUBCUTANEOUS at 00:33

## 2020-01-01 RX ADMIN — FENTANYL CITRATE 50 MCG: 50 INJECTION, SOLUTION INTRAMUSCULAR; INTRAVENOUS at 09:50

## 2020-01-01 RX ADMIN — IPRATROPIUM BROMIDE AND ALBUTEROL SULFATE 3 ML: .5; 3 SOLUTION RESPIRATORY (INHALATION) at 15:01

## 2020-01-01 RX ADMIN — METHYLPREDNISOLONE SODIUM SUCCINATE 40 MG: 40 INJECTION, POWDER, FOR SOLUTION INTRAMUSCULAR; INTRAVENOUS at 23:00

## 2020-01-01 RX ADMIN — Medication 5 ML: at 22:49

## 2020-01-01 RX ADMIN — INSULIN GLARGINE 5 UNITS: 100 INJECTION, SOLUTION SUBCUTANEOUS at 09:00

## 2020-01-01 RX ADMIN — SODIUM CHLORIDE 1000 ML: 900 INJECTION, SOLUTION INTRAVENOUS at 16:14

## 2020-01-01 RX ADMIN — METHYLPREDNISOLONE SODIUM SUCCINATE 40 MG: 40 INJECTION, POWDER, FOR SOLUTION INTRAMUSCULAR; INTRAVENOUS at 08:21

## 2020-01-01 RX ADMIN — PIPERACILLIN AND TAZOBACTAM 3.38 G: 3; .375 INJECTION, POWDER, LYOPHILIZED, FOR SOLUTION INTRAVENOUS at 01:27

## 2020-01-01 RX ADMIN — MIDAZOLAM HYDROCHLORIDE 1 MG: 2 INJECTION, SOLUTION INTRAMUSCULAR; INTRAVENOUS at 07:49

## 2020-01-01 RX ADMIN — SODIUM CHLORIDE, SODIUM LACTATE, POTASSIUM CHLORIDE, AND CALCIUM CHLORIDE 1000 ML: 600; 310; 30; 20 INJECTION, SOLUTION INTRAVENOUS at 14:50

## 2020-01-01 RX ADMIN — IPRATROPIUM BROMIDE AND ALBUTEROL SULFATE 3 ML: .5; 3 SOLUTION RESPIRATORY (INHALATION) at 16:37

## 2020-01-01 RX ADMIN — ATORVASTATIN CALCIUM 80 MG: 80 TABLET, FILM COATED ORAL at 09:00

## 2020-01-01 RX ADMIN — SODIUM CHLORIDE 30 MCG/MIN: 900 INJECTION, SOLUTION INTRAVENOUS at 08:00

## 2020-01-01 RX ADMIN — METHYLPREDNISOLONE SODIUM SUCCINATE 40 MG: 40 INJECTION, POWDER, FOR SOLUTION INTRAMUSCULAR; INTRAVENOUS at 08:16

## 2020-01-01 RX ADMIN — HYDROMORPHONE HYDROCHLORIDE 0.5 MG: 2 INJECTION INTRAMUSCULAR; INTRAVENOUS; SUBCUTANEOUS at 14:05

## 2020-01-01 RX ADMIN — SODIUM CHLORIDE 28 MCG/MIN: 900 INJECTION, SOLUTION INTRAVENOUS at 12:45

## 2020-01-01 RX ADMIN — HEPARIN SODIUM 5000 UNITS: 5000 INJECTION INTRAVENOUS; SUBCUTANEOUS at 12:45

## 2020-01-01 RX ADMIN — SODIUM CHLORIDE 1000 ML: 900 INJECTION, SOLUTION INTRAVENOUS at 22:43

## 2020-01-01 RX ADMIN — MIDAZOLAM HYDROCHLORIDE 1 MG: 2 INJECTION, SOLUTION INTRAMUSCULAR; INTRAVENOUS at 07:59

## 2020-01-01 RX ADMIN — SODIUM CHLORIDE 30 MMOL: 900 INJECTION, SOLUTION INTRAVENOUS at 13:32

## 2020-01-01 RX ADMIN — ASPIRIN 81 MG 81 MG: 81 TABLET ORAL at 09:00

## 2020-01-01 RX ADMIN — MAGNESIUM SULFATE HEPTAHYDRATE 2 G: 40 INJECTION, SOLUTION INTRAVENOUS at 09:10

## 2020-01-01 RX ADMIN — DEXMEDETOMIDINE 0.4 MCG/KG/HR: 100 INJECTION, SOLUTION, CONCENTRATE INTRAVENOUS at 10:50

## 2020-01-01 RX ADMIN — METHYLPREDNISOLONE SODIUM SUCCINATE 40 MG: 40 INJECTION, POWDER, FOR SOLUTION INTRAMUSCULAR; INTRAVENOUS at 09:04

## 2020-01-01 RX ADMIN — PHENYLEPHRINE HYDROCHLORIDE 100 MCG: 10 INJECTION INTRAVENOUS at 12:12

## 2020-01-01 RX ADMIN — FENTANYL CITRATE 50 MCG/HR: 50 INJECTION, SOLUTION INTRAMUSCULAR; INTRAVENOUS at 10:23

## 2020-01-01 RX ADMIN — INSULIN LISPRO 3 UNITS: 100 INJECTION, SOLUTION INTRAVENOUS; SUBCUTANEOUS at 12:28

## 2020-01-01 RX ADMIN — SODIUM CHLORIDE 4 MCG/MIN: 900 INJECTION, SOLUTION INTRAVENOUS at 03:05

## 2020-01-01 RX ADMIN — HYDROMORPHONE HYDROCHLORIDE 1 MG: 1 INJECTION, SOLUTION INTRAMUSCULAR; INTRAVENOUS; SUBCUTANEOUS at 12:05

## 2020-01-01 RX ADMIN — METOROPROLOL TARTRATE 5 MG: 5 INJECTION, SOLUTION INTRAVENOUS at 06:40

## 2020-01-01 RX ADMIN — SODIUM CHLORIDE 75 ML/HR: 450 INJECTION, SOLUTION INTRAVENOUS at 11:04

## 2020-01-01 RX ADMIN — MIDAZOLAM HYDROCHLORIDE 1 MG: 2 INJECTION, SOLUTION INTRAMUSCULAR; INTRAVENOUS at 09:00

## 2020-01-01 RX ADMIN — PIPERACILLIN AND TAZOBACTAM 3.38 G: 3; .375 INJECTION, POWDER, LYOPHILIZED, FOR SOLUTION INTRAVENOUS at 14:43

## 2020-01-01 RX ADMIN — MORPHINE SULFATE 1 MG: 2 INJECTION, SOLUTION INTRAMUSCULAR; INTRAVENOUS at 20:48

## 2020-01-01 RX ADMIN — HYDROMORPHONE HYDROCHLORIDE 1 MG: 1 INJECTION, SOLUTION INTRAMUSCULAR; INTRAVENOUS; SUBCUTANEOUS at 06:07

## 2020-01-01 RX ADMIN — CEFAZOLIN 2 G: 1 INJECTION, POWDER, FOR SOLUTION INTRAVENOUS at 12:19

## 2020-01-01 RX ADMIN — FENTANYL CITRATE 50 MCG: 50 INJECTION, SOLUTION INTRAMUSCULAR; INTRAVENOUS at 12:52

## 2020-01-01 RX ADMIN — ONDANSETRON 4 MG: 2 SOLUTION INTRAMUSCULAR; INTRAVENOUS at 13:30

## 2020-01-01 RX ADMIN — METOPROLOL TARTRATE 25 MG: 25 TABLET, FILM COATED ORAL at 10:24

## 2020-01-01 RX ADMIN — INSULIN LISPRO 9 UNITS: 100 INJECTION, SOLUTION INTRAVENOUS; SUBCUTANEOUS at 14:12

## 2020-01-01 RX ADMIN — CLINDAMYCIN PHOSPHATE 600 MG: 150 INJECTION, SOLUTION INTRAVENOUS at 09:00

## 2020-01-01 RX ADMIN — VECURONIUM BROMIDE FOR INJECTION 4 MG: 1 INJECTION, POWDER, LYOPHILIZED, FOR SOLUTION INTRAVENOUS at 12:17

## 2020-01-01 RX ADMIN — INSULIN GLARGINE 8 UNITS: 100 INJECTION, SOLUTION SUBCUTANEOUS at 14:46

## 2020-01-01 RX ADMIN — HYDROMORPHONE HYDROCHLORIDE 1 MG: 1 INJECTION, SOLUTION INTRAMUSCULAR; INTRAVENOUS; SUBCUTANEOUS at 01:06

## 2020-01-01 RX ADMIN — METOROPROLOL TARTRATE 5 MG: 5 INJECTION, SOLUTION INTRAVENOUS at 20:16

## 2020-01-01 RX ADMIN — IPRATROPIUM BROMIDE AND ALBUTEROL SULFATE 3 ML: .5; 3 SOLUTION RESPIRATORY (INHALATION) at 20:26

## 2020-01-01 RX ADMIN — SODIUM CHLORIDE, SODIUM LACTATE, POTASSIUM CHLORIDE, AND CALCIUM CHLORIDE: 600; 310; 30; 20 INJECTION, SOLUTION INTRAVENOUS at 12:37

## 2020-01-01 RX ADMIN — DIGOXIN 500 MCG: 0.25 INJECTION INTRAMUSCULAR; INTRAVENOUS at 08:10

## 2020-01-01 RX ADMIN — METHYLPREDNISOLONE SODIUM SUCCINATE 40 MG: 40 INJECTION, POWDER, FOR SOLUTION INTRAMUSCULAR; INTRAVENOUS at 08:37

## 2020-01-01 RX ADMIN — INSULIN GLARGINE 8 UNITS: 100 INJECTION, SOLUTION SUBCUTANEOUS at 08:16

## 2020-01-01 RX ADMIN — INSULIN LISPRO 3 UNITS: 100 INJECTION, SOLUTION INTRAVENOUS; SUBCUTANEOUS at 14:47

## 2020-01-01 RX ADMIN — HEPARIN SODIUM 5000 UNITS: 5000 INJECTION INTRAVENOUS; SUBCUTANEOUS at 14:46

## 2020-01-01 RX ADMIN — VASOPRESSIN 1 UNITS: 20 INJECTION INTRAVENOUS at 12:33

## 2020-01-01 RX ADMIN — IPRATROPIUM BROMIDE AND ALBUTEROL SULFATE 3 ML: .5; 3 SOLUTION RESPIRATORY (INHALATION) at 12:34

## 2020-01-01 RX ADMIN — HEPARIN SODIUM 5000 UNITS: 5000 INJECTION INTRAVENOUS; SUBCUTANEOUS at 21:54

## 2020-01-01 RX ADMIN — FAMOTIDINE 20 MG: 10 INJECTION, SOLUTION INTRAVENOUS at 21:02

## 2020-01-01 RX ADMIN — INSULIN LISPRO 6 UNITS: 100 INJECTION, SOLUTION INTRAVENOUS; SUBCUTANEOUS at 11:47

## 2020-01-01 RX ADMIN — INSULIN LISPRO 2 UNITS: 100 INJECTION, SOLUTION INTRAVENOUS; SUBCUTANEOUS at 18:36

## 2020-01-01 RX ADMIN — INSULIN GLARGINE 5 UNITS: 100 INJECTION, SOLUTION SUBCUTANEOUS at 22:21

## 2020-01-01 RX ADMIN — ONDANSETRON 4 MG: 2 INJECTION INTRAMUSCULAR; INTRAVENOUS at 11:47

## 2020-01-01 RX ADMIN — INSULIN LISPRO 3 UNITS: 100 INJECTION, SOLUTION INTRAVENOUS; SUBCUTANEOUS at 01:09

## 2020-01-01 RX ADMIN — IPRATROPIUM BROMIDE AND ALBUTEROL SULFATE 3 ML: .5; 3 SOLUTION RESPIRATORY (INHALATION) at 11:15

## 2020-01-01 RX ADMIN — SODIUM CHLORIDE 75 ML/HR: 450 INJECTION, SOLUTION INTRAVENOUS at 01:34

## 2020-01-01 RX ADMIN — GLYCOPYRROLATE 0.2 MG: 0.2 INJECTION INTRAMUSCULAR; INTRAVENOUS at 13:31

## 2020-01-01 RX ADMIN — LORAZEPAM 1 MG: 2 INJECTION, SOLUTION INTRAMUSCULAR; INTRAVENOUS at 01:47

## 2020-01-01 RX ADMIN — DEXTROSE MONOHYDRATE, SODIUM CHLORIDE, AND POTASSIUM CHLORIDE 75 ML/HR: 50; 4.5; 1.49 INJECTION, SOLUTION INTRAVENOUS at 21:55

## 2020-01-01 RX ADMIN — ROPIVACAINE HYDROCHLORIDE 80 MG: 2 INJECTION, SOLUTION EPIDURAL; INFILTRATION at 14:18

## 2020-01-01 RX ADMIN — LIDOCAINE HYDROCHLORIDE 2 ML: 10 INJECTION, SOLUTION EPIDURAL; INFILTRATION; INTRACAUDAL; PERINEURAL at 11:11

## 2020-01-01 RX ADMIN — INSULIN GLARGINE 5 UNITS: 100 INJECTION, SOLUTION SUBCUTANEOUS at 03:33

## 2020-01-01 RX ADMIN — FENTANYL CITRATE 50 MCG: 50 INJECTION, SOLUTION INTRAMUSCULAR; INTRAVENOUS at 14:51

## 2020-01-01 RX ADMIN — HEPARIN SODIUM 5000 UNITS: 5000 INJECTION INTRAVENOUS; SUBCUTANEOUS at 03:33

## 2020-01-01 RX ADMIN — FENTANYL CITRATE 100 MCG: 50 INJECTION, SOLUTION INTRAMUSCULAR; INTRAVENOUS at 13:03

## 2020-01-01 RX ADMIN — CLINDAMYCIN PHOSPHATE 600 MG: 150 INJECTION, SOLUTION INTRAVENOUS at 17:00

## 2020-01-01 RX ADMIN — POTASSIUM CHLORIDE 40 MEQ: 1500 TABLET, EXTENDED RELEASE ORAL at 09:10

## 2020-01-01 RX ADMIN — ONDANSETRON 6 MG: 2 INJECTION INTRAMUSCULAR; INTRAVENOUS at 11:12

## 2020-01-01 RX ADMIN — SODIUM CHLORIDE 1000 ML: 900 INJECTION, SOLUTION INTRAVENOUS at 21:26

## 2020-01-01 RX ADMIN — FAMOTIDINE 20 MG: 10 INJECTION, SOLUTION INTRAVENOUS at 20:40

## 2020-01-01 RX ADMIN — LIDOCAINE HYDROCHLORIDE 5 ML: 10 INJECTION, SOLUTION EPIDURAL; INFILTRATION; INTRACAUDAL; PERINEURAL at 12:33

## 2020-01-01 RX ADMIN — FENTANYL CITRATE 150 MCG/HR: 50 INJECTION, SOLUTION INTRAMUSCULAR; INTRAVENOUS at 23:27

## 2020-01-01 RX ADMIN — METHYLPREDNISOLONE SODIUM SUCCINATE 40 MG: 40 INJECTION, POWDER, FOR SOLUTION INTRAMUSCULAR; INTRAVENOUS at 20:45

## 2020-01-01 RX ADMIN — PIPERACILLIN AND TAZOBACTAM 3.38 G: 3; .375 INJECTION, POWDER, LYOPHILIZED, FOR SOLUTION INTRAVENOUS at 02:42

## 2020-01-01 RX ADMIN — HYDROMORPHONE HYDROCHLORIDE 1 MG: 1 INJECTION, SOLUTION INTRAMUSCULAR; INTRAVENOUS; SUBCUTANEOUS at 10:14

## 2020-01-01 RX ADMIN — PIPERACILLIN AND TAZOBACTAM 3.38 G: 3; .375 INJECTION, POWDER, LYOPHILIZED, FOR SOLUTION INTRAVENOUS at 21:56

## 2020-01-01 RX ADMIN — IPRATROPIUM BROMIDE AND ALBUTEROL SULFATE 3 ML: .5; 3 SOLUTION RESPIRATORY (INHALATION) at 20:04

## 2020-01-01 RX ADMIN — HALOPERIDOL LACTATE 2.5 MG: 5 INJECTION, SOLUTION INTRAMUSCULAR at 14:27

## 2020-01-01 RX ADMIN — INSULIN GLARGINE 5 UNITS: 100 INJECTION, SOLUTION SUBCUTANEOUS at 22:03

## 2020-01-01 RX ADMIN — HEPARIN SODIUM 5000 UNITS: 5000 INJECTION INTRAVENOUS; SUBCUTANEOUS at 20:40

## 2020-01-01 RX ADMIN — PIPERACILLIN AND TAZOBACTAM 3.38 G: 3; .375 INJECTION, POWDER, LYOPHILIZED, FOR SOLUTION INTRAVENOUS at 08:57

## 2020-01-01 RX ADMIN — MIDAZOLAM HYDROCHLORIDE 1 MG: 2 INJECTION, SOLUTION INTRAMUSCULAR; INTRAVENOUS at 02:26

## 2020-01-01 RX ADMIN — ACETAMINOPHEN 650 MG: 650 SUPPOSITORY RECTAL at 15:09

## 2020-01-01 RX ADMIN — IPRATROPIUM BROMIDE AND ALBUTEROL SULFATE 3 ML: .5; 3 SOLUTION RESPIRATORY (INHALATION) at 20:46

## 2020-01-01 RX ADMIN — NALOXONE HYDROCHLORIDE 1 MG: 1 INJECTION PARENTERAL at 17:07

## 2020-01-01 RX ADMIN — SODIUM CHLORIDE, SODIUM LACTATE, POTASSIUM CHLORIDE, AND CALCIUM CHLORIDE: 600; 310; 30; 20 INJECTION, SOLUTION INTRAVENOUS at 12:00

## 2020-01-01 RX ADMIN — HEPARIN SODIUM 5000 UNITS: 5000 INJECTION INTRAVENOUS; SUBCUTANEOUS at 20:55

## 2020-01-01 RX ADMIN — INSULIN LISPRO 2 UNITS: 100 INJECTION, SOLUTION INTRAVENOUS; SUBCUTANEOUS at 06:59

## 2020-01-01 RX ADMIN — INSULIN LISPRO 2 UNITS: 100 INJECTION, SOLUTION INTRAVENOUS; SUBCUTANEOUS at 05:57

## 2020-01-01 RX ADMIN — MIDAZOLAM HYDROCHLORIDE 1 MG: 2 INJECTION, SOLUTION INTRAMUSCULAR; INTRAVENOUS at 06:04

## 2020-01-01 RX ADMIN — INSULIN LISPRO 3 UNITS: 100 INJECTION, SOLUTION INTRAVENOUS; SUBCUTANEOUS at 13:02

## 2020-01-01 RX ADMIN — Medication 100 MG: at 12:09

## 2020-01-01 RX ADMIN — MIDAZOLAM HYDROCHLORIDE 1 MG: 2 INJECTION, SOLUTION INTRAMUSCULAR; INTRAVENOUS at 19:54

## 2020-01-01 RX ADMIN — INSULIN LISPRO 6 UNITS: 100 INJECTION, SOLUTION INTRAVENOUS; SUBCUTANEOUS at 00:00

## 2020-01-01 RX ADMIN — PROPOFOL 100 MG: 10 INJECTION, EMULSION INTRAVENOUS at 12:09

## 2020-01-01 RX ADMIN — VASOPRESSIN 1 UNITS: 20 INJECTION INTRAVENOUS at 12:34

## 2020-01-01 RX ADMIN — LACTULOSE 60 ML: 20 SOLUTION ORAL at 22:34

## 2020-01-01 RX ADMIN — NALOXONE HYDROCHLORIDE 4 MG: 1 INJECTION PARENTERAL at 18:42

## 2020-01-01 RX ADMIN — DEXMEDETOMIDINE 0.4 MCG/KG/HR: 100 INJECTION, SOLUTION, CONCENTRATE INTRAVENOUS at 11:23

## 2020-01-01 RX ADMIN — HEPARIN SODIUM 1000 UNITS: 200 INJECTION, SOLUTION INTRAVENOUS at 10:59

## 2020-01-01 RX ADMIN — INSULIN LISPRO 4 UNITS: 100 INJECTION, SOLUTION INTRAVENOUS; SUBCUTANEOUS at 00:13

## 2020-01-01 RX ADMIN — HYDROMORPHONE HYDROCHLORIDE 1 MG: 1 INJECTION, SOLUTION INTRAMUSCULAR; INTRAVENOUS; SUBCUTANEOUS at 13:51

## 2020-01-01 RX ADMIN — PERFLUTREN 1 ML: 6.52 INJECTION, SUSPENSION INTRAVENOUS at 10:18

## 2020-01-01 RX ADMIN — FAMOTIDINE 20 MG: 10 INJECTION, SOLUTION INTRAVENOUS at 08:37

## 2020-01-01 RX ADMIN — IPRATROPIUM BROMIDE AND ALBUTEROL SULFATE 3 ML: .5; 3 SOLUTION RESPIRATORY (INHALATION) at 13:05

## 2020-01-01 RX ADMIN — INSULIN LISPRO 9 UNITS: 100 INJECTION, SOLUTION INTRAVENOUS; SUBCUTANEOUS at 18:55

## 2020-01-01 RX ADMIN — PHENYLEPHRINE HYDROCHLORIDE 100 MCG: 10 INJECTION INTRAVENOUS at 12:21

## 2020-01-01 RX ADMIN — SODIUM CHLORIDE 100 ML/HR: 900 INJECTION, SOLUTION INTRAVENOUS at 05:36

## 2020-01-01 RX ADMIN — NOREPINEPHRINE BITARTRATE 4 MCG/MIN: 1 INJECTION INTRAVENOUS at 19:04

## 2020-01-01 RX ADMIN — METHYLPREDNISOLONE SODIUM SUCCINATE 40 MG: 40 INJECTION, POWDER, FOR SOLUTION INTRAMUSCULAR; INTRAVENOUS at 21:02

## 2020-01-01 RX ADMIN — FENTANYL CITRATE 50 MCG: 50 INJECTION, SOLUTION INTRAMUSCULAR; INTRAVENOUS at 11:00

## 2020-01-01 RX ADMIN — WATER 30 MG: 1 INJECTION INTRAMUSCULAR; INTRAVENOUS; SUBCUTANEOUS at 21:27

## 2020-01-01 RX ADMIN — LIDOCAINE HYDROCHLORIDE 100 MG: 20 INJECTION, SOLUTION INTRAVENOUS at 12:09

## 2020-01-01 RX ADMIN — FENTANYL CITRATE 175 MCG/HR: 50 INJECTION, SOLUTION INTRAMUSCULAR; INTRAVENOUS at 09:28

## 2020-01-01 RX ADMIN — PHENYLEPHRINE HYDROCHLORIDE 100 MCG: 10 INJECTION INTRAVENOUS at 12:19

## 2020-01-01 RX ADMIN — HYDROMORPHONE HYDROCHLORIDE 1 MG: 1 INJECTION, SOLUTION INTRAMUSCULAR; INTRAVENOUS; SUBCUTANEOUS at 18:23

## 2020-01-01 RX ADMIN — FAMOTIDINE 20 MG: 10 INJECTION, SOLUTION INTRAVENOUS at 20:55

## 2020-01-01 RX ADMIN — IPRATROPIUM BROMIDE AND ALBUTEROL SULFATE 3 ML: .5; 3 SOLUTION RESPIRATORY (INHALATION) at 16:26

## 2020-01-01 RX ADMIN — HEPARIN SODIUM 5000 UNITS: 5000 INJECTION INTRAVENOUS; SUBCUTANEOUS at 21:02

## 2020-01-01 RX ADMIN — INSULIN LISPRO 3 UNITS: 100 INJECTION, SOLUTION INTRAVENOUS; SUBCUTANEOUS at 15:10

## 2020-01-01 RX ADMIN — FAMOTIDINE 20 MG: 10 INJECTION, SOLUTION INTRAVENOUS at 09:03

## 2020-01-01 RX ADMIN — METHYLPREDNISOLONE SODIUM SUCCINATE 40 MG: 40 INJECTION, POWDER, FOR SOLUTION INTRAMUSCULAR; INTRAVENOUS at 21:27

## 2020-01-01 RX ADMIN — DEXTROSE MONOHYDRATE, SODIUM CHLORIDE, AND POTASSIUM CHLORIDE 75 ML/HR: 50; 4.5; 1.49 INJECTION, SOLUTION INTRAVENOUS at 08:27

## 2020-01-01 RX ADMIN — HEPARIN SODIUM 5000 UNITS: 5000 INJECTION INTRAVENOUS; SUBCUTANEOUS at 04:40

## 2020-01-01 RX ADMIN — INSULIN GLARGINE 5 UNITS: 100 INJECTION, SOLUTION SUBCUTANEOUS at 11:46

## 2020-01-01 RX ADMIN — METOPROLOL TARTRATE 25 MG: 25 TABLET, FILM COATED ORAL at 20:53

## 2020-01-01 RX ADMIN — METHYLPREDNISOLONE SODIUM SUCCINATE 40 MG: 40 INJECTION, POWDER, FOR SOLUTION INTRAMUSCULAR; INTRAVENOUS at 20:40

## 2020-01-01 RX ADMIN — SODIUM CHLORIDE 75 ML/HR: 450 INJECTION, SOLUTION INTRAVENOUS at 09:56

## 2020-01-01 RX ADMIN — HYDROMORPHONE HYDROCHLORIDE 1 MG: 1 INJECTION, SOLUTION INTRAMUSCULAR; INTRAVENOUS; SUBCUTANEOUS at 17:47

## 2020-01-01 RX ADMIN — PIPERACILLIN AND TAZOBACTAM 3.38 G: 3; .375 INJECTION, POWDER, LYOPHILIZED, FOR SOLUTION INTRAVENOUS at 15:23

## 2020-01-01 RX ADMIN — HYDROMORPHONE HYDROCHLORIDE 0.5 MG: 2 INJECTION INTRAMUSCULAR; INTRAVENOUS; SUBCUTANEOUS at 14:15

## 2020-01-01 RX ADMIN — METHYLPREDNISOLONE SODIUM SUCCINATE 40 MG: 40 INJECTION, POWDER, FOR SOLUTION INTRAMUSCULAR; INTRAVENOUS at 09:00

## 2020-01-01 RX ADMIN — METOROPROLOL TARTRATE 5 MG: 5 INJECTION, SOLUTION INTRAVENOUS at 18:13

## 2020-01-01 RX ADMIN — PIPERACILLIN AND TAZOBACTAM 3.38 G: 3; .375 INJECTION, POWDER, LYOPHILIZED, FOR SOLUTION INTRAVENOUS at 18:51

## 2020-01-01 RX ADMIN — METOROPROLOL TARTRATE 5 MG: 5 INJECTION, SOLUTION INTRAVENOUS at 11:51

## 2020-01-01 RX ADMIN — SODIUM CHLORIDE 150 ML/HR: 900 INJECTION, SOLUTION INTRAVENOUS at 02:00

## 2020-01-01 RX ADMIN — HEPARIN SODIUM 5000 UNITS: 5000 INJECTION INTRAVENOUS; SUBCUTANEOUS at 20:44

## 2020-01-01 RX ADMIN — HYDROMORPHONE HYDROCHLORIDE 1 MG: 1 INJECTION, SOLUTION INTRAMUSCULAR; INTRAVENOUS; SUBCUTANEOUS at 13:06

## 2020-01-01 RX ADMIN — INSULIN GLARGINE 10 UNITS: 100 INJECTION, SOLUTION SUBCUTANEOUS at 21:31

## 2020-01-01 RX ADMIN — SODIUM CHLORIDE, SODIUM LACTATE, POTASSIUM CHLORIDE, AND CALCIUM CHLORIDE 50 ML/HR: 600; 310; 30; 20 INJECTION, SOLUTION INTRAVENOUS at 21:08

## 2020-01-01 RX ADMIN — FENTANYL CITRATE 50 MCG: 50 INJECTION, SOLUTION INTRAMUSCULAR; INTRAVENOUS at 17:36

## 2020-01-01 RX ADMIN — PIPERACILLIN AND TAZOBACTAM 3.38 G: 3; .375 INJECTION, POWDER, LYOPHILIZED, FOR SOLUTION INTRAVENOUS at 13:48

## 2020-01-01 RX ADMIN — METOROPROLOL TARTRATE 5 MG: 5 INJECTION, SOLUTION INTRAVENOUS at 13:11

## 2020-01-01 RX ADMIN — HEPARIN SODIUM 5000 UNITS: 5000 INJECTION INTRAVENOUS; SUBCUTANEOUS at 03:31

## 2020-01-01 RX ADMIN — SODIUM CHLORIDE 15 ML/HR: 900 INJECTION, SOLUTION INTRAVENOUS at 10:04

## 2020-01-01 RX ADMIN — INSULIN LISPRO 3 UNITS: 100 INJECTION, SOLUTION INTRAVENOUS; SUBCUTANEOUS at 18:02

## 2020-01-01 RX ADMIN — HEPARIN SODIUM 5000 UNITS: 5000 INJECTION INTRAVENOUS; SUBCUTANEOUS at 19:54

## 2020-01-01 RX ADMIN — MORPHINE SULFATE 4 MG: 2 INJECTION, SOLUTION INTRAMUSCULAR; INTRAVENOUS at 02:37

## 2020-01-01 RX ADMIN — HYDROMORPHONE HYDROCHLORIDE 1 MG: 1 INJECTION, SOLUTION INTRAMUSCULAR; INTRAVENOUS; SUBCUTANEOUS at 20:06

## 2020-01-01 RX ADMIN — PHENYLEPHRINE HYDROCHLORIDE 200 MCG: 10 INJECTION INTRAVENOUS at 12:31

## 2020-01-01 RX ADMIN — SODIUM CHLORIDE AND POTASSIUM CHLORIDE: 4.5; 1.49 INJECTION, SOLUTION INTRAVENOUS at 09:19

## 2020-01-01 RX ADMIN — INSULIN GLARGINE 5 UNITS: 100 INJECTION, SOLUTION SUBCUTANEOUS at 21:45

## 2020-01-01 RX ADMIN — MORPHINE SULFATE 2 MG: 2 INJECTION, SOLUTION INTRAMUSCULAR; INTRAVENOUS at 23:21

## 2020-01-01 RX ADMIN — ONDANSETRON 8 MG: 2 INJECTION INTRAMUSCULAR; INTRAVENOUS at 16:15

## 2020-01-01 RX ADMIN — SODIUM CHLORIDE: 234 INJECTION INTRAMUSCULAR; INTRAVENOUS; SUBCUTANEOUS at 22:11

## 2020-01-01 RX ADMIN — SUCCINYLCHOLINE CHLORIDE 120 MG: 20 INJECTION INTRAMUSCULAR; INTRAVENOUS at 19:37

## 2020-01-01 RX ADMIN — DEXAMETHASONE SODIUM PHOSPHATE 10 MG: 4 INJECTION, SOLUTION INTRA-ARTICULAR; INTRALESIONAL; INTRAMUSCULAR; INTRAVENOUS; SOFT TISSUE at 14:18

## 2020-01-01 RX ADMIN — IPRATROPIUM BROMIDE AND ALBUTEROL SULFATE 3 ML: .5; 3 SOLUTION RESPIRATORY (INHALATION) at 20:21

## 2020-01-01 RX ADMIN — FENTANYL CITRATE 100 MCG/HR: 50 INJECTION, SOLUTION INTRAMUSCULAR; INTRAVENOUS at 23:38

## 2020-01-01 RX ADMIN — HYDROMORPHONE HYDROCHLORIDE 1 MG: 1 INJECTION, SOLUTION INTRAMUSCULAR; INTRAVENOUS; SUBCUTANEOUS at 02:46

## 2020-01-01 RX ADMIN — FAMOTIDINE 20 MG: 10 INJECTION, SOLUTION INTRAVENOUS at 01:24

## 2020-01-01 RX ADMIN — PIPERACILLIN AND TAZOBACTAM 3.38 G: 3; .375 INJECTION, POWDER, LYOPHILIZED, FOR SOLUTION INTRAVENOUS at 03:31

## 2020-01-01 RX ADMIN — PIPERACILLIN AND TAZOBACTAM 3.38 G: 3; .375 INJECTION, POWDER, LYOPHILIZED, FOR SOLUTION INTRAVENOUS at 14:08

## 2020-01-01 RX ADMIN — SUCCINYLCHOLINE CHLORIDE 120 MG: 20 INJECTION, SOLUTION INTRAMUSCULAR; INTRAVENOUS at 19:37

## 2020-01-01 RX ADMIN — IPRATROPIUM BROMIDE AND ALBUTEROL SULFATE 3 ML: .5; 3 SOLUTION RESPIRATORY (INHALATION) at 09:06

## 2020-01-01 RX ADMIN — METOROPROLOL TARTRATE 5 MG: 5 INJECTION, SOLUTION INTRAVENOUS at 00:27

## 2020-01-01 RX ADMIN — SODIUM CHLORIDE, SODIUM LACTATE, POTASSIUM CHLORIDE, AND CALCIUM CHLORIDE 100 ML/HR: 600; 310; 30; 20 INJECTION, SOLUTION INTRAVENOUS at 14:33

## 2020-01-01 RX ADMIN — DEXMEDETOMIDINE 0.4 MCG/KG/HR: 100 INJECTION, SOLUTION, CONCENTRATE INTRAVENOUS at 23:30

## 2020-01-01 RX ADMIN — METHYLPREDNISOLONE SODIUM SUCCINATE 40 MG: 40 INJECTION, POWDER, FOR SOLUTION INTRAMUSCULAR; INTRAVENOUS at 20:55

## 2020-01-01 RX ADMIN — WATER 30 MG: 1 INJECTION INTRAMUSCULAR; INTRAVENOUS; SUBCUTANEOUS at 01:21

## 2020-01-01 RX ADMIN — CLINDAMYCIN PHOSPHATE 600 MG: 150 INJECTION, SOLUTION INTRAVENOUS at 23:44

## 2020-01-01 RX ADMIN — HEPARIN SODIUM 5000 UNITS: 5000 INJECTION INTRAVENOUS; SUBCUTANEOUS at 13:37

## 2020-01-01 RX ADMIN — INSULIN LISPRO 2 UNITS: 100 INJECTION, SOLUTION INTRAVENOUS; SUBCUTANEOUS at 18:41

## 2020-01-01 RX ADMIN — INSULIN GLARGINE 5 UNITS: 100 INJECTION, SOLUTION SUBCUTANEOUS at 08:20

## 2020-01-01 RX ADMIN — MORPHINE SULFATE 1 MG: 2 INJECTION, SOLUTION INTRAMUSCULAR; INTRAVENOUS at 09:11

## 2020-01-01 RX ADMIN — INSULIN LISPRO 2 UNITS: 100 INJECTION, SOLUTION INTRAVENOUS; SUBCUTANEOUS at 23:55

## 2020-01-01 RX ADMIN — SODIUM CHLORIDE 10 MCG/MIN: 900 INJECTION, SOLUTION INTRAVENOUS at 22:21

## 2020-01-01 RX ADMIN — PIPERACILLIN AND TAZOBACTAM 3.38 G: 3; .375 INJECTION, POWDER, LYOPHILIZED, FOR SOLUTION INTRAVENOUS at 03:24

## 2020-01-01 RX ADMIN — MIDAZOLAM HYDROCHLORIDE 1 MG: 2 INJECTION, SOLUTION INTRAMUSCULAR; INTRAVENOUS at 21:14

## 2020-01-01 RX ADMIN — ONDANSETRON 6 MG: 2 INJECTION INTRAMUSCULAR; INTRAVENOUS at 16:31

## 2020-01-01 RX ADMIN — FAMOTIDINE 20 MG: 10 INJECTION, SOLUTION INTRAVENOUS at 09:04

## 2020-01-01 RX ADMIN — FAMOTIDINE 20 MG: 10 INJECTION, SOLUTION INTRAVENOUS at 08:19

## 2020-01-01 RX ADMIN — SODIUM CHLORIDE 30 MCG/MIN: 900 INJECTION, SOLUTION INTRAVENOUS at 03:25

## 2020-01-01 RX ADMIN — PHENYLEPHRINE HYDROCHLORIDE 200 MCG: 10 INJECTION INTRAVENOUS at 12:13

## 2020-01-01 RX ADMIN — MORPHINE SULFATE 1 MG: 2 INJECTION, SOLUTION INTRAMUSCULAR; INTRAVENOUS at 13:03

## 2020-01-01 RX ADMIN — FAMOTIDINE 20 MG: 10 INJECTION, SOLUTION INTRAVENOUS at 08:20

## 2020-01-01 RX ADMIN — IPRATROPIUM BROMIDE AND ALBUTEROL SULFATE 3 ML: .5; 3 SOLUTION RESPIRATORY (INHALATION) at 15:27

## 2020-01-01 RX ADMIN — PIPERACILLIN AND TAZOBACTAM 3.38 G: 3; .375 INJECTION, POWDER, LYOPHILIZED, FOR SOLUTION INTRAVENOUS at 15:28

## 2020-01-01 RX ADMIN — FAMOTIDINE 20 MG: 10 INJECTION, SOLUTION INTRAVENOUS at 21:55

## 2020-01-01 RX ADMIN — ZIPRASIDONE MESYLATE 30 MG: 20 INJECTION, POWDER, LYOPHILIZED, FOR SOLUTION INTRAMUSCULAR at 18:25

## 2020-01-01 RX ADMIN — INSULIN LISPRO 3 UNITS: 100 INJECTION, SOLUTION INTRAVENOUS; SUBCUTANEOUS at 17:52

## 2020-01-01 RX ADMIN — METOROPROLOL TARTRATE 5 MG: 5 INJECTION, SOLUTION INTRAVENOUS at 07:44

## 2020-01-01 RX ADMIN — PIPERACILLIN AND TAZOBACTAM 3.38 G: 3; .375 INJECTION, POWDER, LYOPHILIZED, FOR SOLUTION INTRAVENOUS at 03:19

## 2020-01-01 RX ADMIN — ATORVASTATIN CALCIUM 80 MG: 80 TABLET, FILM COATED ORAL at 08:29

## 2020-01-01 RX ADMIN — INSULIN LISPRO 3 UNITS: 100 INJECTION, SOLUTION INTRAVENOUS; SUBCUTANEOUS at 11:42

## 2020-01-01 RX ADMIN — MORPHINE SULFATE 1 MG: 2 INJECTION, SOLUTION INTRAMUSCULAR; INTRAVENOUS at 06:03

## 2020-01-01 RX ADMIN — FENTANYL CITRATE 125 MCG/HR: 50 INJECTION, SOLUTION INTRAMUSCULAR; INTRAVENOUS at 12:46

## 2020-01-01 RX ADMIN — IPRATROPIUM BROMIDE AND ALBUTEROL SULFATE 3 ML: .5; 3 SOLUTION RESPIRATORY (INHALATION) at 08:22

## 2020-01-01 RX ADMIN — ASPIRIN 81 MG 81 MG: 81 TABLET ORAL at 08:22

## 2020-01-01 RX ADMIN — INSULIN LISPRO 3 UNITS: 100 INJECTION, SOLUTION INTRAVENOUS; SUBCUTANEOUS at 23:21

## 2020-01-01 RX ADMIN — SODIUM CHLORIDE 40 MG: 9 INJECTION, SOLUTION INTRAMUSCULAR; INTRAVENOUS; SUBCUTANEOUS at 09:19

## 2020-01-01 RX ADMIN — INSULIN GLARGINE 5 UNITS: 100 INJECTION, SOLUTION SUBCUTANEOUS at 22:45

## 2020-01-01 RX ADMIN — INSULIN LISPRO 2 UNITS: 100 INJECTION, SOLUTION INTRAVENOUS; SUBCUTANEOUS at 19:00

## 2020-01-01 RX ADMIN — METHYLPREDNISOLONE SODIUM SUCCINATE 40 MG: 40 INJECTION, POWDER, FOR SOLUTION INTRAMUSCULAR; INTRAVENOUS at 09:20

## 2020-01-01 RX ADMIN — HEPARIN SODIUM 5000 UNITS: 5000 INJECTION INTRAVENOUS; SUBCUTANEOUS at 21:27

## 2020-01-01 RX ADMIN — INSULIN GLARGINE 5 UNITS: 100 INJECTION, SOLUTION SUBCUTANEOUS at 23:07

## 2020-01-01 RX ADMIN — POTASSIUM CHLORIDE 10 MEQ: 10 INJECTION, SOLUTION INTRAVENOUS at 19:11

## 2020-01-01 RX ADMIN — IPRATROPIUM BROMIDE AND ALBUTEROL SULFATE 3 ML: .5; 3 SOLUTION RESPIRATORY (INHALATION) at 12:32

## 2020-01-01 RX ADMIN — FENTANYL CITRATE 50 MCG: 50 INJECTION, SOLUTION INTRAMUSCULAR; INTRAVENOUS at 12:34

## 2020-01-01 RX ADMIN — IPRATROPIUM BROMIDE AND ALBUTEROL SULFATE 3 ML: .5; 3 SOLUTION RESPIRATORY (INHALATION) at 12:05

## 2020-01-01 RX ADMIN — INSULIN LISPRO 6 UNITS: 100 INJECTION, SOLUTION INTRAVENOUS; SUBCUTANEOUS at 06:38

## 2020-01-01 RX ADMIN — ATORVASTATIN CALCIUM 80 MG: 80 TABLET, FILM COATED ORAL at 08:17

## 2020-01-01 RX ADMIN — SODIUM CHLORIDE AND POTASSIUM CHLORIDE: 4.5; 1.49 INJECTION, SOLUTION INTRAVENOUS at 15:35

## 2020-01-01 RX ADMIN — INSULIN GLARGINE 5 UNITS: 100 INJECTION, SOLUTION SUBCUTANEOUS at 23:10

## 2020-01-01 RX ADMIN — MIDAZOLAM HYDROCHLORIDE 1 MG: 2 INJECTION, SOLUTION INTRAMUSCULAR; INTRAVENOUS at 16:07

## 2020-01-01 RX ADMIN — OXYCODONE HYDROCHLORIDE AND ACETAMINOPHEN 1 TABLET: 5; 325 TABLET ORAL at 20:53

## 2020-01-01 RX ADMIN — INSULIN LISPRO 4 UNITS: 100 INJECTION, SOLUTION INTRAVENOUS; SUBCUTANEOUS at 06:25

## 2020-01-01 RX ADMIN — SODIUM CHLORIDE, SODIUM LACTATE, POTASSIUM CHLORIDE, AND CALCIUM CHLORIDE 50 ML/HR: 600; 310; 30; 20 INJECTION, SOLUTION INTRAVENOUS at 13:26

## 2020-01-01 RX ADMIN — MIDAZOLAM HYDROCHLORIDE 1 MG: 2 INJECTION, SOLUTION INTRAMUSCULAR; INTRAVENOUS at 13:37

## 2020-01-01 RX ADMIN — HALOPERIDOL LACTATE 2.5 MG: 5 INJECTION, SOLUTION INTRAMUSCULAR at 23:58

## 2020-01-01 RX ADMIN — INSULIN GLARGINE 5 UNITS: 100 INJECTION, SOLUTION SUBCUTANEOUS at 22:20

## 2020-01-01 RX ADMIN — DEXMEDETOMIDINE 0.5 MCG/KG/HR: 100 INJECTION, SOLUTION, CONCENTRATE INTRAVENOUS at 21:27

## 2020-01-01 RX ADMIN — MORPHINE SULFATE 1 MG: 2 INJECTION, SOLUTION INTRAMUSCULAR; INTRAVENOUS at 16:17

## 2020-01-01 RX ADMIN — SODIUM CHLORIDE 75 ML/HR: 450 INJECTION, SOLUTION INTRAVENOUS at 12:00

## 2020-01-01 RX ADMIN — SODIUM CHLORIDE 75 ML/HR: 450 INJECTION, SOLUTION INTRAVENOUS at 00:38

## 2020-01-01 RX ADMIN — POTASSIUM CHLORIDE 10 MEQ: 10 INJECTION, SOLUTION INTRAVENOUS at 18:23

## 2020-01-01 RX ADMIN — SODIUM CHLORIDE AND POTASSIUM CHLORIDE: 4.5; 1.49 INJECTION, SOLUTION INTRAVENOUS at 22:45

## 2020-01-01 RX ADMIN — MAGNESIUM SULFATE HEPTAHYDRATE 2 G: 40 INJECTION, SOLUTION INTRAVENOUS at 22:58

## 2020-01-01 RX ADMIN — Medication 10 ML: at 18:32

## 2020-01-01 RX ADMIN — IPRATROPIUM BROMIDE AND ALBUTEROL SULFATE 3 ML: .5; 3 SOLUTION RESPIRATORY (INHALATION) at 15:47

## 2020-01-01 RX ADMIN — IPRATROPIUM BROMIDE AND ALBUTEROL SULFATE 3 ML: .5; 3 SOLUTION RESPIRATORY (INHALATION) at 20:44

## 2020-01-01 RX ADMIN — EPINEPHRINE 0.5 MG: 0.1 INJECTION, SOLUTION ENDOTRACHEAL; INTRACARDIAC; INTRAVENOUS at 21:45

## 2020-01-01 RX ADMIN — INSULIN GLARGINE 5 UNITS: 100 INJECTION, SOLUTION SUBCUTANEOUS at 09:19

## 2020-01-01 RX ADMIN — FENTANYL CITRATE 200 MCG/HR: 50 INJECTION, SOLUTION INTRAMUSCULAR; INTRAVENOUS at 09:44

## 2020-01-01 RX ADMIN — IPRATROPIUM BROMIDE AND ALBUTEROL SULFATE 3 ML: .5; 3 SOLUTION RESPIRATORY (INHALATION) at 08:13

## 2020-01-01 RX ADMIN — CLINDAMYCIN PHOSPHATE 600 MG: 150 INJECTION, SOLUTION INTRAVENOUS at 18:27

## 2020-07-03 PROBLEM — R77.8 ELEVATED TROPONIN: Status: ACTIVE | Noted: 2020-01-01

## 2020-07-03 PROBLEM — K56.609 SBO (SMALL BOWEL OBSTRUCTION) (HCC): Status: ACTIVE | Noted: 2020-01-01

## 2020-07-03 PROBLEM — N17.9 AKI (ACUTE KIDNEY INJURY) (HCC): Status: ACTIVE | Noted: 2020-01-01

## 2020-07-03 PROBLEM — E11.9 DIABETES (HCC): Status: ACTIVE | Noted: 2020-01-01

## 2020-07-03 PROBLEM — I10 HTN (HYPERTENSION): Status: ACTIVE | Noted: 2020-01-01

## 2020-07-03 NOTE — PROGRESS NOTES
TRANSFER - IN REPORT: 
 
Verbal report received from Venancio RN(name) on Meryl Tang  being received from ED(unit) for routine progression of care Report consisted of patients Situation, Background, Assessment and  
Recommendations(SBAR). Information from the following report(s) SBAR and Kardex was reviewed with the receiving nurse. Opportunity for questions and clarification was provided. Assessment completed upon patients arrival to unit and care assumed. Patient has a salem slump to the right nare for decompression, suction set to low intermittent suction with greenish drainage. Patient is complaining of nausea. Per report patient received Zofran at approximately 1200. MD notified, will continue to monitor.

## 2020-07-03 NOTE — H&P
Internal Medicine History and Physical 
   
 
 
Subjective HPI: Misael Barnett is a 80 y.o. male with a PMHx of DM, HTN who presented to the ED with multiple days of constipation and abd pain. He states the last two day the pain has been so severe that he's been vomiting as well. Imaging reveals SBO with transition point in left midabdomen where there is a suspected short segment intussusception. Patient reports hx of bowel resection per ER notes. GS was consulted from the ED. NGT to be placed. Labs also reveal elevated creatinine 3.24, no prior labs, so unsure if acute or chronic. Patient will be admitted for further evaluation and treatment. PMHx: 
Past Medical History:  
Diagnosis Date  Diabetes ([facility] [address]  Hypertension PSurgHx: 
History reviewed. No pertinent surgical history. SocialHx: 
Social History Socioeconomic History  Marital status:  Spouse name: Not on file  Number of children: Not on file  Years of education: Not on file  Highest education level: Not on file Occupational History  Not on file Social Needs  Financial resource strain: Not on file  Food insecurity Worry: Not on file Inability: Not on file  Transportation needs Medical: Not on file Non-medical: Not on file Tobacco Use  Smoking status: Never Smoker  Smokeless tobacco: Never Used Substance and Sexual Activity  Alcohol use: Not on file  Drug use: Not on file  Sexual activity: Not on file Lifestyle  Physical activity Days per week: Not on file Minutes per session: Not on file  Stress: Not on file Relationships  Social connections Talks on phone: Not on file Gets together: Not on file Attends Restoration service: Not on file Active member of club or organization: Not on file Attends meetings of clubs or organizations: Not on file Relationship status: Not on file  Intimate partner violence Fear of current or ex partner: Not on file Emotionally abused: Not on file Physically abused: Not on file Forced sexual activity: Not on file Other Topics Concern  Not on file Social History Narrative  Not on file FamilyHx: 
History reviewed. No pertinent family history. Home Medications: 
Prior to Admission Medications Prescriptions Last Dose Informant Patient Reported? Taking? Cetirizine 10 mg cap   Yes Yes Sig: Take  by mouth. albuterol (PROVENTIL HFA) 90 mcg/actuation inhaler   Yes No  
Sig: Take 1 Puff by inhalation daily. atenolol (TENORMIN) 50 mg tablet 7/3/2020 at Unknown time  Yes Yes Sig: Take 50 mg by mouth daily. atorvastatin (LIPITOR) 80 mg tablet 7/3/2020 at Unknown time  Yes Yes Sig: Take 80 mg by mouth daily. benzonatate (TESSALON) 100 mg capsule   Yes Yes Sig: Take 100 mg by mouth three (3) times daily as needed for Cough. glipiZIDE (GLUCOTROL) 10 mg tablet 7/3/2020 at Unknown time  Yes Yes Sig: Take 10 mg by mouth two (2) times a day. hydroCHLOROthiazide (HYDRODIURIL) 25 mg tablet 7/3/2020 at Unknown time  Yes Yes Sig: Take 25 mg by mouth daily. insulin NPH (NOVOLIN N NPH U-100 INSULIN) 100 unit/mL injection   Yes No  
Si Units by SubCUTAneous route daily. losartan (COZAAR) 50 mg tablet 7/3/2020 at Unknown time  Yes Yes Sig: Take  by mouth daily. Facility-Administered Medications: None Allergies: Allergies Allergen Reactions  Iodinated Contrast Media Swelling  Lisinopril Swelling Review of Systems: 
CONST: Fever, weight loss, fatigue or chills HEENT: Recent changes in vision, vertigo, epistaxis, dysphagia and hoarseness CV: Chest pain, palpitations, HTN, edema and varicosities RESP: Cough, shortness of breath, wheezing, hemoptysis, snoring and reactive airway disease GI: Nausea, vomiting, abdominal pain, change in bowel habits, hematochezia, melena, and GERD  
 : Hematuria, dysuria, frequency, urgency, nocturia and stress urinary incontinence MS: Weakness, joint pain and arthritis ENDO: Diabetes, thyroid disease, polyuria, polydipsia, polyphagia, poor wound healing, heat intolerance, cold intolerance LYMPH/HEME: Anemia, bruising and history of blood transfusions INTEG: Dermatitis, abnormal moles NEURO: Dizziness, headache, fainting, seizures and stroke PSYCH: Anxiety and depression Objective Visit Vitals /57 (BP 1 Location: Left arm, BP Patient Position: At rest) Pulse 88 Temp 98 °F (36.7 °C) Resp 15 Ht 5' 7\" (1.702 m) Wt 79.4 kg (175 lb) SpO2 100% BMI 27.41 kg/m² Physical Exam: 
General Appearance: NAD, conversant HENT: normocephalic/atraumatic, moist mucus membranes Lungs: CTA with normal respiratory effort Cardiovascular: RRR, no m/r/g, capillary refill < 2 sec, B/L DP/PT pulses +3/4 Abdomen: soft, LLQ and epigastric TTP, no guarding, +bowel sounds Extremities: no cyanosis, no peripheral edema Neuro: moves all extremities, no focal deficits Psych: appropriate affect, alert and oriented to person, place and time Laboratory Studies: 
CMP:  
Lab Results Component Value Date/Time  (L) 07/03/2020 09:36 AM  
 K 4.3 07/03/2020 09:36 AM  
 CL 98 (L) 07/03/2020 09:36 AM  
 CO2 29 07/03/2020 09:36 AM  
 AGAP 7 07/03/2020 09:36 AM  
  (H) 07/03/2020 09:36 AM  
 BUN 45 (H) 07/03/2020 09:36 AM  
 CREA 3.24 (H) 07/03/2020 09:36 AM  
 GFRAA 22 (L) 07/03/2020 09:36 AM  
 GFRNA 18 (L) 07/03/2020 09:36 AM  
 CA 9.9 07/03/2020 09:36 AM  
 ALB 4.3 07/03/2020 09:36 AM  
 TP 9.0 (H) 07/03/2020 09:36 AM  
 GLOB 4.7 (H) 07/03/2020 09:36 AM  
 AGRAT 0.9 07/03/2020 09:36 AM  
 ALT 22 07/03/2020 09:36 AM  
 
CBC:  
Lab Results Component Value Date/Time  WBC 8.6 07/03/2020 09:36 AM  
 HGB 14.4 07/03/2020 09:36 AM  
 HCT 42.5 07/03/2020 09:36 AM  
  07/03/2020 09:36 AM  
 
 All Cardiac Markers in the last 24 hours:  
Lab Results Component Value Date/Time  (H) 07/03/2020 09:36 AM  
 CKMB 4.2 (H) 07/03/2020 09:36 AM  
 CKND1 1.2 07/03/2020 09:36 AM  
 TROIQ 0.05 (H) 07/03/2020 09:36 AM  
 
 
Imaging Reviewed: 
Ct Abd Pelv Wo Cont Result Date: 7/3/2020 EXAM: CT of the abdomen and pelvis CLINICAL INDICATION/HISTORY: Abdominal pain. Left lower quadrant pain. Concern for diverticulitis versus obstruction   > Additional: None. COMPARISON: None. > Reference Exam: None. TECHNIQUE: Axial CT imaging of the abdomen and pelvis was performed without intravenous contrast. Multiplanar reformats were generated. One or more dose reduction techniques were used on this CT: automated exposure control, adjustment of the mAs and/or kVp according to patient size, and iterative reconstruction techniques. The specific techniques used on this CT exam have been documented in the patient's electronic medical record. Digital Imaging and Communications in Medicine (DICOM) format image data are available to nonaffiliated external healthcare facilities or entities on a secure, media free, reciprocally searchable basis with patient authorization for at least a 12-month period after this study. _______________ FINDINGS: LOWER CHEST: There is bronchiectasis and scarring in the bilateral lung bases with scattered subsegmental atelectasis. There are centrilobular nodular opacities in the medial right lung base and to a lesser extent on the left. Additionally there are calcified pleural plaques bilaterally. No pleural effusion. Multivessel coronary artery calcifications are noted. No pericardial effusion. LIVER, BILIARY: Liver is normal. No biliary dilation. Multiple dependent gallstones are noted within the lumen of the gallbladder.  PANCREAS: Normal. SPLEEN: Normal. ADRENALS: Normal. KIDNEYS/URETERS/BLADDER: Bilateral renal cysts including a dominant exophytic cyst arising from the upper pole of the right kidney which measures up to 7.4 cm in diameter. No hydronephrosis. LYMPH NODES: No enlarged lymph nodes. GASTROINTESTINAL TRACT: There are multiple dilated and fluid-filled loops of small bowel in the mid to lower abdomen. Dilated loops of small bowel measure up to approximately 4 cm in diameter with scattered air-fluid levels. The stomach is also distended and fluid-filled. There is an apparent transition point in the left mid abdomen where the dilated loops of small bowel rapidly taper to a decompressed loop of bowel with decompressed distal loops best appreciated on coronal series image 46 and 47 and axial series images 113-108, potentially a subtle intussusception on image 113. No discrete mass is appreciated. Normal appendix. PELVIC ORGANS: Unremarkable. VASCULATURE: Scattered aortic atherosclerosis. BONES: No acute or aggressive osseous abnormalities identified. Degenerative changes are noted throughout the spine with multilevel spondylosis and facet arthrosis. Severe degenerative disc disease is noted throughout the lower thoracic and lumbar spine. Grade 1 anterolisthesis of L4 on L5. OTHER: No ascites. Subcutaneous nodular density along the right anterolateral abdominal wall is noted and to a lesser extent of left of midline, potentially sites of subcutaneous medication injection. _______________ IMPRESSION: 1. Intermediate grade partial small bowel obstruction with an apparent transition point in the left midabdomen where there is a suspected short segment intussusception resulting in proximal dilated loops of small bowel and stomach and distal decompressed loops. No evidence of pneumatosis or free air. 2. Cholelithiasis. 3. 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 Scattered centrilobular nodular opacities suggest superimposed infectious or inflammatory bronchiolitis.  Bilateral renal cysts and additional chronic ancillary findings as above. Xr Chest [facility] Result Date: 7/3/2020 EXAM: XR CHEST PORT CLINICAL INDICATION/HISTORY: abd pain   > Additional: None. COMPARISON: None. TECHNIQUE: Portable chest _______________ FINDINGS: SUPPORT DEVICES: None. HEART AND MEDIASTINUM: Normal size and contour. Normal pulmonary vasculature. LUNGS AND PLEURAL SPACES: Bibasilar bronchiectasis. Prominent calcified pleural plaques predominantly along the left lung are noted. No focal consolidation. BONY THORAX AND SOFT TISSUES: No acute osseous abnormality. 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 No consolidation. EKG:  
Normal sinus rhythm Moderate voltage criteria for LVH, may be normal variant Assessment/Plan Principal Problem: 
  SBO (small bowel obstruction) ([facility] [address] (7/3/2020) Active Problems: 
  MARCI (acute kidney injury) ([facility] [address] (7/3/2020) Diabetes ([facility] [address] (7/3/2020) HTN (hypertension) (7/3/2020) Elevated troponin (7/3/2020) SBO 
- appreciate GS 
- IV fluids 
- NGT 
- NPO 
- PRN pain control MARCI 
- vs chronic, unsure of baseline - IV fluids 
- monitor BMP 
- hold nephrotoxic medications DM 
- lantus/ssi 
- monitor BG 
 
HTN 
- PRN hydralazine while NPO Elevated trop 
- 0.05 
- trend 
- EKG without concern for ACS, denies cardiac sx 
 
- Cont acceptable home medications for chronic conditions  
- DVT protocol I have personally reviewed all pertinent labs, films and EKGs that have officially resulted. I reviewed available electronic documentation outlining the initial presentation as well as the emergency room physician's encounter. J Luis Grant PA-C [facility] Hospitalist Division Office:  [phone] Pager: [phone]

## 2020-07-03 NOTE — ED NOTES
TRANSFER - ED to INPATIENT REPORT: 
 
Verbal report given to Zoey harrington RN(name) on Giovany Greene  being transferred to 2200(unit) for routine progression of care Report consisted of patients Situation, Background, Assessment and  
Recommendations(SBAR). SBAR report made available to receiving floor on this patient being transferred to [address] [place] [address] (2200)  for routine progression of care Admitting diagnosis SBO (small bowel obstruction) ([facility] [address] [C34.329] HTN (hypertension) [I10] Diabetes ([facility] [address] [E11.9] Information from the following report(s) ED Summary and MAR was made available to receiving floor. Lines:  
Peripheral IV 07/03/20 Left Wrist (Active) Site Assessment Clean, dry, & intact 7/3/2020  9:34 AM  
Phlebitis Assessment 0 7/3/2020  9:34 AM  
Infiltration Assessment 0 7/3/2020  9:34 AM  
Dressing Status Clean, dry, & intact 7/3/2020  9:34 AM  
Dressing Type Transparent 7/3/2020  9:34 AM  
Hub Color/Line Status Pink;Patent; Flushed 7/3/2020  9:34 AM  
  
 
HCG Status for ALL Females under 53 y/o: NO Medication list confirmed with patient Opportunity for questions and clarification was provided. Patient is oriented to time, place, person and situation Patient is  continent and ambulatory without assist 
  
Valuables transported with patient Patient transported with: 
 Tech 
 
MAP (Monitor): 85 =Monitored (most recent) Vitals w/ MEWS Score (last day) Date/Time MEWS Score Pulse Resp Temp BP Level of Consciousness SpO2  
 07/03/20 12:44:52  1  86  16  98 °F (36.7 °C)  128/72  Alert  96 % 07/03/20 1200          128/72    99 % 07/03/20 0927  1  88  15  98 °F (36.7 °C)  120/57  Alert  100 % Septic Patients:  
 
Lactic Acid No results found for: LACPOC (Most recent on top) Repeat drawn: NA Time: NA  
 
ALL LACTIC ACIDS GREATER THAN 2 MUST BE REPEATED POC WITHIN 4 HOURS OR PRIOR TO ADMISSION          
 
 
 Total Fluid Bolus initiated and documented on MAR: NA All ordered antibiotics initiated within first 3 hours of TIME ZERO?   NA

## 2020-07-03 NOTE — CONSULTS
Gastroenterology Consult Patient: Vivien Valderrama MRN: [id number]  SSN: [id number] YOB: 1935  Age: 80 y.o. Sex: male Assessment:  
1) Small bowel obstruction with possible intussusception 2) Nausea and vomiting secondary to above 3) Elevated creatinine 3.24; unclear baseline 4) Mild lipase elevation, likely secondary to bowel obstruction/vomiting 5) Mild troponin elevation 6) Chronic intermittent abd pain with constipation 7) Hematochezia 81 y/o M admitted with small bowel obstruction. Question of possible intussusception on CT scan. This may be related to adhesions or radiation changes but intussusception (if truly present) in adults is typically a result of a specific lead point and could be related to inflammatory process, stricture, polyp, mass. He does have some chronic abd pain which seems to be related to constipation and he has intermittent blood in the stool. It's possible that these additional symptoms are secondary to whatever process is causing his bowel obstruction. Defer management of the bowel obstruction to Dr Dorothea Fabian and appreciate his input. If surgery is not pursued and his symptoms resolve, would suggest small bowel follow through to evaluate the possible intussusception. He should ultimately have a colonoscopy to evaluate the hematochezia. Plan:  
-Defer to Dr Dorothea Fabian for evaluation and management of the small bowel obstruction -IV fluids 
-If he does not undergo surgery, will consider small bowel follow through once resolved to evaluate the question of intussusception  
-Will eventually need a colonoscopy to evaluate his hematochezia; likely as outpatient unless he has significant bleeding here in the hospital 
 
Subjective:  
  
Vivien Valderrama is a 80 y.o. male with history of ASCAD, DM, CKD, Hypertension who is being seen for small bowel obstruction. He has had his prior medical care at the [place] and limited records are available.  He apparently has a history of diverticulitis in the past and had a sigmoid colectomy in 1989. He also has a history of prostate cancer treated with external radiation therapy in 2019. He reports intermittent lower abdominal pain for the past year or so. Usually occurs when he is constipated and sometimes goes a day or two without a bowel movements. In the past 4 days, he has had worsening lower abdominal pain associated with nausea/vomiting and he hasn't had a BM in 4 days and hasn't been passing flatus, prompting ER evaluation. His abd pain has improved since admission but still present. He had 1300ml through the NGT per nursing report and still had a couple large volume episodes of vomiting in addition. In the ER, labwork shows unremarkable CBC with BUN/Creat 45/3.24, normal liver enzymes, mild lipase elevation (409), mild troponin elevation (0.05) CT abd/pelvis shows small bowel obstruction with transition point in the left mid-abdomen with possible subtle intussusception without discrete mass; cholelithiasis, asbestos related pleural and lung disease and scattered centrilobular nodular opacities are noted as well. He had an NG tube placed in the ER and Dr Lizz Watt has seen the patient in consultation. He has no prior history of bowel obstruction. He does note intermittent blood on the stool every couple/few days on a chronic basis. Reports at least a couple prior colonoscopies, unsure of the results. Believes his last was about 10 years ago or more. Past Medical History Past Medical History:  
Diagnosis Date  Diabetes ([address]  Hypertension ASCAD 
CKD Prosate CA s/p radiation 2019 Past Surgical History Sigmoid colectomy Family History History reviewed. No pertinent family history. Social History Social History Socioeconomic History  Marital status:  Spouse name: Not on file  Number of children: Not on file  Years of education: Not on file  Highest education level: Not on file Occupational History  Not on file Social Needs  Financial resource strain: Not on file  Food insecurity Worry: Not on file Inability: Not on file  Transportation needs Medical: Not on file Non-medical: Not on file Tobacco Use  Smoking status: Never Smoker  Smokeless tobacco: Never Used Substance and Sexual Activity  Alcohol use: Not on file  Drug use: Not on file  Sexual activity: Not on file Lifestyle  Physical activity Days per week: Not on file Minutes per session: Not on file  Stress: Not on file Relationships  Social connections Talks on phone: Not on file Gets together: Not on file Attends Congregation service: Not on file Active member of club or organization: Not on file Attends meetings of clubs or organizations: Not on file Relationship status: Not on file  Intimate partner violence Fear of current or ex partner: Not on file Emotionally abused: Not on file Physically abused: Not on file Forced sexual activity: Not on file Other Topics Concern  Not on file Social History Narrative  Not on file Medications Current Facility-Administered Medications Medication Dose Route Frequency  0.9% sodium chloride infusion  100 mL/hr IntraVENous CONTINUOUS  
 insulin lispro (HUMALOG) injection   SubCUTAneous Q6H  
 glucose chewable tablet 16 g  4 Tab Oral PRN  
 glucagon (GLUCAGEN) injection 1 mg  1 mg IntraMUSCular PRN  
 dextrose 10% infusion 125-250 mL  125-250 mL IntraVENous PRN  
 morphine injection 1 mg  1 mg IntraVENous Q4H PRN  
 insulin glargine (LANTUS) injection 5 Units  5 Units SubCUTAneous QHS  hydrALAZINE (APRESOLINE) 20 mg/mL injection 10 mg  10 mg IntraVENous Q6H PRN  
 naloxone (NARCAN) injection 0.4 mg  0.4 mg IntraVENous PRN  
 ondansetron (ZOFRAN) injection 6 mg  6 mg IntraVENous Q6H PRN  
  
 
 Hospital Problems  Never Reviewed Codes Class Noted POA Diabetes ([facility] ICD-10-CM: E11.9 ICD-9-CM: 250.00  7/3/2020 Unknown * (Principal) SBO (small bowel obstruction) ([facility] ICD-10-CM: E19.385 ICD-9-CM: 560.9  7/3/2020 Unknown HTN (hypertension) ICD-10-CM: I10 
ICD-9-CM: 401.9  7/3/2020 Unknown MARCI (acute kidney injury) ([facility] ICD-10-CM: N17.9 ICD-9-CM: 584.9  7/3/2020 Unknown Elevated troponin ICD-10-CM: R79.89 ICD-9-CM: 790.6  7/3/2020 Unknown Allergies Allergen Reactions  Iodinated Contrast Media Swelling  Lisinopril Swelling Review of Systems: 
Pertinent items are noted in the History of Present Illness. Objective:  
 
Physical Exam: 
Visit Vitals BP 98/77 (BP 1 Location: Left arm, BP Patient Position: At rest;Head of bed elevated (Comment degrees)) Pulse 98 Temp 97.9 °F (36.6 °C) Resp 18 Ht 5' 7\" (1.702 m) Wt 79.4 kg (175 lb) SpO2 100% BMI 27.41 kg/m² General appearance: alert, cooperative, no distress, appears stated age Head: Normocephalic, without obvious abnormality, atraumatic Eyes: negative for pallor/icterus Nose: NGT in place draining brown/green fluid Neck: supple, symmetrical, trachea midline, no adenopathy, thyroid: not enlarged,   
Lungs: clear to auscultation bilaterally Heart: regular rate and rhythm, S1, S2 normal, no murmur, click, rub or gallop Abdomen: soft, tender in the lower abdomen with voluntary guarding without rebound. Bowel sounds absent. No masses,  no organomegaly Extremities: extremities normal, atraumatic, no cyanosis or edema Skin: Skin color, texture, turgor normal. No rashes or lesions Neurologic: Grossly normal 
 
Recent Results (from the past 24 hour(s)) EKG, 12 LEAD, INITIAL Collection Time: 07/03/20  9:25 AM  
Result Value Ref Range Ventricular Rate 88 BPM  
 Atrial Rate 88 BPM  
 P-R Interval 150 ms QRS Duration 88 ms Q-T Interval 380 ms QTC Calculation (Bezet) 459 ms Calculated P Axis 33 degrees Calculated R Axis 49 degrees Calculated T Axis 107 degrees Diagnosis Normal sinus rhythm Moderate voltage criteria for LVH, may be normal variant ( R in aVL , Yaakov 
 product ) Possible Inferior infarct , age undetermined Abnormal ECG No previous ECGs available CBC WITH AUTOMATED DIFF Collection Time: 07/03/20  9:36 AM  
Result Value Ref Range WBC 8.6 4.6 - 13.2 K/uL  
 RBC 4.37 (L) 4.70 - 5.50 M/uL  
 HGB 14.4 13.0 - 16.0 g/dL HCT 42.5 36.0 - 48.0 % MCV 97.3 (H) 74.0 - 97.0 FL  
 MCH 33.0 24.0 - 34.0 PG  
 MCHC 33.9 31.0 - 37.0 g/dL  
 RDW 12.1 11.6 - 14.5 % PLATELET 143 624 - 537 K/uL MPV 11.3 9.2 - 11.8 FL  
 NEUTROPHILS 75 (H) 40 - 73 % LYMPHOCYTES 9 (L) 21 - 52 % MONOCYTES 16 (H) 3 - 10 % EOSINOPHILS 0 0 - 5 % BASOPHILS 0 0 - 2 %  
 ABS. NEUTROPHILS 6.5 1.8 - 8.0 K/UL  
 ABS. LYMPHOCYTES 0.8 (L) 0.9 - 3.6 K/UL  
 ABS. MONOCYTES 1.4 (H) 0.05 - 1.2 K/UL  
 ABS. EOSINOPHILS 0.0 0.0 - 0.4 K/UL  
 ABS. BASOPHILS 0.0 0.0 - 0.1 K/UL  
 DF AUTOMATED METABOLIC PANEL, BASIC Collection Time: 07/03/20  9:36 AM  
Result Value Ref Range Sodium 134 (L) 136 - 145 mmol/L Potassium 4.3 3.5 - 5.5 mmol/L Chloride 98 (L) 100 - 111 mmol/L  
 CO2 29 21 - 32 mmol/L Anion gap 7 3.0 - 18 mmol/L Glucose 314 (H) 74 - 99 mg/dL BUN 45 (H) 7.0 - 18 MG/DL Creatinine 3.24 (H) 0.6 - 1.3 MG/DL  
 BUN/Creatinine ratio 14 12 - 20 GFR est AA 22 (L) >60 ml/min/1.73m2 GFR est non-AA 18 (L) >60 ml/min/1.73m2 Calcium 9.9 8.5 - 10.1 MG/DL  
CARDIAC PANEL,(CK, CKMB & TROPONIN) Collection Time: 07/03/20  9:36 AM  
Result Value Ref Range CK - MB 4.2 (H) <3.6 ng/ml CK-MB Index 1.2 0.0 - 4.0 %  (H) 39 - 308 U/L Troponin-I, QT 0.05 (H) 0.0 - 0.045 NG/ML  
LIPASE Collection Time: 07/03/20  9:36 AM  
Result Value Ref Range  Lipase 409 (H) 73 - 393 U/L  
HEPATIC FUNCTION PANEL  
 Collection Time: 07/03/20  9:36 AM  
Result Value Ref Range Protein, total 9.0 (H) 6.4 - 8.2 g/dL Albumin 4.3 3.4 - 5.0 g/dL Globulin 4.7 (H) 2.0 - 4.0 g/dL A-G Ratio 0.9 0.8 - 1.7 Bilirubin, total 0.9 0.2 - 1.0 MG/DL Bilirubin, direct 0.2 0.0 - 0.2 MG/DL Alk. phosphatase 78 45 - 117 U/L  
 AST (SGOT) 30 10 - 38 U/L  
 ALT (SGPT) 22 16 - 61 U/L  
URINALYSIS W/ RFLX MICROSCOPIC Collection Time: 07/03/20 12:10 PM  
Result Value Ref Range Color DARK YELLOW Appearance CLOUDY Specific gravity 1.028 1.005 - 1.030    
 pH (UA) 5.0 5.0 - 8.0 Protein 300 (A) NEG mg/dL Glucose Negative NEG mg/dL Ketone 15 (A) NEG mg/dL Bilirubin LARGE (A) NEG Blood Negative NEG Urobilinogen 1.0 0.2 - 1.0 EU/dL Nitrites Negative NEG Leukocyte Esterase TRACE (A) NEG URINE MICROSCOPIC ONLY Collection Time: 07/03/20 12:10 PM  
Result Value Ref Range WBC 4 to 10 0 - 4 /hpf  
 RBC Negative 0 - 5 /hpf Epithelial cells 1+ 0 - 5 /lpf Bacteria Negative NEG /hpf Amorphous Crystals 2+ (A) NEG Hyaline cast 11 to 20 0 - 2 /lpf Granular cast 0 to 3 NEG /lpf  
GLUCOSE, POC Collection Time: 07/03/20  2:08 PM  
Result Value Ref Range Glucose (POC) 260 (H) 70 - 110 mg/dL GLUCOSE, POC Collection Time: 07/03/20  5:14 PM  
Result Value Ref Range Glucose (POC) 158 (H) 70 - 110 mg/dL Signed By: Janina Closs, MD   
 July 3, 2020

## 2020-07-03 NOTE — ED TRIAGE NOTES
Constipated x 3 days. Started vomiting 2 days ago. everytime eats stomach hurts. Now having some diarrhea.

## 2020-07-03 NOTE — CONSULTS
General Surgery Consult Carlene Crockett  Admit date: 7/3/2020 MRN: [id number]     : 1935     Age: 80 y.o. Attending Physician: Celestino Hylton MD, FACS Subjective:  
 
Carlene Crockett is a 80 y.o. male who is admitted to the medicine service with a picture of abdominal pain and possible small bowel obstruction. The patient has stated that he is a [facility] patient and he had an open sigmoid colectomy in  at the [facility] for some type of diverticulitis. He stated that for the past 1 year or so he has been having chronic abdominal pain mainly in the suprapubic area and lower abdomen. He was diagnosed with prostate cancer for which she had radiation therapy. He also stated that he has been having some constipation as well as what he feels as a blockage for the past year or so. Though his pain has been there for 1 year over the last month or few weeks his pain is getting worse and over the last 3 days he started having worsening of his pain that he could not tolerate and it was associated with some nausea and vomiting which made him come to the emergency room. His CT scan showed a small bowel obstruction with transition point with suspicious of intussusception. An NG tube was already placed and he was admitted to the floor. Patient Active Problem List  
 Diagnosis Date Noted  Diabetes ([address] 2020  
 SBO (small bowel obstruction) ([address] 2020  
 HTN (hypertension) 2020  MARCI (acute kidney injury) ([facility] 2020  Elevated troponin 2020 Past Medical History:  
Diagnosis Date  Diabetes ([address]  Hypertension History reviewed. No pertinent surgical history. Social History Tobacco Use  Smoking status: Never Smoker  Smokeless tobacco: Never Used Substance Use Topics  Alcohol use: Not on file Social History Tobacco Use Smoking Status Never Smoker Smokeless Tobacco Never Used History reviewed. No pertinent family history. Current Facility-Administered Medications Medication Dose Route Frequency  0.9% sodium chloride infusion  100 mL/hr IntraVENous CONTINUOUS  
 insulin lispro (HUMALOG) injection   SubCUTAneous Q6H  
 glucose chewable tablet 16 g  4 Tab Oral PRN  
 glucagon (GLUCAGEN) injection 1 mg  1 mg IntraMUSCular PRN  
 dextrose 10% infusion 125-250 mL  125-250 mL IntraVENous PRN  
 morphine injection 1 mg  1 mg IntraVENous Q4H PRN  
 insulin glargine (LANTUS) injection 5 Units  5 Units SubCUTAneous QHS  hydrALAZINE (APRESOLINE) 20 mg/mL injection 10 mg  10 mg IntraVENous Q6H PRN  
 naloxone (NARCAN) injection 0.4 mg  0.4 mg IntraVENous PRN  
 ondansetron (ZOFRAN) injection 6 mg  6 mg IntraVENous Q6H PRN Allergies Allergen Reactions  Iodinated Contrast Media Swelling  Lisinopril Swelling Review of Systems: 
Constitutional: negative Eyes: negative Ears, Nose, Mouth, Throat, and Face: negative Respiratory: negative Cardiovascular: negative Gastrointestinal: positive for nausea, vomiting and abdominal pain Genitourinary:negative Integument/Breast: negative Hematologic/Lymphatic: negative Musculoskeletal:negative Neurological: negative Behavioral/Psychiatric: negative Endocrine: negative Allergic/Immunologic: negative Objective:  
 
Visit Vitals /88 Pulse 99 Temp 98.2 °F (36.8 °C) Resp 18 Ht 5' 7\" (1.702 m) Wt 79.4 kg (175 lb) SpO2 93% BMI 27.41 kg/m² Physical Exam:   
 
General:  in no apparent distress, alert, oriented times 3 and cooperative Eyes:  conjunctivae and sclerae normal, pupils equal, round, reactive to light Throat & Neck: no erythema or exudates noted and neck supple and symmetrical; no palpable masses Lungs:   clear to auscultation bilaterally Heart:  Regular rate and rhythm Abdomen:   rounded, soft, nontender, nondistended, no masses or organomegaly. There is a midline laparotomy that is well-healed with no evidence of abdominal wall hernia. Extremities: extremities normal, atraumatic, no cyanosis or edema Skin: Normal.  
   
 
Imaging and Lab Review: CBC:  
Lab Results Component Value Date/Time WBC 8.6 07/03/2020 09:36 AM  
 RBC 4.37 (L) 07/03/2020 09:36 AM  
 HGB 14.4 07/03/2020 09:36 AM  
 HCT 42.5 07/03/2020 09:36 AM  
 PLATELET 773 91/42/5300 09:36 AM  
 
BMP:  
Lab Results Component Value Date/Time Glucose 314 (H) 07/03/2020 09:36 AM  
 Sodium 134 (L) 07/03/2020 09:36 AM  
 Potassium 4.3 07/03/2020 09:36 AM  
 Chloride 98 (L) 07/03/2020 09:36 AM  
 CO2 29 07/03/2020 09:36 AM  
 BUN 45 (H) 07/03/2020 09:36 AM  
 Creatinine 3.24 (H) 07/03/2020 09:36 AM  
 Calcium 9.9 07/03/2020 09:36 AM  
 
CMP: 
Lab Results Component Value Date/Time Glucose 314 (H) 07/03/2020 09:36 AM  
 Sodium 134 (L) 07/03/2020 09:36 AM  
 Potassium 4.3 07/03/2020 09:36 AM  
 Chloride 98 (L) 07/03/2020 09:36 AM  
 CO2 29 07/03/2020 09:36 AM  
 BUN 45 (H) 07/03/2020 09:36 AM  
 Creatinine 3.24 (H) 07/03/2020 09:36 AM  
 Calcium 9.9 07/03/2020 09:36 AM  
 Anion gap 7 07/03/2020 09:36 AM  
 BUN/Creatinine ratio 14 07/03/2020 09:36 AM  
 Alk. phosphatase 78 07/03/2020 09:36 AM  
 Protein, total 9.0 (H) 07/03/2020 09:36 AM  
 Albumin 4.3 07/03/2020 09:36 AM  
 Globulin 4.7 (H) 07/03/2020 09:36 AM  
 A-G Ratio 0.9 07/03/2020 09:36 AM  
 
 
Recent Results (from the past 24 hour(s)) EKG, 12 LEAD, INITIAL Collection Time: 07/03/20  9:25 AM  
Result Value Ref Range Ventricular Rate 88 BPM  
 Atrial Rate 88 BPM  
 P-R Interval 150 ms QRS Duration 88 ms Q-T Interval 380 ms QTC Calculation (Bezet) 459 ms Calculated P Axis 33 degrees Calculated R Axis 49 degrees Calculated T Axis 107 degrees Diagnosis Normal sinus rhythm Moderate voltage criteria for LVH, may be normal variant ( R in aVL , Yaakov 
 product ) Possible Inferior infarct , age undetermined Abnormal ECG No previous ECGs available CBC WITH AUTOMATED DIFF Collection Time: 07/03/20  9:36 AM  
Result Value Ref Range WBC 8.6 4.6 - 13.2 K/uL  
 RBC 4.37 (L) 4.70 - 5.50 M/uL  
 HGB 14.4 13.0 - 16.0 g/dL HCT 42.5 36.0 - 48.0 % MCV 97.3 (H) 74.0 - 97.0 FL  
 MCH 33.0 24.0 - 34.0 PG  
 MCHC 33.9 31.0 - 37.0 g/dL  
 RDW 12.1 11.6 - 14.5 % PLATELET 224 462 - 145 K/uL MPV 11.3 9.2 - 11.8 FL  
 NEUTROPHILS 75 (H) 40 - 73 % LYMPHOCYTES 9 (L) 21 - 52 % MONOCYTES 16 (H) 3 - 10 % EOSINOPHILS 0 0 - 5 % BASOPHILS 0 0 - 2 %  
 ABS. NEUTROPHILS 6.5 1.8 - 8.0 K/UL  
 ABS. LYMPHOCYTES 0.8 (L) 0.9 - 3.6 K/UL  
 ABS. MONOCYTES 1.4 (H) 0.05 - 1.2 K/UL  
 ABS. EOSINOPHILS 0.0 0.0 - 0.4 K/UL  
 ABS. BASOPHILS 0.0 0.0 - 0.1 K/UL  
 DF AUTOMATED METABOLIC PANEL, BASIC Collection Time: 07/03/20  9:36 AM  
Result Value Ref Range Sodium 134 (L) 136 - 145 mmol/L Potassium 4.3 3.5 - 5.5 mmol/L Chloride 98 (L) 100 - 111 mmol/L  
 CO2 29 21 - 32 mmol/L Anion gap 7 3.0 - 18 mmol/L Glucose 314 (H) 74 - 99 mg/dL BUN 45 (H) 7.0 - 18 MG/DL Creatinine 3.24 (H) 0.6 - 1.3 MG/DL  
 BUN/Creatinine ratio 14 12 - 20 GFR est AA 22 (L) >60 ml/min/1.73m2 GFR est non-AA 18 (L) >60 ml/min/1.73m2 Calcium 9.9 8.5 - 10.1 MG/DL  
CARDIAC PANEL,(CK, CKMB & TROPONIN) Collection Time: 07/03/20  9:36 AM  
Result Value Ref Range CK - MB 4.2 (H) <3.6 ng/ml CK-MB Index 1.2 0.0 - 4.0 %  (H) 39 - 308 U/L Troponin-I, QT 0.05 (H) 0.0 - 0.045 NG/ML  
LIPASE Collection Time: 07/03/20  9:36 AM  
Result Value Ref Range Lipase 409 (H) 73 - 393 U/L  
HEPATIC FUNCTION PANEL Collection Time: 07/03/20  9:36 AM  
Result Value Ref Range Protein, total 9.0 (H) 6.4 - 8.2 g/dL Albumin 4.3 3.4 - 5.0 g/dL Globulin 4.7 (H) 2.0 - 4.0 g/dL A-G Ratio 0.9 0.8 - 1.7  Bilirubin, total 0.9 0.2 - 1.0 MG/DL  
 Bilirubin, direct 0.2 0.0 - 0.2 MG/DL Alk. phosphatase 78 45 - 117 U/L  
 AST (SGOT) 30 10 - 38 U/L  
 ALT (SGPT) 22 16 - 61 U/L  
URINALYSIS W/ RFLX MICROSCOPIC Collection Time: 07/03/20 12:10 PM  
Result Value Ref Range Color DARK YELLOW Appearance CLOUDY Specific gravity 1.028 1.005 - 1.030    
 pH (UA) 5.0 5.0 - 8.0 Protein 300 (A) NEG mg/dL Glucose Negative NEG mg/dL Ketone 15 (A) NEG mg/dL Bilirubin LARGE (A) NEG Blood Negative NEG Urobilinogen 1.0 0.2 - 1.0 EU/dL Nitrites Negative NEG Leukocyte Esterase TRACE (A) NEG URINE MICROSCOPIC ONLY Collection Time: 07/03/20 12:10 PM  
Result Value Ref Range WBC 4 to 10 0 - 4 /hpf  
 RBC Negative 0 - 5 /hpf Epithelial cells 1+ 0 - 5 /lpf Bacteria Negative NEG /hpf Amorphous Crystals 2+ (A) NEG Hyaline cast 11 to 20 0 - 2 /lpf Granular cast 0 to 3 NEG /lpf  
GLUCOSE, POC Collection Time: 07/03/20  2:08 PM  
Result Value Ref Range Glucose (POC) 260 (H) 70 - 110 mg/dL  
 
 
images and reports reviewed Assessment:  
Vikas Amos is a 80 y.o. male who is presenting with a very interesting clinical presentation. The patient is a [facility] patient so we do not have the medical records from the previous abdominal admissions or labs but he stated that his been having abdominal pain for almost 1 year now. He said that the pain comes and goes and it is colicky which is concerning for the what is seen on the CT scan from the intussusception. I am not sure if the patient has some type of stricture (from his radiation therapy for his prostate cancer) or tumor causing this chronic abdominal pain and now it is causing obstruction that is why he is presenting with the chronic pain as well as the acute worsening in the last 3 days. Plan:  
 
N.p.o. 
NG tube IV fluids Treatment of his renal failure and other medical condition I appreciate the medicine admission and management GI consultation for their opinion on this chronic abdominal pain and the possible need for endoscopy secondary to a possible tumor or stricture. Close observation May still need surgical treatment for his obstruction if he does not improve with medical therapy or if there is any evidence of malignancy. Please call me if you have any questions (cell phone: [phone]) Signed By: Amber Donato MD   
 July 3, 2020

## 2020-07-03 NOTE — PROGRESS NOTES
Problem: Discharge Planning Goal: *Discharge to safe environment Outcome: Progressing Towards Goal 
Plan is tbd- probably home Reason for Admission:   SBO 
                
RUR Score:        9% Plan for utilizing home health:       
 
PCP: First and Last name:  Riaz Correa Name of Practice:  
 Are you a current patient: Yes/No: yes Approximate date of last visit:   Had virtual visit since covid started this year- not on smart phone, on a smart TV. Can you participate in a virtual visit with your PCP: yes Current Advanced Directive/Advance Care Plan: no, he wants his wife to do everything he says Transition of Care Plan:      Chart reviewed and pt came from ER for SBO. Patient lives with his wife and is independent with ADL. He has two daughters, one in [place]. His home is a ranch style one level. He and his wife still drive. Patient uses a Rolling walker and a cane for mobility. His plan is probably home , depending on how long his stay is and what is done while here. Wife to [address] [place] [address]. Patient has designated _Wife_ to participate in his/her discharge plan and to receive any needed information. Name: Matthew Freire Address: 
Phone number:      Home [phone] / work [phone] / Justin Wynn [phone] Care Management Interventions PCP Verified by CM: Yes 
Palliative Care Criteria Met (RRAT>21 & CHF Dx)?: No 
Mode of Transport at Discharge: Other (see comment)(wife) Transition of Care Consult (CM Consult): Discharge Planning Current Support Network: Lives with Spouse Confirm Follow Up Transport: Family The Plan for Transition of Care is Related to the Following Treatment Goals : resolution of acute symtoms Perry Guzman. Rachel Ulrich RN, BSN [facility] [phone], Pager [phone] 
[email or web address]

## 2020-07-03 NOTE — ED PROVIDER NOTES
Date: 7/3/2020 Patient Name: Cathy Ha History of Presenting Illness Chief Complaint Patient presents with  Constipation  Vomiting History Provided By: Patient HPI/Chief Complaint: (Context):who presents with chief complaint of abdominal pain epigastric, left lower quadrant Decreased flatus No bowel movement Vomiting as well 
2 days History of diverticulitis with bowel resection and reanastomosis Patient denies any fever cough upper respiratory symptoms black or bloody stools in the past no ulcers no chest pain no exertional symptoms no focal arm or leg weakness no dysuria patient symptoms are constant No acute alleviating or exacerbating factors No radiation of symptoms Sharp pain Moderate --- Patient HOLLEY level 3 arrival 
Patient's chief complaints constipation vomiting Vitals are stable in the emergency department 3 days of symptoms, vomiting for 2 days Patient's pain scale is 8 out of 10 PCP: Roni Ziegler MD 
 
Current Facility-Administered Medications Medication Dose Route Frequency Provider Last Rate Last Dose  
 0.9% sodium chloride infusion  100 mL/hr IntraVENous CONTINUOUS Sanjana Birch  mL/hr at 07/03/20 1351 100 mL/hr at 07/03/20 1351  insulin lispro (HUMALOG) injection   SubCUTAneous Q6H Lyly Birch PA   9 Units at 07/03/20 1412  
 glucose chewable tablet 16 g  4 Tab Oral PRN Lyly Birch PA      
 glucagon (GLUCAGEN) injection 1 mg  1 mg IntraMUSCular PRN Lyly Birch PA      
 dextrose 10% infusion 125-250 mL  125-250 mL IntraVENous PRN Lyly iBrch PA      
 morphine injection 1 mg  1 mg IntraVENous Q4H PRN Lyly Birch PA      
 insulin glargine (LANTUS) injection 5 Units  5 Units SubCUTAneous QHS Lyly Birch PA      
 hydrALAZINE (APRESOLINE) 20 mg/mL injection 10 mg  10 mg IntraVENous Q6H PRN Lyly Birch PA      
  naloxone (NARCAN) injection 0.4 mg  0.4 mg IntraVENous PRN Joann Singer MD      
 ondansetron [facility]) injection 6 mg  6 mg IntraVENous Q6H PRN Joann Singer MD      
 
 
Past History Past Medical History: 
Past Medical History:  
Diagnosis Date  Diabetes ([address]  Hypertension Past Surgical History: 
History reviewed. No pertinent surgical history. Family History: 
History reviewed. No pertinent family history. Social History: 
Social History Tobacco Use  Smoking status: Never Smoker  Smokeless tobacco: Never Used Substance Use Topics  Alcohol use: Not on file  Drug use: Not on file Allergies: Allergies Allergen Reactions  Iodinated Contrast Media Swelling  Lisinopril Swelling Review of Systems Review of Systems Constitutional: Negative for activity change, fatigue and fever. HENT: Negative for congestion and rhinorrhea. Eyes: Negative for visual disturbance. Respiratory: Negative for shortness of breath. Cardiovascular: Negative for chest pain and palpitations. Gastrointestinal: Positive for abdominal pain, nausea and vomiting. Negative for diarrhea. Genitourinary: Negative for dysuria and hematuria. Musculoskeletal: Negative for back pain. Skin: Negative for rash. Neurological: Negative for dizziness, weakness and light-headedness. Psychiatric/Behavioral: Negative for agitation. All other systems reviewed and are negative. Physical Exam  
 
Physical Exam 
Constitutional:   
   Appearance: He is well-developed. HENT:  
   Head: Normocephalic and atraumatic. Mouth/Throat:  
   Mouth: Mucous membranes are dry. Eyes:  
   Conjunctiva/sclera: Conjunctivae normal.  
   Pupils: Pupils are equal, round, and reactive to light. Neck: Musculoskeletal: Normal range of motion and neck supple. Cardiovascular:  
   Rate and Rhythm: Normal rate and regular rhythm. Pulmonary: Effort: Pulmonary effort is normal.  
   Breath sounds: Normal breath sounds. Abdominal:  
   General: Bowel sounds are normal. There is distension. Palpations: Abdomen is soft. There is no mass. Tenderness: There is abdominal tenderness. There is no right CVA tenderness, left CVA tenderness, guarding or rebound. Hernia: No hernia is present. Musculoskeletal: Normal range of motion. Lymphadenopathy:  
   Cervical: No cervical adenopathy. Skin: 
   General: Skin is warm. Capillary Refill: Capillary refill takes less than 2 seconds. Neurological:  
   General: No focal deficit present. Mental Status: He is alert. Mental status is at baseline. Psychiatric:     
   Mood and Affect: Mood normal.  
 
 
 
Medical Decision Making I am the first provider for this patient. I reviewed the vital signs, available nursing notes, past medical history, past surgical history, family history and social history. Provider Notes (Medical Decision Making): Patient presents emergency department abdominal pain vomiting and constipation versus no flatus movement Bowel obstruction versus diverticulitis of concern Check urinalysis as well Patient CT abdomen shows bowel obstruction with a transition point Concern by surgeon is there might be a mass as well considering his history of prostate cancer I will get GI involved Patient does have acute kidney injury from what I can look at patient's labs I do not have prior labs to compare so I will go ahead and admit the patient to medical team 
Discussed this information with Dr. Sandoval Alvarez already. He is aware patient's history and physical findings Patient's troponin is elevated I am unclear if this has any relationship with acute kidney injury although patient is denying any chest pain or shortness of breath there is no acute pathology on the EKG as well that suggest any acute STEMI or severe ischemia Vital Signs-Reviewed the patient's vital signs. Pulse Oximetry Analysis -100%, 
Cardiac Monitor: 
Rate/Rhythm: 88, sinus rhythm EKG: Interpreted by the EP.  9:28 AM, 88, sinus rhythm, normal intervals and axis, no sign of acute STEMI or dysrhythmia LVH is appreciated Vitals:  
 07/03/20 0927 07/03/20 1200 07/03/20 1244 07/03/20 1332 BP: 120/57 128/72 128/72 156/88 Pulse: 88  86 99 Resp: 15  16 18 Temp: 98 °F (36.7 °C)  98 °F (36.7 °C) 98.2 °F (36.8 °C) SpO2: 100% 99% 96% 93% Weight: 79.4 kg (175 lb) Height: 5' 7\" (1.702 m) Records Reviewed: Nursing Notes ED Course: For Hospitalized Patients: 
 
1. Hospitalization Decision Time: The decision to hospitalize the patient was made by Dr. Donnise Najjar, 11:45 AM 
--- Patient remained stable during my ED stay. Patient was admitted patient is with the nasogastric tube without any complications Patient's urinalysis was nonspecific Patient did not have any dysuria or frequency. I am adding urine culture on this patient. Initially patient's urinalysis was added at her as a work-up but patient symptoms appear to be more secondary to bowel obstruction. I did discuss the information with Dr. Viktoria Ny, he will see the patient in consultation he is aware that there is concern for intussusception versus mass causing it and Dr. Enrique Blanton requested this consultation. Diagnostic Study Results Orders Placed This Encounter  XR CHEST PORT Standing Status:   Standing Number of Occurrences:   1 Order Specific Question:   Reason for Exam  
  Answer:   abd pain  CT ABD PELV WO CONT Standing Status:   Standing Number of Occurrences:   1 Order Specific Question:   Transport Answer:   Carolyn Sierra [5] Order Specific Question:   Reason for Exam  
  Answer:   Abdominal pain. Left lower quadrant pain. Concern for diverticulitis versus obstruction Order Specific Question:   Type of contrast.  PLEASE NOTE: IV contrast is NOT utilized with this order. Answer:   None  CBC WITH AUTOMATED DIFF Standing Status:   Standing Number of Occurrences:   1  METABOLIC PANEL, BASIC Standing Status:   Standing Number of Occurrences:   1  CARDIAC PANEL,(CK, CKMB & TROPONIN) Standing Status:   Standing Number of Occurrences:   1  
 LIPASE Standing Status:   Standing Number of Occurrences:   1  URINALYSIS W/ RFLX MICROSCOPIC Standing Status:   Standing Number of Occurrences:   1  
 HEPATIC FUNCTION PANEL Standing Status:   Standing Number of Occurrences:   1  CBC W/ AUTOMATED DIFF Standing Status:   Standing Number of Occurrences:   3  METABOLIC PANEL, BASIC Standing Status:   Standing Number of Occurrences:   3  
 HEMOGLOBIN A1C Standing Status:   Standing Number of Occurrences:   1  CARDIAC PANEL,(CK, CKMB & TROPONIN) Standing Status:   Standing Number of Occurrences:   3  
 URINE MICROSCOPIC ONLY Standing Status:   Standing Number of Occurrences:   1  DIET NPO Standing Status:   Standing Number of Occurrences:   1  
 NG TUBE TO SUCTION ONE TIME Standing Status:   Standing Number of Occurrences:   1 Order Specific Question:   Type of suction Answer:   LIS  
 NOTIFY PROVIDER: VITAL SIGNS CHANGES Standing Status:   Standing Number of Occurrences:   1 Order Specific Question:   Notify for Temperature: Answer:   Greater than 101 F Order Specific Question:   Notify for Heart Rate: Answer:   Greater than 120 Beats Per Minute or Less than 60 Beats Per Minute Order Specific Question:   Notify for Respiratory Rate: Answer:   Greater than 30 per minute or Less than 8 per minute Order Specific Question:   Notify for Systolic Blood Pressure: Answer:   Greater than 180 mm Hg or Less than 90 mm Hg Order Specific Question:   Notify for Oxygen Saturation: Answer:   Less than 90% Order Specific Question:   Notify for Urine Output: Answer:   Less than 120 ml in 4 hours  APPLY/MAINTAIN SEQUENTIAL COMPRESSION DEVICE Standing Status:   Standing Number of Occurrences:   1  
 NURSING-MISCELLANEOUS: SEE HYPOGLYCEMIA PROTOCOL BELOW HYPOGLYCEMIA PROTOCOL: Initiate Hypoglycemia protocol if blood glucose is less than 70 mg/dL FOR CONSCIOUS PATIENT: Administer 4 ounces fruit juice OR regular soda OR 4 glucose tablets. FOR UN. .. HYPOGLYCEMIA PROTOCOL: 
 
Initiate Hypoglycemia protocol if blood glucose is less than 70 mg/dL FOR CONSCIOUS PATIENT: Administer 4 ounces fruit juice OR regular soda OR 4 glucose tablets. FOR UNCONSCIOUS OR CHANGE-IN-MENTAL-STATUS PATIENT: If blood glucose is greater than or equal to 50 mg/dL administer     25 mL of 50% dextrose solution IV push, if blood glucose is less than 50 mg/dL, administer 50 mL IV push. If no IV, administer Glucagon     1 mg IM. Following Hypoglycemic Protocol: Once patient is fully alert and BG greater than 80 mg/dL provide a small snack,  if NPO consider IV fluids with dextrose. Repeat finger stick blood glucose in 15 minutes AFTER treatment, if less than 80 mg/dL repeat hypoglycemic protocol and notify provider. Document all interventions in the Electronic Medical  Record (EMR). Standing Status:   Standing Number of Occurrences:   1 Order Specific Question:   Description of Order: Answer:   SEE HYPOGLYCEMIA PROTOCOL BELOW  NURSING-MISCELLANEOUS: Blood glucose targets -- ICU: 140 - 180 mg/dL;  NON-ICU: 100 - 140 mg/dL CONTINUOUS Standing Status:   Standing Number of Occurrences:   1 Order Specific Question:   Description of Order: Answer:   Blood glucose targets -- ICU: 140 - 180 mg/dL;  NON-ICU: 100 - 140 mg/dL  POC GLUCOSE - Every 6 hours Standing Status:   Standing Number of Occurrences:   1  FULL CODE Standing Status:   Standing Number of Occurrences:   1  
 IP CONSULT TO GASTROENTEROLOGY Standing Status:   Standing Number of Occurrences:   1 Order Specific Question:   Reason for Consult: Answer:   bowel obstruction and intussuption Order Specific Question:   Did you call or speak to the consulting provider? Answer:   No  
  Order Specific Question:   Consult To Answer:   manage Order Specific Question:   Schedule When? Answer:   TODAY  GLUCOSE, POC Standing Status:   Standing Number of Occurrences:   1  EKG, 12 LEAD, INITIAL Standing Status:   Standing Number of Occurrences:   1 Order Specific Question:   Reason for Exam: Answer:   abd pain  Cetirizine 10 mg cap Sig: Take  by mouth.  benzonatate (TESSALON) 100 mg capsule Sig: Take 100 mg by mouth three (3) times daily as needed for Cough.  sodium chloride 0.9 % bolus infusion 1,000 mL  lidocaine (PF) (XYLOCAINE) 10 mg/mL (1 %) injection 5 mL  ondansetron (ZOFRAN) injection 4 mg  DISCONTD: ondansetron (ZOFRAN) 6 mg in 0.9% sodium chloride 50 mL IVPB  
 0.9% sodium chloride infusion  insulin lispro (HUMALOG) injection  glucose chewable tablet 16 g  
 glucagon (GLUCAGEN) injection 1 mg  dextrose 10% infusion 125-250 mL  morphine injection 1 mg  
 insulin glargine (LANTUS) injection 5 Units  hydrALAZINE (APRESOLINE) 20 mg/mL injection 10 mg  
 naloxone (NARCAN) injection 0.4 mg  
 ondansetron (ZOFRAN) injection 6 mg  IP CONSULT TO HOSPITALIST Standing Status:   Standing Number of Occurrences:   1 Order Specific Question:   Reason for Consult: Answer:   TO ADMIT Comments: Bowel obstruction, acute kidney injury, vomiting Order Specific Question:   Did you call or speak to the consulting provider? Answer:   No  
  Order Specific Question:   Consult To Answer:   manage Order Specific Question:   Schedule When? Answer:   TODAY  IP CONSULT TO GENERAL SURGERY Standing Status:   Standing Number of Occurrences:   1 Order Specific Question:   Reason for Consult: Answer:   Manage patient's bowel obstruction. Order Specific Question:   Did you call or speak to the consulting provider? Answer:   No  
  Order Specific Question:   Consult To Answer:   manage Order Specific Question:   Schedule When? Answer:   TODAY  INITIAL PHYSICIAN ORDER: INPATIENT Surgical; 3. Patient receiving treatment that can only be provided in an inpatient setting (further clarification in H&P documentation) Standing Status:   Standing Number of Occurrences:   1 Order Specific Question:   Status: Answer:   [facility] Order Specific Question:   Type of Bed Answer:   Surgical [18] Order Specific Question:   Inpatient Hospitalization Certified Necessary for the Following Reasons Answer:   3. Patient receiving treatment that can only be provided in an inpatient setting (further clarification in H&P documentation) Order Specific Question:   Admitting Diagnosis Answer:   SBO (small bowel obstruction) ([facility] [address] [[id number]] Order Specific Question:   Admitting Diagnosis Answer:   HTN (hypertension) [743545] Order Specific Question:   Admitting Diagnosis Answer:   Diabetes [facility]) [[id number]] Order Specific Question:   Admitting Physician Answer:   Tong Sandoval Order Specific Question:   Attending Physician Answer:   Tong Sandoval Order Specific Question:   Estimated Length of Stay Answer:   3-4 Midnights Order Specific Question:   Discharge Plan: Answer:   Home with Office Follow-up Labs - Recent Results (from the past 12 hour(s)) EKG, 12 LEAD, INITIAL Collection Time: 07/03/20  9:25 AM  
Result Value Ref Range  Ventricular Rate 88 BPM  
 Atrial Rate 88 BPM  
 P-R Interval 150 ms QRS Duration 88 ms Q-T Interval 380 ms QTC Calculation (Bezet) 459 ms Calculated P Axis 33 degrees Calculated R Axis 49 degrees Calculated T Axis 107 degrees Diagnosis Normal sinus rhythm Moderate voltage criteria for LVH, may be normal variant ( R in aVL , [place] 
 product ) Possible Inferior infarct , age undetermined Abnormal ECG No previous ECGs available CBC WITH AUTOMATED DIFF Collection Time: 07/03/20  9:36 AM  
Result Value Ref Range WBC 8.6 4.6 - 13.2 K/uL  
 RBC 4.37 (L) 4.70 - 5.50 M/uL  
 HGB 14.4 13.0 - 16.0 g/dL HCT 42.5 36.0 - 48.0 % MCV 97.3 (H) 74.0 - 97.0 FL  
 MCH 33.0 24.0 - 34.0 PG  
 MCHC 33.9 31.0 - 37.0 g/dL  
 RDW 12.1 11.6 - 14.5 % PLATELET 195 750 - 291 K/uL MPV 11.3 9.2 - 11.8 FL  
 NEUTROPHILS 75 (H) 40 - 73 % LYMPHOCYTES 9 (L) 21 - 52 % MONOCYTES 16 (H) 3 - 10 % EOSINOPHILS 0 0 - 5 % BASOPHILS 0 0 - 2 %  
 ABS. NEUTROPHILS 6.5 1.8 - 8.0 K/UL  
 ABS. LYMPHOCYTES 0.8 (L) 0.9 - 3.6 K/UL  
 ABS. MONOCYTES 1.4 (H) 0.05 - 1.2 K/UL  
 ABS. EOSINOPHILS 0.0 0.0 - 0.4 K/UL  
 ABS. BASOPHILS 0.0 0.0 - 0.1 K/UL  
 DF AUTOMATED METABOLIC PANEL, BASIC Collection Time: 07/03/20  9:36 AM  
Result Value Ref Range Sodium 134 (L) 136 - 145 mmol/L Potassium 4.3 3.5 - 5.5 mmol/L Chloride 98 (L) 100 - 111 mmol/L  
 CO2 29 21 - 32 mmol/L Anion gap 7 3.0 - 18 mmol/L Glucose 314 (H) 74 - 99 mg/dL BUN 45 (H) 7.0 - 18 MG/DL Creatinine 3.24 (H) 0.6 - 1.3 MG/DL  
 BUN/Creatinine ratio 14 12 - 20 GFR est AA 22 (L) >60 ml/min/1.73m2 GFR est non-AA 18 (L) >60 ml/min/1.73m2 Calcium 9.9 8.5 - 10.1 MG/DL  
CARDIAC PANEL,(CK, CKMB & TROPONIN) Collection Time: 07/03/20  9:36 AM  
Result Value Ref Range CK - MB 4.2 (H) <3.6 ng/ml CK-MB Index 1.2 0.0 - 4.0 %  (H) 39 - 308 U/L Troponin-I, QT 0.05 (H) 0.0 - 0.045 NG/ML  
LIPASE Collection Time: 07/03/20  9:36 AM  
Result Value Ref Range Lipase 409 (H) 73 - 393 U/L  
HEPATIC FUNCTION PANEL Collection Time: 07/03/20  9:36 AM  
Result Value Ref Range Protein, total 9.0 (H) 6.4 - 8.2 g/dL Albumin 4.3 3.4 - 5.0 g/dL Globulin 4.7 (H) 2.0 - 4.0 g/dL A-G Ratio 0.9 0.8 - 1.7 Bilirubin, total 0.9 0.2 - 1.0 MG/DL Bilirubin, direct 0.2 0.0 - 0.2 MG/DL Alk. phosphatase 78 45 - 117 U/L  
 AST (SGOT) 30 10 - 38 U/L  
 ALT (SGPT) 22 16 - 61 U/L  
URINALYSIS W/ RFLX MICROSCOPIC Collection Time: 07/03/20 12:10 PM  
Result Value Ref Range Color DARK YELLOW Appearance CLOUDY Specific gravity 1.028 1.005 - 1.030    
 pH (UA) 5.0 5.0 - 8.0 Protein 300 (A) NEG mg/dL Glucose Negative NEG mg/dL Ketone 15 (A) NEG mg/dL Bilirubin LARGE (A) NEG Blood Negative NEG Urobilinogen 1.0 0.2 - 1.0 EU/dL Nitrites Negative NEG Leukocyte Esterase TRACE (A) NEG URINE MICROSCOPIC ONLY Collection Time: 07/03/20 12:10 PM  
Result Value Ref Range WBC 4 to 10 0 - 4 /hpf  
 RBC Negative 0 - 5 /hpf Epithelial cells 1+ 0 - 5 /lpf Bacteria Negative NEG /hpf Amorphous Crystals 2+ (A) NEG Hyaline cast 11 to 20 0 - 2 /lpf Granular cast 0 to 3 NEG /lpf  
GLUCOSE, POC Collection Time: 07/03/20  2:08 PM  
Result Value Ref Range Glucose (POC) 260 (H) 70 - 110 mg/dL Radiologic Studies -  
CT ABD PELV WO CONT Final Result IMPRESSION:  
  
1. Intermediate grade partial small bowel obstruction with an apparent  
transition point in the left midabdomen where there is a suspected short segment  
intussusception resulting in proximal dilated loops of small bowel and stomach  
and distal decompressed loops. No evidence of pneumatosis or free air. 2. Cholelithiasis. 3. Calcific pleural plaques in bronchiectasis compatible with asbestos related  
pleural and lung disease.  Scattered centrilobular nodular opacities suggest  
 superimposed infectious or inflammatory bronchiolitis. Bilateral renal cysts and  
additional chronic ancillary findings as above. XR CHEST PORT Final Result IMPRESSION:  
  
Bibasilar bronchiectasis with calcified pleural plaques suggesting sequela of  
asbestos related pleural disease. No consolidation. CT Results  (Last 48 hours) 07/03/20 1043  CT ABD PELV WO CONT Final result Impression:  IMPRESSION:  
   
1. Intermediate grade partial small bowel obstruction with an apparent  
transition point in the left midabdomen where there is a suspected short segment  
intussusception resulting in proximal dilated loops of small bowel and stomach  
and distal decompressed loops. No evidence of pneumatosis or free air. 2. Cholelithiasis. 3. Calcific pleural plaques in bronchiectasis compatible with asbestos related  
pleural and lung disease. Scattered centrilobular nodular opacities suggest  
superimposed infectious or inflammatory bronchiolitis. Bilateral renal cysts and  
additional chronic ancillary findings as above. Narrative:  EXAM: CT of the abdomen and pelvis CLINICAL INDICATION/HISTORY: Abdominal pain. Left lower quadrant pain. Concern  
for diverticulitis versus obstruction  
  > Additional: None. COMPARISON: None. > Reference Exam: None. TECHNIQUE: Axial CT imaging of the abdomen and pelvis was performed without  
intravenous contrast. Multiplanar reformats were generated. One or more dose  
reduction techniques were used on this CT: automated exposure control,  
adjustment of the mAs and/or kVp according to patient size, and iterative  
reconstruction techniques. The specific techniques used on this CT exam have  
been documented in the patient's electronic medical record.   Digital Imaging and  
Communications in Medicine (DICOM) format image data are available to  
nonaffiliated external healthcare facilities or entities on a secure, media  
free, reciprocally searchable basis with patient authorization for at least a  
12-month period after this study. _______________ FINDINGS:  
   
LOWER CHEST: There is bronchiectasis and scarring in the bilateral lung bases  
with scattered subsegmental atelectasis. There are centrilobular nodular  
opacities in the medial right lung base and to a lesser extent on the left. Additionally there are calcified pleural plaques bilaterally. No pleural  
effusion. Multivessel coronary artery calcifications are noted. No pericardial  
effusion. LIVER, BILIARY: Liver is normal. No biliary dilation. Multiple dependent  
gallstones are noted within the lumen of the gallbladder. PANCREAS: Normal.  
   
SPLEEN: Normal.  
   
ADRENALS: Normal.  
   
KIDNEYS/URETERS/BLADDER: Bilateral renal cysts including a dominant exophytic  
cyst arising from the upper pole of the right kidney which measures up to 7.4 cm  
in diameter. No hydronephrosis. LYMPH NODES: No enlarged lymph nodes. GASTROINTESTINAL TRACT: There are multiple dilated and fluid-filled loops of  
small bowel in the mid to lower abdomen. Dilated loops of small bowel measure up  
to approximately 4 cm in diameter with scattered air-fluid levels. The stomach  
is also distended and fluid-filled. There is an apparent transition point in the  
left mid abdomen where the dilated loops of small bowel rapidly taper to a  
decompressed loop of bowel with decompressed distal loops best appreciated on  
coronal series image 46 and 47 and axial series images 113-108, potentially a  
subtle intussusception on image 113. No discrete mass is appreciated. Normal  
appendix. PELVIC ORGANS: Unremarkable. VASCULATURE: Scattered aortic atherosclerosis. BONES: No acute or aggressive osseous abnormalities identified. Degenerative  
changes are noted throughout the spine with multilevel spondylosis and facet arthrosis. Severe degenerative disc disease is noted throughout the lower  
thoracic and lumbar spine. Grade 1 anterolisthesis of L4 on L5.  
   
OTHER: No ascites. Subcutaneous nodular density along the right anterolateral  
abdominal wall is noted and to a lesser extent of left of midline, potentially  
sites of subcutaneous medication injection. _______________ CXR Results  (Last 48 hours) 07/03/20 0932  XR CHEST PORT Final result Impression:  IMPRESSION:  
   
Bibasilar bronchiectasis with calcified pleural plaques suggesting sequela of  
asbestos related pleural disease. No consolidation. Narrative:  EXAM: XR CHEST PORT  
   
CLINICAL INDICATION/HISTORY: abd pain  
  > Additional: None. COMPARISON: None. TECHNIQUE: Portable chest  
   
_______________ FINDINGS:  
   
SUPPORT DEVICES: None. HEART AND MEDIASTINUM: Normal size and contour. Normal pulmonary vasculature. LUNGS AND PLEURAL SPACES: Bibasilar bronchiectasis. Prominent calcified pleural  
plaques predominantly along the left lung are noted. No focal consolidation. BONY THORAX AND SOFT TISSUES: No acute osseous abnormality. _______________ Discharge Clinical Impression: 1. Abdominal pain, generalized 2. Non-intractable vomiting with nausea, unspecified vomiting type 3. Intussusception of intestine ([address] 4. History of prostate cancer 5. Acute kidney injury ([address] Disposition: 
Admit It should be noted that I will be the provider of record for this patient Wesley Dueñas MD 
 
 
Follow-up Information None Current Discharge Medication List

## 2020-07-03 NOTE — PROGRESS NOTES
Problem: Discharge Planning Goal: *Discharge to safe environment Outcome: Progressing Towards Goal 
Plan is tbd- probably home

## 2020-07-04 NOTE — PROGRESS NOTES
attempted to conducted an initial consultation and Spiritual Assessment for Giovany Greene, who is a 80 y.o.,male. Patients Primary Language is: [place]. According to the patients EMR Confucianist Affiliation is: Voodoo. The reason the Patient came to the hospital is:  
Patient Active Problem List  
 Diagnosis Date Noted  Diabetes ([facility] 07/03/2020  
 SBO (small bowel obstruction) ([facility] 07/03/2020  
 HTN (hypertension) 07/03/2020  MARCI (acute kidney injury) ([facility] 07/03/2020  Elevated troponin 07/03/2020 The  provided the following Interventions: 
 attempted to  Initiate a relationship of care and support with patient in room 2201 today but found the patient resting peacefully with a NG  tube going into his nose. Lamar Schwartz Found patient resting and therefore patient was not in a condition where he could communicate with me. Lamar Schwartz Provided information about Spiritual Care Services. Offered prayer and assurance of continued prayers on patients behalf. Assessment: 
Patient does not have any Samaritan/cultural needs that will affect patients preferences in health care. There are no further spiritual or Samaritan issues which require Spiritual Care Services interventions at this time. Plan: 
Chaplains will continue to follow and will provide pastoral care on an as needed/requested basis Lamar Cisneros 3 Board Certified [employer] Spiritual Care  
[phone]

## 2020-07-04 NOTE — CONSULTS
Consult Note Consult requested by: Dr Osman Cuevassameer Tang is a 80 y.o. male PATIENT REFUSED who is being seen on consult for MARCI. Chief Complaint Patient presents with  Constipation  Vomiting Admission diagnosis: SBO (small bowel obstruction) ([facility] HPI: 80 y.o. male with a PMH of DM, HTN admitted with Constipation, Abd pain & Vomiting. Imaging revealed SBO with Intussusception. Labs showed elevated creatinine 3.24 with no prior labs. Was on ARB & Diuretic Past Medical History:  
Diagnosis Date  Diabetes ([facility]  Hypertension History reviewed. No pertinent surgical history. Social History Socioeconomic History  Marital status:  Spouse name: Not on file  Number of children: Not on file  Years of education: Not on file  Highest education level: Not on file Occupational History  Not on file Social Needs  Financial resource strain: Not on file  Food insecurity Worry: Not on file Inability: Not on file  Transportation needs Medical: Not on file Non-medical: Not on file Tobacco Use  Smoking status: Never Smoker  Smokeless tobacco: Never Used Substance and Sexual Activity  Alcohol use: Not on file  Drug use: Not on file  Sexual activity: Not on file Lifestyle  Physical activity Days per week: Not on file Minutes per session: Not on file  Stress: Not on file Relationships  Social connections Talks on phone: Not on file Gets together: Not on file Attends Amish service: Not on file Active member of club or organization: Not on file Attends meetings of clubs or organizations: Not on file Relationship status: Not on file  Intimate partner violence Fear of current or ex partner: Not on file Emotionally abused: Not on file Physically abused: Not on file Forced sexual activity: Not on file Other Topics Concern  Not on file Social History Narrative  Not on file History reviewed. No pertinent family history. Allergies Allergen Reactions  Iodinated Contrast Media Swelling  Lisinopril Swelling Home Medications:  
 
Current Facility-Administered Medications Medication Dose Route Frequency  0.9% sodium chloride infusion  150 mL/hr IntraVENous CONTINUOUS  
 insulin lispro (HUMALOG) injection   SubCUTAneous Q6H  
 glucose chewable tablet 16 g  4 Tab Oral PRN  
 glucagon (GLUCAGEN) injection 1 mg  1 mg IntraMUSCular PRN  
 dextrose 10% infusion 125-250 mL  125-250 mL IntraVENous PRN  
 morphine injection 1 mg  1 mg IntraVENous Q4H PRN  
 insulin glargine (LANTUS) injection 5 Units  5 Units SubCUTAneous QHS  hydrALAZINE (APRESOLINE) 20 mg/mL injection 10 mg  10 mg IntraVENous Q6H PRN  
 naloxone (NARCAN) injection 0.4 mg  0.4 mg IntraVENous PRN  
 ondansetron (ZOFRAN) injection 6 mg  6 mg IntraVENous Q6H PRN Review of Systems:  
Review of Systems: 
General : Neg 
Psychological: Neg Ophthalmology: Neg 
ENT: Neg Allergy & Immunology: Neg 
Hematology: Neg 
Endocrine: Neg 
Breast: Neg Respiratory: Neg 
Cardiology: Neg 
Genito-Urinary: Neg 
Musculoskeletal: Neg Neuro: Neg 
Dermatological: Neg 
GI: Vomiting Data Review: 
 
Labs: Results:  
   
Chemistry Recent Labs  
  07/04/20 0221 07/03/20 
0852 * 314*  134* K 3.9 4.3  98* CO2 28 29 BUN 63* 45* CREA 3.49* 3.24* CA 9.1 9.9 AGAP 9 7 BUCR 18 14 AP  --  78  
TP  --  9.0* ALB  --  4.3 GLOB  --  4.7* AGRAT  --  0.9 CBC w/Diff Recent Labs  
  07/04/20 0221 07/03/20 
7444 WBC 5.3 8.6  
RBC 4.21* 4.37* HGB 13.8 14.4 HCT 41.4 42.5  224 GRANS 65 75* LYMPH 16* 9* EOS 0 0 Coagulation No results for input(s): PTP, INR, APTT, INREXT in the last 72 hours. Iron/Ferritin No results for input(s): IRON in the last 72 hours.  
 
No lab exists for component: TIBCCALC  
 BNP No results for input(s): BNPP in the last 72 hours. Cardiac Enzymes Recent Labs  
  07/04/20 
0221 07/03/20 
1910  352* CKND1 1.1 1.3 Liver Enzymes Recent Labs  
  07/03/20 
0936 TP 9.0* ALB 4.3 AP 78 Thyroid Studies No results found for: T4, T3U, TSH, TSHEXT Physical Assessment:  
 
Visit Vitals /69 (BP 1 Location: Right arm, BP Patient Position: At rest;Supine; Head of bed elevated (Comment degrees)) Pulse 93 Temp 98.4 °F (36.9 °C) Resp 18 Ht 5' 7\" (1.702 m) Wt 79.4 kg (175 lb) SpO2 94% BMI 27.41 kg/m² Intake/Output Summary (Last 24 hours) at 7/4/2020 1247 Last data filed at 7/4/2020 1125 Gross per 24 hour Intake 70 ml Output 3180 ml Net -3110 ml Physial Exam: 
General appearance: NAD Neurologic: can not assess Neck: no JVD Lungs: Obed CTA Heart: S1S2 Abdomen: Soft, NT Extremities: no edema Impression and Plan:  
 
MARCI: nonoliguric. Likely ATN from GI fluid losses, ARB & Diuretic. Place [place]. Continue IVF. IV Lasix if output falters. UA bland. No  obstruction on imaging. SBO/Intussesseption: per Surgery. NG suction. On NS. 
HTN: avoid tight control. Holding ARB & Diuretic. Hayder Hanson MD 
July 4, 2020 Pager : [phone]

## 2020-07-04 NOTE — PROGRESS NOTES
Internal Medicine Progress Note Patient's Name: Balaji Jansen Admit Date: 7/3/2020 Length of Stay: 1 Assessment/Plan Principal Problem: 
  SBO (small bowel obstruction) ([facility] (7/3/2020) Active Problems: 
  MARCI (acute kidney injury) ([facility] (7/3/2020) Diabetes ([facility] (7/3/2020) HTN (hypertension) (7/3/2020) Elevated troponin (7/3/2020) SBO 
- appreciate GS, GI 
- IV fluids 
- NGT - monitor output - 2900cc overnight 
- NPO 
- PRN pain control 
  
MARCI 
- acute >chronic - IV fluids 
- monitor BMP 
- hold nephrotoxic medications - appreciate nephrology 
- kareen ordered 
  
DM 
- lantus/ssi 
- monitor BG 
  
HTN 
- PRN hydralazine while NPO 
  
Elevated trop 
- trop remained flat, no s/sx ACS 
- EKG without concern for ACS, denies cardiac sx 
 
- Cont acceptable home medications for chronic conditions  
- DVT protocol I have personally reviewed all pertinent labs and films that have officially resulted over the last 24 hours. I have personally checked for all pending labs that are awaiting final results. Anticipated discharge: >2 days HPI Balaji Jansen is a 80 y.o. male with a PMHx of DM, HTN who presented to the ED with multiple days of constipation and abd pain. He states the last two day the pain has been so severe that he's been vomiting as well. Imaging reveals SBO with transition point in left midabdomen where there is a suspected short segment intussusception. Patient reports hx of bowel resection per ER notes. GS was consulted from the ED. NGT to be placed. Labs also reveal elevated creatinine 3.24, no prior labs, so unsure if acute or chronic. Patient will be admitted for further evaluation and treatment. Hospital Course Nephrology consulted for oliguric MARCI. He had 2900cc output from NGT. Subjective Pt s/e @ bedside. No major events overnight. Pt states he feels bad, worse than yesterday. States he has not urinated yet. Objective Visit Vitals /69 (BP 1 Location: Right arm, BP Patient Position: At rest;Supine; Head of bed elevated (Comment degrees)) Pulse 93 Temp 98.4 °F (36.9 °C) Resp 18 Ht 5' 7\" (1.702 m) Wt 79.4 kg (175 lb) SpO2 94% BMI 27.41 kg/m² Physical Exam: 
General Appearance: NAD, conversant HENT: normocephalic/atraumatic, moist mucus membranes Neck: No JVD, supple Lungs: CTA with normal respiratory effort CV: RRR, no m/r/g Abdomen: soft, diffuse ttp, +bowel sounds Extremities: no cyanosis, no peripheral edema Neuro: No focal deficits, motor/sensory intact Skin: Normal color, intact Intake and Output: 
Current Shift:  07/04 0701 - 07/04 1900 In: -  
Out: 200 [Urine:200] Last three shifts:  07/02 1901 - 07/04 0700 In: 79 Out: 2980 Lab/Data Reviewed: 
BMP:  
Lab Results Component Value Date/Time  07/04/2020 02:21 AM  
 K 3.9 07/04/2020 02:21 AM  
  07/04/2020 02:21 AM  
 CO2 28 07/04/2020 02:21 AM  
 AGAP 9 07/04/2020 02:21 AM  
  (H) 07/04/2020 02:21 AM  
 BUN 63 (H) 07/04/2020 02:21 AM  
 CREA 3.49 (H) 07/04/2020 02:21 AM  
 GFRAA 20 (L) 07/04/2020 02:21 AM  
 GFRNA 17 (L) 07/04/2020 02:21 AM  
 
CBC:  
Lab Results Component Value Date/Time WBC 5.3 07/04/2020 02:21 AM  
 HGB 13.8 07/04/2020 02:21 AM  
 HCT 41.4 07/04/2020 02:21 AM  
  07/04/2020 02:21 AM  
 
 
Imaging Reviewed: 
No results found. Medications Reviewed: 
Current Facility-Administered Medications Medication Dose Route Frequency  0.9% sodium chloride infusion  150 mL/hr IntraVENous CONTINUOUS  
 insulin lispro (HUMALOG) injection   SubCUTAneous Q6H  
 glucose chewable tablet 16 g  4 Tab Oral PRN  
 glucagon (GLUCAGEN) injection 1 mg  1 mg IntraMUSCular PRN  
 dextrose 10% infusion 125-250 mL  125-250 mL IntraVENous PRN  
 morphine injection 1 mg  1 mg IntraVENous Q4H PRN  
 insulin glargine (LANTUS) injection 5 Units  5 Units SubCUTAneous QHS  hydrALAZINE (APRESOLINE) 20 mg/mL injection 10 mg  10 mg IntraVENous Q6H PRN  
 naloxone (NARCAN) injection 0.4 mg  0.4 mg IntraVENous PRN  
 ondansetron (ZOFRAN) injection 6 mg  6 mg IntraVENous Q6H PRN Per Rodriguez PA-C [id number] Office:  [phone] Pager: [phone]

## 2020-07-04 NOTE — PROGRESS NOTES
Gastrointestinal Progress Note Patient Name: Carlene Crockett Today's Date: 7/4/2020 Admit Date: 7/3/2020 Assessment-Recommendation: 1. SBO - Continue current management. Defer to surgical management at this time. Subjective: Afebrile. Current Facility-Administered Medications Medication Dose Route Frequency  0.9% sodium chloride infusion  150 mL/hr IntraVENous CONTINUOUS  
 insulin lispro (HUMALOG) injection   SubCUTAneous Q6H  
 glucose chewable tablet 16 g  4 Tab Oral PRN  
 glucagon (GLUCAGEN) injection 1 mg  1 mg IntraMUSCular PRN  
 dextrose 10% infusion 125-250 mL  125-250 mL IntraVENous PRN  
 morphine injection 1 mg  1 mg IntraVENous Q4H PRN  
 insulin glargine (LANTUS) injection 5 Units  5 Units SubCUTAneous QHS  hydrALAZINE (APRESOLINE) 20 mg/mL injection 10 mg  10 mg IntraVENous Q6H PRN  
 naloxone (NARCAN) injection 0.4 mg  0.4 mg IntraVENous PRN  
 ondansetron (ZOFRAN) injection 6 mg  6 mg IntraVENous Q6H PRN Objective:  
 
Physical Exam: 
 
NAD alert OP clear NGT with dark bilious output Abdomen soft NT +BS Data Review: 
 
Labs: Results:  
   
Chemistry Recent Labs  
  07/04/20 0221 07/03/20 
0939 * 314*  134* K 3.9 4.3  98* CO2 28 29 BUN 63* 45* CREA 3.49* 3.24* CA 9.1 9.9 AGAP 9 7 BUCR 18 14 AP  --  78  
TP  --  9.0* ALB  --  4.3 GLOB  --  4.7* AGRAT  --  0.9 CBC w/Diff Recent Labs  
  07/04/20 0221 07/03/20 
7702 WBC 5.3 8.6  
RBC 4.21* 4.37* HGB 13.8 14.4 HCT 41.4 42.5  224 GRANS 65 75* LYMPH 16* 9* EOS 0 0 Coagulation No results for input(s): PTP, INR, APTT, INREXT in the last 72 hours. Liver Enzymes Recent Labs  
  07/03/20 
0936 TP 9.0* ALB 4.3 AP 78 ALT 22 Pj Cartagena MD 
July 4, 2020

## 2020-07-04 NOTE — PROGRESS NOTES
General Surgery Consult Federico Zepeda  Admit date: 7/3/2020 MRN: [id number]     : 1935     Age: 80 y.o. Attending Physician: Rodger Echeverria MD, FACS Subjective:  
 
Federico Zepeda is a 80 y.o. male who was admitted yesterday with abdominal pain nausea and vomiting and a picture of small bowel obstruction on a CT scan. Overnight there has been not much changes and the patient stated he is feeling the same with some mild abdominal pain. He stated that he did not have any flatus or bowel movement. His vital signs are normal with no fever or tachycardia. His NG tube output has been about 2.2 L over the last 24 hours since admission. It is dark bilious. Patient Active Problem List  
 Diagnosis Date Noted  Diabetes ([facility] 2020  
 SBO (small bowel obstruction) ([facility] 2020  
 HTN (hypertension) 2020  MARCI (acute kidney injury) ([facility] 2020  Elevated troponin 2020 Past Medical History:  
Diagnosis Date  Diabetes ([facility]  Hypertension History reviewed. No pertinent surgical history. Social History Tobacco Use  Smoking status: Never Smoker  Smokeless tobacco: Never Used Substance Use Topics  Alcohol use: Not on file Social History Tobacco Use Smoking Status Never Smoker Smokeless Tobacco Never Used History reviewed. No pertinent family history. Current Facility-Administered Medications Medication Dose Route Frequency  0.9% sodium chloride infusion  100 mL/hr IntraVENous CONTINUOUS  
 insulin lispro (HUMALOG) injection   SubCUTAneous Q6H  
 glucose chewable tablet 16 g  4 Tab Oral PRN  
 glucagon (GLUCAGEN) injection 1 mg  1 mg IntraMUSCular PRN  
 dextrose 10% infusion 125-250 mL  125-250 mL IntraVENous PRN  
 morphine injection 1 mg  1 mg IntraVENous Q4H PRN  
 insulin glargine (LANTUS) injection 5 Units  5 Units SubCUTAneous QHS  hydrALAZINE (APRESOLINE) 20 mg/mL injection 10 mg  10 mg IntraVENous Q6H PRN  
 naloxone (NARCAN) injection 0.4 mg  0.4 mg IntraVENous PRN  
 ondansetron (ZOFRAN) injection 6 mg  6 mg IntraVENous Q6H PRN Allergies Allergen Reactions  Iodinated Contrast Media Swelling  Lisinopril Swelling Review of Systems: 
Pertinent items are noted in the History of Present Illness. Objective:  
 
Visit Vitals /72 (BP 1 Location: Left arm, BP Patient Position: At rest) Pulse 88 Temp 98.1 °F (36.7 °C) Resp 18 Ht 5' 7\" (1.702 m) Wt 79.4 kg (175 lb) SpO2 96% BMI 27.41 kg/m² Physical Exam:   
 
General:  in no apparent distress, alert and oriented times 3 Eyes:  conjunctivae and sclerae normal, pupils equal, round, reactive to light Throat & Neck: no erythema or exudates noted and neck supple and symmetrical; no palpable masses Lungs:   clear to auscultation bilaterally Heart:  Regular rate and rhythm Abdomen:    Abdomen is mildly distended but soft and non tender. No abdominal wall hernias. Extremities: extremities normal, atraumatic, no cyanosis or edema Skin: Normal.  
   
 
Imaging and Lab Review: CBC:  
Lab Results Component Value Date/Time WBC 5.3 07/04/2020 02:21 AM  
 RBC 4.21 (L) 07/04/2020 02:21 AM  
 HGB 13.8 07/04/2020 02:21 AM  
 HCT 41.4 07/04/2020 02:21 AM  
 PLATELET 705 59/75/2606 02:21 AM  
 
BMP:  
Lab Results Component Value Date/Time Glucose 149 (H) 07/04/2020 02:21 AM  
 Sodium 140 07/04/2020 02:21 AM  
 Potassium 3.9 07/04/2020 02:21 AM  
 Chloride 103 07/04/2020 02:21 AM  
 CO2 28 07/04/2020 02:21 AM  
 BUN 63 (H) 07/04/2020 02:21 AM  
 Creatinine 3.49 (H) 07/04/2020 02:21 AM  
 Calcium 9.1 07/04/2020 02:21 AM  
 
CMP: 
Lab Results Component Value Date/Time  Glucose 149 (H) 07/04/2020 02:21 AM  
 Sodium 140 07/04/2020 02:21 AM  
 Potassium 3.9 07/04/2020 02:21 AM  
 Chloride 103 07/04/2020 02:21 AM  
 CO2 28 07/04/2020 02:21 AM  
 BUN 63 (H) 07/04/2020 02:21 AM  
 Creatinine 3.49 (H) 07/04/2020 02:21 AM  
 Calcium 9.1 07/04/2020 02:21 AM  
 Anion gap 9 07/04/2020 02:21 AM  
 BUN/Creatinine ratio 18 07/04/2020 02:21 AM  
 Alk. phosphatase 78 07/03/2020 09:36 AM  
 Protein, total 9.0 (H) 07/03/2020 09:36 AM  
 Albumin 4.3 07/03/2020 09:36 AM  
 Globulin 4.7 (H) 07/03/2020 09:36 AM  
 A-G Ratio 0.9 07/03/2020 09:36 AM  
 
 
Recent Results (from the past 24 hour(s)) EKG, 12 LEAD, INITIAL Collection Time: 07/03/20  9:25 AM  
Result Value Ref Range Ventricular Rate 88 BPM  
 Atrial Rate 88 BPM  
 P-R Interval 150 ms QRS Duration 88 ms Q-T Interval 380 ms QTC Calculation (Bezet) 459 ms Calculated P Axis 33 degrees Calculated R Axis 49 degrees Calculated T Axis 107 degrees Diagnosis Normal sinus rhythm Moderate voltage criteria for LVH, may be normal variant ( R in aVL , [place] 
 product ) Possible Inferior infarct , age undetermined Abnormal ECG No previous ECGs available CBC WITH AUTOMATED DIFF Collection Time: 07/03/20  9:36 AM  
Result Value Ref Range WBC 8.6 4.6 - 13.2 K/uL  
 RBC 4.37 (L) 4.70 - 5.50 M/uL  
 HGB 14.4 13.0 - 16.0 g/dL HCT 42.5 36.0 - 48.0 % MCV 97.3 (H) 74.0 - 97.0 FL  
 MCH 33.0 24.0 - 34.0 PG  
 MCHC 33.9 31.0 - 37.0 g/dL  
 RDW 12.1 11.6 - 14.5 % PLATELET 961 616 - 748 K/uL MPV 11.3 9.2 - 11.8 FL  
 NEUTROPHILS 75 (H) 40 - 73 % LYMPHOCYTES 9 (L) 21 - 52 % MONOCYTES 16 (H) 3 - 10 % EOSINOPHILS 0 0 - 5 % BASOPHILS 0 0 - 2 %  
 ABS. NEUTROPHILS 6.5 1.8 - 8.0 K/UL  
 ABS. LYMPHOCYTES 0.8 (L) 0.9 - 3.6 K/UL  
 ABS. MONOCYTES 1.4 (H) 0.05 - 1.2 K/UL  
 ABS. EOSINOPHILS 0.0 0.0 - 0.4 K/UL  
 ABS. BASOPHILS 0.0 0.0 - 0.1 K/UL  
 DF AUTOMATED METABOLIC PANEL, BASIC Collection Time: 07/03/20  9:36 AM  
Result Value Ref Range Sodium 134 (L) 136 - 145 mmol/L  Potassium 4.3 3.5 - 5.5 mmol/L  
 Chloride 98 (L) 100 - 111 mmol/L  
 CO2 29 21 - 32 mmol/L Anion gap 7 3.0 - 18 mmol/L Glucose 314 (H) 74 - 99 mg/dL BUN 45 (H) 7.0 - 18 MG/DL Creatinine 3.24 (H) 0.6 - 1.3 MG/DL  
 BUN/Creatinine ratio 14 12 - 20 GFR est AA 22 (L) >60 ml/min/1.73m2 GFR est non-AA 18 (L) >60 ml/min/1.73m2 Calcium 9.9 8.5 - 10.1 MG/DL  
CARDIAC PANEL,(CK, CKMB & TROPONIN) Collection Time: 07/03/20  9:36 AM  
Result Value Ref Range CK - MB 4.2 (H) <3.6 ng/ml CK-MB Index 1.2 0.0 - 4.0 %  (H) 39 - 308 U/L Troponin-I, QT 0.05 (H) 0.0 - 0.045 NG/ML  
LIPASE Collection Time: 07/03/20  9:36 AM  
Result Value Ref Range Lipase 409 (H) 73 - 393 U/L  
HEPATIC FUNCTION PANEL Collection Time: 07/03/20  9:36 AM  
Result Value Ref Range Protein, total 9.0 (H) 6.4 - 8.2 g/dL Albumin 4.3 3.4 - 5.0 g/dL Globulin 4.7 (H) 2.0 - 4.0 g/dL A-G Ratio 0.9 0.8 - 1.7 Bilirubin, total 0.9 0.2 - 1.0 MG/DL Bilirubin, direct 0.2 0.0 - 0.2 MG/DL Alk. phosphatase 78 45 - 117 U/L  
 AST (SGOT) 30 10 - 38 U/L  
 ALT (SGPT) 22 16 - 61 U/L  
URINALYSIS W/ RFLX MICROSCOPIC Collection Time: 07/03/20 12:10 PM  
Result Value Ref Range Color DARK YELLOW Appearance CLOUDY Specific gravity 1.028 1.005 - 1.030    
 pH (UA) 5.0 5.0 - 8.0 Protein 300 (A) NEG mg/dL Glucose Negative NEG mg/dL Ketone 15 (A) NEG mg/dL Bilirubin LARGE (A) NEG Blood Negative NEG Urobilinogen 1.0 0.2 - 1.0 EU/dL Nitrites Negative NEG Leukocyte Esterase TRACE (A) NEG URINE MICROSCOPIC ONLY Collection Time: 07/03/20 12:10 PM  
Result Value Ref Range WBC 4 to 10 0 - 4 /hpf  
 RBC Negative 0 - 5 /hpf Epithelial cells 1+ 0 - 5 /lpf Bacteria Negative NEG /hpf Amorphous Crystals 2+ (A) NEG Hyaline cast 11 to 20 0 - 2 /lpf Granular cast 0 to 3 NEG /lpf  
GLUCOSE, POC Collection Time: 07/03/20  2:08 PM  
Result Value Ref Range Glucose (POC) 260 (H) 70 - 110 mg/dL GLUCOSE, POC Collection Time: 07/03/20  5:14 PM  
Result Value Ref Range Glucose (POC) 158 (H) 70 - 110 mg/dL CARDIAC PANEL,(CK, CKMB & TROPONIN) Collection Time: 07/03/20  7:10 PM  
Result Value Ref Range CK - MB 4.5 (H) <3.6 ng/ml CK-MB Index 1.3 0.0 - 4.0 %  (H) 39 - 308 U/L Troponin-I, QT 0.06 (H) 0.0 - 0.045 NG/ML  
GLUCOSE, POC Collection Time: 07/04/20  1:05 AM  
Result Value Ref Range Glucose (POC) 161 (H) 70 - 110 mg/dL CBC WITH AUTOMATED DIFF Collection Time: 07/04/20  2:21 AM  
Result Value Ref Range WBC 5.3 4.6 - 13.2 K/uL  
 RBC 4.21 (L) 4.70 - 5.50 M/uL  
 HGB 13.8 13.0 - 16.0 g/dL HCT 41.4 36.0 - 48.0 % MCV 98.3 (H) 74.0 - 97.0 FL  
 MCH 32.8 24.0 - 34.0 PG  
 MCHC 33.3 31.0 - 37.0 g/dL  
 RDW 12.2 11.6 - 14.5 % PLATELET 151 611 - 436 K/uL MPV 11.3 9.2 - 11.8 FL  
 NEUTROPHILS 65 40 - 73 % LYMPHOCYTES 16 (L) 21 - 52 % MONOCYTES 19 (H) 3 - 10 % EOSINOPHILS 0 0 - 5 % BASOPHILS 0 0 - 2 %  
 ABS. NEUTROPHILS 3.4 1.8 - 8.0 K/UL  
 ABS. LYMPHOCYTES 0.9 0.9 - 3.6 K/UL  
 ABS. MONOCYTES 1.0 0.05 - 1.2 K/UL  
 ABS. EOSINOPHILS 0.0 0.0 - 0.4 K/UL  
 ABS. BASOPHILS 0.0 0.0 - 0.1 K/UL  
 DF AUTOMATED METABOLIC PANEL, BASIC Collection Time: 07/04/20  2:21 AM  
Result Value Ref Range Sodium 140 136 - 145 mmol/L Potassium 3.9 3.5 - 5.5 mmol/L Chloride 103 100 - 111 mmol/L  
 CO2 28 21 - 32 mmol/L Anion gap 9 3.0 - 18 mmol/L Glucose 149 (H) 74 - 99 mg/dL BUN 63 (H) 7.0 - 18 MG/DL Creatinine 3.49 (H) 0.6 - 1.3 MG/DL  
 BUN/Creatinine ratio 18 12 - 20 GFR est AA 20 (L) >60 ml/min/1.73m2 GFR est non-AA 17 (L) >60 ml/min/1.73m2 Calcium 9.1 8.5 - 10.1 MG/DL  
CARDIAC PANEL,(CK, CKMB & TROPONIN) Collection Time: 07/04/20  2:21 AM  
Result Value Ref Range CK - MB 3.3 <3.6 ng/ml CK-MB Index 1.1 0.0 - 4.0 %   39 - 308 U/L  
 Troponin-I, QT 0.07 (H) 0.0 - 0.045 NG/ML  
 
 
images and reports reviewed Assessment:  
Marisol Montenegro is a 80 y.o. male who is presenting with picture of small bowel obstruction as well as intussusception. The patient is stable but did not improve remarkably with the medical therapy. However I would like to still wait on any surgical intervention for now hoping that his bowel obstruction is secondary to adhesions and it may resolve. I appreciate the GI input and I think a small bowel series is a good idea because sometimes it can even resolve the intussusception. But for now we cannot do this procedure because of the bowel obstruction. His renal failure is not improving and I am not sure if this is his baseline but we will follow nephrology recommendation. For now we will continue with the medical therapy but the patient may need surgical intervention. Plan:  
 
Appreciate medicine admission and management Appreciate Dr. Zimmer Shadow input Nephrology consultation N.p.o. IV fluids NG tube to low intermittent suction I placed an order for KUB Close observation hopefully he will resolve his bowel obstruction otherwise we will have to intervene surgically Try to get the medical leg records from the [place] concerning his previous abdominal surgeries as well as his medical conditions especially his kidney function Please call me if you have any questions (cell phone: [phone]) Signed By: Shu Kaur MD   
 July 4, 2020

## 2020-07-04 NOTE — ROUTINE PROCESS
POC glucose 165. Noted patient has had no drainage from NGT in approximately four hours. Patient stated that he was nauseated and had approximately 25 mL of emesis. Irrigated NGT with 70 mL sterile water. Noted 600 mL green drainage after irrigation. New canister obtained and changed out. Surgery notified.  
 
Shanique Diaz, RN, BSN

## 2020-07-04 NOTE — PROGRESS NOTES
Problem: Discharge Planning Goal: *Discharge to safe environment Outcome: Progressing Towards Goal 
  
Problem: Pain Goal: *Control of Pain Outcome: Progressing Towards Goal 
  
Problem: Small Bowel Obstruction: Day 1 Goal: Off Pathway (Use only if patient is Off Pathway) Outcome: Progressing Towards Goal 
Goal: Activity/Safety Outcome: Progressing Towards Goal 
Goal: Consults, if ordered Outcome: Progressing Towards Goal 
Goal: Diagnostic Test/Procedures Outcome: Progressing Towards Goal 
Goal: Nutrition/Diet Outcome: Progressing Towards Goal 
Goal: Medications Outcome: Progressing Towards Goal 
Goal: Respiratory Outcome: Progressing Towards Goal 
Goal: Treatments/Interventions/Procedures Outcome: Progressing Towards Goal 
Goal: Psychosocial 
Outcome: Progressing Towards Goal 
Goal: *Optimal pain control at patient's stated goal 
Outcome: Progressing Towards Goal 
Goal: *Adequate urinary output (equal to or greater than 30 milliliters/hour) Outcome: Progressing Towards Goal 
Goal: *Hemodynamically stable Outcome: Progressing Towards Goal 
Goal: *Demonstrates progressive activity Outcome: Progressing Towards Goal 
Goal: *Absence of nausea/vomiting Outcome: Progressing Towards Goal 
  
Problem: Small Bowel Obstruction: Day 2 Goal: Off Pathway (Use only if patient is Off Pathway) Outcome: Progressing Towards Goal 
Goal: Activity/Safety Outcome: Progressing Towards Goal 
Goal: Consults, if ordered Outcome: Progressing Towards Goal 
Goal: Diagnostic Test/Procedures Outcome: Progressing Towards Goal 
Goal: Nutrition/Diet Outcome: Progressing Towards Goal 
Goal: Discharge Planning Outcome: Progressing Towards Goal 
Goal: Medications Outcome: Progressing Towards Goal 
Goal: Respiratory Outcome: Progressing Towards Goal 
Goal: Treatments/Interventions/Procedures Outcome: Progressing Towards Goal 
Goal: Psychosocial 
Outcome: Progressing Towards Goal 
 Goal: *Optimal pain control at patient's stated goal 
Outcome: Progressing Towards Goal 
Goal: *Adequate urinary output (equal to or greater than 30 milliliters/hour) Outcome: Progressing Towards Goal 
Goal: *Hemodynamically stable Outcome: Progressing Towards Goal 
Goal: *Demonstrates progressive activity Outcome: Progressing Towards Goal 
Goal: *Absence of nausea/vomiting Outcome: Progressing Towards Goal 
Goal: *Return of normal bowel function Outcome: Progressing Towards Goal 
  
Problem: Small Bowel Obstruction: Day 3 Goal: Off Pathway (Use only if patient is Off Pathway) Outcome: Progressing Towards Goal 
Goal: Activity/Safety Outcome: Progressing Towards Goal 
Goal: Consults, if ordered Outcome: Progressing Towards Goal 
Goal: Diagnostic Test/Procedures Outcome: Progressing Towards Goal 
Goal: Nutrition/Diet Outcome: Progressing Towards Goal 
Goal: Discharge Planning Outcome: Progressing Towards Goal 
Goal: Medications Outcome: Progressing Towards Goal 
Goal: Respiratory Outcome: Progressing Towards Goal 
Goal: Treatments/Interventions/Procedures Outcome: Progressing Towards Goal 
Goal: Psychosocial 
Outcome: Progressing Towards Goal 
Goal: *Optimal pain control at patient's stated goal 
Outcome: Progressing Towards Goal 
Goal: *Adequate urinary output (equal to or greater than 30 milliliters/hour) Outcome: Progressing Towards Goal 
Goal: *Hemodynamically stable Outcome: Progressing Towards Goal 
Goal: *Adequate nutrition Outcome: Progressing Towards Goal 
Goal: *Demonstrates progressive activity Outcome: Progressing Towards Goal 
Goal: *Participates in discharge planning Outcome: Progressing Towards Goal 
  
Problem: Small Bowel Obstruction: Day 4 to Discharge Goal: Off Pathway (Use only if patient is Off Pathway) Outcome: Progressing Towards Goal 
Goal: Activity/Safety Outcome: Progressing Towards Goal 
Goal: Nutrition/Diet Outcome: Progressing Towards Goal 
Goal: Discharge Planning Outcome: Progressing Towards Goal 
Goal: Medications Outcome: Progressing Towards Goal 
Goal: Respiratory Outcome: Progressing Towards Goal 
Goal: Treatments/Interventions/Procedures Outcome: Progressing Towards Goal 
Goal: Psychosocial 
Outcome: Progressing Towards Goal 
  
Problem: Small Bowel Obstruction - Non Surgical: Discharge Outcomes Goal: *Hemodynamically stable Outcome: Progressing Towards Goal 
Goal: *Demonstrates independent activity or return to baseline Outcome: Progressing Towards Goal 
Goal: *Optimal pain control at patient's stated goal 
Outcome: Progressing Towards Goal 
Goal: *Verbalizes understanding and describes prescribed diet Outcome: Progressing Towards Goal 
Goal: *Tolerating diet Outcome: Progressing Towards Goal 
Goal: *Verbalizes name, dosage, time, side effects, and number of days to continue medications Outcome: Progressing Towards Goal 
Goal: *Anxiety reduced or absent Outcome: Progressing Towards Goal 
Goal: *Understands and describes signs and symptoms to report to providers(Stroke Metric) Outcome: Progressing Towards Goal 
Goal: *Describes follow-up/return visits to physicians Outcome: Progressing Towards Goal 
Goal: *Describes available resources and support systems Outcome: Progressing Towards Goal 
Goal: *Active bowel function Outcome: Progressing Towards Goal 
  
Problem: Falls - Risk of 
Goal: *Absence of Falls Description: Document [facility] Fall Risk and appropriate interventions in the flowsheet. Outcome: Progressing Towards Goal 
Note: Fall Risk Interventions: 
Mobility Interventions: Patient to call before getting OOB Medication Interventions: Patient to call before getting OOB, Teach patient to arise slowly Elimination Interventions: Call light in reach, Patient to call for help with toileting needs, Stay With Me (per policy), Urinal in reach

## 2020-07-04 NOTE — ROUTINE PROCESS
Bedside and Verbal shift change report given to Gibran Perez RN (oncoming nurse) by Yamil Amezcua RN, BSN (offgoing nurse). Report included the following information SBAR, Kardex, ED Summary, Procedure Summary, Intake/Output, MAR and Recent Results.   
 
Yamil Amezcua RN, BSN

## 2020-07-05 NOTE — PROGRESS NOTES
Internal Medicine Progress Note Patient's Name: Farida Briceño Admit Date: 7/3/2020 Length of Stay: 2 Assessment/Plan Principal Problem: 
  SBO (small bowel obstruction) ([facility] [address] (7/3/2020) Active Problems: 
  MARCI (acute kidney injury) ([facility] [address] (7/3/2020) Diabetes ([facility] [address] (7/3/2020) HTN (hypertension) (7/3/2020) Elevated troponin (7/3/2020) SBO 
- appreciate GS, GI 
- IV fluids 
- NGT - monitor output  
- NPO 
- PRN pain control - PICC for TPN recs noted by GS, PICC would need to be cleared by nephrology 
  
MARCI 
- acute >chronic - IV fluids 
- monitor BMP - improving 
- hold nephrotoxic medications - appreciate nephrology 
- kareen  
  
DM 
- lantus/ssi 
- monitor BG 
  
HTN 
- PRN hydralazine while NPO 
  
Elevated trop 
- trop remained flat, no s/sx ACS 
- EKG without concern for ACS, denies cardiac sx 
 
- Cont acceptable home medications for chronic conditions  
- DVT protocol I have personally reviewed all pertinent labs and films that have officially resulted over the last 24 hours. I have personally checked for all pending labs that are awaiting final results. Anticipated discharge: >2 days HPI Farida Briceño is a 80 y.o. male with a PMHx of DM, HTN who presented to the ED with multiple days of constipation and abd pain. He states the last two day the pain has been so severe that he's been vomiting as well. Imaging reveals SBO with transition point in left midabdomen where there is a suspected short segment intussusception. Patient reports hx of bowel resection per ER notes. GS was consulted from the ED. NGT to be placed. Labs also reveal elevated creatinine 3.24, no prior labs, so unsure if acute or chronic. Patient will be admitted for further evaluation and treatment. Hospital Course Nephrology consulted for MARCI. Patient remains with high NGT output. Subjective Pt s/e @ bedside. No major events overnight.  Pt states he feels better than yesterday. Asking for ice chips Objective Visit Vitals /78 (BP 1 Location: Right arm, BP Patient Position: Sitting) Pulse (!) 106 Temp 97.4 °F (36.3 °C) Resp 18 Ht 5' 7\" (1.702 m) Wt 77.4 kg (170 lb 11.2 oz) SpO2 94% BMI 26.74 kg/m² Physical Exam: 
General Appearance: NAD, conversant HENT: normocephalic/atraumatic, moist mucus membranes, NGT in place Neck: No JVD, supple Lungs: CTA with normal respiratory effort CV: RRR, no m/r/g Abdomen: soft, mild ttp, +bowel sounds Extremities: no cyanosis, no peripheral edema Neuro: No focal deficits, motor/sensory intact Skin: Normal color, intact Intake and Output: 
Current Shift:  07/05 0701 - 07/05 1900 In: 0 Out: 200 [Urine:200] Last three shifts:  07/03 1901 - 07/05 0700 In: 1987.5 [P.O.:120; I.V.:1797.5] Out: 5222 [Urine:690] Lab/Data Reviewed: 
BMP:  
Lab Results Component Value Date/Time  07/05/2020 03:15 AM  
 K 3.9 07/05/2020 03:15 AM  
  07/05/2020 03:15 AM  
 CO2 27 07/05/2020 03:15 AM  
 AGAP 7 07/05/2020 03:15 AM  
  (H) 07/05/2020 03:15 AM  
 BUN 80 (H) 07/05/2020 03:15 AM  
 CREA 3.38 (H) 07/05/2020 03:15 AM  
 GFRAA 21 (L) 07/05/2020 03:15 AM  
 GFRNA 17 (L) 07/05/2020 03:15 AM  
 
CBC:  
Lab Results Component Value Date/Time WBC 5.9 07/05/2020 03:15 AM  
 HGB 13.7 07/05/2020 03:15 AM  
 HCT 40.7 07/05/2020 03:15 AM  
  07/05/2020 03:15 AM  
 
 
Imaging Reviewed: 
Xr Abd Flat/ Erect Result Date: 7/4/2020 EXAM: XR ABD FLAT/ ERECT CLINICAL INDICATION/HISTORY: Follow up on SBO   > Additional: None. COMPARISON: CT scan dated 7/3/2020 TECHNIQUE: Supine and upright views of the abdomen were obtained. _______________ FINDINGS: BOWEL GAS PATTERN: Nasogastric tube in satisfactory position projecting over the upper abdomen.  Multiple dilated loops of small bowel in the midabdomen are noted in keeping with ongoing partial small bowel obstruction. No pneumatosis. No free intraperitoneal air. SOFT TISSUES: Calcification projecting over the right upper quadrant likely gallstone. BONES: Unremarkable. ADDITIONAL FINDINGS: Tortuous thoracic aorta. _______________ IMPRESSION: Satisfactory positioning of the nasogastric tube. Ongoing partial small bowel obstruction. Medications Reviewed: 
Current Facility-Administered Medications Medication Dose Route Frequency  0.9% sodium chloride infusion  150 mL/hr IntraVENous CONTINUOUS  
 insulin lispro (HUMALOG) injection   SubCUTAneous Q6H  
 glucose chewable tablet 16 g  4 Tab Oral PRN  
 glucagon (GLUCAGEN) injection 1 mg  1 mg IntraMUSCular PRN  
 dextrose 10% infusion 125-250 mL  125-250 mL IntraVENous PRN  
 morphine injection 1 mg  1 mg IntraVENous Q4H PRN  
 insulin glargine (LANTUS) injection 5 Units  5 Units SubCUTAneous QHS  hydrALAZINE (APRESOLINE) 20 mg/mL injection 10 mg  10 mg IntraVENous Q6H PRN  
 naloxone (NARCAN) injection 0.4 mg  0.4 mg IntraVENous PRN  
 ondansetron (ZOFRAN) injection 6 mg  6 mg IntraVENous Q6H PRN Sarah Smith PA-C [address] [place], [place] [address] Office:  [phone] Pager: [phone]

## 2020-07-05 NOTE — ROUTINE PROCESS
1922: Assumed care. AAOX4. HOB elevated. No SOB on RA. Rt nare NGT to LIWS. Draining green secretions. On IVF of NS at 150 ml/hr. Infusing well to Lt hand. [place] cath intact. Draining scanty yellow urine. Denies any pain or discomfort at this time. Call light within reach. 2048: Medicated for pain per patient request.  
 
2310: . No coverage provided per protocol. 2336: No change from previous assessment. 0200: Sleeping.  
 
0346: No change from previous assessment. 7991: Slept good thru night. Needs attended. . No coverage provided. 2076: Seen by Dr Carlos Blanca. Ordered patient may have ice chips & NGT to CLWS. 0730: Bedside and Verbal shift change report given to Doctor Bj Peck (oncoming nurse) by me (offgoing nurse). Report included the following information SBAR, Kardex, Intake/Output, MAR and Recent Results.

## 2020-07-05 NOTE — PROGRESS NOTES
RENAL PROGRESS NOTE [facility] Prom Assessment/Plan:  
 
  
MARCI: nonoliguric. Creat lower. ATN from GI fluid losses, ARB & Diuretic. Continue IVF. IV Lasix if output falters. UA bland. No  obstruction on imaging. SBO/Intussesseption: per Surgery. NG suction. On NS. 
HTN: avoid tight control. Holding ARB & Diuretic. Subjective: 
Patient complaints of: No complaints. No SOB/CP/N/V. Patient Active Problem List  
Diagnosis Code  Diabetes ([facility] [address] E11.9  
 SBO (small bowel obstruction) ([facility] [address] K56.609  
 HTN (hypertension) I10  
 MARCI (acute kidney injury) ([facility] [address] N17.9  Elevated troponin R79.89 Current Facility-Administered Medications Medication Dose Route Frequency Provider Last Rate Last Dose  
 0.9% sodium chloride infusion  100 mL/hr IntraVENous CONTINUOUS Julián Chun  mL/hr at 07/05/20 0944 150 mL/hr at 07/05/20 [phone]  insulin lispro (HUMALOG) injection   SubCUTAneous Q6H [place] PA   Stopped at 07/04/20 2309  
 glucose chewable tablet 16 g  4 Tab Oral PRN LoretogleSea PA      
 glucagon (GLUCAGEN) injection 1 mg  1 mg IntraMUSCular PRN LoretoglSea estrella PA      
 dextrose 10% infusion 125-250 mL  125-250 mL IntraVENous PRN [place] Birch PA      
 morphine injection 1 mg  1 mg IntraVENous Q4H PRN [place] PA   1 mg at 07/04/20 2048  insulin glargine (LANTUS) injection 5 Units  5 Units SubCUTAneous QHS ReprogleSea PA   5 Units at 07/04/20 2310  hydrALAZINE (APRESOLINE) 20 mg/mL injection 10 mg  10 mg IntraVENous Q6H PRN [place] Birch PA      
 naloxone [facility]) injection 0.4 mg  0.4 mg IntraVENous PRN Damari Tucker MD      
 ondansetron [facility]) injection 6 mg  6 mg IntraVENous Q6H PRN Damari Tucker MD   6 mg at 07/04/20 1112 Objective Vitals: 07/04/20 1934 07/04/20 2336 07/05/20 2308 07/05/20 5292 BP: 121/66 139/71 118/68 142/78 Pulse: 89 89 (!) 109 (!) 106 Resp: 18 18 18 18 Temp: 97.8 °F (36.6 °C) 97.7 °F (36.5 °C) 98.3 °F (36.8 °C) 97.4 °F (36.3 °C) SpO2: 91% 92% 91% 94% Weight:  77.4 kg (170 lb 11.2 oz) Height:      
 
 
 
Intake/Output Summary (Last 24 hours) at 7/5/2020 1008 Last data filed at 7/5/2020 5238 Gross per 24 hour Intake 1917.5 ml Output 4265 ml Net -2347.5 ml Admission weight: Weight: 79.4 kg (175 lb) (07/03/20 0927) Last Weight Metrics: 
Weight Loss Metrics 7/4/2020 1/31/2019 Today's Wt 170 lb 11.2 oz 181 lb 9 oz BMI 26.74 kg/m2 28.44 kg/m2 Physical Assessment:  
 
General: NAD, alert and oriented. Neck: No jvd. LUNGS: Clear to Auscultation, No rales, rhonchi or wheezes. CVS EXM: S1, S2  RRR, no murmurs/gallops/rubs. Abdomen: soft, non tender. Lower Extremities:  no edema. Lab CBC w/Diff Recent Labs  
  07/05/20 0315 07/04/20 0221 07/03/20 
0658 WBC 5.9 5.3 8.6  
RBC 4.08* 4.21* 4.37* HGB 13.7 13.8 14.4 HCT 40.7 41.4 42.5  213 224 GRANS 67 65 75* LYMPH 13* 16* 9* EOS 0 0 0 Chemistry Recent Labs  
  07/05/20 0315 07/04/20 0221 07/03/20 
9727 * 149* 314*  140 134* K 3.9 3.9 4.3  103 98* CO2 27 28 29 BUN 80* 63* 45* CREA 3.38* 3.49* 3.24* CA 9.1 9.1 9.9 AGAP 7 9 7 BUCR 24* 18 14 AP  --   --  78  
TP  --   --  9.0* ALB  --   --  4.3 GLOB  --   --  4.7* AGRAT  --   --  0.9 No results found for: IRON, FE, TIBC, IBCT, PSAT, FERR Lab Results Component Value Date/Time Calcium 9.1 07/05/2020 03:15 AM  
  
 
Nilam Flores MD 
[facility] Pager: [phone]

## 2020-07-05 NOTE — PROGRESS NOTES
Problem: Discharge Planning Goal: *Discharge to safe environment Outcome: Progressing Towards Goal 
  
Problem: Pain Goal: *Control of Pain Outcome: Progressing Towards Goal 
  
Problem: Small Bowel Obstruction: Day 2 Goal: Activity/Safety Outcome: Progressing Towards Goal 
Goal: Consults, if ordered Outcome: Progressing Towards Goal 
Goal: Diagnostic Test/Procedures Outcome: Progressing Towards Goal 
Goal: Nutrition/Diet Outcome: Progressing Towards Goal 
Goal: Discharge Planning Outcome: Progressing Towards Goal 
Goal: Medications Outcome: Progressing Towards Goal 
Goal: Respiratory Outcome: Progressing Towards Goal 
Goal: Treatments/Interventions/Procedures Outcome: Progressing Towards Goal 
Goal: Psychosocial 
Outcome: Progressing Towards Goal 
Goal: *Optimal pain control at patient's stated goal 
Outcome: Progressing Towards Goal 
Goal: *Adequate urinary output (equal to or greater than 30 milliliters/hour) Outcome: Progressing Towards Goal 
Goal: *Hemodynamically stable Outcome: Progressing Towards Goal 
Goal: *Demonstrates progressive activity Outcome: Progressing Towards Goal 
Goal: *Absence of nausea/vomiting Outcome: Progressing Towards Goal 
  
Problem: Pressure Injury - Risk of 
Goal: *Prevention of pressure injury Description: Document Francis Scale and appropriate interventions in the flowsheet. Outcome: Progressing Towards Goal 
Note: Pressure Injury Interventions: 
Sensory Interventions: Assess changes in LOC, Assess need for specialty bed, Avoid rigorous massage over bony prominences, Check visual cues for pain, Discuss PT/OT consult with provider, Float heels, Keep linens dry and wrinkle-free, Maintain/enhance activity level, Minimize linen layers, Pressure redistribution bed/mattress (bed type) Activity Interventions: Pressure redistribution bed/mattress(bed type) Mobility Interventions: HOB 30 degrees or less, Pressure redistribution bed/mattress (bed type) Nutrition Interventions: Document food/fluid/supplement intake Problem: Patient Education: Go to Patient Education Activity Goal: Patient/Family Education Outcome: Progressing Towards Goal 
  
Problem: Diabetes Self-Management Goal: *Disease process and treatment process Description: Define diabetes and identify own type of diabetes; list 3 options for treating diabetes. Outcome: Progressing Towards Goal 
Goal: *Incorporating nutritional management into lifestyle Description: Describe effect of type, amount and timing of food on blood glucose; list 3 methods for planning meals. Outcome: Progressing Towards Goal 
Goal: *Incorporating physical activity into lifestyle Description: State effect of exercise on blood glucose levels. Outcome: Progressing Towards Goal 
Goal: *Developing strategies to promote health/change behavior Description: Define the ABC's of diabetes; identify appropriate screenings, schedule and personal plan for screenings. Outcome: Progressing Towards Goal 
Goal: *Using medications safely Description: State effect of diabetes medications on diabetes; name diabetes medication taking, action and side effects. Outcome: Progressing Towards Goal 
Goal: *Monitoring blood glucose, interpreting and using results Description: Identify recommended blood glucose targets  and personal targets. Outcome: Progressing Towards Goal 
Goal: *Prevention, detection, treatment of acute complications Description: List symptoms of hyper- and hypoglycemia; describe how to treat low blood sugar and actions for lowering  high blood glucose level. Outcome: Progressing Towards Goal 
Goal: *Prevention, detection and treatment of chronic complications Description: Define the natural course of diabetes and describe the relationship of blood glucose levels to long term complications of diabetes. Outcome: Progressing Towards Goal 
Goal: *Developing strategies to address psychosocial issues Description: Describe feelings about living with diabetes; identify support needed and support network Outcome: Progressing Towards Goal 
  
Problem: Patient Education: Go to Patient Education Activity Goal: Patient/Family Education Outcome: Progressing Towards Goal 
  
Problem: Hypertension Goal: *Blood pressure within specified parameters Outcome: Progressing Towards Goal 
Goal: *Fluid volume balance Outcome: Progressing Towards Goal 
Goal: *Labs within defined limits Outcome: Progressing Towards Goal

## 2020-07-05 NOTE — PROGRESS NOTES
Bedside shift change report given to this RN (oncoming nurse) by Tammy Shea RN (offgoing nurse). Report included the following information SBAR and Kardex.

## 2020-07-05 NOTE — PROGRESS NOTES
General Surgery Consult Cathy Ha  Admit date: 7/3/2020 MRN: [id number]     : 1935     Age: 80 y.o. Attending Physician: Keily Maldonado MD, FACS Subjective:  
 
Cathy Ha is a 80 y.o. male who I am following for a picture of small bowel obstruction seen on a CT scan. There are no major issues overnight but his NG tube output is still extremely high. The patient was having some intermittent nausea but once they placed NG tube to continuous suction and irrigated it drained a total of about 3 L over the last 24 hours and about 1.5 L over night. The patient is feeling relatively well but he did not pass any flatus or bowel movement. He denies any significant abdominal pain. His vital signs are normal with no fever or tachycardia and his WBC is normal today. He was seen by the nephrology team and his creatinine has been stable at 3.4 which most likely is his baseline but were not sure yet. His KUB from yesterday showed small bowel obstruction. Patient Active Problem List  
 Diagnosis Date Noted  Diabetes ([facility] 2020  
 SBO (small bowel obstruction) ([facility] 2020  
 HTN (hypertension) 2020  MARCI (acute kidney injury) ([facility] 2020  Elevated troponin 2020 Past Medical History:  
Diagnosis Date  Diabetes ([facility] [facility]  Hypertension History reviewed. No pertinent surgical history. Social History Tobacco Use  Smoking status: Never Smoker  Smokeless tobacco: Never Used Substance Use Topics  Alcohol use: Not on file Social History Tobacco Use Smoking Status Never Smoker Smokeless Tobacco Never Used History reviewed. No pertinent family history. Current Facility-Administered Medications Medication Dose Route Frequency  0.9% sodium chloride infusion  150 mL/hr IntraVENous CONTINUOUS  
 insulin lispro (HUMALOG) injection   SubCUTAneous Q6H  
 glucose chewable tablet 16 g  4 Tab Oral PRN  
  glucagon (GLUCAGEN) injection 1 mg  1 mg IntraMUSCular PRN  
 dextrose 10% infusion 125-250 mL  125-250 mL IntraVENous PRN  
 morphine injection 1 mg  1 mg IntraVENous Q4H PRN  
 insulin glargine (LANTUS) injection 5 Units  5 Units SubCUTAneous QHS  hydrALAZINE (APRESOLINE) 20 mg/mL injection 10 mg  10 mg IntraVENous Q6H PRN  
 naloxone (NARCAN) injection 0.4 mg  0.4 mg IntraVENous PRN  
 ondansetron (ZOFRAN) injection 6 mg  6 mg IntraVENous Q6H PRN Allergies Allergen Reactions  Iodinated Contrast Media Swelling  Lisinopril Swelling Review of Systems: 
Pertinent items are noted in the History of Present Illness. Objective:  
 
Visit Vitals /68 (BP 1 Location: Right arm, BP Patient Position: At rest) Pulse (!) 109 Temp 98.3 °F (36.8 °C) Resp 18 Ht 5' 7\" (1.702 m) Wt 77.4 kg (170 lb 11.2 oz) SpO2 91% BMI 26.74 kg/m² Physical Exam:   
 
General:  in no apparent distress, alert, oriented times 3, afebrile, normal vitals and cooperative Eyes:  conjunctivae and sclerae normal, pupils equal, round, reactive to light Throat & Neck: no erythema or exudates noted and neck supple and symmetrical; no palpable masses Lungs:   clear to auscultation bilaterally Heart:  Regular rate and rhythm Abdomen:   flat, soft, nontender, nondistended, no masses or organomegaly. No evidence of abdominal wall hernias Extremities: extremities normal, atraumatic, no cyanosis or edema Skin: Normal.  
   
 
Imaging and Lab Review: CBC:  
Lab Results Component Value Date/Time WBC 5.9 07/05/2020 03:15 AM  
 RBC 4.08 (L) 07/05/2020 03:15 AM  
 HGB 13.7 07/05/2020 03:15 AM  
 HCT 40.7 07/05/2020 03:15 AM  
 PLATELET 792 52/29/8314 03:15 AM  
 
BMP:  
Lab Results Component Value Date/Time  Glucose 131 (H) 07/05/2020 03:15 AM  
 Sodium 139 07/05/2020 03:15 AM  
 Potassium 3.9 07/05/2020 03:15 AM  
 Chloride 105 07/05/2020 03:15 AM  
 CO2 27 07/05/2020 03:15 AM  
 BUN 80 (H) 07/05/2020 03:15 AM  
 Creatinine 3.38 (H) 07/05/2020 03:15 AM  
 Calcium 9.1 07/05/2020 03:15 AM  
 
CMP: 
Lab Results Component Value Date/Time Glucose 131 (H) 07/05/2020 03:15 AM  
 Sodium 139 07/05/2020 03:15 AM  
 Potassium 3.9 07/05/2020 03:15 AM  
 Chloride 105 07/05/2020 03:15 AM  
 CO2 27 07/05/2020 03:15 AM  
 BUN 80 (H) 07/05/2020 03:15 AM  
 Creatinine 3.38 (H) 07/05/2020 03:15 AM  
 Calcium 9.1 07/05/2020 03:15 AM  
 Anion gap 7 07/05/2020 03:15 AM  
 BUN/Creatinine ratio 24 (H) 07/05/2020 03:15 AM  
 Alk. phosphatase 78 07/03/2020 09:36 AM  
 Protein, total 9.0 (H) 07/03/2020 09:36 AM  
 Albumin 4.3 07/03/2020 09:36 AM  
 Globulin 4.7 (H) 07/03/2020 09:36 AM  
 A-G Ratio 0.9 07/03/2020 09:36 AM  
 
 
Recent Results (from the past 24 hour(s)) GLUCOSE, POC Collection Time: 07/04/20  8:00 AM  
Result Value Ref Range Glucose (POC) 168 (H) 70 - 110 mg/dL GLUCOSE, POC Collection Time: 07/04/20 11:40 AM  
Result Value Ref Range Glucose (POC) 150 (H) 70 - 110 mg/dL GLUCOSE, POC Collection Time: 07/04/20  5:30 PM  
Result Value Ref Range Glucose (POC) 154 (H) 70 - 110 mg/dL GLUCOSE, POC Collection Time: 07/04/20 11:04 PM  
Result Value Ref Range Glucose (POC) 127 (H) 70 - 110 mg/dL CBC WITH AUTOMATED DIFF Collection Time: 07/05/20  3:15 AM  
Result Value Ref Range WBC 5.9 4.6 - 13.2 K/uL  
 RBC 4.08 (L) 4.70 - 5.50 M/uL  
 HGB 13.7 13.0 - 16.0 g/dL HCT 40.7 36.0 - 48.0 % MCV 99.8 (H) 74.0 - 97.0 FL  
 MCH 33.6 24.0 - 34.0 PG  
 MCHC 33.7 31.0 - 37.0 g/dL  
 RDW 12.0 11.6 - 14.5 % PLATELET 452 137 - 561 K/uL MPV 11.3 9.2 - 11.8 FL  
 NEUTROPHILS 67 40 - 73 % LYMPHOCYTES 13 (L) 21 - 52 % MONOCYTES 20 (H) 3 - 10 % EOSINOPHILS 0 0 - 5 % BASOPHILS 0 0 - 2 %  
 ABS. NEUTROPHILS 3.9 1.8 - 8.0 K/UL  
 ABS. LYMPHOCYTES 0.8 (L) 0.9 - 3.6 K/UL  
 ABS. MONOCYTES 1.2 0.05 - 1.2 K/UL  
 ABS. EOSINOPHILS 0.0 0.0 - 0.4 K/UL ABS. BASOPHILS 0.0 0.0 - 0.1 K/UL  
 DF AUTOMATED METABOLIC PANEL, BASIC Collection Time: 07/05/20  3:15 AM  
Result Value Ref Range Sodium 139 136 - 145 mmol/L Potassium 3.9 3.5 - 5.5 mmol/L Chloride 105 100 - 111 mmol/L  
 CO2 27 21 - 32 mmol/L Anion gap 7 3.0 - 18 mmol/L Glucose 131 (H) 74 - 99 mg/dL BUN 80 (H) 7.0 - 18 MG/DL Creatinine 3.38 (H) 0.6 - 1.3 MG/DL  
 BUN/Creatinine ratio 24 (H) 12 - 20 GFR est AA 21 (L) >60 ml/min/1.73m2 GFR est non-AA 17 (L) >60 ml/min/1.73m2 Calcium 9.1 8.5 - 10.1 MG/DL  
GLUCOSE, POC Collection Time: 07/05/20  6:11 AM  
Result Value Ref Range Glucose (POC) 138 (H) 70 - 110 mg/dL  
 
 
images and reports reviewed Assessment:  
Balaji Jansen is a 80 y.o. male is presenting with abdominal pain and a picture of bowel obstruction. For now the patient is doing well with medical therapy with the NG tube and the n.p.o. however his NG tube output is very high and most likely the patient will need surgical evaluation. Also the patient may need some nutritional support so I think may be a PICC line and TPN should be started because I am planning to take him for surgery in the next 1 to 2 days if he does not improve, which based on his NG tube output and his CT scan finding he most likely will not improve but I would like to give him full course of medical therapy especially that he is doing relatively well with no abdominal pain and normal vital signs and no leukocytosis. Plan:  
 
Appreciate medicine admission and management NPO 
IV fluids NG tube to suction Follow-up nephrology recommendation Consider PICC line and starting TPN since his been not eating well for a week and he may be n.p.o. for 3 to 4 days after the surgery We will follow closely Please call me if you have any questions (cell phone: [phone]) Signed By: Ramsey Sears MD   
 July 5, 2020

## 2020-07-06 NOTE — PROGRESS NOTES
Discharge/Transition Planning Problem: Discharge Planning Goal: *Discharge to safe environment Outcome: Progressing Towards Goal 
 
0815:Pt plan was home. Was using walker prior to admission and CM recommends PT/OT. 0930: Spoke with pt about SNF rehab and he declined. Discussed with pt that joint stiffness and muscle mass decrease every day in bed and to work with nurses and staff on what to do in bed if unable to get up yet. Pt states he was using his \"stick\" to mobilize prior but has walker Estrellita Andrews RN [employer]  # [phone] Pager # [phone]

## 2020-07-06 NOTE — PROGRESS NOTES
NUTRITION INITIAL ASSESSMENT/PLAN OF CARE   
 
RECOMMENDATIONS:  
1. NPO with NGT to LCS; continue IVF 2. Monitor labs, weight and plan of care for nutrition 3. Noted general surgery recommendations for TPN; Will need to have a PICC line placed as well as a TPN consult GOALS:  
1. Nutrition support or adequate PO intake to meet estimated nutritional needs by 7/9 
 
 
ASSESSMENT:  
Wt status is classified as overweight per Body mass index is 27.21 kg/m². Currently NPO with NGT to LCS and not meeting nutritional needs x ~1 week. Labs noted. A1c (7.4%). Nutrition recommendations listed. RD to follow. Nutrition Diagnoses:  
Inadequate energy intake related to altered GI function 2/2 SBO as evidenced by need for NPO with NGT to LCS and poor PO intake x ~1 week. SUBJECTIVE/OBJECTIVE:  
 Pt admitted for SBO. Noted Pt with N/V/ABD pain x 3 days PTA. GS consulted: plan continue NPO with NGT to LCS and IVF. However, noted today Pt still with high NGT output and no flatus or BM so possible surgery tomorrow with recommendations for starting TPN. Informed RD that handles TPN; Johann Alcocer. Pt seen in room this afternoon with NGT in place to suction. Denies any N/V but still c/o ABD pain and no flatus or BM today. NKFA or problems chewing/swallowing. Reports usually having 2 meals per day consisting of fish, vegetables and a small portion of rice. Stated UBW ~175 lb and per bed scale during visit 174.2 lb with extra items noted. Had d/w RN, Mary, informed that Pt will need a PICC line placed and TPN consult. Will monitor. Information Obtained from:  
 [x] Chart Review [x] Patient 
 [] Family/Caregiver [x] Nurse/Physician 
 [] Interdisciplinary Meeting/Rounds Diet: NPO with NGT LCS Medications: [x] Reviewed IVF: 1/2 NS with KCl 20 mEq/L @75 mL/hr  
Lantus, Humalog Allergies: [x] Reviewed Encounter Diagnoses ICD-10-CM ICD-9-CM 1.  Abdominal pain, generalized R10.84 789.07  
 2. Non-intractable vomiting with nausea, unspecified vomiting type R11.2 787.01  
3. Intussusception of intestine (HCC) K56.1 560.0 4. History of prostate cancer Z85.46 V10.46  
5. Acute kidney injury ([facility] N17.9 584.9 6. SBO (small bowel obstruction) ([facility] K56.609 560.9 Past Medical History:  
Diagnosis Date  Diabetes ([facility]  Hypertension Labs:   
Lab Results Component Value Date/Time Sodium 141 07/06/2020 04:20 AM  
 Potassium 3.4 (L) 07/06/2020 04:20 AM  
 Chloride 106 07/06/2020 04:20 AM  
 CO2 28 07/06/2020 04:20 AM  
 Anion gap 7 07/06/2020 04:20 AM  
 Glucose 169 (H) 07/06/2020 04:20 AM  
 BUN 68 (H) 07/06/2020 04:20 AM  
 Creatinine 1.98 (H) 07/06/2020 04:20 AM  
 Calcium 9.0 07/06/2020 04:20 AM  
 Albumin 4.3 07/03/2020 09:36 AM  
 
Lab Results Component Value Date/Time  (H) 07/06/2020 04:20 AM  
 GLUCPOC 178 (H) 07/06/2020 11:31 AM  
 GLUCPOC 176 (H) 07/06/2020 05:45 AM  
 GLUCPOC 148 (H) 07/06/2020 12:41 AM  
 
No results found for: CHOL, CHOLPOCT, CHOLX, CHLST, CHOLV, HDL, HDLPOC, HDLP, LDL, LDLCPOC, LDLC, DLDLP, VLDLC, VLDL, TGLX, TRIGL, TRIGP, TGLPOCT, CHHD, CHHDX Anthropometrics: BMI (calculated): 27.2 Last 3 Recorded Weights in this Encounter 07/03/20 1010 07/04/20 2336 07/06/20 0009 Weight: 79.4 kg (175 lb) 77.4 kg (170 lb 11.2 oz) 78.8 kg (173 lb 11.2 oz) Ht Readings from Last 1 Encounters:  
07/03/20 5' 7\" (1.702 m) Documented Weight History: 
Weight Metrics 7/6/2020 1/31/2019 Weight 173 lb 11.2 oz 181 lb 9 oz BMI 27.21 kg/m2 28.44 kg/m2 No data found. IBW: 148 lb %IBW: 117% UBW: 181 lb in Jan 2019 but 175 lb recently per Pt [x] Weight Loss [] Weight Gain [] Weight Stable Estimated Nutrition Needs: [x] MSJ x 1.2-1.3 [] Other: 
Calories: 0098-6472 kcal Based on:   [x] Actual BW   
Protein:   79-94 g Based on:   [x] Actual BW x 1.0-1.2 gm/kg Fluid:       1 mL/kcal 
 
 [x] No Cultural, Mormon or ethnic dietary need identified. [] Cultural, Mormon and ethnic food preferences identified and addressed Wt Status:  [] Normal (18.6 - 24.9) [] Underweight (<18.5) [x] Overweight (25 - 29.9) [] Mild Obesity (30 - 34.9)  [] Moderate Obesity (35 - 39.9) [] Morbid Obesity (40+) Nutrition Problems Identified:  
[x] Suboptimal PO intake v/s NPO with NGT to LCS [] Food Allergies [] Difficulty chewing/swallowing/poor dentition 
[x] Constipation/Diarrhea [x] Nausea/Vomiting/ABD Pain x 3 days PTA [] None 
[x] Other: Altered GI function 2/2 SBO Plan:  
[] Therapeutic Diet 
[]  Obtained/adjusted food preferences/tolerances and/or snacks options  
[]  Supplements added  
[] Occupational therapy following for feeding techniques []  HS snack added  
[]  Modify diet texture  
[]  Modify diet for food allergies []  Educate patient  
[]  Assist with menu selection []  Monitor PO intake on meal rounds  
[x]  Continue inpatient monitoring and intervention  
[x]  Participated in discharge planning/Interdisciplinary rounds/Team meetings  
[x]  Other: To initiate TPN once PICC line placed Education Needs: 
 [x] Not appropriate for teaching at this time 
 [] Identified and addressed Nutrition Monitoring and Evaluation: 
[x] Continue ongoing monitoring and intervention 
[] Other Theotis Gowers

## 2020-07-06 NOTE — PROGRESS NOTES
Problem: Discharge Planning Goal: *Discharge to safe environment Outcome: Progressing Towards Goal 
  
Problem: Pain Goal: *Control of Pain Outcome: Progressing Towards Goal 
  
Problem: Small Bowel Obstruction: Day 1 Goal: Off Pathway (Use only if patient is Off Pathway) Outcome: Progressing Towards Goal 
Goal: Activity/Safety Outcome: Progressing Towards Goal 
Goal: Consults, if ordered Outcome: Progressing Towards Goal 
Goal: Diagnostic Test/Procedures Outcome: Progressing Towards Goal 
Goal: Nutrition/Diet Outcome: Progressing Towards Goal 
Goal: Medications Outcome: Progressing Towards Goal 
Goal: Respiratory Outcome: Progressing Towards Goal 
Goal: Treatments/Interventions/Procedures Outcome: Progressing Towards Goal 
Goal: Psychosocial 
Outcome: Progressing Towards Goal 
Goal: *Optimal pain control at patient's stated goal 
Outcome: Progressing Towards Goal 
Goal: *Adequate urinary output (equal to or greater than 30 milliliters/hour) Outcome: Progressing Towards Goal 
Goal: *Hemodynamically stable Outcome: Progressing Towards Goal 
Goal: *Demonstrates progressive activity Outcome: Progressing Towards Goal 
Goal: *Absence of nausea/vomiting Outcome: Progressing Towards Goal 
  
Problem: Small Bowel Obstruction: Day 2 Goal: Off Pathway (Use only if patient is Off Pathway) Outcome: Progressing Towards Goal 
Goal: Activity/Safety Outcome: Progressing Towards Goal 
Goal: Consults, if ordered Outcome: Progressing Towards Goal 
Goal: Diagnostic Test/Procedures Outcome: Progressing Towards Goal 
Goal: Nutrition/Diet Outcome: Progressing Towards Goal 
Goal: Discharge Planning Outcome: Progressing Towards Goal 
Goal: Medications Outcome: Progressing Towards Goal 
Goal: Respiratory Outcome: Progressing Towards Goal 
Goal: Treatments/Interventions/Procedures Outcome: Progressing Towards Goal 
Goal: Psychosocial 
Outcome: Progressing Towards Goal 
 Goal: *Optimal pain control at patient's stated goal 
Outcome: Progressing Towards Goal 
Goal: *Adequate urinary output (equal to or greater than 30 milliliters/hour) Outcome: Progressing Towards Goal 
Goal: *Hemodynamically stable Outcome: Progressing Towards Goal 
Goal: *Demonstrates progressive activity Outcome: Progressing Towards Goal 
Goal: *Absence of nausea/vomiting Outcome: Progressing Towards Goal 
Goal: *Return of normal bowel function Outcome: Progressing Towards Goal 
  
Problem: Small Bowel Obstruction: Day 3 Goal: Off Pathway (Use only if patient is Off Pathway) Outcome: Progressing Towards Goal 
Goal: Activity/Safety Outcome: Progressing Towards Goal 
Goal: Consults, if ordered Outcome: Progressing Towards Goal 
Goal: Diagnostic Test/Procedures Outcome: Progressing Towards Goal 
Goal: Nutrition/Diet Outcome: Progressing Towards Goal 
Goal: Discharge Planning Outcome: Progressing Towards Goal 
Goal: Medications Outcome: Progressing Towards Goal 
Goal: Respiratory Outcome: Progressing Towards Goal 
Goal: Treatments/Interventions/Procedures Outcome: Progressing Towards Goal 
Goal: Psychosocial 
Outcome: Progressing Towards Goal 
Goal: *Optimal pain control at patient's stated goal 
Outcome: Progressing Towards Goal 
Goal: *Adequate urinary output (equal to or greater than 30 milliliters/hour) Outcome: Progressing Towards Goal 
Goal: *Hemodynamically stable Outcome: Progressing Towards Goal 
Goal: *Adequate nutrition Outcome: Progressing Towards Goal 
Goal: *Demonstrates progressive activity Outcome: Progressing Towards Goal 
Goal: *Participates in discharge planning Outcome: Progressing Towards Goal 
  
Problem: Small Bowel Obstruction: Day 4 to Discharge Goal: Off Pathway (Use only if patient is Off Pathway) Outcome: Progressing Towards Goal 
Goal: Activity/Safety Outcome: Progressing Towards Goal 
Goal: Nutrition/Diet Outcome: Progressing Towards Goal 
Goal: Discharge Planning Outcome: Progressing Towards Goal 
Goal: Medications Outcome: Progressing Towards Goal 
Goal: Respiratory Outcome: Progressing Towards Goal 
Goal: Treatments/Interventions/Procedures Outcome: Progressing Towards Goal 
Goal: Psychosocial 
Outcome: Progressing Towards Goal 
  
Problem: Small Bowel Obstruction - Non Surgical: Discharge Outcomes Goal: *Hemodynamically stable Outcome: Progressing Towards Goal 
Goal: *Demonstrates independent activity or return to baseline Outcome: Progressing Towards Goal 
Goal: *Optimal pain control at patient's stated goal 
Outcome: Progressing Towards Goal 
Goal: *Verbalizes understanding and describes prescribed diet Outcome: Progressing Towards Goal 
Goal: *Tolerating diet Outcome: Progressing Towards Goal 
Goal: *Verbalizes name, dosage, time, side effects, and number of days to continue medications Outcome: Progressing Towards Goal 
Goal: *Anxiety reduced or absent Outcome: Progressing Towards Goal 
Goal: *Understands and describes signs and symptoms to report to providers(Stroke Metric) Outcome: Progressing Towards Goal 
Goal: *Describes follow-up/return visits to physicians Outcome: Progressing Towards Goal 
Goal: *Describes available resources and support systems Outcome: Progressing Towards Goal 
Goal: *Active bowel function Outcome: Progressing Towards Goal 
  
Problem: Falls - Risk of 
Goal: *Absence of Falls Description: Document Poppy Paulino Fall Risk and appropriate interventions in the flowsheet. Outcome: Progressing Towards Goal 
Note: Fall Risk Interventions: 
Mobility Interventions: Patient to call before getting OOB Medication Interventions: Bed/chair exit alarm Elimination Interventions: Call light in reach History of Falls Interventions: Door open when patient unattended

## 2020-07-06 NOTE — PROGRESS NOTES
Internal Medicine Progress Note Patient's Name: Federico Zepeda Admit Date: 7/3/2020 Length of Stay: 3 Assessment/Plan Active Hospital Problems Diagnosis Date Noted  Diabetes ([facility] [address] 07/03/2020  
 SBO (small bowel obstruction) ([facility] [address] 07/03/2020  
 HTN (hypertension) 07/03/2020  MARCI (acute kidney injury) ([facility] 07/03/2020  Elevated troponin 07/03/2020  
 
- Cont NPO 
- Cont NGT to suction - Nutrition consult for TPN 
- Plan for surgery seth - Cr improving - Appreciate nephro - Cont acceptable home medications for chronic conditions  
- DVT protocol I have personally reviewed all pertinent labs and films that have officially resulted over the last 24 hours. I have personally checked for all pending labs that are awaiting final results. Subjective Pt s/e @ bedside No major events overnight Still c/o abd pain Denies CP or SOB Objective Visit Vitals /76 (BP 1 Location: Right arm, BP Patient Position: At rest) Pulse 95 Temp 98 °F (36.7 °C) Resp 16 Ht 5' 7\" (1.702 m) Wt 78.8 kg (173 lb 11.2 oz) SpO2 98% BMI 27.21 kg/m² Physical Exam: 
General Appearance: NAD, conversant Lungs: CTA with normal respiratory effort CV: RRR, no m/r/g Abdomen: soft, TTP diffusely, normal bowel sounds Extremities: no cyanosis, no peripheral edema Neuro: No focal deficits, motor/sensory intact Lab/Data Reviewed: 
BMP:  
Lab Results Component Value Date/Time  07/06/2020 04:20 AM  
 K 3.4 (L) 07/06/2020 04:20 AM  
  07/06/2020 04:20 AM  
 CO2 28 07/06/2020 04:20 AM  
 AGAP 7 07/06/2020 04:20 AM  
  (H) 07/06/2020 04:20 AM  
 BUN 68 (H) 07/06/2020 04:20 AM  
 CREA 1.98 (H) 07/06/2020 04:20 AM  
 GFRAA 39 (L) 07/06/2020 04:20 AM  
 GFRNA 32 (L) 07/06/2020 04:20 AM  
 
CBC:  
Lab Results Component Value Date/Time  WBC 8.7 07/06/2020 04:20 AM  
 HGB 13.7 07/06/2020 04:20 AM  
 HCT 40.7 07/06/2020 04:20 AM  
  07/06/2020 04:20 AM  
 
 
Imaging Reviewed: 
No results found. Medications Reviewed: 
Current Facility-Administered Medications Medication Dose Route Frequency  0.45% sodium chloride with KCl 20 mEq/L infusion   IntraVENous CONTINUOUS  
 insulin lispro (HUMALOG) injection   SubCUTAneous Q6H  
 glucose chewable tablet 16 g  4 Tab Oral PRN  
 glucagon (GLUCAGEN) injection 1 mg  1 mg IntraMUSCular PRN  
 dextrose 10% infusion 125-250 mL  125-250 mL IntraVENous PRN  
 morphine injection 1 mg  1 mg IntraVENous Q4H PRN  
 insulin glargine (LANTUS) injection 5 Units  5 Units SubCUTAneous QHS  hydrALAZINE (APRESOLINE) 20 mg/mL injection 10 mg  10 mg IntraVENous Q6H PRN  
 naloxone (NARCAN) injection 0.4 mg  0.4 mg IntraVENous PRN  
 ondansetron (ZOFRAN) injection 6 mg  6 mg IntraVENous Q6H PRN Mary Reyes DO Internal Medicine, Hospitalist 
Pager: [phone] Located within [facility]

## 2020-07-06 NOTE — PROGRESS NOTES
Bedside shift change report given to SANDRO Hernandez (oncoming nurse) by Brenda Mitchell RN (offgoing nurse). Report included the following information SBAR, Kardex, Intake/Output, MAR and Recent Results. 
   
0911 --No  AM medications given, will continue to monitor. Morphine given, well tolerated. Consent signed for procedure tomorrow and in chart. 1000 -- Reassessment completed, no change in patient condition, will continue to monitor. 
   
1121 -- Reassessment completed, no change in patient condition, will continue to monitor. [address] -- Spoke with the patient's wife, Ozzy Burton and gave updates. 1430 -- Covid test collected and sent down to lab. 
 
1505 -- Reassessment completed, no change in patient condition, will continue to monitor. 1800 -- Blood type and cross collected and sent to lab. Bedside shift change report given to Brenda Mitchell RN (oncoming nurse) by Alexis Gonzales RN (offgoing nurse). Report included the following information SBAR, Kardex, Intake/Output, MAR and Recent Results.

## 2020-07-06 NOTE — PROGRESS NOTES
General Surgery Consult Regina Rosa  Admit date: 7/3/2020 MRN: [id number]     : 1935     Age: 80 y.o. Attending Physician: Sania Silva MD, FACS Subjective:  
 
Regina Rosa is a 80 y.o. male who I am following for a picture of small bowel obstruction. There are no major issues overnight but for the last 24 hours the patient had a couple times mild tachycardia. He stated that he had some mild abdominal pain that comes and goes and crampy-like but did not change from before. He has no fever and he did not pass any flatus or bowel movement. His NG tube output over the last 24 hours is 1.7 L. It is dark and bilious. His WBC is normal and his creatinine has improved remarkably today. It is 2 from 3.4. He has a [place] catheter and is making good urine output. Patient Active Problem List  
 Diagnosis Date Noted  Diabetes ([facility] [address] 2020  
 SBO (small bowel obstruction) ([facility] [address] 2020  
 HTN (hypertension) 2020  MARCI (acute kidney injury) ([facility] [address] 2020  Elevated troponin 2020 Past Medical History:  
Diagnosis Date  [facility]  Hypertension History reviewed. No pertinent surgical history. Social History Tobacco Use  Smoking status: Never Smoker  Smokeless tobacco: Never Used Substance Use Topics  Alcohol use: Not on file Social History Tobacco Use Smoking Status Never Smoker Smokeless Tobacco Never Used History reviewed. No pertinent family history. Current Facility-Administered Medications Medication Dose Route Frequency  0.9% sodium chloride infusion  100 mL/hr IntraVENous CONTINUOUS  
 insulin lispro (HUMALOG) injection   SubCUTAneous Q6H  
 glucose chewable tablet 16 g  4 Tab Oral PRN  
 glucagon (GLUCAGEN) injection 1 mg  1 mg IntraMUSCular PRN  
 dextrose 10% infusion 125-250 mL  125-250 mL IntraVENous PRN  
 morphine injection 1 mg  1 mg IntraVENous Q4H PRN  
  insulin glargine (LANTUS) injection 5 Units  5 Units SubCUTAneous QHS  hydrALAZINE (APRESOLINE) 20 mg/mL injection 10 mg  10 mg IntraVENous Q6H PRN  
 naloxone (NARCAN) injection 0.4 mg  0.4 mg IntraVENous PRN  
 ondansetron (ZOFRAN) injection 6 mg  6 mg IntraVENous Q6H PRN Allergies Allergen Reactions  Iodinated Contrast Media Swelling  Lisinopril Swelling Review of Systems: 
Pertinent items are noted in the History of Present Illness. Objective:  
 
Visit Vitals /81 Pulse (!) 102 Temp 98.1 °F (36.7 °C) Resp 18 Ht 5' 7\" (1.702 m) Wt 78.8 kg (173 lb 11.2 oz) SpO2 97% BMI 27.21 kg/m² Physical Exam:   
 
General:  in no apparent distress, alert, oriented times 3, afebrile, normal vitals and cooperative Eyes:  conjunctivae and sclerae normal, pupils equal, round, reactive to light Throat & Neck: no erythema or exudates noted and neck supple and symmetrical; no palpable masses Lungs:   clear to auscultation bilaterally Heart:  Regular rate and rhythm Abdomen:   flat, soft, nontender, nondistended, no masses or organomegaly. Extremities: extremities normal, atraumatic, no cyanosis or edema Skin: Normal.  
   
 
Imaging and Lab Review: CBC:  
Lab Results Component Value Date/Time WBC 8.7 07/06/2020 04:20 AM  
 RBC 4.13 (L) 07/06/2020 04:20 AM  
 HGB 13.7 07/06/2020 04:20 AM  
 HCT 40.7 07/06/2020 04:20 AM  
 PLATELET 722 65/70/9677 04:20 AM  
 
BMP:  
Lab Results Component Value Date/Time Glucose 169 (H) 07/06/2020 04:20 AM  
 Sodium 141 07/06/2020 04:20 AM  
 Potassium 3.4 (L) 07/06/2020 04:20 AM  
 Chloride 106 07/06/2020 04:20 AM  
 CO2 28 07/06/2020 04:20 AM  
 BUN 68 (H) 07/06/2020 04:20 AM  
 Creatinine 1.98 (H) 07/06/2020 04:20 AM  
 Calcium 9.0 07/06/2020 04:20 AM  
 
CMP: 
Lab Results Component Value Date/Time  Glucose 169 (H) 07/06/2020 04:20 AM  
 Sodium 141 07/06/2020 04:20 AM  
 Potassium 3.4 (L) 07/06/2020 04:20 AM  
 Chloride 106 07/06/2020 04:20 AM  
 CO2 28 07/06/2020 04:20 AM  
 BUN 68 (H) 07/06/2020 04:20 AM  
 Creatinine 1.98 (H) 07/06/2020 04:20 AM  
 Calcium 9.0 07/06/2020 04:20 AM  
 Anion gap 7 07/06/2020 04:20 AM  
 BUN/Creatinine ratio 34 (H) 07/06/2020 04:20 AM  
 Alk. phosphatase 78 07/03/2020 09:36 AM  
 Protein, total 9.0 (H) 07/03/2020 09:36 AM  
 Albumin 4.3 07/03/2020 09:36 AM  
 Globulin 4.7 (H) 07/03/2020 09:36 AM  
 A-G Ratio 0.9 07/03/2020 09:36 AM  
 
 
Recent Results (from the past 24 hour(s)) GLUCOSE, POC Collection Time: 07/05/20 11:58 AM  
Result Value Ref Range Glucose (POC) 145 (H) 70 - 110 mg/dL GLUCOSE, POC Collection Time: 07/05/20  4:58 PM  
Result Value Ref Range Glucose (POC) 153 (H) 70 - 110 mg/dL GLUCOSE, POC Collection Time: 07/05/20  9:42 PM  
Result Value Ref Range Glucose (POC) 151 (H) 70 - 110 mg/dL GLUCOSE, POC Collection Time: 07/06/20 12:41 AM  
Result Value Ref Range Glucose (POC) 148 (H) 70 - 110 mg/dL CBC WITH AUTOMATED DIFF Collection Time: 07/06/20  4:20 AM  
Result Value Ref Range WBC 8.7 4.6 - 13.2 K/uL  
 RBC 4.13 (L) 4.70 - 5.50 M/uL  
 HGB 13.7 13.0 - 16.0 g/dL HCT 40.7 36.0 - 48.0 % MCV 98.5 (H) 74.0 - 97.0 FL  
 MCH 33.2 24.0 - 34.0 PG  
 MCHC 33.7 31.0 - 37.0 g/dL  
 RDW 11.7 11.6 - 14.5 % PLATELET 449 075 - 527 K/uL MPV 11.2 9.2 - 11.8 FL  
 NEUTROPHILS 75 (H) 40 - 73 % LYMPHOCYTES 8 (L) 21 - 52 % MONOCYTES 17 (H) 3 - 10 % EOSINOPHILS 0 0 - 5 % BASOPHILS 0 0 - 2 %  
 ABS. NEUTROPHILS 6.6 1.8 - 8.0 K/UL  
 ABS. LYMPHOCYTES 0.7 (L) 0.9 - 3.6 K/UL  
 ABS. MONOCYTES 1.5 (H) 0.05 - 1.2 K/UL  
 ABS. EOSINOPHILS 0.0 0.0 - 0.4 K/UL  
 ABS. BASOPHILS 0.0 0.0 - 0.1 K/UL  
 DF AUTOMATED METABOLIC PANEL, BASIC Collection Time: 07/06/20  4:20 AM  
Result Value Ref Range Sodium 141 136 - 145 mmol/L Potassium 3.4 (L) 3.5 - 5.5 mmol/L  Chloride 106 100 - 111 mmol/L  
 CO2 28 21 - 32 mmol/L  
 Anion gap 7 3.0 - 18 mmol/L Glucose 169 (H) 74 - 99 mg/dL BUN 68 (H) 7.0 - 18 MG/DL Creatinine 1.98 (H) 0.6 - 1.3 MG/DL  
 BUN/Creatinine ratio 34 (H) 12 - 20 GFR est AA 39 (L) >60 ml/min/1.73m2 GFR est non-AA 32 (L) >60 ml/min/1.73m2 Calcium 9.0 8.5 - 10.1 MG/DL  
GLUCOSE, POC Collection Time: 07/06/20  5:45 AM  
Result Value Ref Range Glucose (POC) 176 (H) 70 - 110 mg/dL  
 
 
images and reports reviewed Assessment:  
Leonard Flores is a 80 y.o. male is presenting with abdominal pain and a picture of bowel obstruction. He also had acute renal injury which is improving with IV hydration and management. Unfortunately he still having picture of bowel obstruction. Though the patient has no significant abdominal pain and his abdomen is soft and nontender and his vitals are relatively normal and he has no leukocytosis, but his NG tube output is very high and he did not pass any flatus yet and is having on and off abdominal pain that is crampy consistent with his bowel obstruction that still persistent. I was hoping the medical therapy will help in the management but I believe that the patient will need surgical exploration. I will plan to schedule the surgery for tomorrow and meantime the patient's kidney function is getting better and he will be started soon on TPN which I believe he will need it because he has been n.p.o. for a while. Plan:  
 
Appreciate medicine admission and management NPO 
IV fluids NG tube to suction Follow-up nephrology recommendation. Kidney function is getting better. Nutrition consult and possible need for TPN. I am planning for exploratory laparotomy for tomorrow if his kidney function continues to improve and his bowel obstruction does not improve. Please call me if you have any questions (cell phone: [phone]) Signed By: En Calixto MD   
 July 6, 2020

## 2020-07-06 NOTE — PROGRESS NOTES
RENAL PROGRESS NOTE Nadira Salazar Assessment:  
 
  
MARCI: nonoliguric. Creat lower. ATN from GI fluid losses, ARB & Diuretic--improving SBO/Intussesseption: per Surgery. NG suction. On NS. 
HTN: avoid tight control. Holding ARB & Diuretic. Plan: 
Change IVF to 1. 2+KCL Monitor K and replace per renal protocol Continue to hold ARB's until full renal recovery Subjective: 
Patient complaints of Some stomach ache this am  
NGT to wall suction Patient Active Problem List  
Diagnosis Code  Diabetes ([facility] [address] E11.9  
 SBO (small bowel obstruction) ([facility] [address] K56.609  
 HTN (hypertension) I10  
 MARCI (acute kidney injury) ([facility] [address] N17.9  Elevated troponin R79.89 Current Facility-Administered Medications Medication Dose Route Frequency Provider Last Rate Last Dose  
 0.9% sodium chloride infusion  100 mL/hr IntraVENous CONTINUOUS Jose R Weber  mL/hr at 07/06/20 0536 100 mL/hr at 07/06/20 0536  
 insulin lispro (HUMALOG) injection   SubCUTAneous Q6H Linh Birch PA   2 Units at 07/06/20 0557  
 glucose chewable tablet 16 g  4 Tab Oral PRN Linh Birch PA      
 glucagon (GLUCAGEN) injection 1 mg  1 mg IntraMUSCular PRN Linh Birch PA      
 dextrose 10% infusion 125-250 mL  125-250 mL IntraVENous PRN Linh Birch PA      
 morphine injection 1 mg  1 mg IntraVENous Q4H PRN Linh Birch PA   1 mg at 07/04/20 2048  insulin glargine (LANTUS) injection 5 Units  5 Units SubCUTAneous QHS TREY Cervantes   5 Units at 07/05/20 2203  hydrALAZINE (APRESOLINE) 20 mg/mL injection 10 mg  10 mg IntraVENous Q6H PRN Linh Birch PA      
 naloxone [facility]) injection 0.4 mg  0.4 mg IntraVENous PRN Ester Hinojosa MD      
 ondansetron [facility]) injection 6 mg  6 mg IntraVENous Q6H PRN Gildardo Patricia Mendez MD   6 mg at 07/04/20 1112 Objective Vitals:  
 07/05/20 1933 07/06/20 0009 07/06/20 0407 07/06/20 9619 BP: 150/70 168/75 159/71 158/81 Pulse: 99 93 (!) 102 (!) 102 Resp: 18 18 18 18 Temp: 97.9 °F (36.6 °C) 97.5 °F (36.4 °C) 97.7 °F (36.5 °C) 98.1 °F (36.7 °C) SpO2: 92% 93% 94% 97% Weight:  78.8 kg (173 lb 11.2 oz) Height:      
 
 
 
Intake/Output Summary (Last 24 hours) at 7/6/2020 0324 Last data filed at 7/6/2020 8243 Gross per 24 hour Intake 600 ml Output 2150 ml Net -1550 ml Admission weight: Weight: 79.4 kg (175 lb) (07/03/20 0927) Last Weight Metrics: 
Weight Loss Metrics 7/6/2020 1/31/2019 Today's Wt 173 lb 11.2 oz 181 lb 9 oz BMI 27.21 kg/m2 28.44 kg/m2 Physical Assessment:  
 
General: NAD, alert and oriented. Neck: No jvd. LUNGS: Clear to Auscultation, No rales, rhonchi or wheezes. CVS EXM: S1, S2  RRR, no murmurs/gallops/rubs. Abdomen: soft, non tender. Lower Extremities:  no edema. Lab CBC w/Diff Recent Labs  
  07/06/20 
0420 07/05/20 0315 07/04/20 
0221 WBC 8.7 5.9 5.3  
RBC 4.13* 4.08* 4.21* HGB 13.7 13.7 13.8 HCT 40.7 40.7 41.4  188 213 GRANS 75* 67 65 LYMPH 8* 13* 16* EOS 0 0 0 Chemistry Recent Labs  
  07/06/20 
4426 07/05/20 
0315 07/04/20 
0221 07/03/20 
5499 * 131* 149* 314*  139 140 134* K 3.4* 3.9 3.9 4.3  105 103 98* CO2 28 27 28 29 BUN 68* 80* 63* 45* CREA 1.98* 3.38* 3.49* 3.24* CA 9.0 9.1 9.1 9.9 AGAP 7 7 9 7 BUCR 34* 24* 18 14 AP  --   --   --  78  
TP  --   --   --  9.0* ALB  --   --   --  4.3 GLOB  --   --   --  4.7* AGRAT  --   --   --  0.9 No results found for: IRON, FE, TIBC, IBCT, PSAT, FERR Lab Results Component Value Date/Time Calcium 9.0 07/06/2020 04:20 AM  
  
 
Kimber Cruz MD 
[facility] Pager: [phone]

## 2020-07-07 NOTE — PERIOP NOTES
[phone] patient received from OR report received tachycardia noted 1410 abdominal block done by Dr Marjorie Lopez patient tolerated well . [phone] Dr Maritza Mcdonald and Dr Pro Locke made aware patient being tachycardic and febrile, orders received.

## 2020-07-07 NOTE — ANESTHESIA PROCEDURE NOTES
Peripheral Block Start time: 7/7/2020 2:10 PM 
End time: 7/7/2020 2:18 PM 
Performed by: Rohini Morton MD 
Authorized by: Rohini Morton MD  
 
 
Pre-procedure: Indications: at surgeon's request, post-op pain management and procedure for pain Preanesthetic Checklist: patient identified, risks and benefits discussed, site marked, timeout performed, anesthesia consent given and patient being monitored Block Type:  
Block Type:  TAP Laterality:  Left and right Monitoring:  Standard ASA monitoring, continuous pulse ox, frequent vital sign checks, oxygen, responsive to questions and heart rate Injection Technique:  Single shot Procedures: ultrasound guided and nerve stimulator Patient Position: supine Prep: chlorhexidine Location:  Abdominal 
Needle Type:  Stimuplex Needle Gauge:  22 G Needle Localization:  Ultrasound guidance and nerve stimulator Assessment: 
Number of attempts:  1 Injection Assessment:  No intravascular symptoms, negative aspiration for blood, local visualized surrounding nerve on ultrasound, ultrasound image on chart, no paresthesia and incremental injection every 5 mL Patient tolerance:  Patient tolerated the procedure well with no immediate complications 7/7/2020     2:18 PM     Beena Pizarro MD

## 2020-07-07 NOTE — PROGRESS NOTES
Pt being transported to surgery. [facility] notified that pt's PIV appears infiltrated, they are aware and will place new for surgery.

## 2020-07-07 NOTE — PROGRESS NOTES
RENAL PROGRESS NOTE Verlinda Knee Assessment:  
 
  
MARCI: nonoliguric. Creat lower. ATN from GI fluid losses, ARB & Diuretic--improving SBO/Intussesseption: per Surgery. NG suction. On NS. 
HTN: avoid tight control. Holding ARB & Diuretic. Plan: 
Continue 1/2 NS+KCL as long as NGT is hooked to wall suction Monitor K and replace per renal protocol Continue to hold ARB's until full renal recovery Subjective: 
Patient complaints of Some stomach ache this am  
NGT to wall suction Patient Active Problem List  
Diagnosis Code  Diabetes ([facility] [address] E11.9  
 SBO (small bowel obstruction) ([facility] [address] K56.609  
 HTN (hypertension) I10  
 MARCI (acute kidney injury) ([facility] [address] N17.9  Elevated troponin R79.89 Current Facility-Administered Medications Medication Dose Route Frequency Provider Last Rate Last Dose  0.45% sodium chloride with KCl 20 mEq/L infusion   IntraVENous CONTINUOUS Raymundo Manuel MD 75 mL/hr at 07/06/20 2245  insulin lispro (HUMALOG) injection   SubCUTAneous Q6H LoretoglAna estrella PA   Stopped at 07/06/20 1803  
 glucose chewable tablet 16 g  4 Tab Oral PRN ReprogleAna PA      
 glucagon (GLUCAGEN) injection 1 mg  1 mg IntraMUSCular PRN Ana Birch PA      
 dextrose 10% infusion 125-250 mL  125-250 mL IntraVENous PRN LoretoglAna estrella PA      
 morphine injection 1 mg  1 mg IntraVENous Q4H PRN ReproglAna estrella PA   1 mg at 07/06/20 1303  insulin glargine (LANTUS) injection 5 Units  5 Units SubCUTAneous QHS ReprogleAna PA   5 Units at 07/06/20 2245  hydrALAZINE (APRESOLINE) 20 mg/mL injection 10 mg  10 mg IntraVENous Q6H PRN Ana Birch PA      
 naloxone [facility]) injection 0.4 mg  0.4 mg IntraVENous PRN Girish Hernandez MD      
  ondansetron (ZOFRAN) injection 6 mg  6 mg IntraVENous Q6H PRN Doris Stephens MD   6 mg at 07/04/20 1112 Objective Vitals:  
 07/06/20 1550 07/06/20 2044 07/07/20 0333 07/07/20 0800 BP: 144/60 138/61 141/63 (!) 168/93 Pulse: 91 92 92 (!) 118 Resp: 16 16 16 15 Temp: 97.9 °F (36.6 °C) 98 °F (36.7 °C) 98.1 °F (36.7 °C) 98 °F (36.7 °C) SpO2: 97% 98% 99% 100% Weight:    80.7 kg (177 lb 14.6 oz) Height:      
 
 
 
Intake/Output Summary (Last 24 hours) at 7/7/2020 8411 Last data filed at 7/7/2020 9212 Gross per 24 hour Intake  Output 2300 ml Net -2300 ml Admission weight: Weight: 79.4 kg (175 lb) (07/03/20 0927) Last Weight Metrics: 
Weight Loss Metrics 7/7/2020 1/31/2019 Today's Wt 177 lb 14.6 oz 181 lb 9 oz BMI 27.86 kg/m2 28.44 kg/m2 Physical Assessment:  
 
General: NAD, alert and oriented. Neck: No jvd. LUNGS: Clear to Auscultation, No rales, rhonchi or wheezes. CVS EXM: S1, S2  RRR, no murmurs/gallops/rubs. Abdomen: soft, non tender. Lower Extremities:  no edema. Lab CBC w/Diff Recent Labs  
  07/07/20 
0350 07/06/20 
0420 07/05/20 
0315 WBC 10.0 8.7 5.9  
RBC 4.14* 4.13* 4.08* HGB 13.6 13.7 13.7 HCT 40.0 40.7 40.7  210 188 GRANS 78* 75* 67  
LYMPH 13* 8* 13* EOS 0 0 0 Chemistry Recent Labs  
  07/07/20 
0350 07/06/20 
0420 07/05/20 
0315 * 169* 131*  141 139  
K 3.9 3.4* 3.9  106 105 CO2 24 28 27 BUN 49* 68* 80* CREA 1.38* 1.98* 3.38* CA 8.7 9.0 9.1 AGAP 6 7 7 BUCR 36* 34* 24* No results found for: IRON, FE, TIBC, IBCT, PSAT, FERR Lab Results Component Value Date/Time Calcium 8.7 07/07/2020 03:50 AM  
  
 
Lenny Arvizu MD 
[facility] Pager: [phone]

## 2020-07-07 NOTE — ANESTHESIA PREPROCEDURE EVALUATION
Anesthetic History No history of anesthetic complications Review of Systems / Medical History Patient summary reviewed, nursing notes reviewed and pertinent labs reviewed Pulmonary Within defined limits Neuro/Psych Within defined limits Cardiovascular Hypertension: well controlled Exercise tolerance: >4 METS 
  
GI/Hepatic/Renal 
Within defined limits Endo/Other Diabetes Other Findings Physical Exam 
 
Airway Mallampati: II 
TM Distance: 4 - 6 cm Neck ROM: normal range of motion Mouth opening: Normal 
 
 Cardiovascular Rhythm: regular Rate: normal 
 
 
 
 Dental 
No notable dental hx Pulmonary Breath sounds clear to auscultation Abdominal 
Abdominal exam normal 
 
 
 Other Findings Anesthetic Plan ASA: 3 Anesthesia type: general 
 
 
 
 
Induction: Intravenous Anesthetic plan and risks discussed with: Patient

## 2020-07-07 NOTE — PROGRESS NOTES
Problem: Discharge Planning Goal: *Discharge to safe environment 7/7/2020 1928 by Rena Dickson Outcome: Progressing Towards Goal 
7/7/2020 1753 by Rena Dickson Outcome: Progressing Towards Goal 
  
Problem: Pain Goal: *Control of Pain 7/7/2020 1928 by Rena Dickson Outcome: Progressing Towards Goal 
7/7/2020 1753 by Rena Dickson Outcome: Progressing Towards Goal 
  
Problem: Small Bowel Obstruction: Day 1 Goal: Off Pathway (Use only if patient is Off Pathway) 7/7/2020 1928 by Rena Dickson Outcome: Progressing Towards Goal 
7/7/2020 1753 by Rena Dickson Outcome: Progressing Towards Goal 
Goal: Activity/Safety 7/7/2020 1928 by Rena Dickson Outcome: Progressing Towards Goal 
7/7/2020 1753 by Rena Dickson Outcome: Progressing Towards Goal 
Goal: Consults, if ordered 7/7/2020 1928 by Rena Dickson Outcome: Progressing Towards Goal 
7/7/2020 1753 by Rena Dickson Outcome: Progressing Towards Goal 
Goal: Diagnostic Test/Procedures 7/7/2020 1928 by Rena Dickson Outcome: Progressing Towards Goal 
7/7/2020 1753 by Rena Dickson Outcome: Progressing Towards Goal 
Goal: Nutrition/Diet 7/7/2020 1928 by Rena Dickson Outcome: Progressing Towards Goal 
7/7/2020 1753 by Rena Dickson Outcome: Progressing Towards Goal 
Goal: Medications 7/7/2020 1928 by Rena Dickson Outcome: Progressing Towards Goal 
7/7/2020 1753 by Rena Dickson Outcome: Progressing Towards Goal 
Goal: Respiratory 7/7/2020 1928 by Rena Dickson Outcome: Progressing Towards Goal 
7/7/2020 1753 by Rena Dickson Outcome: Progressing Towards Goal 
Goal: Treatments/Interventions/Procedures 7/7/2020 1928 by Rena Dickson Outcome: Progressing Towards Goal 
7/7/2020 1753 by Rena Dickson Outcome: Progressing Towards Goal 
Goal: Psychosocial 
7/7/2020 1928 by Rena Dickson Outcome: Progressing Towards Goal 
7/7/2020 1753 by Rena Dickson Outcome: Progressing Towards Goal 
 Goal: *Optimal pain control at patient's stated goal 
7/7/2020 1928 by Conda Cornelia Outcome: Progressing Towards Goal 
7/7/2020 1753 by Conda Cornelia Outcome: Progressing Towards Goal 
Goal: *Adequate urinary output (equal to or greater than 30 milliliters/hour) 7/7/2020 1928 by Conda Cornelia Outcome: Progressing Towards Goal 
7/7/2020 1753 by Conda Cornelia Outcome: Progressing Towards Goal 
Goal: *Hemodynamically stable 7/7/2020 1928 by Conda Cornelia Outcome: Progressing Towards Goal 
7/7/2020 1753 by Conda Cornelia Outcome: Progressing Towards Goal 
Goal: *Demonstrates progressive activity 7/7/2020 1928 by Conda Cornelia Outcome: Progressing Towards Goal 
7/7/2020 1753 by Conda Cornelia Outcome: Progressing Towards Goal 
Goal: *Absence of nausea/vomiting 7/7/2020 1928 by Conda Cornelia Outcome: Progressing Towards Goal 
7/7/2020 1753 by Conda Cornelia Outcome: Progressing Towards Goal 
  
Problem: Small Bowel Obstruction: Day 2 Goal: Off Pathway (Use only if patient is Off Pathway) 7/7/2020 1928 by Conda Cornelia Outcome: Progressing Towards Goal 
7/7/2020 1753 by Conda Cornelia Outcome: Progressing Towards Goal 
Goal: Activity/Safety 7/7/2020 1928 by Conda Cornelia Outcome: Progressing Towards Goal 
7/7/2020 1753 by Conda Cornelia Outcome: Progressing Towards Goal 
Goal: Consults, if ordered 7/7/2020 1928 by Conda Cornelia Outcome: Progressing Towards Goal 
7/7/2020 1753 by Conda Cornelia Outcome: Progressing Towards Goal 
Goal: Diagnostic Test/Procedures 7/7/2020 1928 by Conda Cornelia Outcome: Progressing Towards Goal 
7/7/2020 1753 by Conda Cornelia Outcome: Progressing Towards Goal 
Goal: Nutrition/Diet 7/7/2020 1928 by Conda Cornelia Outcome: Progressing Towards Goal 
7/7/2020 1753 by Conda Cornelia Outcome: Progressing Towards Goal 
Goal: Discharge Planning 7/7/2020 1928 by Conda Cornelia Outcome: Progressing Towards Goal 
7/7/2020 1753 by Conda Cornelia 
 Outcome: Progressing Towards Goal 
Goal: Medications 7/7/2020 1928 by Sruthi Fryer Outcome: Progressing Towards Goal 
7/7/2020 1753 by Austin Fryer Outcome: Progressing Towards Goal 
Goal: Respiratory 7/7/2020 1928 by Austin Fryer Outcome: Progressing Towards Goal 
7/7/2020 1753 by Austin Fryer Outcome: Progressing Towards Goal 
Goal: Treatments/Interventions/Procedures 7/7/2020 1928 by Austin Fryer Outcome: Progressing Towards Goal 
7/7/2020 1753 by Austin Fryer Outcome: Progressing Towards Goal 
Goal: Psychosocial 
7/7/2020 1928 by Austin Fryer Outcome: Progressing Towards Goal 
7/7/2020 1753 by Sruthi Fryer Outcome: Progressing Towards Goal 
Goal: *Optimal pain control at patient's stated goal 
7/7/2020 1928 by Sruthi Fryer Outcome: Progressing Towards Goal 
7/7/2020 1753 by Austin Fryer Outcome: Progressing Towards Goal 
Goal: *Adequate urinary output (equal to or greater than 30 milliliters/hour) 7/7/2020 1928 by Austin Fryer Outcome: Progressing Towards Goal 
7/7/2020 1753 by Austin Fryer Outcome: Progressing Towards Goal 
Goal: *Hemodynamically stable 7/7/2020 1928 by Austin Fryer Outcome: Progressing Towards Goal 
7/7/2020 1753 by Austin Fryer Outcome: Progressing Towards Goal 
Goal: *Demonstrates progressive activity 7/7/2020 1928 by Sruthi Fryer Outcome: Progressing Towards Goal 
7/7/2020 1753 by Austin Fryer Outcome: Progressing Towards Goal 
Goal: *Absence of nausea/vomiting 7/7/2020 1928 by Austin Fryer Outcome: Progressing Towards Goal 
7/7/2020 1753 by Sruthi Fryer Outcome: Progressing Towards Goal 
Goal: *Return of normal bowel function 7/7/2020 1928 by Austin Fryer Outcome: Progressing Towards Goal 
7/7/2020 1753 by Sruthi Fryer Outcome: Progressing Towards Goal 
  
Problem: Small Bowel Obstruction: Day 3 Goal: Off Pathway (Use only if patient is Off Pathway) 7/7/2020 1928 by Austin Fryer Outcome: Progressing Towards Goal 
 7/7/2020 1753 by Glorietta Mcburney Outcome: Progressing Towards Goal 
Goal: Activity/Safety 7/7/2020 1928 by Glorietta Mcburney Outcome: Progressing Towards Goal 
7/7/2020 1753 by Glorietta Mcburney Outcome: Progressing Towards Goal 
Goal: Consults, if ordered 7/7/2020 1928 by Glorietta Mcburney Outcome: Progressing Towards Goal 
7/7/2020 1753 by Glorietta Mcburney Outcome: Progressing Towards Goal 
Goal: Diagnostic Test/Procedures 7/7/2020 1928 by Glorietta Mcburney Outcome: Progressing Towards Goal 
7/7/2020 1753 by Glorietta Mcburney Outcome: Progressing Towards Goal 
Goal: Nutrition/Diet 7/7/2020 1928 by Glorietta Mcburney Outcome: Progressing Towards Goal 
7/7/2020 1753 by Glorietta Mcburney Outcome: Progressing Towards Goal 
Goal: Discharge Planning 7/7/2020 1928 by Glorietta Mcburney Outcome: Progressing Towards Goal 
7/7/2020 1753 by Glorietta Mcburney Outcome: Progressing Towards Goal 
Goal: Medications 7/7/2020 1928 by Glorietta Mcburney Outcome: Progressing Towards Goal 
7/7/2020 1753 by Glorietta Mcburney Outcome: Progressing Towards Goal 
Goal: Respiratory 7/7/2020 1928 by Glorietta Mcburney Outcome: Progressing Towards Goal 
7/7/2020 1753 by Glorietta Mcburney Outcome: Progressing Towards Goal 
Goal: Treatments/Interventions/Procedures 7/7/2020 1928 by Glorietta Mcburney Outcome: Progressing Towards Goal 
7/7/2020 1753 by Glorietta Mcburney Outcome: Progressing Towards Goal 
Goal: Psychosocial 
7/7/2020 1928 by Glorietta Mcburney Outcome: Progressing Towards Goal 
7/7/2020 1753 by Glorietta Mcburney Outcome: Progressing Towards Goal 
Goal: *Optimal pain control at patient's stated goal 
7/7/2020 1928 by Glorietta Mcburney Outcome: Progressing Towards Goal 
7/7/2020 1753 by Glorietta Mcburney Outcome: Progressing Towards Goal 
Goal: *Adequate urinary output (equal to or greater than 30 milliliters/hour) 7/7/2020 1928 by Glorietta Mcburney Outcome: Progressing Towards Goal 
7/7/2020 1753 by Glorietta Mcburney Outcome: Progressing Towards Goal 
 Goal: *Hemodynamically stable 7/7/2020 1928 by Cherry Arellano Outcome: Progressing Towards Goal 
7/7/2020 1753 by Cherry Arellano Outcome: Progressing Towards Goal 
Goal: *Adequate nutrition 7/7/2020 1928 by Cherry Arellano Outcome: Progressing Towards Goal 
7/7/2020 1753 by Cherry Arellano Outcome: Progressing Towards Goal 
Goal: *Demonstrates progressive activity 7/7/2020 1928 by Cherry Arellano Outcome: Progressing Towards Goal 
7/7/2020 1753 by Cherry Arellano Outcome: Progressing Towards Goal 
Goal: *Participates in discharge planning 7/7/2020 1928 by Cherry Arellano Outcome: Progressing Towards Goal 
7/7/2020 1753 by Cherry Arellano Outcome: Progressing Towards Goal 
  
Problem: Small Bowel Obstruction: Day 4 to Discharge Goal: Off Pathway (Use only if patient is Off Pathway) 7/7/2020 1928 by Cherry Arellano Outcome: Progressing Towards Goal 
7/7/2020 1753 by Cherry Arellano Outcome: Progressing Towards Goal 
Goal: Activity/Safety 7/7/2020 1928 by Cherry Arellano Outcome: Progressing Towards Goal 
7/7/2020 1753 by Cherry Arellano Outcome: Progressing Towards Goal 
Goal: Nutrition/Diet 7/7/2020 1928 by Cherry Arellano Outcome: Progressing Towards Goal 
7/7/2020 1753 by Cherry Arellano Outcome: Progressing Towards Goal 
Goal: Discharge Planning 7/7/2020 1928 by Cherry Arellano Outcome: Progressing Towards Goal 
7/7/2020 1753 by Cherry Arellano Outcome: Progressing Towards Goal 
Goal: Medications 7/7/2020 1928 by Cherry Arellano Outcome: Progressing Towards Goal 
7/7/2020 1753 by Cherry Arellano Outcome: Progressing Towards Goal 
Goal: Respiratory 7/7/2020 1928 by Cherry Arellano Outcome: Progressing Towards Goal 
7/7/2020 1753 by Cherry Arellano Outcome: Progressing Towards Goal 
Goal: Treatments/Interventions/Procedures 7/7/2020 1928 by Cherry Arellano Outcome: Progressing Towards Goal 
7/7/2020 1753 by Cherry Arellano Outcome: Progressing Towards Goal 
Goal: Psychosocial 
 7/7/2020 1928 by Katina Armenta Outcome: Progressing Towards Goal 
7/7/2020 1753 by Katina Armenta Outcome: Progressing Towards Goal 
  
Problem: Small Bowel Obstruction - Non Surgical: Discharge Outcomes Goal: *Hemodynamically stable 7/7/2020 1928 by Katina Armenta Outcome: Progressing Towards Goal 
7/7/2020 1753 by Katina Armenta Outcome: Progressing Towards Goal 
Goal: *Demonstrates independent activity or return to baseline 7/7/2020 1928 by Katina Armenta Outcome: Progressing Towards Goal 
7/7/2020 1753 by Katina Armenta Outcome: Progressing Towards Goal 
Goal: *Optimal pain control at patient's stated goal 
7/7/2020 1928 by Katina Armenta Outcome: Progressing Towards Goal 
7/7/2020 1753 by Katina Armenta Outcome: Progressing Towards Goal 
Goal: *Verbalizes understanding and describes prescribed diet 7/7/2020 1928 by Katina Armenta Outcome: Progressing Towards Goal 
7/7/2020 1753 by Katina Armenta Outcome: Progressing Towards Goal 
Goal: *Tolerating diet 7/7/2020 1928 by Katina Armenta Outcome: Progressing Towards Goal 
7/7/2020 1753 by Katina Armenta Outcome: Progressing Towards Goal 
Goal: *Verbalizes name, dosage, time, side effects, and number of days to continue medications 7/7/2020 1928 by Katina Armenta Outcome: Progressing Towards Goal 
7/7/2020 1753 by Katina Armenta Outcome: Progressing Towards Goal 
Goal: *Anxiety reduced or absent 7/7/2020 1928 by Katina Armenta Outcome: Progressing Towards Goal 
7/7/2020 1753 by Katina Armenta Outcome: Progressing Towards Goal 
Goal: *Understands and describes signs and symptoms to report to providers(Stroke Metric) 7/7/2020 1928 by Katina Armenta Outcome: Progressing Towards Goal 
7/7/2020 1753 by Katina Armenta Outcome: Progressing Towards Goal 
Goal: *Describes follow-up/return visits to physicians 7/7/2020 1928 by Katina Armenta Outcome: Progressing Towards Goal 
7/7/2020 1753 by Katina Armenta Outcome: Progressing Towards Goal 
 Goal: *Describes available resources and support systems 7/7/2020 1928 by Lauren Vu Outcome: Progressing Towards Goal 
7/7/2020 1753 by Lauren Vu Outcome: Progressing Towards Goal 
Goal: *Active bowel function 7/7/2020 1928 by Lauren Vu Outcome: Progressing Towards Goal 
7/7/2020 1753 by Lauren Vu Outcome: Progressing Towards Goal 
  
Problem: Falls - Risk of 
Goal: *Absence of Falls Description: Document Kaitlin Ruts Fall Risk and appropriate interventions in the flowsheet. 7/7/2020 1928 by Lauren Vu Outcome: Progressing Towards Goal 
Note: Fall Risk Interventions: 
Mobility Interventions: Bed/chair exit alarm, Patient to call before getting OOB, Strengthening exercises (ROM-active/passive) Medication Interventions: Bed/chair exit alarm Elimination Interventions: Call light in reach, Bed/chair exit alarm History of Falls Interventions: Door open when patient unattended 7/7/2020 1753 by Lauren Vu Outcome: Progressing Towards Goal 
Note: Fall Risk Interventions: 
Mobility Interventions: Bed/chair exit alarm, Patient to call before getting OOB, Strengthening exercises (ROM-active/passive) Medication Interventions: Bed/chair exit alarm Elimination Interventions: Call light in reach, Bed/chair exit alarm History of Falls Interventions: Door open when patient unattended

## 2020-07-07 NOTE — PROGRESS NOTES
1700: TRANSFER - IN REPORT: 
 
Verbal report received from Gera(name) on Juan A Poe  being received from PACU(unit) for routine post - op Report consisted of patients Situation, Background, Assessment and  
Recommendations(SBAR). Information from the following report(s) SBAR, Kardex, ED Summary, Intake/Output, MAR, Accordion, Recent Results and Quality Measures was reviewed with the receiving nurse. Opportunity for questions and clarification was provided. Assessment completed upon patients arrival to unit and care assumed. Pt alert and oriented x4, no complaints of pain, abdomen is soft, activ bs in all 4 quadrants, dressing is clean, dry and intact. 1715: Daniel Khanna paged for [facility], said post op fevers are normal and didn't want to order any labs. 1900: Daniel Khanna called, Pt not making Bps, new orders given  
 
2000: Az called regarding temp and orders for labs, holding off until second temp taken 2030: Temp 98.8 axillary  
 
2200: Meds given 
 
0000: reassessment completed, no changes noted. NG flushed and checked for residual.  
 
0200: pt resting , no s/s of distress 0400: Reassessment completed, no changes noted 0600: Dr Jaylen Mackenzie at bedside, happy with postop incision  
 
0700: Bedside and Verbal shift change report given to Florence Wilcox (oncoming nurse) by Serge Aburto  (offgoing nurse). Report included the following information SBAR, Kardex, ED Summary, Intake/Output, MAR, Accordion and Quality Measures.

## 2020-07-07 NOTE — PERIOP NOTES
TRANSFER - OUT REPORT: 
 
Verbal report given to torres pires(name) on Federico Zepeda  being transferred to 2600(unit) for routine post - op Report consisted of patients Situation, Background, Assessment and  
Recommendations(SBAR). Information from the following report(s) SBAR, Intake/Output and MAR was reviewed with the receiving nurse. Lines:  
Peripheral IV 07/07/20 Left Foot (Active) Site Assessment Clean, dry, & intact 7/7/2020  2:33 PM  
Phlebitis Assessment 0 7/7/2020  2:33 PM  
Infiltration Assessment 0 7/7/2020  2:33 PM  
Dressing Status Clean, dry, & intact 7/7/2020  2:33 PM  
Dressing Type Transparent;Tape 7/7/2020  2:33 PM  
Hub Color/Line Status Green; Infusing 7/7/2020 11:00 AM  
  
 
Opportunity for questions and clarification was provided. Patient transported with: 
 Monitor 8477 patient transferred to [phone]  In stable condition via bed on monitor. wife updated on status.

## 2020-07-07 NOTE — PROGRESS NOTES
General Surgery Consult Carlene Crockett  Admit date: 7/3/2020 MRN: [id number]     : 1935     Age: 80 y.o. Attending Physician: Celestino Hylton MD, FACS Subjective:  
 
Carlene Crockett is a 80 y.o. male who I am following for SBO. No major issues overnight. He said he is having some abdominal pain that comes and goes. He said he had a very small bowel movement. I asked the nurse and he said it was very small. He denies passing any flatus. His NG tube output is only 100 cc overnight, but he showed me a bag with vomitus. He stated he vomited only once. The bag has about 100 cc. His vitals are normal. His WBC is normal. 
 
Patient Active Problem List  
 Diagnosis Date Noted  Diabetes ([facility] 2020  
 SBO (small bowel obstruction) ([facility] 2020  
 HTN (hypertension) 2020  MARCI (acute kidney injury) ([facility] 2020  Elevated troponin 2020 Past Medical History:  
Diagnosis Date  Diabetes ([facility]  Hypertension History reviewed. No pertinent surgical history. Social History Tobacco Use  Smoking status: Never Smoker  Smokeless tobacco: Never Used Substance Use Topics  Alcohol use: Not on file Social History Tobacco Use Smoking Status Never Smoker Smokeless Tobacco Never Used History reviewed. No pertinent family history. Current Facility-Administered Medications Medication Dose Route Frequency  0.45% sodium chloride with KCl 20 mEq/L infusion   IntraVENous CONTINUOUS  
 insulin lispro (HUMALOG) injection   SubCUTAneous Q6H  
 glucose chewable tablet 16 g  4 Tab Oral PRN  
 glucagon (GLUCAGEN) injection 1 mg  1 mg IntraMUSCular PRN  
 dextrose 10% infusion 125-250 mL  125-250 mL IntraVENous PRN  
 morphine injection 1 mg  1 mg IntraVENous Q4H PRN  
 insulin glargine (LANTUS) injection 5 Units  5 Units SubCUTAneous QHS  hydrALAZINE (APRESOLINE) 20 mg/mL injection 10 mg  10 mg IntraVENous Q6H PRN  
  naloxone (NARCAN) injection 0.4 mg  0.4 mg IntraVENous PRN  
 ondansetron (ZOFRAN) injection 6 mg  6 mg IntraVENous Q6H PRN Allergies Allergen Reactions  Iodinated Contrast Media Swelling  Lisinopril Swelling Review of Systems: 
Pertinent items are noted in the History of Present Illness. Objective:  
 
Visit Vitals /63 Pulse 92 Temp 98.1 °F (36.7 °C) Resp 16 Ht 5' 7\" (1.702 m) Wt 78.8 kg (173 lb 11.2 oz) SpO2 99% BMI 27.21 kg/m² Physical Exam:   
 
General:  in no apparent distress, alert, oriented times 3 and normal vitals Abdomen:   flat, soft but today has some more tenderness than yesterday. No guarding. Imaging and Lab Review: CBC:  
Lab Results Component Value Date/Time WBC 10.0 07/07/2020 03:50 AM  
 RBC 4.14 (L) 07/07/2020 03:50 AM  
 HGB 13.6 07/07/2020 03:50 AM  
 HCT 40.0 07/07/2020 03:50 AM  
 PLATELET 010 84/86/8020 03:50 AM  
 
BMP:  
Lab Results Component Value Date/Time Glucose 129 (H) 07/07/2020 03:50 AM  
 Sodium 140 07/07/2020 03:50 AM  
 Potassium 3.9 07/07/2020 03:50 AM  
 Chloride 110 07/07/2020 03:50 AM  
 CO2 24 07/07/2020 03:50 AM  
 BUN 49 (H) 07/07/2020 03:50 AM  
 Creatinine 1.38 (H) 07/07/2020 03:50 AM  
 Calcium 8.7 07/07/2020 03:50 AM  
 
CMP: 
Lab Results Component Value Date/Time Glucose 129 (H) 07/07/2020 03:50 AM  
 Sodium 140 07/07/2020 03:50 AM  
 Potassium 3.9 07/07/2020 03:50 AM  
 Chloride 110 07/07/2020 03:50 AM  
 CO2 24 07/07/2020 03:50 AM  
 BUN 49 (H) 07/07/2020 03:50 AM  
 Creatinine 1.38 (H) 07/07/2020 03:50 AM  
 Calcium 8.7 07/07/2020 03:50 AM  
 Anion gap 6 07/07/2020 03:50 AM  
 BUN/Creatinine ratio 36 (H) 07/07/2020 03:50 AM  
 Alk.  phosphatase 78 07/03/2020 09:36 AM  
 Protein, total 9.0 (H) 07/03/2020 09:36 AM  
 Albumin 4.3 07/03/2020 09:36 AM  
 Globulin 4.7 (H) 07/03/2020 09:36 AM  
 A-G Ratio 0.9 07/03/2020 09:36 AM  
 
 
 Recent Results (from the past 24 hour(s)) GLUCOSE, POC Collection Time: 07/06/20 11:31 AM  
Result Value Ref Range Glucose (POC) 178 (H) 70 - 110 mg/dL SARS-COV-2 Collection Time: 07/06/20  2:30 PM  
Result Value Ref Range Specimen source Nasopharyngeal    
 COVID-19 rapid test Not detected NOTD    
GLUCOSE, POC Collection Time: 07/06/20  5:54 PM  
Result Value Ref Range Glucose (POC) 119 (H) 70 - 110 mg/dL GLUCOSE, POC Collection Time: 07/07/20 12:28 AM  
Result Value Ref Range Glucose (POC) 123 (H) 70 - 110 mg/dL CBC WITH AUTOMATED DIFF Collection Time: 07/07/20  3:50 AM  
Result Value Ref Range WBC 10.0 4.6 - 13.2 K/uL  
 RBC 4.14 (L) 4.70 - 5.50 M/uL  
 HGB 13.6 13.0 - 16.0 g/dL HCT 40.0 36.0 - 48.0 % MCV 96.6 74.0 - 97.0 FL  
 MCH 32.9 24.0 - 34.0 PG  
 MCHC 34.0 31.0 - 37.0 g/dL  
 RDW 11.7 11.6 - 14.5 % PLATELET 257 075 - 571 K/uL MPV 11.4 9.2 - 11.8 FL  
 NEUTROPHILS 78 (H) 40 - 73 % LYMPHOCYTES 13 (L) 21 - 52 % MONOCYTES 9 3 - 10 % EOSINOPHILS 0 0 - 5 % BASOPHILS 0 0 - 2 %  
 ABS. NEUTROPHILS 7.7 1.8 - 8.0 K/UL  
 ABS. LYMPHOCYTES 1.3 0.9 - 3.6 K/UL  
 ABS. MONOCYTES 0.9 0.05 - 1.2 K/UL  
 ABS. EOSINOPHILS 0.0 0.0 - 0.4 K/UL  
 ABS. BASOPHILS 0.0 0.0 - 0.1 K/UL  
 DF AUTOMATED METABOLIC PANEL, BASIC Collection Time: 07/07/20  3:50 AM  
Result Value Ref Range Sodium 140 136 - 145 mmol/L Potassium 3.9 3.5 - 5.5 mmol/L Chloride 110 100 - 111 mmol/L  
 CO2 24 21 - 32 mmol/L Anion gap 6 3.0 - 18 mmol/L Glucose 129 (H) 74 - 99 mg/dL BUN 49 (H) 7.0 - 18 MG/DL Creatinine 1.38 (H) 0.6 - 1.3 MG/DL  
 BUN/Creatinine ratio 36 (H) 12 - 20 GFR est AA 60 (L) >60 ml/min/1.73m2 GFR est non-AA 49 (L) >60 ml/min/1.73m2 Calcium 8.7 8.5 - 10.1 MG/DL  
GLUCOSE, POC Collection Time: 07/07/20  5:40 AM  
Result Value Ref Range Glucose (POC) 136 (H) 70 - 110 mg/dL  
 
 
images and reports reviewed Assessment: Sheila Duval is a 80 y.o. male is presenting with abdominal pain and a picture of bowel obstruction. At the beginning I thought the patient may be better because he said he had a bowel movement and his NG tube output has decreased remarkably. However the BM he had is very small and he is not passing flatus, and he did vomit around the NG tube and his abdomen is . I still believe that he require surgical intervention. I ordered a stat KUB for further evaluation but still planing for laparotomy today. Plan:  
 
Appreciate medicine admission and management Exploratory laparotomy today NPO 
IV fluids NG tube to suction Follow-up nephrology recommendation. Nutrition consult Please call me if you have any questions (cell phone: [phone]) Signed By: Rodney Keller MD   
 July 7, 2020

## 2020-07-07 NOTE — PROGRESS NOTES
Called OR at [phone] to notify that pt is ready to go to surgery, NPO, consent in chart, preop completed. Transport on the way.

## 2020-07-07 NOTE — PROGRESS NOTES
Patient's KUB showed a picture of persistent small bowel obstruction. Also he is tachycardic to 118 and still having abdominal pain. We will proceed with surgical exploration today.

## 2020-07-07 NOTE — BRIEF OP NOTE
Brief Postoperative Note Patient: Alec Sacks YOB: 1935 MRN: [id number] Date of Procedure: 7/7/2020 Pre-Op Diagnosis: Small Bowel Obstruction K56.609 Post-Op Diagnosis: Sever adhesions with small bowel obstruction. Procedure(s): 
Exploratory Laparotomy, Lysis of Adhesions, Bowel Resection Surgeon(s): 
Andrew Olvera MD 
 
Surgical Assistant: None Anesthesia: General  
 
Estimated Blood Loss (mL): Minimal 
 
Complications: None Specimens:  
ID Type Source Tests Collected by Time Destination 1 : PART OF ILEUM Preservative   Andrew Olvera MD 7/7/2020 1252 Pathology Implants: * No implants in log * Drains:  
Nasogastric Tube 07/03/20 (Active) Site Assessment Clean, dry, & intact 7/7/2020  4:23 AM  
Securement Device Tape 7/7/2020  4:23 AM  
G Port Status Continuous Suction 7/7/2020  4:23 AM  
External Insertion Guido (cms) 65 cms 7/7/2020  4:23 AM  
Action Taken Placement verified (comment) 7/7/2020  4:23 AM  
Drainage Description Green 7/7/2020  4:23 AM  
Gastric Residual (mL) 0 ml 7/6/2020  7:26 AM  
Water Flush Volume (mL) 70 mL 7/4/2020  6:52 AM  
Drainage Chamber Level (ml) 50 ml 7/6/2020  6:59 PM  
Output (ml) 50 ml 7/6/2020 11:23 AM  
 
 
Findings: Severe adhesions.   
 
Electronically Signed by Nemesio Silva MD on 7/7/2020 at 1:34 PM

## 2020-07-07 NOTE — PROGRESS NOTES
Problem: Discharge Planning Goal: *Discharge to safe environment Outcome: Progressing Towards Goal 
  
Problem: Pain Goal: *Control of Pain Outcome: Progressing Towards Goal 
  
Problem: Small Bowel Obstruction: Day 1 Goal: Off Pathway (Use only if patient is Off Pathway) Outcome: Progressing Towards Goal 
Goal: Activity/Safety Outcome: Progressing Towards Goal 
Goal: Consults, if ordered Outcome: Progressing Towards Goal 
Goal: Diagnostic Test/Procedures Outcome: Progressing Towards Goal 
Goal: Nutrition/Diet Outcome: Progressing Towards Goal 
Goal: Medications Outcome: Progressing Towards Goal 
Goal: Respiratory Outcome: Progressing Towards Goal 
Goal: Treatments/Interventions/Procedures Outcome: Progressing Towards Goal 
Goal: Psychosocial 
Outcome: Progressing Towards Goal 
Goal: *Optimal pain control at patient's stated goal 
Outcome: Progressing Towards Goal 
Goal: *Adequate urinary output (equal to or greater than 30 milliliters/hour) Outcome: Progressing Towards Goal 
Goal: *Hemodynamically stable Outcome: Progressing Towards Goal 
Goal: *Demonstrates progressive activity Outcome: Progressing Towards Goal 
Goal: *Absence of nausea/vomiting Outcome: Progressing Towards Goal 
  
Problem: Small Bowel Obstruction: Day 2 Goal: Off Pathway (Use only if patient is Off Pathway) Outcome: Progressing Towards Goal 
Goal: Activity/Safety Outcome: Progressing Towards Goal 
Goal: Consults, if ordered Outcome: Progressing Towards Goal 
Goal: Diagnostic Test/Procedures Outcome: Progressing Towards Goal 
Goal: Nutrition/Diet Outcome: Progressing Towards Goal 
Goal: Discharge Planning Outcome: Progressing Towards Goal 
Goal: Medications Outcome: Progressing Towards Goal 
Goal: Respiratory Outcome: Progressing Towards Goal 
Goal: Treatments/Interventions/Procedures Outcome: Progressing Towards Goal 
Goal: Psychosocial 
Outcome: Progressing Towards Goal 
 Goal: *Optimal pain control at patient's stated goal 
Outcome: Progressing Towards Goal 
Goal: *Adequate urinary output (equal to or greater than 30 milliliters/hour) Outcome: Progressing Towards Goal 
Goal: *Hemodynamically stable Outcome: Progressing Towards Goal 
Goal: *Demonstrates progressive activity Outcome: Progressing Towards Goal 
Goal: *Absence of nausea/vomiting Outcome: Progressing Towards Goal 
Goal: *Return of normal bowel function Outcome: Progressing Towards Goal 
  
Problem: Small Bowel Obstruction: Day 3 Goal: Off Pathway (Use only if patient is Off Pathway) Outcome: Progressing Towards Goal 
Goal: Activity/Safety Outcome: Progressing Towards Goal 
Goal: Consults, if ordered Outcome: Progressing Towards Goal 
Goal: Diagnostic Test/Procedures Outcome: Progressing Towards Goal 
Goal: Nutrition/Diet Outcome: Progressing Towards Goal 
Goal: Discharge Planning Outcome: Progressing Towards Goal 
Goal: Medications Outcome: Progressing Towards Goal 
Goal: Respiratory Outcome: Progressing Towards Goal 
Goal: Treatments/Interventions/Procedures Outcome: Progressing Towards Goal 
Goal: Psychosocial 
Outcome: Progressing Towards Goal 
Goal: *Optimal pain control at patient's stated goal 
Outcome: Progressing Towards Goal 
Goal: *Adequate urinary output (equal to or greater than 30 milliliters/hour) Outcome: Progressing Towards Goal 
Goal: *Hemodynamically stable Outcome: Progressing Towards Goal 
Goal: *Adequate nutrition Outcome: Progressing Towards Goal 
Goal: *Demonstrates progressive activity Outcome: Progressing Towards Goal 
Goal: *Participates in discharge planning Outcome: Progressing Towards Goal 
  
Problem: Small Bowel Obstruction: Day 4 to Discharge Goal: Off Pathway (Use only if patient is Off Pathway) Outcome: Progressing Towards Goal 
Goal: Activity/Safety Outcome: Progressing Towards Goal 
Goal: Nutrition/Diet Outcome: Progressing Towards Goal 
Goal: Discharge Planning Outcome: Progressing Towards Goal 
Goal: Medications Outcome: Progressing Towards Goal 
Goal: Respiratory Outcome: Progressing Towards Goal 
Goal: Treatments/Interventions/Procedures Outcome: Progressing Towards Goal 
Goal: Psychosocial 
Outcome: Progressing Towards Goal 
  
Problem: Small Bowel Obstruction - Non Surgical: Discharge Outcomes Goal: *Hemodynamically stable Outcome: Progressing Towards Goal 
Goal: *Demonstrates independent activity or return to baseline Outcome: Progressing Towards Goal 
Goal: *Optimal pain control at patient's stated goal 
Outcome: Progressing Towards Goal 
Goal: *Verbalizes understanding and describes prescribed diet Outcome: Progressing Towards Goal 
Goal: *Tolerating diet Outcome: Progressing Towards Goal 
Goal: *Verbalizes name, dosage, time, side effects, and number of days to continue medications Outcome: Progressing Towards Goal 
Goal: *Anxiety reduced or absent Outcome: Progressing Towards Goal 
Goal: *Understands and describes signs and symptoms to report to providers(Stroke Metric) Outcome: Progressing Towards Goal 
Goal: *Describes follow-up/return visits to physicians Outcome: Progressing Towards Goal 
Goal: *Describes available resources and support systems Outcome: Progressing Towards Goal 
Goal: *Active bowel function Outcome: Progressing Towards Goal 
  
Problem: Falls - Risk of 
Goal: *Absence of Falls Description: Document Tino Dylon Fall Risk and appropriate interventions in the flowsheet. Outcome: Progressing Towards Goal 
Note: Fall Risk Interventions: 
Mobility Interventions: Bed/chair exit alarm, Patient to call before getting OOB, Strengthening exercises (ROM-active/passive) Medication Interventions: Bed/chair exit alarm Elimination Interventions: Call light in reach, Bed/chair exit alarm History of Falls Interventions: Door open when patient unattended

## 2020-07-07 NOTE — OP NOTES
[address] [facility] 
OPERATIVE REPORT Name:  Annetta Chen 
MR#:   [id number] :  [id number] ACCOUNT #:  [de-identified] DATE OF SERVICE:  2020 PREOPERATIVE DIAGNOSIS:  Small bowel obstruction, possible small bowel volvulus. POSTOPERATIVE DIAGNOSIS:  Severe adhesion causing small bowel obstruction. PROCEDURE PERFORMED: 
1. Exploratory laparotomy with extensive lysis of adhesion more than 70% of the time of the surgery. 2.  Small bowel resection x2. SURGEON:  Parvez Felix. Omar Crockett MD 
 
ASSISTANT:  Concepción Watson CSA ANESTHESIA:  General. 
 
COMPLICATIONS:  None. SPECIMENS REMOVED:  Small bowel. IMPLANTS:  None. ESTIMATED BLOOD LOSS:  Minimal. 
 
PROCEDURE:  The patient was brought to operating room. Anesthesia was induced. Scrubbing and draping of the abdomen was done in usual manner. A time-out was performed. A midline laparotomy was performed over the old scar, deepened through the skin and subcutaneous tissue. The fascia was identified. It was opened with cautery and then when we opened the fascia we saw the peritoneum. I took 2 Debakey and Metzenbaum and we opened the peritoneum and we entered the abdomen. There were no fluids in the abdomen, but there was significant adhesion between the small bowel and the anterior abdominal wall. Extensive lysis of adhesion was started by pulling on the fascia bilaterally and dissecting the small bowel either by Metzenbaum or some blunt or sharp dissection alternating between them and then I continued in opening the fascia and doing further lysis of adhesion as we went along the midline fascia. The adhesion was extremely dense and difficult to dissect and at one point I saw that the proximal bowel was distended and part of the small bowel was completely decompressed but all were stuck together in multiple area in the abdomen.   I started as proximal as possible and I continued with the incision superiorly slightly higher until I reached to where I saw the transverse colon. There was some adhesion between the omentum or the lesser omentum and anterior abdominal wall that was taken down gently by LigaSure Impact and then the proximal small bowel was seen. It was very dilated up to 5 cm in size. So, I dissected the small bowel gently and there was also adhesion between the bowel loop themselves until I reached the ligament of Treitz and I started moving distally from there. There was significant adhesion in the left side of abdominal wall and I was able to dissect them gently and there was what seems to be like a band that I was able to take down but the bowel was still slightly dilated distal to the band so I realized that this could be not the only reason for the obstruction. At this point, I could not dissect very well the middle adhesion especially the one on the left side because there was still severe adhesion that were very dense. So, I decided to go distal and work my way back. I was able to identify the right colon easily and the distal ileum at the ileocecal valve looked to be kind of normal but there was adhesion in the pelvis. I had to do significant lysis of adhesion in the pelvic area where the bowel was stuck to what seems to be a previous type of surgery in the pelvis most likely represent his previous sigmoid colectomy. During the dissection, there was a segment of small bowel that clearly was stuck and was impossible to dissect without doing a bowel resection because of multiple serosal tear which was impossible to prevent.   So at this point after I pulled the small bowel after multiple times during the dissection, I decided to do a small bowel resection of this bowel area and this was done using a CHEMA blue load 75 on both side because there was a small hole in the bowel from the dissection which again was impossible to prevent and the mesentery of the bowel was taken with LigaSure Impact and the reason why I divided the bowel at that stage is because I do want any spillage and then I send this for pathology. We put it aside and then I continued with the lysis of adhesion. Again, there was significant adhesion in the pelvic area, but after further dissection I was able to dissect it completely and then I was able to reach the left side of the abdomen and I was able to try to connect the proximal and distal which took also a while to do the lysis of adhesion in this area, but finally I was able to free it completely. There was an area of the bowel where there was again seems to be a band that was tight with some ecchymosis but no ischemia of the bowel which most likely represented the area where it was read as possible intussusception on the CT scan. I felt I palpated the whole 
bowel to make sure there is no tumor or masses and there was none. At this point, I looked at the small bowel where we did the bowel resection. There was an area proximal to it where it was kind of slightly dusky but clearly viable but the mesentery was also beaten up and there was couple serosal tear. So, I decided to excise this part of the small bowel, so I do not do anastomosis to it. So, another CHEMA blue load was fired and the mesentery was taken with LigaSure Impact and then a side-to-side anastomosis of the distal ileum was done using a CHEMA blue load and the opening was closed with running 2-0 Vicryl sutures in two layers and all the stapler lines were reinforced with seromuscular 2-0 Vicryl sutures in a running fashion. At this point, I ran the small bowel one more time. Clearly, there was no evidence of any injury and there was no evidence of any kinking or mass and I placed the small bowel back into the abdomen. There was no omentum to cover the bowel, so I closed the fascia with #1 PDS non-looped in two layers and the skin was closed with skin stapler. Hodan Bernstein MD 
 
 
[id number] 
D:  07/07/2020 13:43 T:  07/07/2020 16:05 
JOB #:  [id number]

## 2020-07-07 NOTE — ANESTHESIA POSTPROCEDURE EVALUATION
Procedure(s): 
Exploratory Laparotomy, Lysis of Adhesions, Possible Bowel Resection Possibly Ostomy. general 
 
Anesthesia Post Evaluation Multimodal analgesia: multimodal analgesia used between 6 hours prior to anesthesia start to PACU discharge Patient location during evaluation: bedside Patient participation: complete - patient participated Level of consciousness: awake Pain management: adequate Airway patency: patent Anesthetic complications: no 
Cardiovascular status: stable Respiratory status: acceptable Hydration status: acceptable Post anesthesia nausea and vomiting:  controlled INITIAL Post-op Vital signs:  
Vitals Value Taken Time /67 7/7/2020  2:18 PM  
Temp 37.9 °C (100.3 °F) 7/7/2020  1:47 PM  
Pulse 134 7/7/2020  2:25 PM  
Resp 12 7/7/2020  2:25 PM  
SpO2 92 % 7/7/2020  2:25 PM  
Vitals shown include unvalidated device data.

## 2020-07-07 NOTE — PROGRESS NOTES
Internal Medicine Progress Note Patient's Name: Cathy Ha Admit Date: 7/3/2020 Length of Stay: 4 Assessment/Plan Active Hospital Problems Diagnosis Date Noted  Diabetes ([facility] 07/03/2020  
 SBO (small bowel obstruction) ([facility] 07/03/2020  
 HTN (hypertension) 07/03/2020  [facility] 07/03/2020  Elevated troponin 07/03/2020  
 
- Aggressive hydration - If persistently febrile, will need cultured - Transfer stepdown - Cont NPO 
- Cont NGT to suction - Nutrition consult for TPN 
- PICC line - s/p surgery - Cr improving - Appreciate nephro - Cont acceptable home medications for chronic conditions  
- DVT protocol I have personally reviewed all pertinent labs and films that have officially resulted over the last 24 hours. I have personally checked for all pending labs that are awaiting final results. Subjective Pt s/e @ bedside No major events overnight Febrile after surgery and tachy Denies CP or SOB Objective Visit Vitals /66 Pulse (!) 133 Temp (!) 101.2 °F (38.4 °C) Resp 19 Ht 5' 7\" (1.702 m) Wt 80.7 kg (177 lb 14.6 oz) SpO2 92% BMI 27.86 kg/m² Physical Exam: 
General Appearance: NAD, conversant Lungs: CTA with normal respiratory effort CV: Tachy RR, no m/r/g Abdomen: soft, approp TTP, hypo bowel sounds Extremities: no cyanosis, no peripheral edema Neuro: No focal deficits, motor/sensory intact Lab/Data Reviewed: 
BMP:  
Lab Results Component Value Date/Time  07/07/2020 03:50 AM  
 K 3.9 07/07/2020 03:50 AM  
  07/07/2020 03:50 AM  
 CO2 24 07/07/2020 03:50 AM  
 AGAP 6 07/07/2020 03:50 AM  
  (H) 07/07/2020 03:50 AM  
 BUN 49 (H) 07/07/2020 03:50 AM  
 CREA 1.38 (H) 07/07/2020 03:50 AM  
 GFRAA 60 (L) 07/07/2020 03:50 AM  
 GFRNA 49 (L) 07/07/2020 03:50 AM  
 
CBC:  
Lab Results Component Value Date/Time  WBC 10.0 07/07/2020 03:50 AM  
 HGB 13.6 07/07/2020 03:50 AM  
 HCT 40.0 07/07/2020 03:50 AM  
  07/07/2020 03:50 AM  
 
 
Imaging Reviewed: 
Lilia Garcia Nini Flat/ Erect Result Date: 7/7/2020 EXAM: ABDOMINAL RADIOGRAPH CLINICAL INDICATION/HISTORY: Follow up on SBO -Additional: None COMPARISON: 7/4/2020 TECHNIQUE: Abdominal radiographs. _______________ FINDINGS: BOWEL GAS PATTERN: Enteric feeding tube tip within the stomach. Persistent gaseous distention of multiple small bowel loops. Colonic gas. BONES: Unremarkable. OTHER: None. _______________ IMPRESSION: Persistent gaseous distention of multiple small bowel loops. Medications Reviewed: 
Current Facility-Administered Medications Medication Dose Route Frequency  lactated Ringers infusion  100 mL/hr IntraVENous CONTINUOUS  
 sodium chloride (NS) flush 5-40 mL  5-40 mL IntraVENous Q8H  
 sodium chloride (NS) flush 5-40 mL  5-40 mL IntraVENous PRN  
 oxyCODONE-acetaminophen (PERCOCET) 5-325 mg per tablet 1 Tab  1 Tab Oral PRN  
 HYDROmorphone (DILAUDID) syringe 0.2 mg  0.2 mg IntraVENous PRN  
 HYDROmorphone (DILAUDID) injection 0.5 mg  0.5 mg IntraVENous Multiple  diphenhydrAMINE (BENADRYL) injection 12.5 mg  12.5 mg IntraVENous Multiple  insulin lispro (HUMALOG) injection   SubCUTAneous ONCE  
 glucose chewable tablet 16 g  4 Tab Oral PRN  
 glucagon (GLUCAGEN) injection 1 mg  1 mg IntraMUSCular PRN  
 dextrose 10% infusion 125-250 mL  125-250 mL IntraVENous PRN  
 HYDROmorphone (DILAUDID) 0.5 mg/0.5 mL syringe  0.45% sodium chloride with KCl 20 mEq/L infusion   IntraVENous CONTINUOUS  
 insulin lispro (HUMALOG) injection   SubCUTAneous Q6H  
 glucose chewable tablet 16 g  4 Tab Oral PRN  
 glucagon (GLUCAGEN) injection 1 mg  1 mg IntraMUSCular PRN  
 dextrose 10% infusion 125-250 mL  125-250 mL IntraVENous PRN  
 morphine injection 1 mg  1 mg IntraVENous Q4H PRN  
 insulin glargine (LANTUS) injection 5 Units  5 Units SubCUTAneous QHS  hydrALAZINE (APRESOLINE) 20 mg/mL injection 10 mg  10 mg IntraVENous Q6H PRN  
 naloxone (NARCAN) injection 0.4 mg  0.4 mg IntraVENous PRN  
 ondansetron (ZOFRAN) injection 6 mg  6 mg IntraVENous Q6H PRN Mary Reyes DO Internal Medicine, Hospitalist 
Pager: [phone] [facility]

## 2020-07-08 NOTE — PROGRESS NOTES
Patient seen and examined. He was transferred to the stepdown unit yesterday after surgery because of his hypotension and tachycardia. The patient has developed a fever directly in the recovery room and he was slightly tachycardic. Overnight unfortunately he had to be placed on pressors because of his hypotension but currently he is off pressors. His kidney function has worsened most likely secondary to the hypotension and the use of pressors. His creatinine today is 1.9. It was 1.4 yesterday. His WBC is 13,000 today. Today his blood pressure is better as well as his tachycardia has slightly improved. As stated he is off pressors currently. He also has not developed any fever except the 38.4 that happened directly after surgery in the afternoon yesterday. Overnight he only took twice Dilaudid for pain. He said he has some incisional tenderness but he looks okay today. His NG tube output has drained very minimal about 150 cc overnight. His abdomen is soft and non-distended with mild incisional tenderness. I removed the dressing and the wound is clean. He still have the peripheral IV in his foot because of no access but he is scheduled for a PICC line today. Plan: NPO Keep the NG tube but if it continues to drain very minimal and the patient do well today I may remove it tomorrow Try the best not to use any pressors because it is affecting his kidney function as well as the anastomosis. Instead use a lot of fluids to improve his blood pressure and kidney function when possible PICC line because we need IV access and most likely he will need TPN though clinically he looks well and if his bowel function returned I may start him on diet Await return of bowel function Daily lab to check on the creatinine and the WBC Ambulation when possible Pain management DVT prophylaxis I appreciate the medicine management Follow recommendation from the nephrology team

## 2020-07-08 NOTE — PROGRESS NOTES
Chart reviewed. Plan remains home with home health vs SNF, however pt has refused SNF. Will need PT & OT when medically appropriate to assess for disposition needs. Will cont to follow. Esther Rouse RN,ext [phone].

## 2020-07-08 NOTE — PROGRESS NOTES
Internal Medicine Progress Note Patient's Name: Stan Zambrano Admit Date: 7/3/2020 Length of Stay: 5 Assessment/Plan Active Hospital Problems Diagnosis Date Noted  Diabetes ([facility] [address] 07/03/2020  
 SBO (small bowel obstruction) ([facility] [address] 07/03/2020  
 HTN (hypertension) 07/03/2020  [facility] 07/03/2020  Elevated troponin 07/03/2020 Hypovolemic Shock now resolved - Cont NPO 
- Pain control PRN 
- F/u surgery recs - Nutrition consult for TPN 
- OK for PICC line from kidney standpoint - PICC line ordered - Cr up slightly 
- Cont IVFs - Appreciate nephro - Cont acceptable home medications for chronic conditions  
- DVT protocol I have personally reviewed all pertinent labs and films that have officially resulted over the last 24 hours. I have personally checked for all pending labs that are awaiting final results. Subjective Pt s/e @ bedside No major events overnight Tachycardia improved Admits to abd pain Denies CP or SOB Objective Visit Vitals /52 Pulse (!) 110 Temp 98 °F (36.7 °C) Resp 16 Ht 5' 7\" (1.702 m) Wt 80.7 kg (177 lb 14.6 oz) SpO2 96% BMI 27.86 kg/m² Physical Exam: 
General Appearance: NAD, conversant Lungs: CTA with normal respiratory effort CV: Tachy RR, no m/r/g Abdomen: soft, approp TTP, absent bowel sounds Extremities: no cyanosis, no peripheral edema Neuro: No focal deficits, motor/sensory intact Lab/Data Reviewed: 
BMP:  
Lab Results Component Value Date/Time  07/08/2020 02:42 AM  
 K 5.0 07/08/2020 02:42 AM  
  07/08/2020 02:42 AM  
 CO2 23 07/08/2020 02:42 AM  
 AGAP 7 07/08/2020 02:42 AM  
  (H) 07/08/2020 02:42 AM  
 BUN 50 (H) 07/08/2020 02:42 AM  
 CREA 1.94 (H) 07/08/2020 02:42 AM  
 GFRAA 40 (L) 07/08/2020 02:42 AM  
 GFRNA 33 (L) 07/08/2020 02:42 AM  
 
CBC:  
Lab Results Component Value Date/Time  WBC 13.3 (H) 07/08/2020 02:42 AM  
 HGB 11.9 (L) 07/08/2020 02:42 AM  
 HCT 36.0 07/08/2020 02:42 AM  
  07/08/2020 02:42 AM  
 
 
Imaging Reviewed: 
No results found. Medications Reviewed: 
Current Facility-Administered Medications Medication Dose Route Frequency  0.45% sodium chloride infusion  75 mL/hr IntraVENous CONTINUOUS  
 HYDROmorphone (DILAUDID) injection 1 mg  1 mg IntraVENous Q2H PRN  
 acetaminophen (TYLENOL) suppository 650 mg  650 mg Rectal Q6H PRN  
 NOREPINephrine (LEVOPHED) 8 mg in 0.9% NS 250ml infusion  0.5-16 mcg/min IntraVENous TITRATE  insulin lispro (HUMALOG) injection   SubCUTAneous Q6H  
 glucose chewable tablet 16 g  4 Tab Oral PRN  
 glucagon (GLUCAGEN) injection 1 mg  1 mg IntraMUSCular PRN  
 dextrose 10% infusion 125-250 mL  125-250 mL IntraVENous PRN  
 morphine injection 1 mg  1 mg IntraVENous Q4H PRN  
 insulin glargine (LANTUS) injection 5 Units  5 Units SubCUTAneous QHS  hydrALAZINE (APRESOLINE) 20 mg/mL injection 10 mg  10 mg IntraVENous Q6H PRN  
 naloxone (NARCAN) injection 0.4 mg  0.4 mg IntraVENous PRN  
 ondansetron (ZOFRAN) injection 6 mg  6 mg IntraVENous Q6H PRN Alyse Narvaez DO Internal Medicine, Hospitalist 
Pager: [phone] [facility]

## 2020-07-08 NOTE — DIABETES MGMT
Nutrition/Glycemic Control:  
Consult received for TPN today. Pt does not have a PICC line placed as of 1720 although ordered for 0400. When pt has central access, recommend 100g amino acids, 300 g dextrose with 250 ml 20% lipids/d for initial order.  
Davey MAHAJAN

## 2020-07-08 NOTE — PROGRESS NOTES
0700 Effective handoff at bedside with Lorne Garza RN 
1000 Intervental Rad called, no answer. Pt for Picc insertion today  
1700 Pts wife called and updated on status 1830 Pt with eyes closed, post pain medication 1900 Effective handoff to Isaias Quintanilla

## 2020-07-08 NOTE — PROGRESS NOTES
attempted to complete a follow up visit with and Spiritual assessment of patient in room 2605 today and offer Pastoral care support once again but found the patient in a condition where he was not able to communicate with me at this time. Silent prayer was offered at doorway to room. Teodora Dos Santos will continue to follow and will provide pastoral care on an as needed/requested basis Blayne 3 Board Certified [employer] Spiritual Care  
[phone]

## 2020-07-08 NOTE — CDMP QUERY
Pt admitted with bowel obstruction and is s/p Exploratory lap with bowel resection. Pt noted to have develop tachycardia and decreased blood pressure. If possible, please document in the progress notes and discharge summary if you are evaluating and/or treating any of the following: ? Hemorrhagic Shock now resolved ? Traumatic Shock now resolved ? Septic Shock now resolved ? Hypovolemic Shock now resolved 
? Other Shock, please specify ? Hypovolemia/hypotension only ? Other, please specify ? Clinically unable to determine The medical record reflects the following: 
   Risk Factors: 81 yo male with SBO, possible intussusception Clinical Indicators: 7/7 Exploratory lap with extensive lysis of adhesions > 70% of surgery time and small bowel bowel resection x2 
 
=> Tachycardia up to 140,  BP decreased to 87/49 MAP 58  Temp 101.2 Treatment: Transferred to Stepdown for care, IV fluid boluses, Norepinephrine drip Thank you for your time, 
Racquel Avilez RN [facility] [phone]

## 2020-07-08 NOTE — PROGRESS NOTES
RENAL PROGRESS NOTE Sheldon Boyd Assessment/Plan: · MARCI (ischemic atn in a setting of sbo). Scr was improving but is up since yesterday due to transient hypotension. Non oliguric. Continue ivf, reduce the rate to avoid volume overload. Resume pressors if needed for bp support. · POD #1, exploratory laparotomy with extensive lysis of adhesions, small bowel resection x2.       
· HTN, h/o off. Continue to hold antihtn including  Arb and diuretic. Subjective: 
Patient complaints off: Transferred to the icu last night due to hypotension. Was on pressors, off now. No SOB/CP/N/V. C/o abd pain. Patient Active Problem List  
Diagnosis Code  Diabetes ([facility] E11.9  
 SBO (small bowel obstruction) ([facility] K56.609  
 HTN (hypertension) I10  
 MARCI (acute kidney injury) ([facility] N17.9  Elevated troponin R79.89 Current Facility-Administered Medications Medication Dose Route Frequency Provider Last Rate Last Dose  
 HYDROmorphone (DILAUDID) injection 1 mg  1 mg IntraVENous Q2H PRN Sharma Babinski, MD   1 mg at 07/08/20 0246  acetaminophen (TYLENOL) suppository 650 mg  650 mg Rectal Q6H PRN Donnice Loft, DO   650 mg at 07/07/20 1509  
 0.45% sodium chloride with KCl 20 mEq/L infusion   IntraVENous CONTINUOUS Donnice Loft,  mL/hr at 07/07/20 2331    
 NOREPINephrine (LEVOPHED) 8 mg in 0.9% NS 250ml infusion  0.5-16 mcg/min IntraVENous TITRATE Elvia Smith H, DO   Stopped at 07/07/20 2226  
 insulin lispro (HUMALOG) injection   SubCUTAneous Q6H Sharma Babinski, MD   3 Units at 07/08/20 0600  
 glucose chewable tablet 16 g  4 Tab Oral PRN Sharma Babinski, MD      
 glucagon (GLUCAGEN) injection 1 mg  1 mg IntraMUSCular PRN Sharma Babinski, MD      
 dextrose 10% infusion 125-250 mL  125-250 mL IntraVENous PRN Enrique Blanton Fidel Nieto MD      
 morphine injection 1 mg  1 mg IntraVENous Q4H PRN Roc Sanchez MD   1 mg at 07/08/20 8510  insulin glargine (LANTUS) injection 5 Units  5 Units SubCUTAneous QHS Roc Sanchez MD   5 Units at 07/06/20 2245  hydrALAZINE (APRESOLINE) 20 mg/mL injection 10 mg  10 mg IntraVENous Q6H PRN Roc Sanchez MD      
 [facility]) injection 0.4 mg  0.4 mg IntraVENous PRN Roc Sanchez MD      
 ondansetron [facility]) injection 6 mg  6 mg IntraVENous Q6H PRN Roc Sanchez MD   6 mg at 07/04/20 1112 Objective Vitals:  
 07/08/20 0400 07/08/20 0500 07/08/20 0600 07/08/20 0800 BP: 100/58 105/54 124/65 Pulse: (!) 118 (!) 114 (!) 117 Resp: 17 17 25 Temp: 98.8 °F (37.1 °C)   97.7 °F (36.5 °C) SpO2: 95% 96% 95% Weight:      
Height:      
 
 
 
Intake/Output Summary (Last 24 hours) at 7/8/2020 0900 Last data filed at 7/8/2020 0700 Gross per 24 hour Intake 5972.68 ml Output 585 ml Net 5387.68 ml Admission weight: Weight: 79.4 kg (175 lb) (07/03/20 0927) Last Weight Metrics: 
Weight Loss Metrics 7/7/2020 1/31/2019 Today's Wt 177 lb 14.6 oz 181 lb 9 oz BMI 27.86 kg/m2 28.44 kg/m2 Physical Assessment:  
 
General: NAD, alert and oriented. Neck: No jvd. LUNGS: Clear to Auscultation, No rales, rhonchi or wheezes. CVS EXM: S1, S2  RRR, no murmurs/gallops/rubs. Abdomen: mildly distended, diffusely tender. Lower Extremities:  trace edema. Lab CBC w/Diff Recent Labs  
  07/08/20 
0242 07/07/20 
0350 07/06/20 
0420 WBC 13.3* 10.0 8.7 RBC 3.64* 4.14* 4.13* HGB 11.9* 13.6 13.7 HCT 36.0 40.0 40.7  209 210 GRANS 85* 78* 75* LYMPH 7* 13* 8*  
EOS 0 0 0 Chemistry Recent Labs  
  07/08/20 
0242 07/07/20 
0350 07/06/20 
7784 * 129* 169*  140 141  
K 5.0 3.9 3.4*  
 110 106 CO2 23 24 28 BUN 50* 49* 68* CREA 1.94* 1.38* 1.98* CA 7.7* 8.7 9.0 AGAP 7 6 7 BUCR 26* 36* 34* No results found for: IRON, FE, TIBC, IBCT, PSAT, FERR Lab Results Component Value Date/Time Calcium 7.7 (L) 07/08/2020 02:42 AM  
  
 
Zhanna Vaughan M.D. [facility] Phone [phone] Pager [phone]

## 2020-07-09 PROBLEM — I48.0 PAROXYSMAL ATRIAL FIBRILLATION (HCC): Status: ACTIVE | Noted: 2020-01-01

## 2020-07-09 NOTE — PROGRESS NOTES
TRANSFER - OUT REPORT:    Verbal report given to SANDRO Marie(name) on Meryl Tang being transferred to 2600(unit) for routine progression of care       Report consisted of patient's Situation, Background, Assessment and   Recommendations(SBAR). Information from the following report(s) SBAR was reviewed with the receiving nurse. Opportunity for questions and clarification was provided.       Patient transported with:   Monitor  O2 @ 2 liters  Tech

## 2020-07-09 NOTE — PROGRESS NOTES
RENAL PROGRESS NOTE Farida Briceño Assessment/Plan: · MARCI (ischemic atn in a setting of sbo). Scr is better since yesterday, non oliguric. Continue to monitor, especially in light of a.fib with rvr/transient hypotension. Continue ivf till tpn is started. · POD #2, s/p exploratory laparotomy with extensive lysis of adhesions, small bowel resection x2. · PAF with rvr. In nsr at this time. Received dig and iv metoprolol. Card on the case. F/u on echo result. · HTN, h/o off. Continue to hold antihtn including  Arb and diuretic. Subjective: 
Patient complaints off: Feels, looks better at this time but this am had an episode of a.fib with rvr, transient hypootension. SOB/CP/N/V. C/o abd pain. Patient Active Problem List  
Diagnosis Code  Diabetes ([facility] E11.9  
 SBO (small bowel obstruction) ([facility] K56.609  
 HTN (hypertension) I10  
 MARCI (acute kidney injury) ([facility] N17.9  Elevated troponin R79.89  Paroxysmal atrial fibrillation (HCC) I48.0 Current Facility-Administered Medications Medication Dose Route Frequency Provider Last Rate Last Dose  metoprolol (LOPRESSOR) injection 5 mg  5 mg IntraVENous ONCE Ace Kearney DO      
 metoprolol (LOPRESSOR) injection 5 mg  5 mg IntraVENous Q10MIN PRN Jennifer Upton MD   5 mg at 07/09/20 0949  
 metoprolol (LOPRESSOR) injection 5 mg  5 mg IntraVENous Q6H Jennifer Upton MD      
 TPN ADULT - CENTRAL   IntraVENous CONTINUOUS Tammi Mora MD      
 0.45% sodium chloride infusion  75 mL/hr IntraVENous CONTINUOUS Estuardo García MD 75 mL/hr at 07/09/20 1200 75 mL/hr at 07/09/20 1200  
 HYDROmorphone (DILAUDID) injection 1 mg  1 mg IntraVENous Q2H PRN Tammi Mora MD   1 mg at 07/09/20 1306  acetaminophen (TYLENOL) suppository 650 mg  650 mg Rectal Q6H PRN Sunil Mares Ace H, DO   650 mg at 07/09/20 0014  
 NOREPINephrine (LEVOPHED) 8 mg in 0.9% NS 250ml infusion  0.5-16 mcg/min IntraVENous TITRATE Hernando Triplett H, DO   Stopped at 07/07/20 2226  
 insulin lispro (HUMALOG) injection   SubCUTAneous Q6H Genny Ramirez MD   Stopped at 07/08/20 1200  
 glucose chewable tablet 16 g  4 Tab Oral PRN Genny Ramirez MD      
 glucagon (GLUCAGEN) injection 1 mg  1 mg IntraMUSCular PRN Genny Ramirez MD      
 dextrose 10% infusion 125-250 mL  125-250 mL IntraVENous PRN Genny Ramirez MD      
 morphine injection 1 mg  1 mg IntraVENous Q4H PRN Genny Ramirez MD   1 mg at 07/09/20 1800  insulin glargine (LANTUS) injection 5 Units  5 Units SubCUTAneous QHS Genny Ramirez MD   5 Units at 07/08/20 2220  hydrALAZINE (APRESOLINE) 20 mg/mL injection 10 mg  10 mg IntraVENous Q6H PRPALMIRA Ramirez MD      
 naloxone [facility]) injection 0.4 mg  0.4 mg IntraVENous PRPALMIRA Ramirez MD      
 ondansetron [facility]) injection 6 mg  6 mg IntraVENous Q6H PRN Genny Ramirez MD   6 mg at 07/04/20 1112 Facility-Administered Medications Ordered in Other Encounters Medication Dose Route Frequency Provider Last Rate Last Dose  lidocaine (PF) (XYLOCAINE) 10 mg/mL (1 %) injection 1-90 mL  1-90 mL IntraDERMal Multiple NarinderTREY Be   4 mL at 07/09/20 1125 Objective Vitals:  
 07/09/20 1200 07/09/20 1230 07/09/20 1300 07/09/20 1330 BP: 134/52 132/55 140/53 127/56 Pulse: (!) 114 (!) 111 (!) 111 (!) 123 Resp: 18 19 20 18 Temp:      
SpO2: 96% 97% 96% 94% Weight:      
Height:      
 
 
 
Intake/Output Summary (Last 24 hours) at 7/9/2020 1402 Last data filed at 7/9/2020 1300 Gross per 24 hour Intake 1500 ml Output 1415 ml Net 85 ml Admission weight: Weight: 79.4 kg (175 lb) (07/03/20 0927) Last Weight Metrics: 
Weight Loss Metrics 7/9/2020 1/31/2019 Today's Wt 193 lb 181 lb 9 oz BMI 30.23 kg/m2 28.44 kg/m2 Physical Assessment:  
 
General: NAD, alert and oriented. Neck: No jvd. LUNGS: Clear to Auscultation, No rales, rhonchi or wheezes. CVS EXM: S1, S2  RRR, no murmurs/gallops/rubs. Abdomen: mildly distended, diffusely tender. Lower Extremities:  trace edema. Lab CBC w/Diff Recent Labs  
  07/09/20 
0509 07/08/20 
0242 07/07/20 
0350 WBC 8.4 13.3* 10.0  
RBC 3.22* 3.64* 4.14* HGB 10.6* 11.9* 13.6 HCT 32.2* 36.0 40.0  184 209 GRANS 84* 85* 78* LYMPH 8* 7* 13* EOS 0 0 0 Chemistry Recent Labs  
  07/09/20 
0509 07/08/20 
0242 07/07/20 
0350 * 149* 129*  140 140  
K 4.8 5.0 3.9  110 110 CO2 24 23 24 BUN 57* 50* 49* CREA 1.84* 1.94* 1.38* CA 8.3* 7.7* 8.7 AGAP 6 7 6 BUCR 31* 26* 36* No results found for: IRON, FE, TIBC, IBCT, PSAT, FERR Lab Results Component Value Date/Time Calcium 8.3 (L) 07/09/2020 05:09 AM  
  
 
Fredy Andrews M.D. [facility] Phone [phone] Pager [phone]

## 2020-07-09 NOTE — CONSULTS
CARDIOLOGY CONSULT Patient: Jannette Barraza MR #: [id number] Reason for consult: Atrial fibrillation with rapid ventricular response Assessment/Plan:  
 
Hospital Problems  Never Reviewed Codes Class Noted POA Paroxysmal atrial fibrillation ([facility] [id number] score 4,  patient developed atrial fibrillation with a ventricular response of approximately 170 bpm earlier this morning. He received intravenous metoprolol 5 mg as well as 500 mcg of intravenous digoxin. At present, the patient is in sinus rhythm with a rate at approximately 120 bpm.  The patient was on atenolol 50 mg daily prior to admission. Echocardiogram pending. Will give beta-blockers intravenously until p.o. route reliably available. ICD-10-CM: I48.0 ICD-9-CM: 427.31  7/9/2020 Unknown Diabetes ([facility] ICD-10-CM: E11.9 ICD-9-CM: 250.00  7/3/2020 Unknown * (Principal) SBO (small bowel obstruction) ([facility] ICD-10-CM: X86.786 ICD-9-CM: 560.9  7/3/2020 Unknown HTN (hypertension) ICD-10-CM: I10 
ICD-9-CM: 401.9  7/3/2020 Unknown MARCI (acute kidney injury) ([facility] ICD-10-CM: N17.9 ICD-9-CM: 584.9  7/3/2020 Unknown Elevated troponin, mildly elevated 0.08. Will trend cardiac biomarkers ICD-10-CM: R79.89 ICD-9-CM: 790.6  7/3/2020 Unknown History of Present Illness Jannette Barraza is a 80 y.o. hypertensive diabetic man that was admitted with a small bowel obstruction on 7/3/2020. He underwent exploratory laparotomy on 7/6/2020. Patient has no known prior cardiac history including no history of known atrial fibrillation. Earlier today, the patient developed atrial fibrillation with a rapid ventricular response. He was given intravenous metoprolol and digoxin. Several hours later, the patient was in sinus tachycardia. He complained of chest pain to the nurse  but is now complaining of abdominal discomfort. He denies chest pain at present. He denies shortness of breath. Past Medical History Past Medical History:  
Diagnosis Date  Diabetes ([facility]  Hypertension Past Surgical History Social History Socioeconomic History  Marital status:  Spouse name: Not on file  Number of children: Not on file  Years of education: Not on file  Highest education level: Not on file Occupational History  Not on file Social Needs  Financial resource strain: Not on file  Food insecurity Worry: Not on file Inability: Not on file  Transportation needs Medical: Not on file Non-medical: Not on file Tobacco Use  Smoking status: Never Smoker  Smokeless tobacco: Never Used Substance and Sexual Activity  Alcohol use: Not on file  Drug use: Not on file  Sexual activity: Not on file Lifestyle  Physical activity Days per week: Not on file Minutes per session: Not on file  Stress: Not on file Relationships  Social connections Talks on phone: Not on file Gets together: Not on file Attends Protestant service: Not on file Active member of club or organization: Not on file Attends meetings of clubs or organizations: Not on file Relationship status: Not on file  Intimate partner violence Fear of current or ex partner: Not on file Emotionally abused: Not on file Physically abused: Not on file Forced sexual activity: Not on file Other Topics Concern  Not on file Social History Narrative  Not on file Meds Current Facility-Administered Medications Medication Dose Route Frequency  metoprolol (LOPRESSOR) injection 5 mg  5 mg IntraVENous ONCE  
 metoprolol (LOPRESSOR) injection 5 mg  5 mg IntraVENous Q10MIN PRN  
 0.45% sodium chloride infusion  75 mL/hr IntraVENous CONTINUOUS  
 HYDROmorphone (DILAUDID) injection 1 mg  1 mg IntraVENous Q2H PRN  
 acetaminophen (TYLENOL) suppository 650 mg  650 mg Rectal Q6H PRN  
  NOREPINephrine (LEVOPHED) 8 mg in 0.9% NS 250ml infusion  0.5-16 mcg/min IntraVENous TITRATE  insulin lispro (HUMALOG) injection   SubCUTAneous Q6H  
 glucose chewable tablet 16 g  4 Tab Oral PRN  
 glucagon (GLUCAGEN) injection 1 mg  1 mg IntraMUSCular PRN  
 dextrose 10% infusion 125-250 mL  125-250 mL IntraVENous PRN  
 morphine injection 1 mg  1 mg IntraVENous Q4H PRN  
 insulin glargine (LANTUS) injection 5 Units  5 Units SubCUTAneous QHS  hydrALAZINE (APRESOLINE) 20 mg/mL injection 10 mg  10 mg IntraVENous Q6H PRN  
 naloxone (NARCAN) injection 0.4 mg  0.4 mg IntraVENous PRN  
 ondansetron (ZOFRAN) injection 6 mg  6 mg IntraVENous Q6H PRN Facility-Administered Medications Ordered in Other Encounters Medication Dose Route Frequency  lidocaine (PF) (XYLOCAINE) 10 mg/mL (1 %) injection 1-90 mL  1-90 mL IntraDERMal Multiple Allergies Allergies Allergen Reactions  Iodinated Contrast Media Swelling  Lisinopril Swelling Social History Social History Socioeconomic History  Marital status:  Spouse name: Not on file  Number of children: Not on file  Years of education: Not on file  Highest education level: Not on file Occupational History  Not on file Social Needs  Financial resource strain: Not on file  Food insecurity Worry: Not on file Inability: Not on file  Transportation needs Medical: Not on file Non-medical: Not on file Tobacco Use  Smoking status: Never Smoker  Smokeless tobacco: Never Used Substance and Sexual Activity  Alcohol use: Not on file  Drug use: Not on file  Sexual activity: Not on file Lifestyle  Physical activity Days per week: Not on file Minutes per session: Not on file  Stress: Not on file Relationships  Social connections Talks on phone: Not on file Gets together: Not on file Attends Hinduism service: Not on file Active member of club or organization: Not on file Attends meetings of clubs or organizations: Not on file Relationship status: Not on file  Intimate partner violence Fear of current or ex partner: Not on file Emotionally abused: Not on file Physically abused: Not on file Forced sexual activity: Not on file Other Topics Concern  Not on file Social History Narrative  Not on file Family History History reviewed. No pertinent family history. Review of systems Unobtainable at present Physical Exam 
Visit Vitals /59 Pulse (!) 113 Temp 99.3 °F (37.4 °C) Resp 22 Ht 5' 7\" (1.702 m) Wt 193 lb (87.5 kg) SpO2 98% BMI 30.23 kg/m² Stephanie Brady is who is alert and in no acute respiratory distress. Head is normocephalic and atraumatic. Trachea appears midline with no noted thyromegaly. Carotids are full without definite bruits. There is no JVD. Chest is clear to auscultation bilaterally. Cardiac auscultation reveals regular rate and rhythm without significant murmur. Abdomen  firm, mildly tender. Extremities are without significant edema. Dorsalis pedis and posterior tibial pulses are  palpable. . Skin is warm and dry. Diagnostic Tests EK20  07:35, atrial fibrillation with a rapid ventricular response. Diffuse nondiagnostic ST and T wave abnormalities Labs:  
Recent Labs  
  20 
0509 20 
0242 20 
0350 WBC 8.4 13.3* 10.0 HGB 10.6* 11.9* 13.6 HCT 32.2* 36.0 40.0  184 209 Recent Labs  
  20 
0509 20 
0242 20 
0350  140 140  
K 4.8 5.0 3.9  110 110 CO2 24 23 24 * 149* 129* BUN 57* 50* 49* CREA 1.84* 1.94* 1.38* CA 8.3* 7.7* 8.7 Recent Labs  
  20 
0830 TROIQ 0.08*  CKMB 1.1 Last Lipid:  No results found for: CHOL, CHOLX, CHLST, CHOLV, HDL, HDLP, LDL, LDLC, DLDLP, TGLX, TRIGL, TRIGP, CHHD, CHHDX We appreciate the opportunity to see Yamini Dickens with you. We will follow along. Elizabeth Davis MD 
7/9/2020

## 2020-07-09 NOTE — PROGRESS NOTES
Problem: Discharge Planning Goal: *Discharge to safe environment Outcome: Progressing Towards Goal 
  
Problem: Pain Goal: *Control of Pain Outcome: Progressing Towards Goal 
  
Problem: Small Bowel Obstruction: Day 1 Goal: Activity/Safety Outcome: Progressing Towards Goal 
Goal: Consults, if ordered Outcome: Progressing Towards Goal 
Goal: Respiratory Outcome: Progressing Towards Goal 
Goal: Treatments/Interventions/Procedures Outcome: Progressing Towards Goal 
Goal: Psychosocial 
Outcome: Progressing Towards Goal 
  
Problem: Small Bowel Obstruction: Day 2 Goal: Off Pathway (Use only if patient is Off Pathway) Outcome: Progressing Towards Goal 
Goal: Activity/Safety Outcome: Progressing Towards Goal 
Goal: *Optimal pain control at patient's stated goal 
Outcome: Progressing Towards Goal 
  
Problem: Small Bowel Obstruction: Day 3 Goal: Activity/Safety Outcome: Progressing Towards Goal 
Goal: Consults, if ordered Outcome: Progressing Towards Goal 
Goal: Discharge Planning Outcome: Progressing Towards Goal 
Goal: Medications Outcome: Progressing Towards Goal 
Goal: *Optimal pain control at patient's stated goal 
Outcome: Progressing Towards Goal 
Goal: *Adequate urinary output (equal to or greater than 30 milliliters/hour) Outcome: Progressing Towards Goal 
Goal: *Hemodynamically stable Outcome: Progressing Towards Goal 
  
Problem: Small Bowel Obstruction: Day 4 to Discharge Goal: Activity/Safety Outcome: Progressing Towards Goal 
Goal: Nutrition/Diet Outcome: Progressing Towards Goal 
  
Problem: Small Bowel Obstruction - Non Surgical: Discharge Outcomes Goal: *Hemodynamically stable Outcome: Progressing Towards Goal 
Goal: *Demonstrates independent activity or return to baseline Outcome: Progressing Towards Goal 
Goal: *Optimal pain control at patient's stated goal 
Outcome: Progressing Towards Goal

## 2020-07-09 NOTE — PROGRESS NOTES
1915- Bedside shift change report given to Buren Bosworth, RN (oncoming nurse) by Angie Polo RN (offgoing nurse). Report included the following information SBAR, ED Summary, OR Summary, Intake/Output, MAR, Accordion, Recent Results and Cardiac Rhythm ST. Received patient resting in bed quietly. Patient states he is experiencing abdominal pain at this time. Assessment completed. All orders verified. 2221- Patient requiring PRN pain medication for abdominal pain. VSS, will continue to monitor. 0000- PRN tylenol given. Rectal temp reading 101.2. Patient sustaining -140bpm.  
 
0200- Patient mathur catheter leaking. RN advanced catheter and re-inflated balloon. 0400- Mathur catheter continues to leak. Partial drainage into mathur bag. Reassessment completed. No new changes noted. Bed bath given, patient tolerated well.  
 
0600- PRN dilaudid given throughout shift for patient abdominal pain. 0700- Dr. Evan Winter at bedside. Verbal orders to remove mahtur catheter, and NGT. 9238- Bedside shift change report given to SANDRO Marie (oncoming nurse) by Buren Bosworth, RN (offgoing nurse). Report included the following information SBAR, Kardex, ED Summary, Intake/Output, MAR, Accordion, Recent Results and Cardiac Rhythm ST. During shift change, patient HR went to 185-205bpm 12-lead EKG performed and confirmed AFIB RVR. Dr. Rose Marie Gracia pageradha, order received for 5mg lopressor to achieve hr <120. Patient states he has 8/10 chest pain at this time.

## 2020-07-09 NOTE — PROGRESS NOTES
0730  Assumed care of pt from Danyelle Calabrese, [address] [facility] Monitor reveals sustained Afib with RVR rate 180 sudden onset. Dr Pro Locke notified, EKG done, metoprolol 5 mgm IV administered with decrease in rate to 123. Pt admits to substernal angina 8/10 when questioned. O2@ 3 lpm n/c in place. 0900  Chest pain reported 10/10; no dyspnea, skin warm and dry. Morphine administered, cardiac echo in progress. Incontinent of urine x1 
[id number]  Dr. Rui Candelaria at bedside. OK to give metoprolol for HR control, Orders to hold metoprolol if SBP<100 
1000  Metoprolol second dose given, .   
1030  To cath lab for PICC line insertion, , states pain better. 1155  Returned from PICC placement. Reported 100 ml urine voided during procedure. 1330  Medicated for pain; Dangled at bedside, stood briefly with help of 2; weak. Returned to bed. 1700  Phone update to daughter 
200  Bedside and Verbal shift change report given to Javier Fields RN (oncoming nurse) by Manuel Ferrer RN (offgoing nurse). Report included the following information SBAR, Kardex, OR Summary, Intake/Output, MAR, Accordion, Recent Results, Cardiac Rhythm ST and Quality Measures.

## 2020-07-09 NOTE — PROGRESS NOTES
Patient seen and examined. Doing relatively well. Had some abdominal pain overnight and had 4 doses of dilaudid. When I examined him he was sleeping and resting. He had fever of 38.4 rectally. Still tachycardia and the nursing team think he may have A.fib (on and off). He denies abdominal pain now. Did not pass any flatus but NG tube output is only 300 overnight and 550 over 24 hours. His abdomen is soft and non-tender Midline wound was covered. I removed the dressing. It is clean. Mathur is leaking. Plan: D/C NG tube Keep NPO but can have ice chips and sips of water D/C mathur because it is leaking and he has fever. If nephrology or medicine needs accurate I/O than a new one can be placed Ambulation Await return of bowel function Appreciate medicine, nephrology, and ICU teams management.

## 2020-07-09 NOTE — PROGRESS NOTES
Chart reviewed. Plan remains home with home health vs SNF, however pt has refused SNF. Will need PT & OT when medically appropriate to assess for disposition needs. Will cont to follow. Esther Rouse RN,ext [phone]

## 2020-07-09 NOTE — PROGRESS NOTES
Internal Medicine Progress Note Patient's Name: Ashvin Sadler Admit Date: 7/3/2020 Length of Stay: 6 Assessment/Plan Active Hospital Problems Diagnosis Date Noted  Paroxysmal atrial fibrillation ([facility] 07/09/2020  Diabetes ([facility] 07/03/2020  
 SBO (small bowel obstruction) ([facility] 07/03/2020  
 HTN (hypertension) 07/03/2020  MARCI (acute kidney injury) ([facility] 07/03/2020  Elevated troponin 07/03/2020 Hypovolemic Shock now resolved Atrial fibrillation with RVR 
 
- Cont NPO 
- Pain control PRN 
- F/u surgery recs - Nutrition consult for TPN 
- PICC line placed - Cr improved - Cont IVFs - Appreciate nephro - Cont IV lopressor for now per cardio until able to use PO 
- CHADS-VASC2 of 4, will defer to cardio re AC and surg due to recent surgery - Trend trop for chest pain earlier (mildly up, suspect demand) - Check echo 
- Cont acceptable home medications for chronic conditions  
- DVT protocol I have personally reviewed all pertinent labs and films that have officially resulted over the last 24 hours. I have personally checked for all pending labs that are awaiting final results. Subjective Pt s/e @ bedside Went into afib RVR earlier today but back in sinus Had CP earlier but improved Tachycardia improved Admits to abd pain, but feeling better Denies CP or SOB Objective Visit Vitals /52 Pulse (!) 114 Temp 99.3 °F (37.4 °C) Resp 18 Ht 5' 7\" (1.702 m) Wt 87.5 kg (193 lb) SpO2 96% BMI 30.23 kg/m² Physical Exam: 
General Appearance: NAD, conversant Lungs: CTA with normal respiratory effort CV: Tachy RR, no m/r/g Abdomen: soft, approp TTP, absent bowel sounds Extremities: no cyanosis, no peripheral edema Neuro: No focal deficits, motor/sensory intact Lab/Data Reviewed: 
BMP:  
Lab Results Component Value Date/Time   07/09/2020 05:09 AM  
 K 4.8 07/09/2020 05:09 AM  
  07/09/2020 05:09 AM  
 CO2 24 07/09/2020 05:09 AM  
 AGAP 6 07/09/2020 05:09 AM  
  (H) 07/09/2020 05:09 AM  
 BUN 57 (H) 07/09/2020 05:09 AM  
 CREA 1.84 (H) 07/09/2020 05:09 AM  
 GFRAA 43 (L) 07/09/2020 05:09 AM  
 GFRNA 35 (L) 07/09/2020 05:09 AM  
 
CBC:  
Lab Results Component Value Date/Time WBC 8.4 07/09/2020 05:09 AM  
 HGB 10.6 (L) 07/09/2020 05:09 AM  
 HCT 32.2 (L) 07/09/2020 05:09 AM  
  07/09/2020 05:09 AM  
 
 
Imaging Reviewed: 
No results found. Medications Reviewed: 
Current Facility-Administered Medications Medication Dose Route Frequency  metoprolol (LOPRESSOR) injection 5 mg  5 mg IntraVENous ONCE  
 metoprolol (LOPRESSOR) injection 5 mg  5 mg IntraVENous Q10MIN PRN  
 metoprolol (LOPRESSOR) injection 5 mg  5 mg IntraVENous Q6H  
 TPN ADULT - CENTRAL   IntraVENous CONTINUOUS  
 0.45% sodium chloride infusion  75 mL/hr IntraVENous CONTINUOUS  
 HYDROmorphone (DILAUDID) injection 1 mg  1 mg IntraVENous Q2H PRN  
 acetaminophen (TYLENOL) suppository 650 mg  650 mg Rectal Q6H PRN  
 NOREPINephrine (LEVOPHED) 8 mg in 0.9% NS 250ml infusion  0.5-16 mcg/min IntraVENous TITRATE  insulin lispro (HUMALOG) injection   SubCUTAneous Q6H  
 glucose chewable tablet 16 g  4 Tab Oral PRN  
 glucagon (GLUCAGEN) injection 1 mg  1 mg IntraMUSCular PRN  
 dextrose 10% infusion 125-250 mL  125-250 mL IntraVENous PRN  
 morphine injection 1 mg  1 mg IntraVENous Q4H PRN  
 insulin glargine (LANTUS) injection 5 Units  5 Units SubCUTAneous QHS  hydrALAZINE (APRESOLINE) 20 mg/mL injection 10 mg  10 mg IntraVENous Q6H PRN  
 naloxone (NARCAN) injection 0.4 mg  0.4 mg IntraVENous PRN  
 ondansetron (ZOFRAN) injection 6 mg  6 mg IntraVENous Q6H PRN Facility-Administered Medications Ordered in Other Encounters Medication Dose Route Frequency  lidocaine (PF) (XYLOCAINE) 10 mg/mL (1 %) injection 1-90 mL  1-90 mL IntraDERMal Multiple Jewels Shelton DO 
 Internal Medicine, Hospitalist 
Pager: [phone] [facility]

## 2020-07-09 NOTE — PROCEDURES
Interventional Radiology Brief Procedure Note Performed By:  Jewel Orozco PA-C Attending Radiologist: Gwendolyn Horta MD 
 
Supervision: Direct Pre-operative Diagnosis:  IV access for TPN Post-operative Diagnosis: Same as pre-op diagnosis Procedure(s) Performed:  Ultrasound & fluoroscopic guided PICC line placement Anesthesia:  Local   
 
Findings:  Successful right arm triple lumen PICC line placement. Please see dictated report for details. Complications: None immediate Estimated Blood Loss:  Minimal 
 
Specimens: None Condition: Stable Disposition:  Return to nursing unit. PICC line is ready for immediate use. Jewel Orozco PA-C [address] [place] [address],- [facility] 7/9/2020

## 2020-07-10 NOTE — PROGRESS NOTES
2000 Assumed care of pt after bedside report with pt lying in bed with HOB elevated. Monitor denotes ST. AAO x 4. OLMSTEAD x4. Lungs diminished in bases. Abd distended, obese, soft with abd incision D&I. Voiding in urinal as needed. 2200 TPN hung as ordered. Pt denies complaints. 07/10/2020 0000 Accucheck result 208. Lispro insulin  6 units SQ given as per sliding scale coverage. 0200 Pt tring to get OOB. Pt reoriented and advised that needs to stay in bed. 0300 AM labs drawn and sent to lab. 0600 pt incontinent of urin. Pt cleansed, bed linens and bed pads changed. Accucheck result 227. Lispro insulin 6 units SQ given as per sliding scale coverage.

## 2020-07-10 NOTE — PROGRESS NOTES
RENAL PROGRESS NOTE Meryl Tang Assessment/Plan: · MARCI (ischemic atn in a setting of sbo). Scr is better since yesterday (destite episode of a.fib with rvr/transient hypotension). On TPN in terms of ivf. · POD #3, s/p exploratory laparotomy with extensive lysis of adhesions, small bowel resection x2. · PAF with rvr. In nsr at this time. On iv metoprolol. Card on the case. Echo with preserved ef.   
· HTN, h/o off. Continue to hold antihtn including  Arb and diuretic. Subjective: 
Patient complaints off: Feels better. No SOB/CP/N/V. C/o abd pain, getting better. No bm. NPO. Patient Active Problem List  
Diagnosis Code  Diabetes ([facility] E11.9  
 SBO (small bowel obstruction) ([facility] K56.609  
 HTN (hypertension) I10  
 MARCI (acute kidney injury) ([facility] N17.9  Elevated troponin R79.89  Paroxysmal atrial fibrillation ([facility]) I48.0 Current Facility-Administered Medications Medication Dose Route Frequency Provider Last Rate Last Dose  insulin glargine (LANTUS) injection 10 Units  10 Units SubCUTAneous QHS Candiss Other H, DO      
 TPN ADULT - CENTRAL   IntraVENous CONTINUOUS Leopold Pita, MD      
 sodium phosphate 30 mmol in 0.9% sodium chloride 250 mL infusion  30 mmol IntraVENous ONCE Ace Kearney H, DO   30 mmol at 07/10/20 1332  metoprolol (LOPRESSOR) injection 5 mg  5 mg IntraVENous Q10MIN PRN Reece Leahy MD   5 mg at 07/09/20 0949  
 metoprolol (LOPRESSOR) injection 5 mg  5 mg IntraVENous Q6H Reece Leahy MD   5 mg at 07/10/20 1151  TPN ADULT - CENTRAL   IntraVENous CONTINUOUS Leopold Pita, MD 75 mL/hr at 07/09/20 2211    
 HYDROmorphone (DILAUDID) injection 1 mg  1 mg IntraVENous Q2H PRN Leopold Pita, MD   1 mg at 07/09/20 9089  acetaminophen (TYLENOL) suppository 650 mg  650 mg Rectal Q6H PRN Aracelis Arvizu, DO   650 mg at 07/09/20 0014  
 NOREPINephrine (LEVOPHED) 8 mg in 0.9% NS 250ml infusion  0.5-16 mcg/min IntraVENous TITRATE Cleopatra Arana H, DO   Stopped at 07/07/20 2226  
 insulin lispro (HUMALOG) injection   SubCUTAneous Q6H Brenda Michele MD   6 Units at 07/10/20 1147  
 glucose chewable tablet 16 g  4 Tab Oral PRN Brenda Michele MD      
 glucagon (GLUCAGEN) injection 1 mg  1 mg IntraMUSCular PRN Brenda Michele MD      
 dextrose 10% infusion 125-250 mL  125-250 mL IntraVENous PRN Brenda Michele MD      
 morphine injection 1 mg  1 mg IntraVENous Q4H PRN Brenda Michele MD   1 mg at 07/09/20 6632  hydrALAZINE (APRESOLINE) 20 mg/mL injection 10 mg  10 mg IntraVENous Q6H PRN Brenda Michele MD      
 [facility]) injection 0.4 mg  0.4 mg IntraVENous PRPALMIRA Michele MD      
 ondansetron [facility]) injection 6 mg  6 mg IntraVENous Q6H PRN Brenda Michele MD   6 mg at 07/04/20 1112 Facility-Administered Medications Ordered in Other Encounters Medication Dose Route Frequency Provider Last Rate Last Dose  lidocaine (PF) (XYLOCAINE) 10 mg/mL (1 %) injection 1-90 mL  1-90 mL IntraDERMal Multiple TREY Rios   4 mL at 07/09/20 1125 Objective Vitals:  
 07/10/20 0700 07/10/20 0800 07/10/20 1100 07/10/20 1151 BP: 140/89 145/72 141/65 141/65 Pulse: 96 95 (!) 108 (!) 107 Resp: 25 22 18 Temp:  98.6 °F (37 °C) 97.2 °F (36.2 °C) SpO2: 96% 98% Weight:      
Height:      
 
 
 
Intake/Output Summary (Last 24 hours) at 7/10/2020 1353 Last data filed at 7/10/2020 1100 Gross per 24 hour Intake 2161.25 ml Output 350 ml Net 1811.25 ml Admission weight: Weight: 79.4 kg (175 lb) (07/03/20 0927) Last Weight Metrics: 
Weight Loss Metrics 7/9/2020 1/31/2019 Today's Wt 193 lb 181 lb 9 oz BMI 30.23 kg/m2 28.44 kg/m2 Physical Assessment:  
 
General: NAD, alert and oriented. Neck: No jvd. LUNGS: Clear to Auscultation, No rales, rhonchi or wheezes. CVS EXM: S1, S2  RRR, no murmurs/gallops/rubs. Abdomen: mildly distended, diffusely tender. Lower Extremities:  trace edema. Lab CBC w/Diff Recent Labs  
  07/10/20 
0300 07/09/20 
0509 07/08/20 
0242 WBC 6.5 8.4 13.3*  
RBC 2.84* 3.22* 3.64* HGB 9.2* 10.6* 11.9*  
HCT 28.1* 32.2* 36.0  176 184 GRANS 84* 84* 85* LYMPH 8* 8* 7* EOS 1 0 0 Chemistry Recent Labs  
  07/10/20 
0300 07/09/20 
0509 07/08/20 
0242 * 115* 149*  141 140  
K 4.2 4.8 5.0  
* 111 110 CO2 26 24 23 BUN 49* 57* 50* CREA 1.39* 1.84* 1.94* CA 7.9* 8.3* 7.7* AGAP 4 6 7 BUCR 35* 31* 26* PHOS 1.2*  --   -- No results found for: IRON, FE, TIBC, IBCT, PSAT, FERR Lab Results Component Value Date/Time Calcium 7.9 (L) 07/10/2020 03:00 AM  
 Phosphorus 1.2 (L) 07/10/2020 03:00 AM  
  
 
Yann Morgan M.D. [facility] Phone [phone] Pager [phone]

## 2020-07-10 NOTE — PROGRESS NOTES
Patient seen and examined. No major issues overnight. However when I asked the patient how he is feeling he said he feels terrible. I asked him why he said he has severe abdominal pain. I was a little bit concerned by his comment, so I asked the nursing team and they stated that he had an uneventful night and his abdomen has been soft all night with no issues. Also I reviewed his vital signs and he has no fever and his tachycardia has even improved. Also his WBC has normalized and his creatinine has improved and he is making good urine. I was told by the nursing team that he was having some difficulty urinating so I am not sure if this is the reason why he is having the abdominal pain now. On my exam his abdomen is very soft and nontender and non distended. His midline wound is healing well. Plan: 
  
Close observation of his abdominal pain but currently I am not concerned because his abdomen is very soft and nondistended and his vital signs are almost normal with no fever but mild tachycardia which she had secondary to his cardiac issue. His WBC is normal and his wound is clean. His creatinine is almost back to normal.  All of these are great sign and makes me less concerned about his abdominal pain that there is any intra-abdominal infection. Await return of bowel function. Continue with the ice chips and sips of water till the patient passed gas or had a bowel movement. Continue with the TPN for now but once the patient start on tube feeds I would like to wean it and stop it as soon as possible. Ambulation when possible Appreciate medicine, nephrology, and ICU teams management.

## 2020-07-10 NOTE — PROGRESS NOTES
Internal Medicine Progress Note Patient's Name: Sheldon Boyd Admit Date: 7/3/2020 Length of Stay: 7 Assessment/Plan Active Hospital Problems Diagnosis Date Noted  Paroxysmal atrial fibrillation ([facility] 07/09/2020  [facility] 07/03/2020  
 SBO (small bowel obstruction) ([facility] 07/03/2020  
 HTN (hypertension) 07/03/2020  MARCI (acute kidney injury) ([facility] 07/03/2020  Elevated troponin 07/03/2020 Hypovolemic Shock now resolved Atrial fibrillation with RVR 
 
- Cont NPO per surg 
- Pain control PRN 
- F/u surgery recs 
- Cont TPN 
- Cr improving 
- Cont IVFs per nephro - Cont IV lopressor for now per cardio until able to use PO 
- CHADS-VASC2 of 4, will defer to cardio re AC and surg due to recent surgery - Echo results noted, preserved EF, no valvular issues 
- PT/OT 
- Cont acceptable home medications for chronic conditions  
- DVT protocol I have personally reviewed all pertinent labs and films that have officially resulted over the last 24 hours. I have personally checked for all pending labs that are awaiting final results. Subjective Pt s/e @ bedside No major events overnight Complains of feeling terrible, but vitals look much better Says abd hurts bad but nursing state did well all night and sof Denies CP or SOB Objective Visit Vitals BP (P) 141/65 (BP 1 Location: Left arm) Pulse (!) (P) 108 Temp (P) 97.2 °F (36.2 °C) Resp (P) 18 Ht 5' 7\" (1.702 m) Wt 87.5 kg (193 lb) SpO2 98% BMI 30.23 kg/m² Physical Exam: 
General Appearance: NAD, conversant Lungs: CTA with normal respiratory effort CV: Tachy RR, no m/r/g Abdomen: soft, approp TTP Extremities: no cyanosis, no peripheral edema Neuro: No focal deficits, motor/sensory intact Lab/Data Reviewed: 
BMP:  
Lab Results Component Value Date/Time   07/10/2020 03:00 AM  
 K 4.2 07/10/2020 03:00 AM  
  (H) 07/10/2020 03:00 AM  
 CO2 26 07/10/2020 03:00 AM  
 AGAP 4 07/10/2020 03:00 AM  
  (H) 07/10/2020 03:00 AM  
 BUN 49 (H) 07/10/2020 03:00 AM  
 CREA 1.39 (H) 07/10/2020 03:00 AM  
 GFRAA 59 (L) 07/10/2020 03:00 AM  
 GFRNA 49 (L) 07/10/2020 03:00 AM  
 
CBC:  
Lab Results Component Value Date/Time WBC 6.5 07/10/2020 03:00 AM  
 HGB 9.2 (L) 07/10/2020 03:00 AM  
 HCT 28.1 (L) 07/10/2020 03:00 AM  
  07/10/2020 03:00 AM  
 
 
Imaging Reviewed: 
Ir Guide Insert Picc Over 5 Yrs Result Date: 7/9/2020 PROCEDURE:  Ultrasound & Fluoroscopic Guided Right PICC Line Placement CLINICAL INDICATION/HISTORY: IV access for TPN PERFORMED BY: Robbin Park PA-C ATTENDING RADIOLOGIST: Delisa Gage MD SUPERVISION: Direct TECHNIQUE: After explaining the procedure to the patient, including the potential risks, benefits, and alternatives, informed written and verbal consent was obtained. A time out was performed to verify appropriate patient, procedure, and site. The procedure was performed following standard maximal barrier technique which includes, cap, mask, sterile gown, sterile gloves, sterile full body drape, hand hygiene with alcohol foam, and 2% chlorhexidine for cutaneous antisepsis. Sterile ultrasound gel and a sterile cover for the US probe was used. Ultrasound evaluation for potential access sites was performed. The right brachial vein is patent and normally compresses. A permanent recording was created for the patient record. The patient's upper arm was prepped and draped in sterile fashion and 1% Lidocaine was used for local anesthesia. The vein was accessed in the upper arm with a 21 gauge needle under ultrasound guidance. A guide wire was advanced under fluoroscopic control to the superior vena cava. The distance between the superior vena cava and skin puncture site was measured and a 5 [place] triple lumen power PICC was cut to the appropriate length. Overall catheter length was 37 cm.   The PICC was advanced to the SVC under fluoroscopic control. The line was flushed with heparinized saline and adhered to the skin with a Statlock device. Final coned AP view of the chest was obtained to document catheter position. The patient tolerated the procedure well and there were no immediate complications. FINDINGS: Final coned AP view of the chest shows good position of the PICC with its tip in the SVC. All ports flushed easily and there was good blood return. Radiation dose (reference air kerma):  11 mGy. Fluoroscopy Time: 0.9 minutes. GUIDANCE: Ultrasound and fluoroscopic guidance was used to position (and confirm the position of) the needle and catheter. Image(s) saved in PACS: Ultrasound and fluoroscopy. IMPRESSION: Successful placement of a triple lumen power PICC line via the right arm. The PICC line is ready for immediate use. Medications Reviewed: 
Current Facility-Administered Medications Medication Dose Route Frequency  insulin glargine (LANTUS) injection 10 Units  10 Units SubCUTAneous QHS  TPN ADULT - CENTRAL   IntraVENous CONTINUOUS  
 metoprolol (LOPRESSOR) injection 5 mg  5 mg IntraVENous Q10MIN PRN  
 metoprolol (LOPRESSOR) injection 5 mg  5 mg IntraVENous Q6H  
 TPN ADULT - CENTRAL   IntraVENous CONTINUOUS  
 0.45% sodium chloride infusion  75 mL/hr IntraVENous CONTINUOUS  
 HYDROmorphone (DILAUDID) injection 1 mg  1 mg IntraVENous Q2H PRN  
 acetaminophen (TYLENOL) suppository 650 mg  650 mg Rectal Q6H PRN  
 NOREPINephrine (LEVOPHED) 8 mg in 0.9% NS 250ml infusion  0.5-16 mcg/min IntraVENous TITRATE  insulin lispro (HUMALOG) injection   SubCUTAneous Q6H  
 glucose chewable tablet 16 g  4 Tab Oral PRN  
 glucagon (GLUCAGEN) injection 1 mg  1 mg IntraMUSCular PRN  
 dextrose 10% infusion 125-250 mL  125-250 mL IntraVENous PRN  
 morphine injection 1 mg  1 mg IntraVENous Q4H PRN  
 hydrALAZINE (APRESOLINE) 20 mg/mL injection 10 mg  10 mg IntraVENous Q6H PRN  
  naloxone (NARCAN) injection 0.4 mg  0.4 mg IntraVENous PRN  
 ondansetron (ZOFRAN) injection 6 mg  6 mg IntraVENous Q6H PRN Facility-Administered Medications Ordered in Other Encounters Medication Dose Route Frequency  lidocaine (PF) (XYLOCAINE) 10 mg/mL (1 %) injection 1-90 mL  1-90 mL IntraDERMal [facility] Arthur Ribeiro DO Internal Medicine, Hospitalist 
Pager: [phone] [facility]

## 2020-07-10 NOTE — PROGRESS NOTES
Chart reviewed. Iam Tijerina remains home with home health vs SNF, however pt has refused SNF.  Will need PT & OT when medically appropriate to assess for disposition needs. Will cont to follow.  Esther Rouse RN,ext [phone]

## 2020-07-10 NOTE — PROGRESS NOTES
1657  Received from ICU , transfer  From ICU bed to  2400 bed by 3 staff,pt  Is awake, alert and oriented, moving all extremities, came with  Condom cath, right PICC line dressing intact, re tape and secured, abdominal  Incision looks clean no pain at this time, bell with in reach. 618 [facility] office no response at this time, zhang [facility] nurse to push  Metoprolol. 1900  Resting no p[ain so far.

## 2020-07-10 NOTE — PROGRESS NOTES
Patient: Juan A Poe MR #: [id number] Reason for consult: Atrial fibrillation with rapid ventricular response Subjective: 
Slight abdominal discomfort. Denies any chest pain. Assessment/Plan:  
 
Hospital Problems  Never Reviewed Codes Class Noted POA Paroxysmal atrial fibrillation (HCC), [id number] score 4,  patient developed atrial fibrillation with a ventricular response of approximately 170 bpm after surgery. Now in sinus rhythm with sinus tachycardia at 110 bpm.  Likely physiologic. Continue beta-blockers intravenously until p.o. route reliably available. Long-term plan of anticoagulation for stroke prophylaxis depending on hospital course ICD-10-CM: I48.0 ICD-9-CM: 427.31  7/9/2020 Unknown Diabetes ([facility] ICD-10-CM: E11.9 ICD-9-CM: 250.00  7/3/2020 Unknown * (Principal) SBO (small bowel obstruction) ([facility] Defer to surgery team ICD-10-CM: B99.575 ICD-9-CM: 560.9  7/3/2020 Unknown HTN (hypertension) ICD-10-CM: I10 
ICD-9-CM: 401.9  7/3/2020 Unknown MARCI (acute kidney injury) ([facility] ICD-10-CM: N17.9 ICD-9-CM: 584.9  7/3/2020 Unknown Elevated troponin, mildly elevated 0.08. Will trend cardiac biomarkers ICD-10-CM: R79.89 ICD-9-CM: 790.6  7/3/2020 Unknown History of Present Illness Juan A Poe is a 80 y.o. hypertensive diabetic man that was admitted with a small bowel obstruction on 7/3/2020. He underwent exploratory laparotomy on 7/6/2020. Patient has no known prior cardiac history including no history of known atrial fibrillation. Earlier today, the patient developed atrial fibrillation with a rapid ventricular response. He was given intravenous metoprolol and digoxin. Several hours later, the patient was in sinus tachycardia. He complained of chest pain to the nurse  but is now complaining of abdominal discomfort. He denies chest pain at present. He denies shortness of breath. Past Medical History Past Medical History:  
Diagnosis Date  Diabetes ([facility]  Hypertension Past Surgical History Social History Socioeconomic History  Marital status:  Spouse name: Not on file  Number of children: Not on file  Years of education: Not on file  Highest education level: Not on file Occupational History  Not on file Social Needs  Financial resource strain: Not on file  Food insecurity Worry: Not on file Inability: Not on file  Transportation needs Medical: Not on file Non-medical: Not on file Tobacco Use  Smoking status: Never Smoker  Smokeless tobacco: Never Used Substance and Sexual Activity  Alcohol use: Not on file  Drug use: Not on file  Sexual activity: Not on file Lifestyle  Physical activity Days per week: Not on file Minutes per session: Not on file  Stress: Not on file Relationships  Social connections Talks on phone: Not on file Gets together: Not on file Attends Jewish service: Not on file Active member of club or organization: Not on file Attends meetings of clubs or organizations: Not on file Relationship status: Not on file  Intimate partner violence Fear of current or ex partner: Not on file Emotionally abused: Not on file Physically abused: Not on file Forced sexual activity: Not on file Other Topics Concern  Not on file Social History Narrative  Not on file Meds Current Facility-Administered Medications Medication Dose Route Frequency  insulin glargine (LANTUS) injection 10 Units  10 Units SubCUTAneous QHS  TPN ADULT - CENTRAL   IntraVENous CONTINUOUS  
 sodium phosphate 30 mmol in 0.9% sodium chloride 250 mL infusion  30 mmol IntraVENous ONCE  
 metoprolol (LOPRESSOR) injection 5 mg  5 mg IntraVENous Q10MIN PRN  
 metoprolol (LOPRESSOR) injection 5 mg  5 mg IntraVENous Q6H  
  TPN ADULT - CENTRAL   IntraVENous CONTINUOUS  
 HYDROmorphone (DILAUDID) injection 1 mg  1 mg IntraVENous Q2H PRN  
 acetaminophen (TYLENOL) suppository 650 mg  650 mg Rectal Q6H PRN  
 NOREPINephrine (LEVOPHED) 8 mg in 0.9% NS 250ml infusion  0.5-16 mcg/min IntraVENous TITRATE  insulin lispro (HUMALOG) injection   SubCUTAneous Q6H  
 glucose chewable tablet 16 g  4 Tab Oral PRN  
 glucagon (GLUCAGEN) injection 1 mg  1 mg IntraMUSCular PRN  
 dextrose 10% infusion 125-250 mL  125-250 mL IntraVENous PRN  
 morphine injection 1 mg  1 mg IntraVENous Q4H PRN  
 hydrALAZINE (APRESOLINE) 20 mg/mL injection 10 mg  10 mg IntraVENous Q6H PRN  
 naloxone (NARCAN) injection 0.4 mg  0.4 mg IntraVENous PRN  
 ondansetron (ZOFRAN) injection 6 mg  6 mg IntraVENous Q6H PRN Facility-Administered Medications Ordered in Other Encounters Medication Dose Route Frequency  lidocaine (PF) (XYLOCAINE) 10 mg/mL (1 %) injection 1-90 mL  1-90 mL IntraDERMal Multiple Allergies Allergies Allergen Reactions  Iodinated Contrast Media Swelling  Lisinopril Swelling Social History Social History Socioeconomic History  Marital status:  Spouse name: Not on file  Number of children: Not on file  Years of education: Not on file  Highest education level: Not on file Occupational History  Not on file Social Needs  Financial resource strain: Not on file  Food insecurity Worry: Not on file Inability: Not on file  Transportation needs Medical: Not on file Non-medical: Not on file Tobacco Use  Smoking status: Never Smoker  Smokeless tobacco: Never Used Substance and Sexual Activity  Alcohol use: Not on file  Drug use: Not on file  Sexual activity: Not on file Lifestyle  Physical activity Days per week: Not on file Minutes per session: Not on file  Stress: Not on file Relationships  Social connections Talks on phone: Not on file Gets together: Not on file Attends Yazidi service: Not on file Active member of club or organization: Not on file Attends meetings of clubs or organizations: Not on file Relationship status: Not on file  Intimate partner violence Fear of current or ex partner: Not on file Emotionally abused: Not on file Physically abused: Not on file Forced sexual activity: Not on file Other Topics Concern  Not on file Social History Narrative  Not on file Family History History reviewed. No pertinent family history. Review of systems Unobtainable at present Physical Exam 
Visit Vitals /65 Pulse (!) 107 Temp (P) 97.2 °F (36.2 °C) Resp (P) 18 Ht 5' 7\" (1.702 m) Wt 193 lb (87.5 kg) SpO2 98% BMI 30.23 kg/m² Vivien Valderrama is who is alert and in no acute respiratory distress. Head is normocephalic and atraumatic. Trachea appears midline with no noted thyromegaly. Carotids are full without definite bruits. There is no JVD. Chest is clear to auscultation bilaterally. Cardiac auscultation reveals regular rate and rhythm without significant murmur. Abdomen with surgical scar and stapling. . Extremities are without significant edema. D. Skin is warm and dry. Diagnostic Tests EK20  07:35, atrial fibrillation with a rapid ventricular response. Diffuse nondiagnostic ST and T wave abnormalities Labs:  
Recent Labs  
  07/10/20 
0300 20 
0509 20 
0242 WBC 6.5 8.4 13.3* HGB 9.2* 10.6* 11.9*  
HCT 28.1* 32.2* 36.0  176 184 Recent Labs  
  07/10/20 
0300 20 
0509 20 
0242  141 140  
K 4.2 4.8 5.0  
* 111 110 CO2 26 24 23 * 115* 149* BUN 49* 57* 50* CREA 1.39* 1.84* 1.94* CA 7.9* 8.3* 7.7* MG 1.8  --   --   
PHOS 1.2*  --   --   
 
 
Recent Labs  
  20 
0830 TROIQ 0.08*  CKMB 1.1 Last Lipid:  No results found for: CHOL, CHOLX, CHLST, CHOLV, HDL, HDLP, LDL, LDLC, DLDLP, TGLX, TRIGL, TRIGP, CHHD, CHHDX We appreciate the opportunity to see Stanyenny Zambrano with you. We will follow along. Suzanne Vila MD 
7/10/2020

## 2020-07-10 NOTE — DIABETES MGMT
Glycemic Control/ Nutrition Reassessment: 
 
RECOMMENDATIONS:  
TPN #2 of 90 g amino acids, 300 g dextrose with 250 ml 20% lipids/d at 80 ml/hr. TPN to provide 90 g protein, 1520 non protein calories/d. Recommend IV Phosphorus supplementation for Phos 1.2. Phos was increased in TPN, but will not start infusing until 2000 this evening. Recommend increase Lantus to 10 units daily. GOALS:  
Intake to meet  nutritional needs by 7/13/20.  
  
ASSESSMENT:  
Wt status is classified as overweight per Body mass index is 27.21 kg/m². A1c (7.4%). Nutrition recommendations listed. RD to follow.  
  
Nutrition Diagnoses:  
Inadequate energy intake related to altered GI function 2/2 SBO as evidenced by need for NPO with NGT to LCS and poor PO intake x ~1 week. 
  
SUBJECTIVE/OBJECTIVE:  
 
Patient Active Problem List  
Diagnosis  Diabetes ([facility]  SBO (small bowel obstruction) ([facility]  HTN (hypertension)  MARCI (acute kidney injury) ([facility]  Elevated troponin  Paroxysmal atrial fibrillation (HCC) Diet: NPO Medications: [x]? Reviewed Lantus 10 units plus corrective  Humalog Lab Results Component Value Date/Time Sodium 142 07/10/2020 03:00 AM  
 Potassium 4.2 07/10/2020 03:00 AM  
 Chloride 112 (H) 07/10/2020 03:00 AM  
 CO2 26 07/10/2020 03:00 AM  
 Anion gap 4 07/10/2020 03:00 AM  
 Glucose 194 (H) 07/10/2020 03:00 AM  
 BUN 49 (H) 07/10/2020 03:00 AM  
 Creatinine 1.39 (H) 07/10/2020 03:00 AM  
 BUN/Creatinine ratio 35 (H) 07/10/2020 03:00 AM  
 GFR est AA 59 (L) 07/10/2020 03:00 AM  
 GFR est non-AA 49 (L) 07/10/2020 03:00 AM  
 Calcium 7.9 (L) 07/10/2020 03:00 AM  
Phos 1.2  Mg 1.8 Anthropometrics: BMI (calculated): 27.2 Last 3 Recorded Weights in this Encounter  
  07/03/20 0895 07/04/20 2336 07/06/20 0009 Weight: 79.4 kg (175 lb) 77.4 kg (170 lb 11.2 oz) 78.8 kg (173 lb 11.2 oz) Ht Readings from Last 1 Encounters:  
07/03/20 5' 7\" (1.702 m)  
  
 IBW: 148 lb %IBW: 117% UBW: 181 lb in Jan 2019 but 175 lb recently per Pt [x]? Weight Loss []? Weight Gain []? Weight Stable 
  
Estimated Nutrition Needs: [x]? MSJ x 1.2-1.3 []? Other: 
Calories: 3968-2380 kcal Based on:   [x]? Actual BW   
Protein:   79-94 g Based on:   [x]? Actual BW x 1.0-1.2 gm/kg Fluid:       1 mL/kcal 
  
Continue monitoring and Evaluation.  
Davey MAHAJAN

## 2020-07-11 NOTE — PROGRESS NOTES
conducted a Follow up consultation and Spiritual Assessment for Kavithabarrett Barraza, who is a 80 y.o.,male. The  provided the following Interventions: 
Continued the relationship of care and support. Listened empathically. Offered prayer and assurance of continued prayer on patients behalf. Chart reviewed. The following outcomes were achieved: 
Patient expressed gratitude for pastoral care visit. Assessment: 
There are no further spiritual or Episcopal issues which require Spiritual Care Services interventions at this time. Plan: 
Chaplains will continue to follow and will provide pastoral care on an as needed/requested basis.  recommends bedside caregivers page  on duty if patient shows signs of acute spiritual or emotional distress. [address] [facility] Staff  Spiritual Care  
[phone]

## 2020-07-11 NOTE — PROGRESS NOTES
Patient seen and examined. He is awake and oriented to me but he had a rough night and was confused and getting out of bed and pulled his PICC line. He has no IV and he is very hard access. Multiple nurses and anesthesia team members tried to insert an IV on him on the day of surgery but not able but to place only a line on his leg. I told the nurse that. His vitals are normal except for the tachycardia. WBC is normal. 
Creatinine is normal. 
His abdomen is soft and non-tender and his wound is clean Plan: 
Though he did not pass flatus yet, but I will start him on clear liquid diet because he has no nausea, his NG tube out for 2 days, and his abdomen is soft. Also I don't want him to be dehydrated with no IV now. Ambulation Medical management IV access (Most likely leg or PICC line) Hold on TPN since no PICC now Stopped all his IV narcotics.  Added Percocet PO

## 2020-07-11 NOTE — PROGRESS NOTES
Patient received in bed awake. Patient alert to self; reoriented to place, time and situation. Denies pain; behavior indicators negative. No distress noted. Frequently use items within reach. Bed alarm is activated. Bed locked in low position. Call bell within reach. [phone]- Dr. Caitlyn Kang was called made aware that Patient has peripheral IV and that had Patient pulled out his PICC line T.O. received to resume IVF 1/2 NS at 75 ml/hr. (RBV) Ishaan called to report Patient's  to 133; this nurse at bedside; Patient asymptomatic. /95, . See MAR for scheduled Lopressor given. Call lane w/in reach. Dr. Caitlyn Kang was called awaiting call back. [phone]-- Dr. Caitlyn Kang was called made aware that Patient's  to 133 sustaining and was given scheduled Lopressor 1 hour early; then went to 80 per monitor; voiced understanding.

## 2020-07-11 NOTE — PROGRESS NOTES
Internal Medicine Progress Note Patient's Name: Ashvin Sadler Admit Date: 7/3/2020 Length of Stay: 8 Assessment/Plan Active Hospital Problems Diagnosis Date Noted  Paroxysmal atrial fibrillation ([facility] 07/09/2020  Diabetes ([facility] 07/03/2020  
 SBO (small bowel obstruction) ([facility] 07/03/2020  
 HTN (hypertension) 07/03/2020  MARCI (acute kidney injury) ([facility] 07/03/2020  Elevated troponin 07/03/2020 Hypovolemic Shock now resolved Atrial fibrillation with RVR 
 
- Diet per surgery, advanced to liquids - Pain control PRN 
- F/u surgery recs - Pulled PICC line last night (possible sundowning) so TPN stopped - Cr at baseline - Cont IVFs per nephro - Cont IV lopressor for now, should be able to transition to oral tonight 
- CHADS-VASC2 of 4, will defer to cardio re [facility] and surg due to recent surgery - Echo results noted, preserved EF, no valvular issues 
- PT/OT 
- Cont acceptable home medications for chronic conditions  
- DVT protocol I have personally reviewed all pertinent labs and films that have officially resulted over the last 24 hours. I have personally checked for all pending labs that are awaiting final results. Subjective Pt s/e @ bedside Pulled PICC line and IV access overnight, likely sundowning Seems ok right now and lucid Still c/o abd pain Denies CP or SOB Objective Visit Vitals BP (!) 173/95 (BP 1 Location: Right arm, BP Patient Position: At rest) Pulse (!) 103 Temp 97.9 °F (36.6 °C) Resp 20 Ht 5' 7\" (1.702 m) Wt 82.1 kg (181 lb 1.6 oz) SpO2 98% BMI 28.36 kg/m² Physical Exam: 
General Appearance: NAD, conversant Lungs: CTA with normal respiratory effort CV: Tachy RR, no m/r/g Abdomen: soft, approp TTP Extremities: no cyanosis, no peripheral edema Neuro: No focal deficits, motor/sensory intact Lab/Data Reviewed: 
BMP:  
Lab Results Component Value Date/Time   07/11/2020 03:10 AM  
 K 3.5 07/11/2020 03:10 AM  
  (H) 07/11/2020 03:10 AM  
 CO2 29 07/11/2020 03:10 AM  
 AGAP 3 07/11/2020 03:10 AM  
  (H) 07/11/2020 03:10 AM  
 BUN 32 (H) 07/11/2020 03:10 AM  
 CREA 1.14 07/11/2020 03:10 AM  
 GFRAA >60 07/11/2020 03:10 AM  
 GFRNA >60 07/11/2020 03:10 AM  
 
CBC:  
Lab Results Component Value Date/Time WBC 7.4 07/11/2020 03:10 AM  
 HGB 10.0 (L) 07/11/2020 03:10 AM  
 HCT 30.2 (L) 07/11/2020 03:10 AM  
  07/11/2020 03:10 AM  
 
 
Imaging Reviewed: 
No results found. Medications Reviewed: 
Current Facility-Administered Medications Medication Dose Route Frequency  oxyCODONE-acetaminophen (PERCOCET) 5-325 mg per tablet 1 Tab  1 Tab Oral Q4H PRN  
 0.45% sodium chloride infusion  75 mL/hr IntraVENous CONTINUOUS  
 insulin glargine (LANTUS) injection 10 Units  10 Units SubCUTAneous QHS  metoprolol tartrate (LOPRESSOR) tablet 25 mg  25 mg Oral BID PRN  
 metoprolol (LOPRESSOR) injection 5 mg  5 mg IntraVENous Q10MIN PRN  
 metoprolol (LOPRESSOR) injection 5 mg  5 mg IntraVENous Q6H  
 acetaminophen (TYLENOL) suppository 650 mg  650 mg Rectal Q6H PRN  
 insulin lispro (HUMALOG) injection   SubCUTAneous Q6H  
 glucose chewable tablet 16 g  4 Tab Oral PRN  
 glucagon (GLUCAGEN) injection 1 mg  1 mg IntraMUSCular PRN  
 dextrose 10% infusion 125-250 mL  125-250 mL IntraVENous PRN  
 hydrALAZINE (APRESOLINE) 20 mg/mL injection 10 mg  10 mg IntraVENous Q6H PRN  
 naloxone (NARCAN) injection 0.4 mg  0.4 mg IntraVENous PRN  
 ondansetron (ZOFRAN) injection 6 mg  6 mg IntraVENous Q6H PRN Facility-Administered Medications Ordered in Other Encounters Medication Dose Route Frequency  lidocaine (PF) (XYLOCAINE) 10 mg/mL (1 %) injection 1-90 mL  1-90 mL IntraDERMal Multiple Manette Stains, DO Internal Medicine, Hospitalist 
Pager: [phone] [facility]

## 2020-07-11 NOTE — PROGRESS NOTES
INTERIM UPDATE - 2300 EST on 7/10/2020 Nursing Staff reports that Patient has forcibly removed his PICC Line. Patient has no IV access and PICC Line was placed due to difficulty obtaining access. Nursing Staff reports that Patient was well-oriented at the beginning of Shift, but Patient is clearly now altered. Assessment: 
Possible Delirium Plan: 
PT/INR. HOLD IV medications until AM when PICC Line (or other access) might possibly be placed.

## 2020-07-11 NOTE — PROGRESS NOTES
This writer was called to assist primary nurse -to administer scheduled lopressor 5 mg IV. B/P=160/92, Heart rate= 112. Personal [employer] tech, Keena notified. Lopressor 5 mg IV given slowly using right PICC line.

## 2020-07-11 NOTE — ROUTINE PROCESS
Bedside and Verbal shift change report given to Angie Brewer, RN (oncoming nurse) by Ruben Ramirez RN, BSN (offgoing nurse). Report given with SBAR, Kardex, Intake/Output, MAR and Recent Results.

## 2020-07-11 NOTE — PROGRESS NOTES
1930  Bedside, Verbal, and Written shift change report given to [address] [place] [address] (oncoming nurse) by Sofia Berkowitz nurse). Report included the following information SBAR, Kardex, and MAR. Patient is in bed, alert and oriented. Oxygen on at 1L for comfort. IV CDI, dressing CDI, no sign of distress. Patient was pleasant and was speaking in Gipsy with me. Bed low, call bell within reach. 2245  Telemetry called asked to check patient, leads are off. CNA called me found patient pulled all his wires including PICC line. When asked patient could not say why he pulled it. He just said he did not know it was his feeding. Patient is only able to give me his full name and cannot remember date/ place. Called and spoke with Dr. Anuj Mendez, he said to hold IV meds until patient gets another PICC line hopefully later in the morning. 2344  Patient is now confused and was caught trying to get out of bed during rounds. Patient stated that he was trying to get out of bed to urinate, advised that he has a condom catheter and it is okay for him to urinate in bed with it. Patient appears agitated but calmed down after reorientation. Bed alarm turned on.  
 
0100  Patient tried to get out of bed again. 0200  Patient is in bed, resting. No sign of distress. Bed low, call bell within reach. 0300  Cleaned patient for incontinence. 0400  Patient is in bed, alert and oriented. Oxygen on, IV CDI, dressing CDI, no sign of distress. Bed low, call bell within reach. 0323  Telemetry called to check on patient, leads off. I went to his room patient was standing at the side of the bed, pulled his telemetry monitor and condom catheter. Assisted back into bed. Patient stated he \"wants to go to [facility]\". 5806  Dr. Alec Kahn came by to see patient, Dr. Alec Kahn d/c all IV narcotics and put in order for clear liquid diet. He also stated that if needed we can try to put an IV on LE. 0630  Spoke with nursing supervisor about possible IV insertion on LE. Pita said she will try to put an IV or passed it on to the morning supervisor. 0710  Bedside, Verbal, and Written shift change report given to Reema [address] (oncoming nurse) by [address] [place] [address] (offgoing nurse). Report included the following information SBAR, Kardex, and MAR.

## 2020-07-11 NOTE — PROGRESS NOTES
Problem: Discharge Planning Goal: *Discharge to safe environment Outcome: Progressing Towards Goal 
  
Problem: Pain Goal: *Control of Pain Outcome: Progressing Towards Goal 
  
Problem: Small Bowel Obstruction: Day 1 Goal: Off Pathway (Use only if patient is Off Pathway) Outcome: Progressing Towards Goal 
Goal: Activity/Safety Outcome: Progressing Towards Goal 
Goal: Consults, if ordered Outcome: Progressing Towards Goal 
Goal: Nutrition/Diet Outcome: Progressing Towards Goal 
Goal: Medications Outcome: Progressing Towards Goal 
Goal: Respiratory Outcome: Progressing Towards Goal 
Goal: Treatments/Interventions/Procedures Outcome: Progressing Towards Goal 
Goal: Psychosocial 
Outcome: Progressing Towards Goal 
Goal: *Optimal pain control at patient's stated goal 
Outcome: Progressing Towards Goal 
Goal: *Adequate urinary output (equal to or greater than 30 milliliters/hour) Outcome: Progressing Towards Goal 
Goal: *Hemodynamically stable Outcome: Progressing Towards Goal 
Goal: *Demonstrates progressive activity Outcome: Progressing Towards Goal 
Goal: *Absence of nausea/vomiting Outcome: Progressing Towards Goal 
  
Problem: Small Bowel Obstruction: Day 2 Goal: Off Pathway (Use only if patient is Off Pathway) Outcome: Progressing Towards Goal 
Goal: Activity/Safety Outcome: Progressing Towards Goal 
Goal: Consults, if ordered Outcome: Progressing Towards Goal 
Goal: Diagnostic Test/Procedures Outcome: Progressing Towards Goal 
Goal: Nutrition/Diet Outcome: Progressing Towards Goal 
Goal: Discharge Planning Outcome: Progressing Towards Goal 
Goal: Medications Outcome: Progressing Towards Goal 
Goal: Respiratory Outcome: Progressing Towards Goal 
Goal: Treatments/Interventions/Procedures Outcome: Progressing Towards Goal 
Goal: Psychosocial 
Outcome: Progressing Towards Goal 
Goal: *Optimal pain control at patient's stated goal 
Outcome: Progressing Towards Goal 
 Goal: *Adequate urinary output (equal to or greater than 30 milliliters/hour) Outcome: Progressing Towards Goal 
Goal: *Hemodynamically stable Outcome: Progressing Towards Goal 
Goal: *Demonstrates progressive activity Outcome: Progressing Towards Goal 
Goal: *Absence of nausea/vomiting Outcome: Progressing Towards Goal 
Goal: *Return of normal bowel function Outcome: Progressing Towards Goal 
  
Problem: Small Bowel Obstruction: Day 3 Goal: Off Pathway (Use only if patient is Off Pathway) Outcome: Progressing Towards Goal 
Goal: Activity/Safety Outcome: Progressing Towards Goal 
Goal: Consults, if ordered Outcome: Progressing Towards Goal 
Goal: Diagnostic Test/Procedures Outcome: Progressing Towards Goal 
Goal: Nutrition/Diet Outcome: Not Progressing Towards Goal 
Goal: Discharge Planning Outcome: Progressing Towards Goal 
Goal: Medications Outcome: Progressing Towards Goal 
Goal: Respiratory Outcome: Progressing Towards Goal 
Goal: Treatments/Interventions/Procedures Outcome: Progressing Towards Goal 
Goal: Psychosocial 
Outcome: Progressing Towards Goal 
Goal: *Optimal pain control at patient's stated goal 
Outcome: Progressing Towards Goal 
Goal: *Adequate urinary output (equal to or greater than 30 milliliters/hour) Outcome: Progressing Towards Goal 
Goal: *Hemodynamically stable Outcome: Progressing Towards Goal 
Goal: *Adequate nutrition Outcome: Not Progressing Towards Goal 
Goal: *Demonstrates progressive activity Outcome: Progressing Towards Goal 
Goal: *Participates in discharge planning Outcome: Progressing Towards Goal 
  
Problem: Small Bowel Obstruction: Day 4 to Discharge Goal: Off Pathway (Use only if patient is Off Pathway) Outcome: Progressing Towards Goal 
Goal: Activity/Safety Outcome: Not Progressing Towards Goal 
Goal: Nutrition/Diet Outcome: Not Progressing Towards Goal 
Goal: Discharge Planning Outcome: Progressing Towards Goal 
Goal: Medications Outcome: Progressing Towards Goal 
Goal: Respiratory Outcome: Progressing Towards Goal 
Goal: Treatments/Interventions/Procedures Outcome: Progressing Towards Goal 
Goal: Psychosocial 
Outcome: Progressing Towards Goal 
  
Problem: Small Bowel Obstruction - Non Surgical: Discharge Outcomes Goal: *Hemodynamically stable Outcome: Progressing Towards Goal 
Goal: *Demonstrates independent activity or return to baseline Outcome: Not Progressing Towards Goal 
Goal: *Optimal pain control at patient's stated goal 
Outcome: Progressing Towards Goal 
Goal: *Verbalizes understanding and describes prescribed diet Outcome: Not Progressing Towards Goal 
Goal: *Tolerating diet Outcome: Not Progressing Towards Goal 
Goal: *Verbalizes name, dosage, time, side effects, and number of days to continue medications Outcome: Not Progressing Towards Goal 
Goal: *Anxiety reduced or absent Outcome: Not Progressing Towards Goal 
Goal: *Understands and describes signs and symptoms to report to providers(Stroke Metric) Outcome: Not Progressing Towards Goal 
Goal: *Describes follow-up/return visits to physicians Outcome: Not Progressing Towards Goal 
Goal: *Describes available resources and support systems Outcome: Not Progressing Towards Goal 
Goal: *Active bowel function Outcome: Progressing Towards Goal 
  
Problem: Falls - Risk of 
Goal: *Absence of Falls Description: Document Vickii Bridges Fall Risk and appropriate interventions in the flowsheet. Outcome: Progressing Towards Goal 
Note: Fall Risk Interventions: 
Mobility Interventions: Assess mobility with egress test, Bed/chair exit alarm Medication Interventions: Bed/chair exit alarm, Evaluate medications/consider consulting pharmacy Elimination Interventions: Bed/chair exit alarm, Call light in reach History of Falls Interventions: Bed/chair exit alarm, Consult care management for discharge planning

## 2020-07-11 NOTE — PROGRESS NOTES
Patient: Sharon Rebolledo MR #: [id number] Reason for consult: Atrial fibrillation with rapid ventricular response Subjective: 
Slight abdominal discomfort. No chest pain. Assessment/Plan:  
 
Hospital Problems  Never Reviewed Codes Class Noted POA Paroxysmal atrial fibrillation (HCC), [id number] score 4,  patient developed atrial fibrillation with a ventricular response of approximately 170 bpm after surgery. Now in sinus rhythm with sinus tachycardia still at a rate of about 110 bpm.  Likely physiologic. Continue beta-blockers intravenously until p.o. route reliably available. Long-term plan of anticoagulation for stroke prophylaxis depending on hospital course ICD-10-CM: I48.0 ICD-9-CM: 427.31  7/9/2020 Unknown [facility] ICD-10-CM: E11.9 ICD-9-CM: 250.00  7/3/2020 Unknown * (Principal) SBO (small bowel obstruction) ([facility] Defer to surgery team ICD-10-CM: J73.369 ICD-9-CM: 560.9  7/3/2020 Unknown HTN (hypertension) ICD-10-CM: I10 
ICD-9-CM: 401.9  7/3/2020 Unknown MARCI (acute kidney injury) ([facility] ICD-10-CM: N17.9 ICD-9-CM: 584.9  7/3/2020 Unknown Elevated troponin, mildly elevated 0.07 and 0.08. Values not consistent with ACS ICD-10-CM: R79.89 ICD-9-CM: 790.6  7/3/2020 Unknown History of Present Illness Sharon Rebolledo is a 80 y.o. hypertensive diabetic man that was admitted with a small bowel obstruction on 7/3/2020. He underwent exploratory laparotomy on 7/6/2020. Patient has no known prior cardiac history including no history of known atrial fibrillation. Earlier today, the patient developed atrial fibrillation with a rapid ventricular response. He was given intravenous metoprolol and digoxin. Several hours later, the patient was in sinus tachycardia. He complained of chest pain to the nurse  but is now complaining of abdominal discomfort.   He denies chest pain at present. He denies shortness of breath. Past Medical History Past Medical History:  
Diagnosis Date  Diabetes ([facility]  Hypertension Past Surgical History Social History Socioeconomic History  Marital status:  Spouse name: Not on file  Number of children: Not on file  Years of education: Not on file  Highest education level: Not on file Occupational History  Not on file Social Needs  Financial resource strain: Not on file  Food insecurity Worry: Not on file Inability: Not on file  Transportation needs Medical: Not on file Non-medical: Not on file Tobacco Use  Smoking status: Never Smoker  Smokeless tobacco: Never Used Substance and Sexual Activity  Alcohol use: Not on file  Drug use: Not on file  Sexual activity: Not on file Lifestyle  Physical activity Days per week: Not on file Minutes per session: Not on file  Stress: Not on file Relationships  Social connections Talks on phone: Not on file Gets together: Not on file Attends Judaism service: Not on file Active member of club or organization: Not on file Attends meetings of clubs or organizations: Not on file Relationship status: Not on file  Intimate partner violence Fear of current or ex partner: Not on file Emotionally abused: Not on file Physically abused: Not on file Forced sexual activity: Not on file Other Topics Concern  Not on file Social History Narrative  Not on file Meds Current Facility-Administered Medications Medication Dose Route Frequency  oxyCODONE-acetaminophen (PERCOCET) 5-325 mg per tablet 1 Tab  1 Tab Oral Q4H PRN  
 0.45% sodium chloride infusion  75 mL/hr IntraVENous CONTINUOUS  
 insulin glargine (LANTUS) injection 10 Units  10 Units SubCUTAneous QHS  metoprolol tartrate (LOPRESSOR) tablet 25 mg  25 mg Oral BID PRN  
  metoprolol (LOPRESSOR) injection 5 mg  5 mg IntraVENous Q10MIN PRN  
 metoprolol (LOPRESSOR) injection 5 mg  5 mg IntraVENous Q6H  
 acetaminophen (TYLENOL) suppository 650 mg  650 mg Rectal Q6H PRN  
 insulin lispro (HUMALOG) injection   SubCUTAneous Q6H  
 glucose chewable tablet 16 g  4 Tab Oral PRN  
 glucagon (GLUCAGEN) injection 1 mg  1 mg IntraMUSCular PRN  
 dextrose 10% infusion 125-250 mL  125-250 mL IntraVENous PRN  
 hydrALAZINE (APRESOLINE) 20 mg/mL injection 10 mg  10 mg IntraVENous Q6H PRN  
 naloxone (NARCAN) injection 0.4 mg  0.4 mg IntraVENous PRN  
 ondansetron (ZOFRAN) injection 6 mg  6 mg IntraVENous Q6H PRN Facility-Administered Medications Ordered in Other Encounters Medication Dose Route Frequency  lidocaine (PF) (XYLOCAINE) 10 mg/mL (1 %) injection 1-90 mL  1-90 mL IntraDERMal Multiple Allergies Allergies Allergen Reactions  Iodinated Contrast Media Swelling  Lisinopril Swelling Social History Social History Socioeconomic History  Marital status:  Spouse name: Not on file  Number of children: Not on file  Years of education: Not on file  Highest education level: Not on file Occupational History  Not on file Social Needs  Financial resource strain: Not on file  Food insecurity Worry: Not on file Inability: Not on file  Transportation needs Medical: Not on file Non-medical: Not on file Tobacco Use  Smoking status: Never Smoker  Smokeless tobacco: Never Used Substance and Sexual Activity  Alcohol use: Not on file  Drug use: Not on file  Sexual activity: Not on file Lifestyle  Physical activity Days per week: Not on file Minutes per session: Not on file  Stress: Not on file Relationships  Social connections Talks on phone: Not on file Gets together: Not on file Attends Mandaeism service: Not on file Active member of club or organization: Not on file Attends meetings of clubs or organizations: Not on file Relationship status: Not on file  Intimate partner violence Fear of current or ex partner: Not on file Emotionally abused: Not on file Physically abused: Not on file Forced sexual activity: Not on file Other Topics Concern  Not on file Social History Narrative  Not on file Family History History reviewed. No pertinent family history. Review of systems Unobtainable at present Physical Exam 
Visit Vitals /90 (BP 1 Location: Right arm, BP Patient Position: At rest) Comment: rn notified Pulse (!) 103 Temp 98.2 °F (36.8 °C) Resp 20 Ht 5' 7\" (1.702 m) Wt 181 lb 1.6 oz (82.1 kg) SpO2 95% BMI 28.36 kg/m² Joshua Lopez is who is alert and in no acute respiratory distress. Head is normocephalic and atraumatic. Trachea appears midline with no noted thyromegaly. Carotids are full without definite bruits. There is no JVD. Chest is clear to auscultation bilaterally. Cardiac auscultation reveals regular rate and rhythm without significant murmur. Abdomen with surgical scar and stapling. . Extremities are without significant edema. D. Skin is warm and dry. Diagnostic Tests EK20  07:35, atrial fibrillation with a rapid ventricular response. Diffuse nondiagnostic ST and T wave abnormalities Labs:  
Recent Labs  
  20 
0310 07/10/20 
0300 20 
3533 WBC 7.4 6.5 8.4 HGB 10.0* 9.2* 10.6* HCT 30.2* 28.1* 32.2*  
 150 176 Recent Labs  
  20 
0310 07/10/20 
0300 20 
6773  142 141  
K 3.5 4.2 4.8  
* 112* 111 CO2 29 26 24 * 194* 115* BUN 32* 49* 57* CREA 1.14 1.39* 1.84* CA 8.3* 7.9* 8.3*  
MG  --  1.8  --   
PHOS  --  1.2*  --   
 
 
Recent Labs  
  20 
0830 TROIQ 0.08*  CKMB 1.1 Last Lipid:  No results found for: CHOL, CHOLX, CHLST, CHOLV, HDL, HDLP, LDL, LDLC, DLDLP, TGLX, TRIGL, TRIGP, CHHD, CHHDX We appreciate the opportunity to see Balaji Jansen with you. We will follow along. Srini Suazo MD 
7/11/2020

## 2020-07-12 NOTE — PROGRESS NOTES
1927- Bedside report given by Prosper Barahona, Pt in bed resting alert and oriented x3 denies pain at the moment. Assessement completed plan of care for the shift explained pt verbalized understanding. IVF infusing well, HOB elevated, bed low and in locked position. Call light and frequently used items within reach. 2120- Pt incontinent of urine care done bed linen changed pt made comfortable in bed. 
 
2307- Blood sugar 167 mg/dl Pt is on clear liquid,  has insulin lantus 10 units scheduled. Notified Dr Bear Monday who gave order to give 5 units of lantus and also follow as per the sliding scale. 0100- Pt sleeping. Incontinent care completed. 0200- Pt sleeping IVF infusing well 
0510- Pt incontinent of urine care done pt made comfortable in bed no concerns. 4560- Pt awake resting. 0746-Bedside and Verbal shift change report given to Prosper Barahona  RN (oncoming nurse) by Pallavi Solorzano RN (offgoing nurse). Report included the following information SBAR, Kardex, Intake/Output, MAR and Recent Results.

## 2020-07-12 NOTE — PROGRESS NOTES
Patient seen and examined. He is slightly better. For the first time he denies any abdominal pain. He also passed flatus. No BM yet. Tolerating clear liquid diet No fever. Still tachycardic. WBC slightly increased. Creatinine is normal. 
His abdomen is soft and non-tender and his wound is clean 
  
Plan: 
Advance to full liquid diet Ambulation Medical management Hold on TPN since no PICC now and he is tolerating diet Stopped all his IV narcotics.  Added Percocet PO

## 2020-07-12 NOTE — PROGRESS NOTES
Patient received in bed awake. Patient A&Ox4, denies pain and discomfort. No distress noted. Frequently use items within reach. Bed locked in low position. Call bell within reach and Patient verbalized understanding of use for assistance and needs. 0900- This nurse was told by Jero Arizmendi RN that she had decreased Patient's IVF 1/2NS to 50 ml/hr per Dr. Natalia Garrett. Noted to [facility] that 1/2 NS was order 50 ml instead of 50 ml/hr. See Manage Order for correction. 1110- Patient awake. Reassessment completed, no change in patient condition. Call bell w/in reach. 1406- Dr. Rocael Freeman was called to ask if  PT/OT eval to be ordered T.O. received for PT/OT eval (RBV).

## 2020-07-12 NOTE — ROUTINE PROCESS
Bedside and Verbal shift change report given to Tarun Burnett RN (oncoming nurse) by Nataliya Killian RN, BSN (offgoing nurse). Report given with SBAR, Kardex, Intake/Output, MAR and Recent Results.

## 2020-07-12 NOTE — PROGRESS NOTES
Problem: Discharge Planning Goal: *Discharge to safe environment Outcome: Progressing Towards Goal 
  
Problem: Pain Goal: *Control of Pain Outcome: Progressing Towards Goal 
  
Problem: Discharge Planning Goal: *Discharge to safe environment Outcome: Progressing Towards Goal 
  
Problem: Pain Goal: *Control of Pain Outcome: Progressing Towards Goal

## 2020-07-12 NOTE — PROGRESS NOTES
Internal Medicine Progress Note Patient's Name: [facility] Admit Date: 7/3/2020 Length of Stay: 9 Assessment/Plan Active Hospital Problems Diagnosis Date Noted  Paroxysmal atrial fibrillation ([facility] 07/09/2020  Diabetes ([facility] 07/03/2020  
 SBO (small bowel obstruction) ([facility] 07/03/2020  
 HTN (hypertension) 07/03/2020  MARCI (acute kidney injury) ([facility] 07/03/2020  Elevated troponin 07/03/2020 Hypovolemic Shock now resolved Atrial fibrillation with RVR 
 
- Diet per surgery, advanced to full liquids - Pain control PRN 
- F/u surgery recs - Cr at baseline - Decrease IV fluids - Cont PO lopressor - CHADS-VASC2 of 4, will defer to cardio re AC and surg due to recent surgery - Echo results noted, preserved EF, no valvular issues 
- PT/OT 
- Cont acceptable home medications for chronic conditions  
- DVT protocol I have personally reviewed all pertinent labs and films that have officially resulted over the last 24 hours. I have personally checked for all pending labs that are awaiting final results. Subjective Pt s/e @ bedside No major events overnight ABd pain improved Having flatus Denies CP or SOB Objective Visit Vitals /77 (BP 1 Location: Left arm, BP Patient Position: At rest) Pulse (!) 109 Temp 97.9 °F (36.6 °C) Resp 18 Ht 5' 7\" (1.702 m) Wt 82.1 kg (181 lb 1.6 oz) SpO2 97% BMI 28.36 kg/m² Physical Exam: 
General Appearance: NAD, conversant Lungs: CTA with normal respiratory effort CV: Tachy RR, no m/r/g Abdomen: soft, approp TTP, + BS Extremities: no cyanosis, no peripheral edema Neuro: No focal deficits, motor/sensory intact Lab/Data Reviewed: 
BMP:  
Lab Results Component Value Date/Time   07/12/2020 04:10 AM  
 K 3.3 (L) 07/12/2020 04:10 AM  
  07/12/2020 04:10 AM  
 CO2 27 07/12/2020 04:10 AM  
 AGAP 5 07/12/2020 04:10 AM  
  (H) 07/12/2020 04:10 AM  
 BUN 27 (H) 07/12/2020 04:10 AM  
 CREA 1.13 07/12/2020 04:10 AM  
 GFRAA >60 07/12/2020 04:10 AM  
 GFRNA >60 07/12/2020 04:10 AM  
 
CBC:  
Lab Results Component Value Date/Time WBC 12.2 07/12/2020 04:10 AM  
 HGB 11.1 (L) 07/12/2020 04:10 AM  
 HCT 32.5 (L) 07/12/2020 04:10 AM  
  07/12/2020 04:10 AM  
 
 
Imaging Reviewed: 
No results found. Medications Reviewed: 
Current Facility-Administered Medications Medication Dose Route Frequency  insulin glargine (LANTUS) injection 5 Units  5 Units SubCUTAneous QHS  magnesium sulfate 2 g/50 ml IVPB (premix or compounded)  2 g IntraVENous NOW  
 0.45% sodium chloride infusion  50 mL/hr IntraVENous CONTINUOUS  
 oxyCODONE-acetaminophen (PERCOCET) 5-325 mg per tablet 1 Tab  1 Tab Oral Q4H PRN  
 metoprolol tartrate (LOPRESSOR) tablet 25 mg  25 mg Oral Q12H  
 metoprolol (LOPRESSOR) injection 5 mg  5 mg IntraVENous Q6H PRN  
 acetaminophen (TYLENOL) suppository 650 mg  650 mg Rectal Q6H PRN  
 insulin lispro (HUMALOG) injection   SubCUTAneous Q6H  
 glucose chewable tablet 16 g  4 Tab Oral PRN  
 glucagon (GLUCAGEN) injection 1 mg  1 mg IntraMUSCular PRN  
 dextrose 10% infusion 125-250 mL  125-250 mL IntraVENous PRN  
 hydrALAZINE (APRESOLINE) 20 mg/mL injection 10 mg  10 mg IntraVENous Q6H PRN  
 naloxone (NARCAN) injection 0.4 mg  0.4 mg IntraVENous PRN  
 ondansetron (ZOFRAN) injection 6 mg  6 mg IntraVENous Q6H PRN Facility-Administered Medications Ordered in Other Encounters Medication Dose Route Frequency  lidocaine (PF) (XYLOCAINE) 10 mg/mL (1 %) injection 1-90 mL  1-90 mL IntraDERMal Multiple Dana Wan DO Internal Medicine, Hospitalist 
Pager: [phone] [facility]

## 2020-07-13 NOTE — PROGRESS NOTES
Problem: Self Care Deficits Care Plan (Adult) Goal: Interventions Description: Occupational Therapy Goals Initiated 7/13/2020 within 7 day(s). 1.  Patient will perform grooming with modified independence. 2.  Patient will perform bathing with modified independence. 3.  Patient will perform upper body dressing and lower body dressing with modified independence using adaptive equipment as needed. 4.  Patient will perform toilet transfers with modified independence using RW. 5.  Patient will perform all aspects of toileting with modified independence. 6.  Patient will participate in upper extremity therapeutic exercise/activities with modified independence for 8 minutes. 7.  Patient will utilize energy conservation techniques during functional activities with min verbal cues. Prior Level of Function: Mod I with ADLs and functional mobility using RW Outcome: Progressing Towards Goal 
  
Problem: Patient Education: Go to Patient Education Activity Goal: Patient/Family Education Outcome: Progressing Towards Goal 
 OCCUPATIONAL THERAPY EVALUATION Patient: Vivien Valderrama (80 y.o. male) Date: 7/13/2020 Primary Diagnosis: SBO (small bowel obstruction) ([facility] [M92.687] HTN (hypertension) [I10] Diabetes ([facility] [E11.9] Procedure(s) (LRB): 
Exploratory Laparotomy, Lysis of Adhesions, Bowel Resection (N/A) 6 Days Post-Op Precautions:   Fall, Skin PLOF: see above ASSESSMENT : 
Nursing/cleared for pt to participate in OT evaluation and tx session. Based on the objective data described below, the patient presents with impaired strength, balance, functional activity tolerance and safety awareness, which is inhibiting independence for ADLs and functional mobility. Patient presents sitting up in bed, A & O x 4, c/o 8/10 abdominal pain - nursing notified. Bed mobility: rolling w/ SGA, CGA supine to sit edge of bed.  Bed side commode transfer: Min A w/ sit to stand, CGA w/ stand step using RW. Functional transfer: Min A sit to stand from bedside commode, CGA using RW for stand step to recliner. Patient sitting up in recliner at end of tx session, Set up to wash hands with [employer] and wash face with wash cloth. Call bell within reach & pt verbalized understanding and provided return demonstration to utilize for assist e.g. functional transfers in order to prevent falls. Patient will benefit from skilled intervention to address the above impairments. Patient's rehabilitation potential is considered to be Good Factors which may influence rehabilitation potential include:  
[x]             None noted []             Mental ability/status []             Medical condition []             Home/family situation and support systems []             Safety awareness []             Pain tolerance/management 
[]             Other: PLAN : 
Recommendations and Planned Interventions:  
[x]               Self Care Training                  [x]      Therapeutic Activities [x]               Functional Mobility Training   []      Cognitive Retraining 
[x]               Therapeutic Exercises           [x]      Endurance Activities [x]               Balance Training                    []      Neuromuscular Re-Education []               Visual/Perceptual Training     [x]      Home Safety Training 
[x]               Patient Education                   [x]      Family Training/Education []               Other (comment): Frequency/Duration: Patient will be followed by occupational therapy 3 times a week to address goals. Discharge Recommendations: [address] Further Equipment Recommendations for Discharge: bedside commode and rolling walker SUBJECTIVE:  
Patient stated I was in the [address] [place] [address].  OBJECTIVE DATA SUMMARY:  
 
Past Medical History:  
Diagnosis Date Diabetes ([facility] [address] [facility] Hypertension History reviewed. No pertinent surgical history. Barriers to Learning/Limitations: None Compensate with: visual, verbal, tactile, kinesthetic cues/model Home Situation:  
Home Situation Home Environment: Private residence # Steps to Enter: 1 Rails to Enter: No 
One/Two Story Residence: One story Living Alone: No 
Support Systems: Spouse/Significant Other/Partner Patient Expects to be Discharged to[de-identified] Private residence Current DME Used/Available at Home: Raised toilet seat, Shower chair, Grab bars, Walker, rolling, Cane, straight Tub or Shower Type: Shower [x]  Right hand dominant   []  Left hand dominant Cognitive/Behavioral Status: 
Neurologic State: Alert Orientation Level: Oriented X4 Cognition: Appropriate for age attention/concentration; Follows commands Safety/Judgement: Fall prevention Skin: abdominal incision w/ staples Edema: stomach appears distended Vision/Perceptual:   
 
Corrective Lenses: Glasses(not present) Coordination: BUE Coordination: Within functional limits(BUEs) Fine Motor Skills-Upper: Left Intact; Right Intact Gross Motor Skills-Upper: Left Intact; Right Intact Balance: 
Sitting: Impaired Sitting - Static: Good (unsupported) Sitting - Dynamic: Fair (occasional) Standing: Impaired Standing - Static: Fair Standing - Dynamic : Fair Strength: BUE Strength: Within functional limits(BUEs) Tone & Sensation: BUE Tone: Normal(BUEs) Sensation: Intact(BUEs) Range of Motion: BUE 
AROM: Within functional limits(BUEs) Functional Mobility and Transfers for ADLs: 
Bed Mobility: 
  
Supine to Sit: Contact guard assistance Sit to Supine: Contact guard assistance Scooting: Contact guard assistance Transfers: 
Sit to Stand: Minimum assistance Stand to Sit: Minimum assistance Bed to Chair: Contact guard assistance Toilet Transfer : Contact guard assistance(bedside commode) ADL Assessment:  
Feeding: Modified independent Oral Facial Hygiene/Grooming: Stand-by assistance;Setup Bathing: Moderate assistance Upper Body Dressing: Minimum assistance Lower Body Dressing: Total assistance Toileting: Moderate assistance Cognitive Retraining Safety/Judgement: Fall prevention Pain: 
Pain level pre-treatment: 8/10 Pain level post-treatment: 8/10 Pain Intervention(s): Medication (see MAR); Rest, Ice, Repositioning Response to intervention: Nurse notified, See doc flow Activity Tolerance:  
Poor Please refer to the flowsheet for vital signs taken during this treatment. After treatment:  
[x] Patient left in no apparent distress sitting up in chair 
[] Patient left in no apparent distress in bed 
[x] Call bell left within reach [x] Nursing notified 
[] Caregiver present 
[] Bed alarm activated COMMUNICATION/EDUCATION:  
[x] Role of Occupational Therapy in the acute care setting 
[x] Home safety education was provided and the patient/caregiver indicated understanding. [x] Patient/family have participated as able in goal setting and plan of care. [x] Patient/family agree to work toward stated goals and plan of care. [] Patient understands intent and goals of therapy, but is neutral about his/her participation. [] Patient is unable to participate in goal setting and plan of care. Thank you for this referral. 
Yanet Alejo Time Calculation: 29 mins Eval Complexity: History: MEDIUM Complexity : Expanded review of history including physical, cognitive and psychosocial  history ; Examination: MEDIUM Complexity : 3-5 performance deficits relating to physical, cognitive , or psychosocial skils that result in activity limitations and / or participation restrictions; Decision Making:MEDIUM Complexity : Patient may present with comorbidities that affect occupational performnce. Miniml to moderate modification of tasks or assistance (eg, physical or verbal ) with assesment(s) is necessary to enable patient to complete evaluation

## 2020-07-13 NOTE — DISCHARGE INSTRUCTIONS
DISCHARGE SUMMARY from Nurse    PATIENT INSTRUCTIONS:    After general anesthesia or intravenous sedation, for 24 hours or while taking prescription Narcotics:  · Limit your activities  · Do not drive and operate hazardous machinery  · Do not make important personal or business decisions  · Do  not drink alcoholic beverages  · If you have not urinated within 8 hours after discharge, please contact your surgeon on call. Report the following to your surgeon:  · Excessive pain, swelling, redness or odor of or around the surgical area  · Temperature over 100.5  · Nausea and vomiting lasting longer than 4 hours or if unable to take medications  · Any signs of decreased circulation or nerve impairment to extremity: change in color, persistent  numbness, tingling, coldness or increase pain  · Any questions    What to do at Home:  Patient {ARMBANDS:20124}  Patient {ARMBANDS:20124}    Recommended activity: {discharge activity:43250}, as tolerated    If you experience any of the following symptoms elevated temperature notify doctor    *  Please give a list of your current medications to your Primary Care Provider. *  Please update this list whenever your medications are discontinued, doses are      changed, or new medications (including over-the-counter products) are added. *  Please carry medication information at all times in case of emergency situations. These are general instructions for a healthy lifestyle:    No smoking/ No tobacco products/ Avoid exposure to second hand smoke  Surgeon General's Warning:  Quitting smoking now greatly reduces serious risk to your health.     Obesity, smoking, and sedentary lifestyle greatly increases your risk for illness    A healthy diet, regular physical exercise & weight monitoring are important for maintaining a healthy lifestyle    You may be retaining fluid if you have a history of heart failure or if you experience any of the following symptoms:  Weight gain of 3 pounds or more overnight or 5 pounds in a week, increased swelling in our hands or feet or shortness of breath while lying flat in bed. Please call your doctor as soon as you notice any of these symptoms; do not wait until your next office visit. The discharge information has been reviewed with the {PATIENT PARENT GUARDIAN:07394}. The {PATIENT PARENT GUARDIAN:41053} verbalized understanding. Discharge medications reviewed with the {Dishcarge meds reviewed OWYZ:96990} and appropriate educational materials and side effects teaching were provided. ___________________________________________________________________________________________________________________________________Discharge Instructions Following Surgery    Patient: Lola Maria MRN: [id number]  SSN: [id number]    YOB: 1935  Age: 80 y.o. Sex: male      Activity  · As tolerated, walking encourage, stairs are okay. · Avoid strenuous activities - no lifting anything heavier than 15 pounds till seen in the clinic. · You may shower at home. Diet  · Regular diet. Pain  · Take pain medication as directed by your doctor. · Call your doctor if pain is NOT relieved by medication. Call your doctor if  · Excessive bleeding that does not stop after holding mild pressure over the area. · Temperature of 101 degrees F or above. · Redness,excessive swelling or bruising, and/or green or yellow, smelly discharge from incision. · If nausea and vomiting continues. Appointment date/time Follow-Up Phone Calls    · Call the office at [phone] to make your follow-up appointment in 2 weeks after the surgery (if not already set up) . Dr. Elijah Snell cell phone number is [phone]. Please call me if you have any concerns or questions.

## 2020-07-13 NOTE — DISCHARGE SUMMARY
[address] [place] [address] Hospitalist Division Discharge Summary Patient: Sheldon Boyd MRN: [id number]  [facility]: [id number] YOB: 1935  Age: 80 y.o. Sex: male DOA: 7/3/2020 LOS:  LOS: 10 days   Discharge Date:   
 
Admission Diagnoses: SBO (small bowel obstruction) ([facility] [address] [N03.746] HTN (hypertension) [I10] Diabetes ([address] [E11.9] Discharge Diagnoses:  Principal Problem: 
  SBO (small bowel obstruction) ([address] (7/3/2020) Active Problems: 
  MARCI (acute kidney injury) ([address] (7/3/2020) Diabetes ([address] (7/3/2020) HTN (hypertension) (7/3/2020) Elevated troponin (7/3/2020) Paroxysmal atrial fibrillation ([address] (7/9/2020) Discharge Condition: Stable Discharge To: SNF Consults: General Surgery and Nephrology HPI: Bro Hernandezs a 80 y. o. male with a PMHx of DM, HTN who presented to the ED with multiple days of constipation and abd pain. He states the last two day the pain has been so severe that he's been vomiting as well. Imaging reveals SBO with transition point in left midabdomen where there is a suspected short segment intussusception. Patient reports hx of bowel resection per ER notes. GS was consulted from the ED. NGT placed. Labs also reveal elevated creatinine 3.24, no prior labs, but likely acute elevation. Patient will be admitted for further evaluation and treatment. Hospital Course: Nephrology was consulted for MARCI. It improved slowly with IV fluids and eventually resolved. He was taken to OR on 7/7 for ex lap, DONNA, and bowel resection. He tolerated the procedure well. He was started on TPN for nutrition until PO diet could be resumed per GS. On 7/9 he had an episode of Afib RVR. Cardiology was consulted. Patient converted back to sinus rhythm. PO BB started. Echo with preserved EF, moderate septal wall hypertrophy, unable to assess diastolic function, no significant VHD. Will start aspirin for stroke prevention for now. CHADS-VASC is 4 - would benefit from anticoagulation, but would re-evaluate his fall risk after rehab. PT/OT recommending home health vs SNF. Patient requesting to go to SNF due to his concern about his ability to ambulate safely. VS and labs stable for discharge. Physical Exam: 
General appearance: alert, cooperative, no distress, appears stated age Head: Normocephalic, without obvious abnormality, atraumatic Lungs: clear to auscultation bilaterally Heart: regular rate and rhythm, S1, S2 normal, no murmur, click, rub or gallop Abdomen: soft, non-tender. Bowel sounds normal. No masses,  no organomegaly Extremities: no cyanosis or edema Skin: Skin color, texture normal. No rashes or lesions Neurologic: no focal deficits, motor/sensory intact PSY: Mood and affect normal, appropriately behaved Significant Diagnostic Studies:  
 
BMP:  
Lab Results Component Value Date/Time  07/13/2020 04:10 AM  
 K 3.7 07/13/2020 04:10 AM  
  07/13/2020 04:10 AM  
 CO2 26 07/13/2020 04:10 AM  
 AGAP 6 07/13/2020 04:10 AM  
  (H) 07/13/2020 04:10 AM  
 BUN 25 (H) 07/13/2020 04:10 AM  
 CREA 1.11 07/13/2020 04:10 AM  
 GFRAA >60 07/13/2020 04:10 AM  
 GFRNA >60 07/13/2020 04:10 AM  
 
CBC:  
Lab Results Component Value Date/Time WBC 10.6 07/13/2020 04:10 AM  
 HGB 10.4 (L) 07/13/2020 04:10 AM  
 HCT 31.5 (L) 07/13/2020 04:10 AM  
  07/13/2020 04:10 AM  
 
 
Xr Abd Flat/ Erect Result Date: 7/7/2020 EXAM: ABDOMINAL RADIOGRAPH CLINICAL INDICATION/HISTORY: Follow up on SBO -Additional: None COMPARISON: 7/4/2020 TECHNIQUE: Abdominal radiographs. _______________ FINDINGS: BOWEL GAS PATTERN: Enteric feeding tube tip within the stomach. Persistent gaseous distention of multiple small bowel loops. Colonic gas. BONES: Unremarkable. OTHER: None. _______________ IMPRESSION: Persistent gaseous distention of multiple small bowel loops. Xr Abd Flat/ Erect Result Date: 7/4/2020 EXAM: XR ABD FLAT/ ERECT CLINICAL INDICATION/HISTORY: Follow up on SBO   > Additional: None. COMPARISON: CT scan dated 7/3/2020 TECHNIQUE: Supine and upright views of the abdomen were obtained. _______________ FINDINGS: BOWEL GAS PATTERN: Nasogastric tube in satisfactory position projecting over the upper abdomen. Multiple dilated loops of small bowel in the midabdomen are noted in keeping with ongoing partial small bowel obstruction. No pneumatosis. No free intraperitoneal air. SOFT TISSUES: Calcification projecting over the right upper quadrant likely gallstone. BONES: Unremarkable. ADDITIONAL FINDINGS: Tortuous thoracic aorta. _______________ IMPRESSION: Satisfactory positioning of the nasogastric tube. Ongoing partial small bowel obstruction. Ct Abd Pelv Wo Cont Result Date: 7/3/2020 EXAM: CT of the abdomen and pelvis CLINICAL INDICATION/HISTORY: Abdominal pain. Left lower quadrant pain. Concern for diverticulitis versus obstruction   > Additional: None. COMPARISON: None. > Reference Exam: None. TECHNIQUE: Axial CT imaging of the abdomen and pelvis was performed without intravenous contrast. Multiplanar reformats were generated. One or more dose reduction techniques were used on this CT: automated exposure control, adjustment of the mAs and/or kVp according to patient size, and iterative reconstruction techniques. The specific techniques used on this CT exam have been documented in the patient's electronic medical record. Digital Imaging and Communications in Medicine (DICOM) format image data are available to nonaffiliated external healthcare facilities or entities on a secure, media free, reciprocally searchable basis with patient authorization for at least a 12-month period after this study.  _______________ FINDINGS: LOWER CHEST: There is bronchiectasis and scarring in the bilateral lung bases with scattered subsegmental atelectasis. There are centrilobular nodular opacities in the medial right lung base and to a lesser extent on the left. Additionally there are calcified pleural plaques bilaterally. No pleural effusion. Multivessel coronary artery calcifications are noted. No pericardial effusion. LIVER, BILIARY: Liver is normal. No biliary dilation. Multiple dependent gallstones are noted within the lumen of the gallbladder. PANCREAS: Normal. SPLEEN: Normal. ADRENALS: Normal. KIDNEYS/URETERS/BLADDER: Bilateral renal cysts including a dominant exophytic cyst arising from the upper pole of the right kidney which measures up to 7.4 cm in diameter. No hydronephrosis. LYMPH NODES: No enlarged lymph nodes. GASTROINTESTINAL TRACT: There are multiple dilated and fluid-filled loops of small bowel in the mid to lower abdomen. Dilated loops of small bowel measure up to approximately 4 cm in diameter with scattered air-fluid levels. The stomach is also distended and fluid-filled. There is an apparent transition point in the left mid abdomen where the dilated loops of small bowel rapidly taper to a decompressed loop of bowel with decompressed distal loops best appreciated on coronal series image 46 and 47 and axial series images 113-108, potentially a subtle intussusception on image 113. No discrete mass is appreciated. Normal appendix. PELVIC ORGANS: Unremarkable. VASCULATURE: Scattered aortic atherosclerosis. BONES: No acute or aggressive osseous abnormalities identified. Degenerative changes are noted throughout the spine with multilevel spondylosis and facet arthrosis. Severe degenerative disc disease is noted throughout the lower thoracic and lumbar spine. Grade 1 anterolisthesis of L4 on L5. OTHER: No ascites. Subcutaneous nodular density along the right anterolateral abdominal wall is noted and to a lesser extent of left of midline, potentially sites of subcutaneous medication injection. _______________ IMPRESSION: 1. Intermediate grade partial small bowel obstruction with an apparent transition point in the left midabdomen where there is a suspected short segment intussusception resulting in proximal dilated loops of small bowel and stomach and distal decompressed loops. No evidence of pneumatosis or free air. 2. Cholelithiasis. 3. 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 Scattered centrilobular nodular opacities suggest superimposed infectious or inflammatory bronchiolitis. Bilateral renal cysts and additional chronic ancillary findings as above. Xr Chest [facility] Result Date: 7/3/2020 EXAM: XR CHEST PORT CLINICAL INDICATION/HISTORY: abd pain   > Additional: None. COMPARISON: None. TECHNIQUE: Portable chest _______________ FINDINGS: SUPPORT DEVICES: None. HEART AND MEDIASTINUM: Normal size and contour. Normal pulmonary vasculature. LUNGS AND PLEURAL SPACES: Bibasilar bronchiectasis. Prominent calcified pleural plaques predominantly along the left lung are noted. No focal consolidation. BONY THORAX AND SOFT TISSUES: No acute osseous abnormality. 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 No consolidation. Ir Guide Insert Picc Over 5 Yrs Result Date: 7/9/2020 PROCEDURE:  Ultrasound & Fluoroscopic Guided Right PICC Line Placement CLINICAL INDICATION/HISTORY: IV access for TPN PERFORMED BY: Robbin Park PA-C ATTENDING RADIOLOGIST: Delisa Gage MD SUPERVISION: Direct TECHNIQUE: After explaining the procedure to the patient, including the potential risks, benefits, and alternatives, informed written and verbal consent was obtained. A time out was performed to verify appropriate patient, procedure, and site.  The procedure was performed following standard maximal barrier technique which includes, cap, mask, sterile gown, sterile gloves, sterile full body drape, hand hygiene with alcohol foam, and 2% chlorhexidine for cutaneous antisepsis. Sterile ultrasound gel and a sterile cover for the US probe was used. Ultrasound evaluation for potential access sites was performed. The right brachial vein is patent and normally compresses. A permanent recording was created for the patient record. The patient's upper arm was prepped and draped in sterile fashion and 1% Lidocaine was used for local anesthesia. The vein was accessed in the upper arm with a 21 gauge needle under ultrasound guidance. A guide wire was advanced under fluoroscopic control to the superior vena cava. The distance between the superior vena cava and skin puncture site was measured and a 5 Shari Pepper triple lumen power PICC was cut to the appropriate length. Overall catheter length was 37 cm. The PICC was advanced to the SVC under fluoroscopic control. The line was flushed with heparinized saline and adhered to the skin with a Statlock device. Final coned AP view of the chest was obtained to document catheter position. The patient tolerated the procedure well and there were no immediate complications. FINDINGS: Final coned AP view of the chest shows good position of the PICC with its tip in the SVC. All ports flushed easily and there was good blood return. Radiation dose (reference air kerma):  11 mGy. Fluoroscopy Time: 0.9 minutes. GUIDANCE: Ultrasound and fluoroscopic guidance was used to position (and confirm the position of) the needle and catheter. Image(s) saved in PACS: Ultrasound and fluoroscopy. IMPRESSION: Successful placement of a triple lumen power PICC line via the right arm. The PICC line is ready for immediate use. Procedures: Stated above Discharge Medications:    
 
Current Discharge Medication List  
  
START taking these medications  Details  
metoprolol tartrate (LOPRESSOR) 25 mg tablet Take 1 Tab by mouth every twelve (12) hours. Qty: 60 Tab, Refills: 0  
  
aspirin 81 mg chewable tablet Take 1 Tab by mouth daily. Qty: 30 Tab, Refills: 0 CONTINUE these medications which have NOT CHANGED Details Cetirizine 10 mg cap Take  by mouth. atorvastatin (LIPITOR) 80 mg tablet Take 80 mg by mouth daily. glipiZIDE (GLUCOTROL) 10 mg tablet Take 10 mg by mouth two (2) times a day. hydroCHLOROthiazide (HYDRODIURIL) 25 mg tablet Take 25 mg by mouth daily. benzonatate (TESSALON) 100 mg capsule Take 100 mg by mouth three (3) times daily as needed for Cough. albuterol (PROVENTIL HFA) 90 mcg/actuation inhaler Take 1 Puff by inhalation daily. STOP taking these medications  
  
 losartan (COZAAR) 50 mg tablet Comments:  
Reason for Stopping:   
   
 insulin NPH (NOVOLIN N NPH U-100 INSULIN) 100 unit/mL injection Comments:  
Reason for Stopping:   
   
 atenolol (TENORMIN) 50 mg tablet Comments:  
Reason for Stopping:   
   
  
 
 
 
Activity: PT/OT Eval and Treat Diet: Cardiac/Diabetic Diet Wound Care: None needed Follow-up: Staff MD on arrival, cardiology in 3-4 weeks Discharge time: >35 minutes Jason Tolliver PA-C [facility] Office:  [phone] Pager: [phone] 7/13/2020, 2:32 PM

## 2020-07-13 NOTE — ROUTINE PROCESS
Report received from Sr. Richard with sbar 
oob with physio Appetite poor Report called to rick Antonios, wife to transport to [facility]

## 2020-07-13 NOTE — PROGRESS NOTES
Problem: Mobility Impaired (Adult and Pediatric) Goal: *Acute Goals and Plan of Care (Insert Text) Description: Physical Therapy Goals Initiated 7/13/2020 and to be accomplished within 7 day(s) 1. Patient will move from supine to sit and sit to supine  in bed with modified independence. 2.  Patient will transfer from bed to chair and chair to bed with supervision the least restrictive device. 3.  Patient will perform sit to stand with supervision. 4.  Patient will ambulate with supervision for 50 feet with the least restrictive device. 5.  Patient will ascend/descend 1 stairs with no handrail(s) with contact guard assist with least restrictive device. Prior Level of Function:  
Patient was modified independence for all mobility including gait using walking stick, not cane. States he has arthritis in both knees with right knee worse. Patient lives with wife in a one-story home, one step to enter without hand rails. Wife has a rollator at home also for patient to use. Was independent with ADLs. Outcome: Progressing Towards Goal 
 PHYSICAL THERAPY EVALUATION Patient: Giovany Greene (80 y.o. male) Date: 7/13/2020 Primary Diagnosis: SBO (small bowel obstruction) ([facility] [address] [P66.478] HTN (hypertension) [I10] Diabetes ([facility] [address] [E11.9] Procedure(s) (LRB): 
Exploratory Laparotomy, Lysis of Adhesions, Bowel Resection (N/A) 6 Days Post-Op Precautions:   Fall WBAT 
PLOF: modified independent with \"walking stick. \" 
 
ASSESSMENT : 
Based on the objective data described below, the patient presents with abdominal pain, impaired bed mobility, impaired BLE strength, impaired transfers, unsteadiness on feet, and unsteady gait. Patient is s/p exploratory laparotomy POD#6. Nurse cleared patient for participation in skilled physical therapy. Patient received reclined in bed, awake and alert, agreeable to physical therapy treatment. Patient on room air. Endorsed 8/10 abdominal pain but stated he had a bowel movement today. Moderate assist required for supine to sit with cues for using hand rail with bed modified with HOB elevated. CGA for sit to stand from edge of bed to RW with cues for safe hand placement. Patient ambulated with PT guidance x 20 feet around room to bedside chair with RW and step-to, antalgic pattern due to right knee pain. Verbal cues given for step sequencing with RW to offload RLE due to knee pain. Patient returned to seated in bedside chair after session. Patient educated on calling for assistance when needing to get up; verbalized understanding. Recommend home with family support and home health PT at this time and rolling walker. Patient left sitting in chair, call bell in reach, all needs in reach, in NAD, nurse notified. Patient will benefit from skilled intervention to address the above impairments. Patient's rehabilitation potential is considered to be Good Factors which may influence rehabilitation potential include:  
[]         None noted 
[]         Mental ability/status [x]         Medical condition 
[]         Home/family situation and support systems 
[]         Safety awareness [x]         Pain tolerance/management 
[]         Other: PLAN : 
Recommendations and Planned Interventions:  
[x]           Bed Mobility Training             [x]    Neuromuscular Re-Education 
[x]           Transfer Training                   []    Orthotic/Prosthetic Training 
[x]           Gait Training                          []    Modalities [x]           Therapeutic Exercises           []    Edema Management/Control 
[x]           Therapeutic Activities            [x]    Family Training/Education 
[x]           Patient Education 
[]           Other (comment): Frequency/Duration: Patient will be followed by physical therapy 3-5 times a week to address goals. Discharge Recommendations: Home Health and family support Further Equipment Recommendations for Discharge: rolling walker SUBJECTIVE:  
Patient stated The doctor said I am too old to get a knee replacement. I feel a little wobbly.  OBJECTIVE DATA SUMMARY:  
 
Past Medical History:  
Diagnosis Date Diabetes ([address] Hypertension History reviewed. No pertinent surgical history. Barriers to Learning/Limitations: None Compensate with: N/A Home Situation: 
Home Situation Home Environment: Private residence # Steps to Enter: 1 Rails to Enter: No 
One/Two Story Residence: One story Living Alone: No 
Support Systems: Spouse/Significant Other/Partner Patient Expects to be Discharged to[de-identified] Private residence Current DME Used/Available at Home: Raised toilet seat, Shower chair, Other (comment)(walking stick, not cane) Tub or Shower Type: Tub/Shower combination Critical Behavior: 
Neurologic State: Alert Orientation Level: Oriented X4 Cognition: Appropriate decision making; Follows commands Safety/Judgement: Fall prevention Psychosocial 
Patient Behaviors: Calm; Cooperative Strength:   
Strength: Generally decreased, functional 
 Grossly 4/5 BLE Tone & Sensation:  
Tone: Normal 
   
Sensation: Intact Range Of Motion: 
AROM: Generally decreased, functional 
  
  
Functional Mobility: 
Bed Mobility:  
Supine to Sit: Moderate assistance Scooting: Moderate assistance Transfers: 
Sit to Stand: Contact guard assistance Stand to Sit: Contact guard assistance Bed to Chair: Contact guard assistance Balance:  
Sitting: Intact; Without support Standing: Impaired; With support Standing - Static: Fair Standing - Dynamic : Fair Ambulation/Gait Training: 
Distance (ft): 20 Feet (ft) Assistive Device: Walker, rolling Ambulation - Level of Assistance: Contact guard assistance Gait Description (WDL): Exceptions to [facility] Gait Abnormalities: Antalgic; Step to gait; Decreased step clearance Stance: Left increased Speed/Sunita: Slow;Pace decreased (<100 feet/min) Step Length: Left shortened Pain: 
Pain level pre-treatment: 8/10 abdominal 
Pain level post-treatment: 8/10 Pain Intervention(s) : Medication (see MAR); Rest, Repositioning Response to intervention: Nurse notified, See doc flow Activity Tolerance:  
Fair+ Please refer to the flowsheet for vital signs taken during this treatment. After treatment:  
[x]         Patient left in no apparent distress sitting up in chair 
[]         Patient left in no apparent distress in bed 
[x]         Call bell left within reach [x]         Nursing notified 
[]         Caregiver present 
[]         Bed alarm activated 
[]         SCDs applied COMMUNICATION/EDUCATION:  
[x]         Role of Physical Therapy in the acute care setting. [x]         Fall prevention education was provided and the patient/caregiver indicated understanding. [x]         Patient/family have participated as able in goal setting and plan of care. [x]         Patient/family agree to work toward stated goals and plan of care. []         Patient understands intent and goals of therapy, but is neutral about his/her participation. []         Patient is unable to participate in goal setting/plan of care: ongoing with therapy staff. 
[]         Other: Thank you for this referral. 
Denton Collet, DPT Time Calculation: 31 mins Eval Complexity: History: MEDIUM  Complexity : 1-2 comorbidities / personal factors will impact the outcome/ POC Exam:MEDIUM Complexity : 3 Standardized tests and measures addressing body structure, function, activity limitation and / or participation in recreation  Presentation: MEDIUM Complexity : Evolving with changing characteristics  Clinical Decision Making:Medium Complexity strength, bed mobility, transfers, gait, balance  Overall Complexity:MEDIUM

## 2020-07-13 NOTE — PROGRESS NOTES
Problem: Discharge Planning Goal: *Discharge to safe environment Outcome: Progressing Towards Goal 
  
Problem: Pain Goal: *Control of Pain Outcome: Progressing Towards Goal 
  
Problem: Falls - Risk of 
Goal: *Absence of Falls Description: Document Velasquez Roa Fall Risk and appropriate interventions in the flowsheet. Outcome: Progressing Towards Goal 
Note: Fall Risk Interventions: 
Mobility Interventions: Communicate number of staff needed for ambulation/transfer, Patient to call before getting OOB Mentation Interventions: Door open when patient unattended, Evaluate medications/consider consulting pharmacy Medication Interventions: Evaluate medications/consider consulting pharmacy, Patient to call before getting OOB Elimination Interventions: Call light in reach, Patient to call for help with toileting needs History of Falls Interventions: Door open when patient unattended, Evaluate medications/consider consulting pharmacy Problem: Pressure Injury - Risk of 
Goal: *Prevention of pressure injury Description: Document Francis Scale and appropriate interventions in the flowsheet. Outcome: Progressing Towards Goal 
Note: Pressure Injury Interventions: 
Sensory Interventions: Assess changes in LOC, Check visual cues for pain, Minimize linen layers Moisture Interventions: Absorbent underpads Activity Interventions: Pressure redistribution bed/mattress(bed type) Mobility Interventions: HOB 30 degrees or less, Pressure redistribution bed/mattress (bed type) Nutrition Interventions: Document food/fluid/supplement intake Friction and Shear Interventions: HOB 30 degrees or less, Minimize layers

## 2020-07-13 NOTE — DIABETES MGMT
Glycemic Control/ Nutrition Reassessment: 
Diagnosis  Diabetes ([facility]  SBO (small bowel obstruction) ([facility] [address]  HTN (hypertension)  MARCI (acute kidney injury) ([facility] [address]  Elevated troponin  Paroxysmal atrial fibrillation (HCC) RECOMMENDATIONS:  
Recommend discontinue Lantus , since TPN discontinued , glucose within target and poor p.o. intake. Increased Ensure Enlive supplements to tid at pt's request. 
 
ASSESSMENT:  
Wt status is classified as overweight per Body mass index is 27.21 kg/m². Altered nutrition related lab values r/t diabetes AEB VnS4a=1.4%. equivalent  to ave Blood Glucose of 166mg/dl for 2-3 months prior to admission. nutrition Diagnoses:  
Inadequate energy intake related to poor appetite. Observed intake at the lunch meal- zero from meal and 100% of Ensure Enlive supplement.  
Nutrient deficit this admission related to NPO >7 days, TPN 2 days then pt pulled out PICC line and now zero intake of meal. 
SUBJECTIVE/OBJECTIVE:  
 
Pt reports a poor appetite. Diet: Regular   With Ensure [employer] Medications: [x]? Reviewed   
corrective  Humalog Lab Results Component Value Date/Time Sodium 142 07/13/2020 04:10 AM  
 Potassium 3.7 07/13/2020 04:10 AM  
 Chloride 110 07/13/2020 04:10 AM  
 CO2 26 07/13/2020 04:10 AM  
 Anion gap 6 07/13/2020 04:10 AM  
 Glucose 132 (H) 07/13/2020 04:10 AM  
 BUN 25 (H) 07/13/2020 04:10 AM  
 Creatinine 1.11 07/13/2020 04:10 AM  
 BUN/Creatinine ratio 23 (H) 07/13/2020 04:10 AM  
 GFR est AA >60 07/13/2020 04:10 AM  
 GFR est non-AA >60 07/13/2020 04:10 AM  
 Calcium 7.8 (L) 07/13/2020 04:10 AM  
prealbumin 7.3  (7/10) Anthropometrics: BMI (calculated): 27.2 Ht Readings from Last 1 Encounters:  
07/03/20 5' 7\" (1.702 m) Wt.  183 lbs. IBW: 148 lb %IBW: 117%  
  
Estimated Nutrition Needs: [x]? MSJ x 1.2-1.3 []? Other: 
Calories: 1528-8014 kcal Based on:   [x]? Actual BW   
Protein:   79-94 g Based on:   [x]? Actual BW x 1.0-1.2 gm/kg Fluid reqrts:       1800ml /d  
GOALS:  
Intake to meet  75% nutritional needs by 7/18/20.  
 Weight maintenance by 7/23/20. Glucose will be within target range by 7/16/20. Continue monitoring and Evaluation.  
Davey MAHAJAN

## 2020-07-13 NOTE — PROGRESS NOTES
Patient seen and examined. He is doing great. In addition to passing flatus, he had multiple bowel movement. His tachycardia has resolved. He has no fever. WBC is back to normal. Creatinine is normal. 
Abdomen is soft and non-tender. Wound is clean Plan: 
Regular diet No need for TPN Appreciate medicine admission and management Discharge planing I wrote my discharge instructions. I will sign off for now.

## 2020-07-13 NOTE — PROGRESS NOTES
Spoke with pt, he agrees to snf. Wants me to call spouse to discuss facilities. Called spouse posted to her choices. First choice [facility], 2nd [facility] 3rd [facility] 4th [facility]. [place] (SNF) Provider list has been given to the patient and/or patient representative. Patient and/or patient representative has signed the [place] of Choice selecting __________rick camacho_______________ as their preference facility and a copy given. Both SNF Provider list and Freedom of Choice have been placed on the chart.

## 2020-07-13 NOTE — PROGRESS NOTES
1930 .Pt in bed resting alert and oriented x3 denies pain at the moment. Assessement completed plan of care for the shift explained pt verbalized understanding. IVF infusing well, HOB elevated, bed low and in locked position. Call light and frequently used items within reach. Pt has mid abdomen surgical incision with 15 staples, CDI open to air. 2100  Patient is in bed, resting No sign of distress. Bed low, call bell within reach. 2200  Patient is in bed, resting No sign of distress. Bed low, call bell within reach. Incontinence care done. 2300  Patient is in bed, sleeping. No sign of distress. Bed low, call bell within reach. Blood sugar 132 mg/dl scheduled lantus 5 units administered. 0000  Patient is in bed,sleeping  
 
0100  Patient is in bed, sleeping. No sign of distress. Bed low, call bell within reach 
0137- noted blood sugar 157 insulin humalog 2 units given. 0300  Patient is in bed  sleeping. No sign of distress. Bed low, call bell within reach. Incontinent care done. 0400  Patient is in bed, alert and oriented. IV CDI, incision cDI, no sign of distress. Bed low, call bell within reach. 0800-  Bedside, Verbal, and Written shift change report given to [address] (oncoming nurse) by Vannessa Alvarez RN (offgoing nurse). Report included the following information SBAR, Kardex, and MAR.

## 2020-07-13 NOTE — PROGRESS NOTES
Problem: Discharge Planning Goal: *Discharge to safe environment Outcome: resolved/met  
snf at [address] [facility] Received call from Chana at [address]. They can accept pt. Notified pt. He is in agreement. He states he could not afford 60,000 to live in [facility] home , will need uai. Notified pt's spouse rick camacho accepted. He does not qualify for stretcher transport. Told spouse if we set it up, may get denied and they would be billed. [facility]ed with [facility] cost would be [phone]. she called her dtr, called me back and she will transport pt. Explained to go into [facility] when arrives so they can help him out of car into w/c. She verbalized understanding. Notified pt's nurse that spouse will  at 4p but will wait for nurses call when pt ready. Notified [facility] at [address] 
Notified [facility] of bed available. Requested uai be done , sent to [facility] via email Jessica Kwok Care Management Interventions PCP Verified by CM: Yes 
Palliative Care Criteria Met (RRAT>21 & CHF Dx)?: No 
Mode of Transport at Discharge: Other (see comment) Transition of Care Consult (CM Consult): SNF Partner SNF: Yes Discharge Durable Medical Equipment: No 
Physical Therapy Consult: Yes Occupational Therapy Consult: Yes Speech Therapy Consult: No 
Current Support Network: Lives with Spouse Confirm Follow Up Transport: Family The Plan for Transition of Care is Related to the Following Treatment Goals : sn/pt/ot The Patient and/or Patient Representative was Provided with a Choice of Provider and Agrees with the Discharge Plan?: Yes Freedom of Choice List was Provided with Basic Dialogue that Supports the Patient's Individualized Plan of Care/Goals, Treatment Preferences and Shares the Quality Data Associated with the Providers?: Yes Discharge Location Discharge Placement: Skilled nursing facility

## 2020-07-16 PROBLEM — J69.0 ASPIRATION PNEUMONIA (HCC): Status: ACTIVE | Noted: 2020-01-01

## 2020-07-16 PROBLEM — E16.2 HYPOGLYCEMIA: Status: ACTIVE | Noted: 2020-01-01

## 2020-07-16 PROBLEM — J96.02 ACUTE RESPIRATORY FAILURE WITH HYPOXIA AND HYPERCAPNIA (HCC): Status: ACTIVE | Noted: 2020-01-01

## 2020-07-16 PROBLEM — J96.01 ACUTE RESPIRATORY FAILURE WITH HYPOXIA AND HYPERCAPNIA (HCC): Status: ACTIVE | Noted: 2020-01-01

## 2020-07-16 PROBLEM — G93.41 METABOLIC ENCEPHALOPATHY: Status: ACTIVE | Noted: 2020-01-01

## 2020-07-16 NOTE — H&P
Internal Medicine History and Physical  Note           NAME:  Misael Barntet   :   1935   MRN:  [id number]     PCP:  Fredo Perez MD     Date/Time:  2020 4:47 PM      I hereby certify this patient for admission based upon medical necessity as noted below:        Assessment / plan :        Principal Problem:    SBO (small bowel obstruction) ([address] (7/3/2020). Admitted to the hospital for further management. Surgery consulted. N.p.o.  IV fluid. Antibiotic. Active Problems:    Diabetes uncontrolled with hypoglycemia. Hypoglycemia likely due to poor p.o. intake. Improved in the ER with IV glucose infusion. We will continue close monitoring. Provide SSI, hypoglycemia protocol and frequent Accu checks. Provide SSI, hypoglycemia protocol and frequent Accu checks. Education,  diabetic educator  . Diabetic Diet       HTN (hypertension) (7/3/2020). Control blood pressure      MARCI (acute kidney injury) ([address] (7/3/2020). IV fluid   Monitor Renal function and other labs as indicated. Avoid nephrotoxins , iv Contrast, NSAID. Renally dosing medications. Monitor urine out put. Troponin level elevated (7/3/2020). EKG. Telemetry. Serial troponin. History of paroxysmal atrial fibrillation ([facility] [address] (2020). Telemetry bed      Aspiration pneumonia ([facility] [address] (2020). CT scan with possible right lower lobe pneumonia. IV Zosyn. Maintain oxygenation. Follow chest x-ray    Right kidney mass. Need follow-up as an outpatient for further management. Metabolic encephalopathy. Initially thought due to hypoglycemia but after correction of blood sugar he continued to be unresponsive. Other possibility due to 4 mg IV morphine given for pains. ER MD, nursing staff informed and plan to give IV Narcan dose and follow progress.        -DVT prophylaxis : Heparin.   - Code Status : Full    -Other chronic medical problems as per past history. Further management depend on the course of the case and expanded data base. DISPO - pt to be admitted at this time for reasons addressed above, continued hospitalization for ongoing assessment and treatment indicated        Subjective:     CHIEF COMPLAINT: Sent here from nursing home because of increasing abdominal pain. HISTORY OF PRESENT ILLNESS:     Mr. Harry Pedroza is a 80 y.o.  male who is admitted for SBO. Mr. Harry Pedroza with past medical history as noted below , presented to the Emergency Department today with history of abdominal pain, increasing and progressive over the last 2 days. Nausea vomiting. He has history of surgery for SBO by Dr. Tawanna Valero on July 3. He went to rehab. They sent him here today for the above symptom. CT scan showed possible SBO's/ileus. ER MD talk to the surgeon who recommended admission for further management. Also there was some other abnormal findings in the labs and radiological findings so elected to be admitted by medicine. As stated in the assessment and plan, the patient is absolutely not responding even with a sternal rub and nipple twisting. I got the nurse to check his blood sugar immediately which was 116 while running D10 solution. After further discussion with the ER staff they were suspecting therefore milligrams IV morphine provided to him and Narcan plan to be given. At this stage patient is unresponsive and non-historian. Triage and ER notes noted. Past Medical History:   Diagnosis Date    Diabetes ([address]     Hypertension         No past surgical history on file. Social History     Tobacco Use    Smoking status: Never Smoker    Smokeless tobacco: Never Used   Substance Use Topics    Alcohol use: Not on file        No family history on file. Allergies   Allergen Reactions    Iodinated Contrast Media Swelling    Lisinopril Swelling        Prior to Admission medications    Medication Sig Start Date End Date Taking? Authorizing Provider   metoprolol tartrate (LOPRESSOR) 25 mg tablet Take 1 Tab by mouth every twelve (12) hours. 7/13/20   Irina Birch PA   aspirin 81 mg chewable tablet Take 1 Tab by mouth daily. 7/13/20   Irina Birch PA   Cetirizine 10 mg cap Take  by mouth. Other, MD Kiah   benzonatate (TESSALON) 100 mg capsule Take 100 mg by mouth three (3) times daily as needed for Cough. Other, MD Kiah   atorvastatin (LIPITOR) 80 mg tablet Take 80 mg by mouth daily. Provider, Historical   glipiZIDE (GLUCOTROL) 10 mg tablet Take 10 mg by mouth two (2) times a day. Provider, Historical   hydroCHLOROthiazide (HYDRODIURIL) 25 mg tablet Take 25 mg by mouth daily. Provider, Historical   albuterol (PROVENTIL HFA) 90 mcg/actuation inhaler Take 1 Puff by inhalation daily. Provider, Historical       Review of Systems:  Unresponsive and non-historian. Encephalopathic. VITALS:    Vital signs reviewed; most recent are:    Visit Vitals  /72   Pulse (!) 105   Temp 97.7 °F (36.5 °C)   Resp 30   Wt 82.6 kg (182 lb)   SpO2 98%   BMI 28.51 kg/m²     SpO2 Readings from Last 6 Encounters:   07/16/20 98%   07/13/20 97%   01/31/19 100%    O2 Flow Rate (L/min): 2 l/min   No intake or output data in the 24 hours ending 07/16/20 4005     Physical Exam:     Gen:  Appear stated age, Well-developed,   in no acute distress  HEENT:  Head atraumatic, normocephalic , hearing intact to voice, moist mucous membranes. Neck:  Trachea midline , No apparent JVD, Supple   Resp: Poor inspiratory effort but lungs are clear overall. No accessory muscle use,Bilateral BS present   Card: + murmur, normal S1, S2 without Gallop .+ lower leg peripheral edema. Abd: Lower abdomen midline scar with staples on. No active discharges from the surgical wound. Abdomen slightly tense. Reduced bowel sounds. Unable to assess tenderness as patient is unresponsive. Musc:  No cyanosis or clubbing.   Skin:  No rashes or ulcers, skin turgor is good. Neuro: Unresponsive to sternal rub and voices. Unable to follows commands appropriately. Labs: All Cardiac Markers in the last 24 hours:   Lab Results   Component Value Date/Time    TROIQ 0.08 (H) 07/16/2020 03:31 PM       Recent Labs     07/16/20  1531   WBC 7.3   HGB 9.8*   HCT 30.7*        Recent Labs     07/16/20  1531      K 3.5   *   CO2 28   GLU 52*   BUN 35*   CREA 1.95*   CA 7.7*   MG 1.8   ALB 2.3*   TBILI 0.3   ALT 17     Lab Results   Component Value Date/Time    Glucose (POC) 151 (H) 07/16/2020 05:02 PM    Glucose (POC) 146 (H) 07/13/2020 11:53 AM     No results for input(s): PH, PCO2, PO2, HCO3, FIO2 in the last 72 hours. Recent Labs     07/16/20  1531   INR 1.2       Ct Abd Pelv Wo Cont    Result Date: 7/16/2020  IMPRESSION: 1. Prominent gastric distention and proximal small bowel dilation extending to the right lower quadrant small bowel anastomosis concerning for high-grade ileus versus developing small bowel obstruction. No definite transition point is identified. No pneumatosis or portal venous gas. Recommend surgical consultation. 2.  Free intraperitoneal fluid measures higher than simple fluid density. Limited evaluation for infection, peritonitis or leak given lack of contrast. 3.  New right lower lung consolidation may represent aspiration and/or developing pneumonia. Recommend repeat imaging following completion of appropriate medical therapy. 4.  Bladder wall thickening may be due to underdistention though superimposed infection cannot be excluded. Recommend correlation for cystitis. 5.  Indeterminant 1.8 cm structure in the right lower kidney may represent a proteinaceous or complex cyst however neoplasm cannot be entirely excluded. Contrast enhanced CT or MRI may be used for further characterization as clinically indicated.  Findings discussed with Dr. Erika Damon by Dr. Farrah Lozoya MD, PhD at 4:23 AM on 7/16/2020       Please refer to radiology reports. Risk of deterioration: high      Total time spent in the care of this patient: [address] discussed with: Nursing Staff, Consultant/Specialist, >50% of time spent in counseling and coordination of care and ER MD/staff discussed with his surgeon Dr. Marin Gotti. Discussed:  Care Plan       I have personally reviewed all pertinent labs, films and EKGs that have officially resulted. I reviewed available pertaining electronic documentation outlining the initial presentation as well as the emergency room physician's encounter. This document in whole or part of it has been produced using voice recognition software. Unrecognized errors in transcription may be present.     Attending Physician: Gely Arizmendi MD

## 2020-07-16 NOTE — ED NOTES
1700- MD smith @bs and pt diaphoretic, no response to painful stimuli, bolus d10 250ml initiated  1702  bs 151  1mg narcan administered    1704 er md @ bs  1706 pt has gag reflex with suction, pt opens eyes  1708 d10 complete, bs 174, pt continues to be unresponsive  1712 pts 20g IV pulled out, MD Sunday Franny @bs to attempt EJ x2,  1742 2 unsuccesful EJ attempts, IO placed rt tibia  1745 pt to CT by RN, pt in stretcher. 1800 pt returns to ed rm 07, pt grunts with painful stimuli.

## 2020-07-16 NOTE — ED PROVIDER NOTES
EMERGENCY DEPARTMENT HISTORY AND PHYSICAL EXAM      Date: 7/16/2020  Patient Name: Monae Elias    History of Presenting Illness     Chief Complaint   Patient presents with    Abdominal Pain       History Provided By: Patient and Caregiver    Chief Complaint: Abdominal pain and vomiting    Additional History (Context): Monae Elias is a 80 y.o. male who presents with postop July 3 for small bowel obstruction (Dr. Carmella Closs), and a relatively uneventful postoperative course and was discharged to skilled nursing [facility]. Was doing well until the last 24 hours, when he developed nausea and vomiting and diffuse crampy abdominal pain. No fevers, chills, sweats, urinary symptoms. Pain is mild to moderate, comes in waves and lasts 20 to 30 minutes at a time with a more severe intensity, but is always present to a small degree. Diffuse, no radiation.     PCP: Lilia Louis MD    Current Facility-Administered Medications   Medication Dose Route Frequency Provider Last Rate Last Dose    lidocaine (XYLOCAINE) 2 % jelly   Mucous Membrane PRN Elis Orozco MD        lidocaine (XYLOCAINE) 10 mg/mL (1 %) injection 5 mL  5 mL Nebulization ONCE Elis Orozco MD   Stopped at 07/16/20 1635    sodium chloride (NS) flush 5-10 mL  5-10 mL IntraVENous PRN Elis Orozco MD   10 mL at 07/16/20 1832    clindamycin (CLEOCIN) 600mg NS 100mL IVPB   600 mg IntraVENous Q8H Elis Orozco MD   600 mg at 07/16/20 1827    dextrose 5% - 0.45% NaCl with KCl 20 mEq/L infusion  125 mL/hr IntraVENous CONTINUOUS Elis Orozco  mL/hr at 07/16/20 1819 125 mL/hr at 07/16/20 1819    piperacillin-tazobactam (ZOSYN) 3.375 g in 0.9% sodium chloride (MBP/ADV) 100 mL MBP  3.375 g IntraVENous Q6H Karoline Infante MD   Stopped at 07/16/20 1925    naloxone [facility]) injection 2 mg  2 mg IntraVENous ONCE Elis Orozco MD        naloxone [facility]) injection 2 mg  2 mg IntraVENous ONCE Elis Orozco MD         Current Outpatient Medications   Medication Sig Dispense Refill    metoprolol tartrate (LOPRESSOR) 25 mg tablet Take 1 Tab by mouth every twelve (12) hours. 60 Tab 0    aspirin 81 mg chewable tablet Take 1 Tab by mouth daily. 30 Tab 0    Cetirizine 10 mg cap Take  by mouth.  benzonatate (TESSALON) 100 mg capsule Take 100 mg by mouth three (3) times daily as needed for Cough.  atorvastatin (LIPITOR) 80 mg tablet Take 80 mg by mouth daily.  glipiZIDE (GLUCOTROL) 10 mg tablet Take 10 mg by mouth two (2) times a day.  hydroCHLOROthiazide (HYDRODIURIL) 25 mg tablet Take 25 mg by mouth daily.  albuterol (PROVENTIL HFA) 90 mcg/actuation inhaler Take 1 Puff by inhalation daily. Past History     Past Medical History:  Past Medical History:   Diagnosis Date    Diabetes ([address]     Hypertension        Past Surgical History:  No past surgical history on file. Most recently small bowel obstruction status post exploratory laparotomy on July 3. Family History:  No family history on file. Social History:  Social History     Tobacco Use    Smoking status: Never Smoker    Smokeless tobacco: Never Used   Substance Use Topics    Alcohol use: Not on file    Drug use: Not on file   Denies alcohol or illicit drug abuse    Allergies: Allergies   Allergen Reactions    Iodinated Contrast Media Swelling    Lisinopril Swelling         Review of Systems   Review of Systems   Constitutional: Negative for chills, fatigue and fever. HENT: Negative for congestion, rhinorrhea, sore throat and trouble swallowing. Eyes: Negative for discharge, redness and itching. Respiratory: Negative for cough, shortness of breath, wheezing and stridor. Cardiovascular: Negative for chest pain, palpitations and leg swelling. Gastrointestinal: Positive for abdominal pain, nausea and vomiting. Negative for blood in stool and diarrhea. Genitourinary: Negative for difficulty urinating and dysuria. Musculoskeletal: Negative for back pain. Skin: Negative for rash. Neurological: Negative for syncope and light-headedness. Hematological: Does not bruise/bleed easily. Psychiatric/Behavioral: Negative for behavioral problems and confusion. All other systems reviewed and are negative. Physical Exam     Vitals:    07/16/20 1505 07/16/20 1700 07/16/20 1710 07/16/20 1945   BP: 127/72 124/76 136/68    Pulse: (!) 105 (!) 114 (!) 115 (P) 95   Resp: 30  24 (P) 16   Temp: 97.7 °F (36.5 °C)      SpO2: 98% 100% 97% (P) 99%   Weight: 82.6 kg (182 lb)        Physical Exam  Vitals signs and nursing note reviewed. Constitutional:       General: He is not in acute distress. Appearance: He is well-developed. He is obese. HENT:      Head: Normocephalic and atraumatic. Mouth/Throat:      Mouth: Mucous membranes are moist.   Eyes:      Extraocular Movements: Extraocular movements intact. Pupils: Pupils are equal, round, and reactive to light. Neck:      Musculoskeletal: Normal range of motion and neck supple. Cardiovascular:      Rate and Rhythm: Normal rate and regular rhythm. Heart sounds: Normal heart sounds. No murmur. Pulmonary:      Effort: Pulmonary effort is normal.      Breath sounds: Normal breath sounds. No wheezing. Abdominal:      General: Bowel sounds are normal.      Palpations: Abdomen is soft. Tenderness: There is no abdominal tenderness. Comments: Bowel sounds present but decreased. Midline laparotomy incision clean, dry, intact with staples in place. Abdomen obese, nondistended. Has mild diffuse discomfort to palpation. No rebound tenderness, rigidity, guarding. Musculoskeletal: Normal range of motion. General: No tenderness. Skin:     General: Skin is warm and dry. Capillary Refill: Capillary refill takes less than 2 seconds. Neurological:      General: No focal deficit present. Mental Status: He is alert.    Psychiatric: Mood and Affect: Mood normal.         Behavior: Behavior normal.           Diagnostic Study Results     Labs -     Recent Results (from the past 12 hour(s))   CULTURE, BLOOD    Collection Time: 07/16/20  2:41 PM    Specimen: Blood   Result Value Ref Range    Special Requests: PERIPHERAL      Culture result: PENDING    CULTURE, BLOOD    Collection Time: 07/16/20  3:00 PM    Specimen: Blood   Result Value Ref Range    Special Requests: PERIPHERAL      Culture result: PENDING    CBC WITH AUTOMATED DIFF    Collection Time: 07/16/20  3:31 PM   Result Value Ref Range    WBC 7.3 4.6 - 13.2 K/uL    RBC 3.03 (L) 4.70 - 5.50 M/uL    HGB 9.8 (L) 13.0 - 16.0 g/dL    HCT 30.7 (L) 36.0 - 48.0 %    .3 (H) 74.0 - 97.0 FL    MCH 32.3 24.0 - 34.0 PG    MCHC 31.9 31.0 - 37.0 g/dL    RDW 12.2 11.6 - 14.5 %    PLATELET 155 021 - 337 K/uL    MPV 10.1 9.2 - 11.8 FL    NEUTROPHILS 78 (H) 40 - 73 %    LYMPHOCYTES 10 (L) 21 - 52 %    MONOCYTES 11 (H) 3 - 10 %    EOSINOPHILS 1 0 - 5 %    BASOPHILS 0 0 - 2 %    ABS. NEUTROPHILS 5.7 1.8 - 8.0 K/UL    ABS. LYMPHOCYTES 0.8 (L) 0.9 - 3.6 K/UL    ABS. MONOCYTES 0.8 0.05 - 1.2 K/UL    ABS. EOSINOPHILS 0.1 0.0 - 0.4 K/UL    ABS.  BASOPHILS 0.0 0.0 - 0.1 K/UL    DF AUTOMATED     PROTHROMBIN TIME + INR    Collection Time: 07/16/20  3:31 PM   Result Value Ref Range    Prothrombin time 15.1 11.5 - 15.2 sec    INR 1.2 0.8 - 1.2     METABOLIC PANEL, COMPREHENSIVE    Collection Time: 07/16/20  3:31 PM   Result Value Ref Range    Sodium 143 136 - 145 mmol/L    Potassium 3.5 3.5 - 5.5 mmol/L    Chloride 112 (H) 100 - 111 mmol/L    CO2 28 21 - 32 mmol/L    Anion gap 3 3.0 - 18 mmol/L    Glucose 52 (LL) 74 - 99 mg/dL    BUN 35 (H) 7.0 - 18 MG/DL    Creatinine 1.95 (H) 0.6 - 1.3 MG/DL    BUN/Creatinine ratio 18 12 - 20      GFR est AA 40 (L) >60 ml/min/1.73m2    GFR est non-AA 33 (L) >60 ml/min/1.73m2    Calcium 7.7 (L) 8.5 - 10.1 MG/DL    Bilirubin, total 0.3 0.2 - 1.0 MG/DL    ALT (SGPT) 17 16 - 61 U/L AST (SGOT) 24 10 - 38 U/L    Alk. phosphatase 80 45 - 117 U/L    Protein, total 6.3 (L) 6.4 - 8.2 g/dL    Albumin 2.3 (L) 3.4 - 5.0 g/dL    Globulin 4.0 2.0 - 4.0 g/dL    A-G Ratio 0.6 (L) 0.8 - 1.7     NT-PRO BNP    Collection Time: 07/16/20  3:31 PM   Result Value Ref Range    NT pro-BNP 2,716 (H) 0 - 1,800 PG/ML   TROPONIN I    Collection Time: 07/16/20  3:31 PM   Result Value Ref Range    Troponin-I, QT 0.08 (H) 0.0 - 0.045 NG/ML   LIPASE    Collection Time: 07/16/20  3:31 PM   Result Value Ref Range    Lipase 882 (H) 73 - 393 U/L   MAGNESIUM    Collection Time: 07/16/20  3:31 PM   Result Value Ref Range    Magnesium 1.8 1.6 - 2.6 mg/dL   LACTIC ACID    Collection Time: 07/16/20  3:31 PM   Result Value Ref Range    Lactic acid 0.4 0.4 - 2.0 MMOL/L   GLUCOSE, POC    Collection Time: 07/16/20  5:02 PM   Result Value Ref Range    Glucose (POC) 151 (H) 70 - 110 mg/dL   GLUCOSE, POC    Collection Time: 07/16/20  5:12 PM   Result Value Ref Range    Glucose (POC) 174 (H) 70 - 110 mg/dL       Radiologic Studies -   CT ABD PELV WO CONT   Final Result   IMPRESSION:   1. Prominent gastric distention and proximal small bowel dilation extending to   the right lower quadrant small bowel anastomosis concerning for high-grade ileus   versus developing small bowel obstruction. No definite transition point is   identified. No pneumatosis or portal venous gas. Recommend surgical   consultation. 2.  Free intraperitoneal fluid measures higher than simple fluid density. Limited evaluation for infection, peritonitis or leak given lack of contrast.    3.  New right lower lung consolidation may represent aspiration and/or   developing pneumonia. Recommend repeat imaging following completion of   appropriate medical therapy. 4.  Bladder wall thickening may be due to underdistention though superimposed   infection cannot be excluded. Recommend correlation for cystitis.    5.  Indeterminant 1.8 cm structure in the right lower kidney may represent a   proteinaceous or complex cyst however neoplasm cannot be entirely excluded. Contrast enhanced CT or MRI may be used for further characterization as   clinically indicated. Findings discussed with Dr. Helen Burleson by Dr. Jose Martinez MD, PhD at 4:23 AM   on 7/16/2020                     CT HEAD WO CONT    (Results Pending)   XR ABD PORT  1 V    (Results Pending)   XR CHEST PORT    (Results Pending)     CT Results  (Last 48 hours)               07/16/20 1602  CT ABD PELV WO CONT Final result    Impression:  IMPRESSION:   1. Prominent gastric distention and proximal small bowel dilation extending to   the right lower quadrant small bowel anastomosis concerning for high-grade ileus   versus developing small bowel obstruction. No definite transition point is   identified. No pneumatosis or portal venous gas. Recommend surgical   consultation. 2.  Free intraperitoneal fluid measures higher than simple fluid density. Limited evaluation for infection, peritonitis or leak given lack of contrast.    3.  New right lower lung consolidation may represent aspiration and/or   developing pneumonia. Recommend repeat imaging following completion of   appropriate medical therapy. 4.  Bladder wall thickening may be due to underdistention though superimposed   infection cannot be excluded. Recommend correlation for cystitis. 5.  Indeterminant 1.8 cm structure in the right lower kidney may represent a   proteinaceous or complex cyst however neoplasm cannot be entirely excluded. Contrast enhanced CT or MRI may be used for further characterization as   clinically indicated. Findings discussed with Dr. Helen Burleson by Dr. Jose Martinez MD, PhD at 4:23 AM   on 7/16/2020                           Narrative:  EXAM: CT ABD PELV WO CONT       CLINICAL INDICATION/HISTORY: abd pain, post-op. Shortness of breath and   increased abdominal pain, recent abdominal surgery 7/3/2020.        COMPARISON: CT: 7/3/2020. TECHNIQUE:   CT of the abdomen and pelvis without intravenous contrast   administration. Coronal and sagittal reformats were generated and reviewed. One or more dose reduction techniques were used on this CT: automated exposure   control, adjustment of the mAs and/or kVp according to patient size, and   iterative reconstruction techniques. The specific techniques used on this CT   exam have been documented in the patient's electronic medical record. Digital   Imaging and Communications in Medicine (DICOM) format image data are available   to nonaffiliated external healthcare facilities or entities on a secure, media   free, reciprocally searchable basis with patient authorization for at least a   12-month period after this study. _______________       FINDINGS: Suboptimal evaluation given lack of intravenous contrast.       LOWER THORAX: Heart is normal size with aortic calcifications. Atherosclerosis   including the coronary arteries. Bilateral dependent opacities likely reflect   atelectasis. Diffuse bronchial wall thickening and bronchiectasis, similar to   prior study, may be sequela of chronic aspiration. Consolidative airspace   opacities in the right lower lobe (series 3 image 11) are new since prior study. No pleural effusion. Calcified pleural plaques are again seen in the left lung   base. LIVER: Normal contours. No suspicious liver lesions on this unenhanced CT. GALLBLADDER AND BILIARY: Cholelithiasis. No biliary dilation. SPLEEN: Normal.       PANCREAS: Normal.       ADRENALS: Normal.       KIDNEYS: Bilateral fluid density renal cyst largest exophytic from the right   upper kidney contains internal septations, incompletely characterized given lack   of contrast. Low-attenuation structure in the right lower kidney measures 1.8 x   1.7 cm, 22 HU (series 3 image 91). No perinephric stranding, radiopaque stone or   hydronephrosis.        BLADDER: Circumferential wall thickening throughout the partially distended   bladder. LYMPH NODES: No mesenteric or retroperitoneal lymphadenopathy. GI TRACT: The stomach is dilated with diffusely dilated proximal small bowel   with air-fluid levels that extends to the right lower quadrant side to side   small bowel anastomosis, small bowel distal to the anastomosis is decompressed. There is air and fluid within the nondilated colon. Prior partial sigmoidectomy;   distal colonic anastomosis appears normal.       PELVIC ORGANS: Fiducial markers are again seen in the prostate. VASCULATURE: Normal caliber lower thoracic and abdominal aorta. Atherosclerotic calcifications throughout the aorta and branch vessels. IVC is   flattened suggesting hypovolemia. OTHER: No free intraperitoneal air. Small volume. Peritoneal fluid measures   higher than simple fluid density. OSSEOUS STRUCTURES:  Moderate degenerative changes in the lumbar spine and both   hips. No acute fracture, dislocation, or destructive osseous lesion. _______________               CXR Results  (Last 48 hours)    None            Medical Decision Making   I am the first provider for this patient. I reviewed the vital signs, available nursing notes, past medical history, past surgical history, family history and social history. Vital Signs-Reviewed the patient's vital signs. EKG: Interpreted by the EP. Time Interpreted: 4:20 PM   Rate: 4 bpm   Rhythm: Sinus tachycardia   Interpretation: No acute changes    Records Reviewed: Nursing Notes and Old Medical Records    4:45 PM - I suspect that this patient has an active infection. 4:45 PM - The patient met criteria for severe sepsis at this time.       PROVIDER SEPSIS PHYSICAL EXAM EVAL  Vital signs reviewed (see nursing documentation for further details):  Vitals:    07/16/20 1505 07/16/20 1700 07/16/20 1710 07/16/20 1945   BP: 127/72 124/76 136/68    Pulse: (!) 105 (!) 114 (!) 115 (P) 95   Resp: 30  24 (P) 16   Temp: 97.7 °F (36.5 °C)      SpO2: 98% 100% 97% (P) 99%       Cardiac exam:Tachycardic    Pulmonary exam:Clear Lungs    Peripheral pulses:Normal    Capillary refill:Normal    Skin exam:pink    Exam performed Dhaval Zheng MD    SEP-1 Core Measure Exclusion Criteria  Patient has congestive heart failure and has an elevated BNP, also do not want to cause bowel edema by over hydrating him with fluids. Because of this, we will not give the full 30 mL/kg of IV crystalloid bolus. ED Course:   Remained stable during his emergency department stay    Disposition:  Admit    Provider Notes (Medical Decision Making):   Recently postop from small bowel obstruction, now with either high-grade ileus or recurrent/developing small bowel obstruction. Would not give antibiotics for his intra-abdominal fluid since he does not have leukocytosis or fever, however he does have evidence of aspiration pneumonia, so we will activate sepsis protocol and give IV clindamycin. Also has possible cystitis on CT scan, urinalysis is pending at time of admission. Also slightly low blood sugar, which is likely secondary to poor oral intake for the last couple days; will give D5 half-normal saline with potassium at maintenance rate. Consult:  Discussed care with Dr. Cecilia Domingo, Specialty: General Surgery. Standard discussion; including history of patients chief complaint, available diagnostic results, and treatment course. NG tube, fluids, hold antibiotics for now given no fevers and no leukocytosis. 4:43 PM, 7/16/2020     Consult:  Discussed care with Dr. Katie Max, Specialty: Hospitalist.  Standard discussion; including history of patients chief complaint, available diagnostic results, and treatment course. Will admit. 4:45 PM, 7/16/2020       For Hospitalized Patients:    1.  Hospitalization Decision Time:  The decision to hospitalize the patient was made by Dr. Katie Max at 4:45pm on 7/16/2020    Diagnosis     Clinical Impression:   1. SBO (small bowel obstruction) ([address]    2. Cystitis    3. Aspiration pneumonia of right lower lobe, unspecified aspiration pneumonia type ([address]    4. Hypoglycemia      5pm: When patient's chemistry showed that he had a low blood sugar 58, I went to his room and evaluated him and he was awake and alert and talking to me. However, now patient has become unresponsive. Heart rate and blood pressure remain normal. Emperic D10 was given; blood sugar check was 111. He had received morphine about 1 hour ago, was given 1 mg of Narcan but that did not significantly improve his mental status. Still has a gag reflex and occasionally slowly opens his eyes. Will obtain a head CT now.    7:30pm: Head CT unrevealing. Patient given a 4 mg dose of Narcan with no response. At this time he has no gag reflex either. Additionally, he is requiring 100% nonrebreather to keep his oxygen saturations normal.  We will proceed with intubation. Also, patient only has a single small peripheral IV in the dorsum of his hand. Nursing attempted several peripheral sites, and I attempted to cannulate both external jugular veins but was unable to get the catheter to pass. A right leg interosseous catheter was placed using the [address] IO drill. It was stable in the bone, was able to aspirate bone marrow, and it flushed easily. Procedure note: Intubation. Indications are hypoxia and altered mental status and absence of a gag reflex. Patient had an slightly tachycardic heart rate of 110s and a blood pressure 130/60. He was given etomidate 20 mg IV and succinylcholine 120 mg IV. A 7.5 endotracheal tube was placed using glide scope visualization with a grade 1 view of the cords. Tube verification with bilateral breath sounds, condensation in the tube, end-tidal CO2 capnography, and good response of vital signs and pulse oximetry. Tube secured at 25 cm at the lips.   Portable chest x-ray ordered for verification. TREY Banuelos assisted with the procedure. At this time, I am unsure what the cause of the patient's altered mental status is. He was noted to be slightly hypoglycemic before, however repeat Accu-Chek and empiric bolus of D10 did not resolve his mental status change. He was noted to have very small pupils, and had been given narcotics, however a large dose of Narcan did not awaken him. His head CT did not show anything acute. We will continue hemodynamic support, support with mechanical ventilation, is already receiving broad-spectrum antibiotics. Will give additional fluids and recheck labs now. Call placed to hospitalist to make him aware that patient will need to be upgraded from stepdown to ICU admission. Consult:  Discussed care with Dr. Drashana Jones, Specialty: Hospitalist.  Standard discussion; including history of patients chief complaint, available diagnostic results, and treatment course. I made him aware of the patient's change in status and of what had transpired since Dr. Anthony Contreras are admitted him. 8:03 PM, 7/16/2020       Critical Care Time:  The services I provided to this patient were to treat and/or prevent clinically significant deterioration that could result in the failure of one or more body systems and/or organ systems due to obtundation, hypoxia, absent gag reflex. Services included the following:  -reviewing nursing notes and old charts  -vital sign assessments  -direct patient care  -medication orders and management  -interpreting and reviewing diagnostic studies/labs  -re-evaluations  -documentation time    Aggregate critical care time was 70 minutes, which includes only time during which I was engaged in work directly related to the patient's care as described above, whether I was at bedside or elsewhere in the Emergency Department.  It did not include time spent performing other reported procedures or the services of residents, students, nurses, or advance practice providers.        Ez Want, MD    7:52 PM

## 2020-07-16 NOTE — ED TRIAGE NOTES
Alert male arrives to ED from [address] [place] [address] after having abd surgery 7/3/2020 for sbo. Pt has had decreased intake, receiving IVFs at [facility]. Pt c/o diffuse abd pain, unable to rate currently +diffuse abd tenderness.

## 2020-07-17 NOTE — ED NOTES
TRANSFER - ED to INPATIENT REPORT:    Verbal report given to SANDRO Talley on Mai Plan  being transferred to 2700 (unit) for routine progression of care       Report consisted of patients Situation, Background, Assessment and   Recommendations(SBAR). SBAR report made available to receiving floor on this patient being transferred to 2700  for routine progression of care       Admitting diagnosis SBO (small bowel obstruction) ([facility] [address] [K56.609]  Acute respiratory failure with hypoxia and hypercapnia ([facility] [address] [J96.01, J96.02]    Information from the following report(s) SBAR, ED Summary, MAR and Recent Results was made available to receiving floor. Lines:   Triple Lumen 07/16/20 Right Subclavian (Active)       Peripheral IV 07/16/20 Right Forearm (Active)   Site Assessment Clean, dry, & intact 07/16/20 1537   Phlebitis Assessment 0 07/16/20 1537   Infiltration Assessment 0 07/16/20 1537   Dressing Status Clean, dry, & intact 07/16/20 1537   Dressing Type Transparent 07/16/20 1537   Hub Color/Line Status Pink;Flushed 07/16/20 1537   Action Taken Blood drawn 07/16/20 1537       Peripheral IV 07/16/20 Left Hand (Active)       Intraosseous Line 07/16/20 Right;Tibia (Active)       Intraosseous Line 07/16/20 Left;Tibia (Active)   Site Assessment Clean, dry, & intact 07/16/20 2145   Dressing Status Clean, dry, & intact 07/16/20 2145   Dressing Type Transparent 07/16/20 2145   Status Capped 07/16/20 2145        HCG Status for ALL Females under 55 y/o: N/A     Medication list confirmed with patient    Opportunity for questions and clarification was provided.       Patient is disoriented ***  Patient is  {DMCEDCONTINENCE:00848} and {DMCEDAMBULATORY:43012}     Valuables {DMCEDVALUBLES:03001}     Patient transported with:   {TRANSPORTDETAILS:57828}    MAP (Monitor): 70 =Monitored (most recent)  Vitals w/ MEWS Score (last day)     Date/Time MEWS Score Pulse Resp Temp BP Level of Consciousness SpO2    07/17/20 0112             100 %    07/17/20 0105              100 %    07/16/20 2308    (!) 109  20        94 %    07/16/20 2242    (!) 109  20    120/59    94 %    07/16/20 2240    (!) 109  20    112/66    93 %    07/16/20 2238    (!) 110  21        93 %    07/16/20 2237    (!) 110  20    (!) 84/58    94 %    07/16/20 2236    (!) 110  20        94 %    07/16/20 2235    (!) 110  20    101/59    95 %    07/16/20 2234    (!) 110  20        94 %    07/16/20 2233    (!) 111  21        95 %    07/16/20 2232    (!) 111  20    104/46    95 %    07/16/20 2230    (!) 111  20    96/62    94 %    07/16/20 2229    (!) 112  20        94 %    07/16/20 2228    (!) 113  21        94 %    07/16/20 2227    (!) 112  22    (!) 88/55    93 %    07/16/20 2226    (!) 113  20        93 %    07/16/20 2225    (!) 113  21    94/67    90 %    07/16/20 2224    (!) 112  18        91 %    07/16/20 2223    (!) 113  22        94 %    07/16/20 2222    (!) 113  21    113/64    95 %    07/16/20 2221    (!) 112  22            07/16/20 2220    (!) 112  20    109/53    94 %    07/16/20 2219    (!) 113  21            07/16/20 2218    (!) 113  21            07/16/20 2217    (!) 113  30    112/71        07/16/20 2216    (!) 113  18            07/16/20 2215    (!) 113  19    104/63        07/16/20 2214    (!) 114  22    99/70        07/16/20 2213    (!) 114  20            07/16/20 2212    (!) 114  21    (!) 88/63        07/16/20 2209    (!) 115  18    110/49        07/16/20 2206    (!) 117  20    112/56        07/16/20 2203    (!) 117  20    100/60    (!) 87 %    07/16/20 2200    (!) 117  17    (!) 87/52        07/16/20 2157    (!) 117  20    (!) 77/29        07/16/20 2154    (!) 115  19    102/55        07/16/20 2151    (!) 114  22    112/58        07/16/20 2148    (!) 113  18    102/64        07/16/20 2145    (!) 112  20   105/54        07/16/20 2142    (!) 110  20    103/49        07/16/20 2139    (!) 109  20    107/46    (!) 85 %    07/16/20 2136    (!) 105  21    107/44        07/16/20 2133    (!) 113  20    101/52        07/16/20 2130    (!) 122  20    (!) 148/35        07/16/20 2121    75  20        (!) 86 %    07/16/20 2120    74  20    (!) 42/31        07/16/20 2111    76  20    (!) 55/30    (!) 88 %    07/16/20 2100    (!) 114  20    (!) 66/12    93 %    07/16/20 2050    (!) 110  19    (!) 90/29    94 %    07/16/20 2020    (!) 102  16    98/51    100 %    07/16/20 2000    (!) 103  19    (!) 94/37    96 %    07/16/20 1945    95  16        99 %    07/16/20 1940    (!) 102  17    113/58        07/1935    97  16        100 %    07/16/20 1930    (!) 104  22    (!) 92/39    97 %    07/16/20 1920    (!) 106  (!) 31    127/51    93 %    07/16/20 1910    (!) 106  29    132/47    93 %    07/16/20 1905    (!) 106  (!) 31        92 %    07/16/20 1900    (!) 107  27    137/51    96 %    07/16/20 1850    (!) 108  24    133/54    95 %    07/16/20 1840    (!) 109  30    127/51    96 %    07/16/20 1830    (!) 113  27    157/54    95 %    07/16/20 1820    (!) 115  28    169/58    95 %    07/16/20 1815    (!) 115  (!) 32        96 %    07/16/20 1810    (!) 114  28    175/59    95 %    07/16/20 1800    (!) 114  24    170/59    95 %    07/16/20 1750          161/58        07/16/20 1710    (!) 115  24    136/68    97 %    07/16/20 1700    (!) 114      124/76    100 %    07/16/20 1505  4  (!) 105  30  97.7 °F (36.5 °C)  127/72  Alert  98 %              Septic Patients:     Lactic Acid  No results found for: LACPOC (Most recent on top)  Repeat drawn: {YES/NO:38991} Time: ***     ALL LACTIC ACIDS GREATER THAN 2 MUST BE REPEATED POC WITHIN 4 HOURS OR PRIOR TO ADMISSION               Total Fluid Bolus initiated and documented on MAR: {YES/NO:93471}    All ordered antibiotics initiated within first 3 hours of TIME ZERO?  {YES/NO:42481}

## 2020-07-17 NOTE — PROGRESS NOTES
1035 patient self extubated. Called dr Bryan Griffith to the bedside. Patient was currently on 5 lpm via NC. No respiratory distress or issues noted. Dr Bryan Griffith agreed to keep patient on NC. Will continue to monitor patient closely.

## 2020-07-17 NOTE — ED NOTES
R subclavion central line placed by Dr. Altagracia Holly. Stat chest portable ordered. Patient appears very pale, shaking, BG  Checked and in 200s.

## 2020-07-17 NOTE — ED NOTES
Paged Dr. Shayla Thompson for primary RN [facility] due to patient being wide awake and attempting to pull ET tube out at bedside. Patient has no patent IV access other than a 24 gauge in hand. ED tech at bedside attempting US guided PIV access. 1 dose of ketamine ordered by ED attending for sedation. Patient placed in restraints. Dr. Shayla Thompson aware. Dr. Shayla Thompson states placing order for fentanyl and versed as well as restraint and to give 1 bolus. 2125 US attempted >3 times and unsuccessful. Called back Dr. Shayla Thompson and notified him. Patient decompensating. Current BP 55.30. Dr. Shayla Thompson coming in to place central line.  Charge RN aware and will attempt new IO access in mean time since current IO access not patent

## 2020-07-17 NOTE — ED NOTES
Patients BP dropped as low as systolic in 19S.  Nicolette Barba to bedside for emergent central line placement

## 2020-07-17 NOTE — PROGRESS NOTES
Physical Exam  Skin:             Pt arrived from ER, RT with vent. 7.5 ETT, [email or web address]. Second skin check with Isaac Azar RN completed. Assessment completed as charted, [email or web address] and D30/1+68F@964 infusing into R CVL neck dressing (changed 7/17/2020). Midline abd with staples, SIMONE. [place] draining scant yellow urine. Changed pt's gown and linens. Applied tammy CHRISTI boots. Inserted #[id number] OGT w/o difficulty, KUB ordered. Xavier AM labs. Pt resting, NAD. Gave bedside shift report to [facility]am[facility] Olive[facility] (Frank) Juana Chew, SANDRO.

## 2020-07-17 NOTE — DIABETES MGMT
Glycemic Control: A1c=7.4%  Pt is NPO  Entered glucose monitoring and subcutaneous insulin  order set .  Davey MAHAJAN

## 2020-07-17 NOTE — PROGRESS NOTES
[facility] Progress Note                                              I have reviewed the flowsheet and previous days notes. Events, vitals, medications and notes from last 24 hours reviewed. Care plan discussed with staff and on multidisciplinary rounds. Subjective:  7/17/2020       Impression and Plan  Patient Active Problem List   Diagnosis Code    Diabetes ([facility] E11.9    SBO (small bowel obstruction) ([facility] K56.609    HTN (hypertension) I10    MARCI [facility]) [facility] N17.9    Troponin level elevated R79.89    Paroxysmal atrial fibrillation (HCC) I48.0    Aspiration pneumonia ([facility] J69.0    Hypoglycemia B63.1    Metabolic encephalopathy G81.04    Acute respiratory failure with hypoxia and hypercapnia (HCC) J96.01, J96.02     Acute respiratory failure with hypoxia and hypercapnia/vent dependent respiratory failure -patient self extubated today. He is saturating okay on 5 L oxygen by nasal cannula so well continue with it. Patient is increased work of breathing we will try BiPAP first prior to intubating him again. Shock possibly hypovolemic or septic. Patient had received IV resuscitation overnight. He is currently on Levophed, titrate pressors to keep map more than 65  Right lower lobe pneumonia possibly aspiration -continue with Zosyn. Blood culture shows no growth as yet. COVID-19 test has also been sent  Acute kidney injury -patient received IV fluid resuscitation yesterday at the time of his admission. His creatinine has gone up from yesterday. We will consult nephrology  Recent small bowel obstruction status post expiratory laparotomy lysis of adhesions and bowel resection on 7/7/2020. Patient was discharged on 7/13/2020. CT scan abdomen on 7/16/20 shows high-grade ileus versus developing small bowel obstruction. Patient has been empirically started on Zosyn and clindamycin.   Surgery is also consulted, follow-up with them  Anemia -patient's hemoglobin is lower today than yesterday. He has received IV fluid resuscitation. Monitor hemoglobin closely  History of diabetes mellitus -patient is on D5 half-normal saline drip due to episodes of hypoglycemia  History of hypertension  History of proximal atrial fibrillation    OTHER:  Glycemic Control. Glucose stabilizer per ICU protocol when on insulin drip. Maintain blood glucose 140-180. Replace electrolytes per ICU electrolyte replacement protocol  HOB >=30 degree elevation all the time. Aggressive pulmonary toileting. Incentive spirometry when appropriate. Aspiration precautions. Sepsis bundle and protocol followed. Deescalate antibiotic when appropriate. Vasopressor when appropriate with MAP goal >65 mmHg. Central Line bundle followed, remove when not needed. Large bore IV line or CVP when appropriate. Mathur bundle followed, remove mathur catheter when not critically ill. PT/OT eval and treat. OOB/IS when appropriate. Quality Care: Stress ulcer prophylaxis, DVT prophylaxis, HOB elevated, Infection control all reviewed and addressed. Events and notes from last 24 hours reviewed. Care plan discussed with nursing. D/w patient and family above medical problems and answered all questions to their satisfaction. CC TIME: >35 min     Medication Reviewed: Allergies   Allergen Reactions    Iodinated Contrast Media Swelling    Lisinopril Swelling      Past Medical History:   Diagnosis Date    Diabetes ([facility]     Hypertension       No past surgical history on file. Social History     Tobacco Use    Smoking status: Never Smoker    Smokeless tobacco: Never Used   Substance Use Topics    Alcohol use: Not on file      No family history on file. Prior to Admission medications    Medication Sig Start Date End Date Taking? Authorizing Provider   metoprolol tartrate (LOPRESSOR) 25 mg tablet Take 1 Tab by mouth every twelve (12) hours.  7/13/20   Linh Birch PA   aspirin 81 mg chewable tablet Take 1 Tab by mouth daily. 7/13/20   Jay Birch PA   Cetirizine 10 mg cap Take  by mouth. Andie, MD Kiah   benzonatate (TESSALON) 100 mg capsule Take 100 mg by mouth three (3) times daily as needed for Cough. Other, MD Kiah   atorvastatin (LIPITOR) 80 mg tablet Take 80 mg by mouth daily. Provider, Historical   glipiZIDE (GLUCOTROL) 10 mg tablet Take 10 mg by mouth two (2) times a day. Provider, Historical   hydroCHLOROthiazide (HYDRODIURIL) 25 mg tablet Take 25 mg by mouth daily. Provider, Historical   albuterol (PROVENTIL HFA) 90 mcg/actuation inhaler Take 1 Puff by inhalation daily. Provider, Historical     Current Facility-Administered Medications   Medication Dose Route Frequency    clindamycin (CLEOCIN) 600mg NS 100mL IVPB   600 mg IntraVENous Q8H    dextrose 5% - 0.45% NaCl with KCl 20 mEq/L infusion  125 mL/hr IntraVENous CONTINUOUS    piperacillin-tazobactam (ZOSYN) 3.375 g in 0.9% sodium chloride (MBP/ADV) 100 mL MBP  3.375 g IntraVENous Q6H    propofol (DIPRIVAN) 10 mg/mL infusion  10 mcg/kg/min IntraVENous TITRATE    NOREPINephrine (LEVOPHED) 8 mg in 0.9% NS 250ml infusion  0.5-30 mcg/min IntraVENous TITRATE    famotidine (PF) (PEPCID) 20 mg in 0.9% sodium chloride 10 mL injection  20 mg IntraVENous Q12H       Lines: All central lines examined by me. No signs of erythema, induration, discharge. Central Venous Catheter:  Triple Lumen 07/16/20 Right Subclavian (Active)   Central Line Being Utilized Yes 07/17/20 0610   Criteria for Appropriate Use Hemodynamically unstable, requiring monitoring lines, vasopressors, or volume resuscitation 07/17/20 0610   Site Assessment Clean, dry, & intact 07/17/20 0610   Infiltration Assessment 0 07/17/20 0610   Affected Extremity/Extremities Color distal to insertion site pink (or appropriate for race); Pulses palpable;Range of motion performed 07/17/20 0610   Date of Last Dressing Change 07/17/20 07/17/20 0610   Dressing Status Clean, dry, & intact 20 0610   Dressing Type Disk with Chlorhexadine gluconate (CHG); Transparent 20 0610   Action Taken Blood drawn;Dressing changed; Open ports on tubing capped 20 0610   Proximal Hub Color/Line Status White; Infusing;Patent 20 0610   Positive Blood Return (Medial Site) Yes 20 0610   Medial Hub Color/Line Status Blue; Infusing;Patent 20 0610   Positive Blood Return (Lateral Site) Yes 20 0610   Distal Hub Color/Line Status Meryl Havers; Infusing;Patent 20 0610   Positive Blood Return (Site #3) Yes 20 0610   Alcohol Cap Used Yes 20 0610   Drain(s):  Nasogastric Tube 20 (Active)   Site Assessment Clean, dry, & intact 20 0610   Securement Device Tape 20 0610   G Port Status Continuous Suction 20 0610   External Insertion Guido (cms) 63 cms 20 0610   Action Taken Placement verified (comment) 20 0610   Drainage Description Green 20 0610   Output (ml) 200 ml 20 1048     Airway:  Airway - Endotracheal Tube 20 Oral (Active)   Insertion Depth (cm) 25 cm 20 0851   Line Guido Lips 20 0851   Side Secured Left 20 0851   Cuff Pressure 28 cmH20 20 0543   Site Assessment Clean, dry, & intact 20 0851       Objective:  Vital Signs:    Visit Vitals  /48   Pulse (!) 108   Temp 100.3 °F (37.9 °C)   Resp 27   Wt 86.7 kg (191 lb 1.6 oz)   SpO2 98%   BMI 29.93 kg/m²      O2 Device: Nasal cannula  O2 Flow Rate (L/min): 5 l/min  Temp (24hrs), Av.8 °F (38.8 °C), Min:96.9 °F (36.1 °C), Max:102.8 °F (39.3 °C)      Intake/Output:   Last shift:      701 - 1900  In: -   Out: 200     Last 3 shifts: 07/15 1901 -  0700  In: 2927.5 [I.V.:2927.5]  Out: 700       Intake/Output Summary (Last 24 hours) at 2020 1202  Last data filed at 2020 1048  Gross per 24 hour   Intake 2927.52 ml   Output 900 ml   Net 2027.52 ml       Last 3 Recorded Weights in this Encounter    20 1505 20 0130 Weight: 82.6 kg (182 lb) 86.7 kg (191 lb 1.6 oz)     Physical Exam:     General/Neurology: Opens eyes to voice  Head:   Normocephalic, without obvious abnormality  Eye:   no scleral icterus, no pallor  Oral:   Mucus membranes moist  Neck:   Supple  Lung:   B/l air entry is decreased  Heart:   S1 S2 present. Abdomen/: Soft, incision site appears to be clean  Extremities:  Pedal edema present    Data:      Recent Results (from the past 24 hour(s))   CULTURE, BLOOD    Collection Time: 07/16/20  2:41 PM    Specimen: Blood   Result Value Ref Range    Special Requests: PERIPHERAL      Culture result: NO GROWTH AFTER 14 HOURS     CULTURE, BLOOD    Collection Time: 07/16/20  3:00 PM    Specimen: Blood   Result Value Ref Range    Special Requests: PERIPHERAL      Culture result: NO GROWTH AFTER 14 HOURS     CBC WITH AUTOMATED DIFF    Collection Time: 07/16/20  3:31 PM   Result Value Ref Range    WBC 7.3 4.6 - 13.2 K/uL    RBC 3.03 (L) 4.70 - 5.50 M/uL    HGB 9.8 (L) 13.0 - 16.0 g/dL    HCT 30.7 (L) 36.0 - 48.0 %    .3 (H) 74.0 - 97.0 FL    MCH 32.3 24.0 - 34.0 PG    MCHC 31.9 31.0 - 37.0 g/dL    RDW 12.2 11.6 - 14.5 %    PLATELET 252 928 - 236 K/uL    MPV 10.1 9.2 - 11.8 FL    NEUTROPHILS 78 (H) 40 - 73 %    LYMPHOCYTES 10 (L) 21 - 52 %    MONOCYTES 11 (H) 3 - 10 %    EOSINOPHILS 1 0 - 5 %    BASOPHILS 0 0 - 2 %    ABS. NEUTROPHILS 5.7 1.8 - 8.0 K/UL    ABS. LYMPHOCYTES 0.8 (L) 0.9 - 3.6 K/UL    ABS. MONOCYTES 0.8 0.05 - 1.2 K/UL    ABS. EOSINOPHILS 0.1 0.0 - 0.4 K/UL    ABS.  BASOPHILS 0.0 0.0 - 0.1 K/UL    DF AUTOMATED     PROTHROMBIN TIME + INR    Collection Time: 07/16/20  3:31 PM   Result Value Ref Range    Prothrombin time 15.1 11.5 - 15.2 sec    INR 1.2 0.8 - 1.2     METABOLIC PANEL, COMPREHENSIVE    Collection Time: 07/16/20  3:31 PM   Result Value Ref Range    Sodium 143 136 - 145 mmol/L    Potassium 3.5 3.5 - 5.5 mmol/L    Chloride 112 (H) 100 - 111 mmol/L    CO2 28 21 - 32 mmol/L    Anion gap 3 3.0 - 18 mmol/L    Glucose 52 (LL) 74 - 99 mg/dL    BUN 35 (H) 7.0 - 18 MG/DL    Creatinine 1.95 (H) 0.6 - 1.3 MG/DL    BUN/Creatinine ratio 18 12 - 20      GFR est AA 40 (L) >60 ml/min/1.73m2    GFR est non-AA 33 (L) >60 ml/min/1.73m2    Calcium 7.7 (L) 8.5 - 10.1 MG/DL    Bilirubin, total 0.3 0.2 - 1.0 MG/DL    ALT (SGPT) 17 16 - 61 U/L    AST (SGOT) 24 10 - 38 U/L    Alk. phosphatase 80 45 - 117 U/L    Protein, total 6.3 (L) 6.4 - 8.2 g/dL    Albumin 2.3 (L) 3.4 - 5.0 g/dL    Globulin 4.0 2.0 - 4.0 g/dL    A-G Ratio 0.6 (L) 0.8 - 1.7     NT-PRO BNP    Collection Time: 07/16/20  3:31 PM   Result Value Ref Range    NT pro-BNP 2,716 (H) 0 - 1,800 PG/ML   TROPONIN I    Collection Time: 07/16/20  3:31 PM   Result Value Ref Range    Troponin-I, QT 0.08 (H) 0.0 - 0.045 NG/ML   LIPASE    Collection Time: 07/16/20  3:31 PM   Result Value Ref Range    Lipase 882 (H) 73 - 393 U/L   MAGNESIUM    Collection Time: 07/16/20  3:31 PM   Result Value Ref Range    Magnesium 1.8 1.6 - 2.6 mg/dL   LACTIC ACID    Collection Time: 07/16/20  3:31 PM   Result Value Ref Range    Lactic acid 0.4 0.4 - 2.0 MMOL/L   GLUCOSE, POC    Collection Time: 07/16/20  5:02 PM   Result Value Ref Range    Glucose (POC) 151 (H) 70 - 110 mg/dL   GLUCOSE, POC    Collection Time: 07/16/20  5:12 PM   Result Value Ref Range    Glucose (POC) 174 (H) 70 - 110 mg/dL   POC G3    Collection Time: 07/16/20  8:53 PM   Result Value Ref Range    Device: VENT      FIO2 (POC) 100 %    pH (POC) 7.13 (LL) 7.35 - 7.45      pCO2 (POC) 77.8 (H) 35.0 - 45.0 MMHG    pO2 (POC) 75 (L) 80 - 100 MMHG    HCO3 (POC) 26.2 (H) 22 - 26 MMOL/L    sO2 (POC) 89 (L) 92 - 97 %    Base deficit (POC) 3 mmol/L    Mode ASSIST CONTROL      Tidal volume 500 ml    Set Rate 16 bpm    PEEP/CPAP (POC) 8 cmH2O    PIP (POC) 35      Allens test (POC) YES      Inspiratory Time 0.9 sec    Total resp.  rate 17      Site RIGHT RADIAL      Patient temp. 97.6      Specimen type (POC) ARTERIAL      Performed by Oscarmodesto Pabon     Volume control plus YES     CBC WITH AUTOMATED DIFF    Collection Time: 07/16/20  9:40 PM   Result Value Ref Range    WBC 8.0 4.6 - 13.2 K/uL    RBC 3.00 (L) 4.70 - 5.50 M/uL    HGB 9.7 (L) 13.0 - 16.0 g/dL    HCT 31.7 (L) 36.0 - 48.0 %    .7 (H) 74.0 - 97.0 FL    MCH 32.3 24.0 - 34.0 PG    MCHC 30.6 (L) 31.0 - 37.0 g/dL    RDW 12.3 11.6 - 14.5 %    PLATELET 184 693 - 262 K/uL    MPV 9.7 9.2 - 11.8 FL    NEUTROPHILS 87 (H) 40 - 73 %    LYMPHOCYTES 8 (L) 21 - 52 %    MONOCYTES 5 3 - 10 %    EOSINOPHILS 0 0 - 5 %    BASOPHILS 0 0 - 2 %    ABS. NEUTROPHILS 7.0 1.8 - 8.0 K/UL    ABS. LYMPHOCYTES 0.6 (L) 0.9 - 3.6 K/UL    ABS. MONOCYTES 0.4 0.05 - 1.2 K/UL    ABS. EOSINOPHILS 0.0 0.0 - 0.4 K/UL    ABS.  BASOPHILS 0.0 0.0 - 0.1 K/UL    DF AUTOMATED     METABOLIC PANEL, BASIC    Collection Time: 07/16/20  9:40 PM   Result Value Ref Range    Sodium 143 136 - 145 mmol/L    Potassium 4.2 3.5 - 5.5 mmol/L    Chloride 112 (H) 100 - 111 mmol/L    CO2 25 21 - 32 mmol/L    Anion gap 6 3.0 - 18 mmol/L    Glucose 203 (H) 74 - 99 mg/dL    BUN 37 (H) 7.0 - 18 MG/DL    Creatinine 2.40 (H) 0.6 - 1.3 MG/DL    BUN/Creatinine ratio 15 12 - 20      GFR est AA 31 (L) >60 ml/min/1.73m2    GFR est non-AA 26 (L) >60 ml/min/1.73m2    Calcium 6.9 (L) 8.5 - 10.1 MG/DL   TROPONIN I    Collection Time: 07/16/20  9:40 PM   Result Value Ref Range    Troponin-I, QT 0.09 (H) 0.0 - 0.045 NG/ML   CARDIAC PANEL,(CK, CKMB & TROPONIN)    Collection Time: 07/16/20  9:40 PM   Result Value Ref Range    CK - MB 3.2 <3.6 ng/ml    CK-MB Index 3.6 0.0 - 4.0 %    CK 90 39 - 308 U/L    Troponin-I, QT DUPLICATE REQUEST NG/ML   GLUCOSE, POC    Collection Time: 07/16/20 10:01 PM   Result Value Ref Range    Glucose (POC) 230 (H) 70 - 110 mg/dL   URINALYSIS W/ RFLX MICROSCOPIC    Collection Time: 07/16/20 10:25 PM   Result Value Ref Range    Color DARK YELLOW      Appearance CLOUDY      Specific gravity 1.022 1.005 - 1.030      pH (UA) 5.0 5.0 - 8.0      Protein 100 (A) NEG mg/dL    Glucose Negative NEG mg/dL    Ketone TRACE (A) NEG mg/dL    Bilirubin SMALL (A) NEG      Blood Negative NEG      Urobilinogen 1.0 0.2 - 1.0 EU/dL    Nitrites Negative NEG      Leukocyte Esterase Negative NEG     URINE MICROSCOPIC ONLY    Collection Time: 07/16/20 10:25 PM   Result Value Ref Range    WBC 0 to 3 0 - 4 /hpf    RBC 0 to 3 0 - 5 /hpf    Epithelial cells FEW 0 - 5 /lpf    Bacteria FEW (A) NEG /hpf    Amorphous Crystals 2+ (A) NEG   TYPE & SCREEN    Collection Time: 07/16/20 11:00 PM   Result Value Ref Range    Crossmatch Expiration 07/19/2020     ABO/Rh(D) B POSITIVE     Antibody screen NEG    LACTIC ACID    Collection Time: 07/17/20  1:29 AM   Result Value Ref Range    Lactic acid 1.4 0.4 - 2.0 MMOL/L   SARS-COV-2    Collection Time: 07/17/20  4:45 AM   Result Value Ref Range    SARS-CoV-2 PENDING     Specimen source Nasopharyngeal     CARDIAC PANEL,(CK, CKMB & TROPONIN)    Collection Time: 07/17/20  5:50 AM   Result Value Ref Range    CK - MB 3.1 <3.6 ng/ml    CK-MB Index 0.7 0.0 - 4.0 %     (H) 39 - 308 U/L    Troponin-I, QT 0.13 (H) 0.0 - 0.045 NG/ML   CBC WITH AUTOMATED DIFF    Collection Time: 07/17/20  5:50 AM   Result Value Ref Range    WBC 13.3 (H) 4.6 - 13.2 K/uL    RBC 2.67 (L) 4.70 - 5.50 M/uL    HGB 8.5 (L) 13.0 - 16.0 g/dL    HCT 27.1 (L) 36.0 - 48.0 %    .5 (H) 74.0 - 97.0 FL    MCH 31.8 24.0 - 34.0 PG    MCHC 31.4 31.0 - 37.0 g/dL    RDW 12.2 11.6 - 14.5 %    PLATELET 973 184 - 340 K/uL    MPV 10.1 9.2 - 11.8 FL    NEUTROPHILS PENDING %    LYMPHOCYTES PENDING %    MONOCYTES PENDING %    EOSINOPHILS PENDING %    BASOPHILS PENDING %    ABS. NEUTROPHILS PENDING K/UL    ABS. LYMPHOCYTES PENDING K/UL    ABS. MONOCYTES PENDING K/UL    ABS. EOSINOPHILS PENDING K/UL    ABS.  BASOPHILS PENDING K/UL    DF PENDING    METABOLIC PANEL, COMPREHENSIVE    Collection Time: 07/17/20  5:50 AM   Result Value Ref Range    Sodium 142 136 - 145 mmol/L Potassium 4.0 3.5 - 5.5 mmol/L    Chloride 113 (H) 100 - 111 mmol/L    CO2 21 21 - 32 mmol/L    Anion gap 8 3.0 - 18 mmol/L    Glucose 155 (H) 74 - 99 mg/dL    BUN 37 (H) 7.0 - 18 MG/DL    Creatinine 2.70 (H) 0.6 - 1.3 MG/DL    BUN/Creatinine ratio 14 12 - 20      GFR est AA 27 (L) >60 ml/min/1.73m2    GFR est non-AA 23 (L) >60 ml/min/1.73m2    Calcium 6.7 (L) 8.5 - 10.1 MG/DL    Bilirubin, total 0.4 0.2 - 1.0 MG/DL    ALT (SGPT) 15 (L) 16 - 61 U/L    AST (SGOT) 29 10 - 38 U/L    Alk. phosphatase 64 45 - 117 U/L    Protein, total 4.8 (L) 6.4 - 8.2 g/dL    Albumin 1.8 (L) 3.4 - 5.0 g/dL    Globulin 3.0 2.0 - 4.0 g/dL    A-G Ratio 0.6 (L) 0.8 - 1.7     LIPASE    Collection Time: 07/17/20  5:50 AM   Result Value Ref Range    Lipase 528 (H) 73 - 393 U/L   NT-PRO BNP    Collection Time: 07/17/20  5:50 AM   Result Value Ref Range    NT pro-BNP 3,903 (H) 0 - 1,800 PG/ML   POC G3    Collection Time: 07/17/20  5:53 AM   Result Value Ref Range    Device: VENT      FIO2 (POC) 80 %    pH (POC) 7.35 7.35 - 7.45      pCO2 (POC) 34.0 (L) 35.0 - 45.0 MMHG    pO2 (POC) 138 (H) 80 - 100 MMHG    HCO3 (POC) 18.6 (L) 22 - 26 MMOL/L    sO2 (POC) 99 (H) 92 - 97 %    Base deficit (POC) 7 mmol/L    Mode ASSIST CONTROL      Tidal volume 500 ml    Set Rate 20 bpm    PEEP/CPAP (POC) 8 cmH2O    PIP (POC) 27      Allens test (POC) YES      Inspiratory Time 0.9 sec    Total resp.  rate 20      Site RIGHT RADIAL      Specimen type (POC) ARTERIAL      Performed by Keya Hermes     Volume control plus YES           Chemistry   Recent Labs     07/17/20  0550 07/16/20 2140 07/16/20  1531   * 203* 52*    143 143   K 4.0 4.2 3.5   * 112* 112*   CO2 21 25 28   BUN 37* 37* 35*   CREA 2.70* 2.40* 1.95*   CA 6.7* 6.9* 7.7*   MG  --   --  1.8   AGAP 8 6 3   BUCR 14 15 18   AP 64  --  80   TP 4.8*  --  6.3*   ALB 1.8*  --  2.3*   GLOB 3.0  --  4.0   AGRAT 0.6*  --  0.6*       CBC w/Diff   Recent Labs     07/17/20 0550 07/16/20 2140 07/16/20  1531   WBC 13.3* 8.0 7.3   RBC 2.67* 3.00* 3.03*   HGB 8.5* 9.7* 9.8*   HCT 27.1* 31.7* 30.7*    356 383   GRANS PENDING 87* 78*   LYMPH PENDING 8* 10*   EOS PENDING 0 1       ABG    Recent Labs     07/17/20  0553 07/16/20  2053   PHI 7.35 7.13*   PCO2I 34.0* 77.8*   PO2I 138* 75*   HCO3I 18.6* 26.2*   FIO2I 80 100       Micro     Recent Labs     07/16/20  1500 07/16/20  1441   CULT NO GROWTH AFTER 14 HOURS NO GROWTH AFTER 14 HOURS     Recent Labs     07/16/20  1500 07/16/20  1441   CULT NO GROWTH AFTER 14 HOURS NO GROWTH AFTER 14 HOURS       CT (Most Recent)    Results from [place] encounter on 07/16/20   CT HEAD WO CONT    Narrative EXAM: CT HEAD W/O CONTRAST    INDICATION: Altered mental status/level of consciousness, unresponsive    COMPARISON: No prior comparison study. TECHNIQUE: Axial CT imaging of the head was performed without intravenous  contrast.  Additional coronal and sagittal reconstructions were performed. One  or more dose reduction techniques were used on this CT: automated exposure  control, adjustment of the mAs and/or kVp according to patient's size, and  iterative reconstruction techniques. The specific techniques utilized on this CT  exam have been documented in the patient's electronic medical record. Digital  Imaging and Communications in Medicine (DICOM) format image data are available  to nonaffiliated external healthcare facilities or entities on a secure, media  free, reciprocally searchable basis with patient authorization for at least a  12-month period after this study. _______________    FINDINGS:    VENTRICLES/EXTRA-AXIAL SPACES: The ventricles and sulci are mildly enlarged  consistent with diffuse volume loss. No extra-axial fluid collection is present. BRAIN PARENCHYMA: There is no evidence of acute intracranial hemorrhage, mass  effect, midline shift, or herniation. No definite CT evidence of acute cortical  infarct is seen.    White matter CT    ORBITS: Right ocular lens is absent most likely from prior cataract surgery. PARANASAL SINUSES: Mucoperiosteal thickening scattered throughout the paranasal  sinuses. Sclerotic wall thickening suggested in the partially visualized  maxillary antra from chronic sinusitis. No air-fluid levels are seen. MASTOID AIR CELLS: Visualized mastoid air cells are clear. OSSEOUS STRUCTURES: No fracture is seen. OTHER: Atherosclerotic calcifications are present.    _______________      Impression IMPRESSION:    1. No acute intracranial hemorrhage, mass effect, midline shift, or herniation. No definite CT evidence of acute cortical infarct is seen. Please note that  noncontrast head CT may be normal in early acute infarct. 2. Mild nonspecific white matter disease likely representing chronic small  vessel changes. Preliminary report provided by on-call radiology resident. XR (Most Recent). CXR reviewed by me and compared with previous CXR   Results from Hospital Encounter encounter on 07/16/20   XR CHEST PORT    Narrative EXAM: PORTABLE FRONTAL CHEST RADIOGRAPH    CLINICAL INDICATION/HISTORY: Central line placement. COMPARISON: 7/16/2020 at 2008 hours    TECHNIQUE: Portable frontal view of the chest    _______________    FINDINGS:    SUPPORT DEVICES:   -Right internal jugular venous catheter tip terminates in the distal SVC. -Endotracheal tube is in place with its tip 4.5 cm from the juan francisco. -EKG leads overlie the patient. HEART AND MEDIASTINUM: Normal heart size and mediastinal contours. Atherosclerotic calcifications present. LUNGS: Airspace opacities are again seen in both lungs with air bronchograms in  the left lower lobe. PLEURAL SPACES:No large pneumothorax. No large pleural effusion. Calcified  pleural plaques notably in the left lung. BONY THORAX AND SOFT TISSUES: No acute abnormality. _______________      Impression IMPRESSION:   1.   Interval right internal jugular venous catheter appears appropriately  positioned. No pneumothorax. 2.  Patchy airspace opacities in both lungs concerning for pneumonia, while  nonspecific this may be seen in the setting of COVID or other pneumonic process. High complexity decision making was performed during the evaluation of this patient at high risk for decompensation with multiple organ involvement     Above mentioned total time spent on reviewing the case/medical record/data/notes/EMR/patient examination/documentation/coordinating care with nurse/consultants, exclusive of procedures with complex decision making performed and > 50% time spent in face to face evaluation.     This note has been dictated using the dragon dictation system    Adonis Briggs MD  7/17/2020

## 2020-07-17 NOTE — ED NOTES
Vitals:  Patient Vitals for the past 12 hrs:   Temp Pulse Resp BP SpO2   07/16/20 2209  (!) 115 18 110/49    07/16/20 2206  (!) 117 20 112/56    07/16/20 2203  (!) 117 20 100/60 (!) 87 %   07/16/20 2200  (!) 117 17 (!) 87/52    07/16/20 2157  (!) 117 20 (!) 77/29    07/16/20 2154  (!) 115 19 102/55    07/16/20 2151  (!) 114 22 112/58    07/16/20 2148  (!) 113 18 102/64    07/16/20 2145  (!) 112 20 105/54    07/16/20 2142  (!) 110 20 103/49    07/16/20 2139  (!) 109 20 107/46 (!) 85 %   07/16/20 2136  (!) 105 21 107/44    07/16/20 2133  (!) 113 20 101/52    07/16/20 2130  (!) 122 20 (!) 148/35    07/16/20 2121  75 20  (!) 86 %   07/16/20 2120  74 20 (!) 42/31    07/16/20 2111  76 20 (!) 55/30 (!) 88 %   07/16/20 2100  (!) 114 20 (!) 66/12 93 %   07/16/20 2050  (!) 110 19 (!) 90/29 94 %   07/16/20 2020  (!) 102 16 98/51 100 %   07/16/20 2000  (!) 103 19 (!) 94/37 96 %   07/16/20 1945  95 16  99 %   07/16/20 1940  (!) 102 17 113/58    07/1935  97 16  100 %   07/16/20 1930  (!) 104 22 (!) 92/39 97 %   07/16/20 1920  (!) 106 (!) 31 127/51 93 %   07/16/20 1910  (!) 106 29 132/47 93 %   07/16/20 1905  (!) 106 (!) 31  92 %   07/16/20 1900  (!) 107 27 137/51 96 %   07/16/20 1850  (!) 108 24 133/54 95 %   07/16/20 1840  (!) 109 30 127/51 96 %   07/16/20 1830  (!) 113 27 157/54 95 %   07/16/20 1820  (!) 115 28 169/58 95 %   07/16/20 1815  (!) 115 (!) 32  96 %   07/16/20 1810  (!) 114 28 175/59 95 %   07/16/20 1800  (!) 114 24 170/59 95 %   07/16/20 1750    161/58    07/16/20 1710  (!) 115 24 136/68 97 %   07/16/20 1700  (!) 114  124/76 100 %   07/16/20 1505 97.7 °F (36.5 °C) (!) 105 30 127/72 98 %         Medications ordered:   Medications   lidocaine (XYLOCAINE) 2 % jelly (has no administration in time range)   lidocaine (XYLOCAINE) 10 mg/mL (1 %) injection 5 mL (0 mL Nebulization Held 7/16/20 1635)   sodium chloride (NS) flush 5-10 mL (10 mL IntraVENous Given 7/16/20 1832)   clindamycin (CLEOCIN) 600mg NS 100mL IVPB  (600 mg IntraVENous Given 7/16/20 1827)   dextrose 5% - 0.45% NaCl with KCl 20 mEq/L infusion (125 mL/hr IntraVENous New Bag 7/16/20 1819)   piperacillin-tazobactam (ZOSYN) 3.375 g in 0.9% sodium chloride (MBP/ADV) 100 mL MBP (0 g IntraVENous IV Completed 7/16/20 1925)   naloxone [facility]) injection 2 mg ( IntraVENous Canceled Entry 7/16/20 1838)   naloxone [facility]) injection 2 mg ( IntraVENous Canceled Entry 7/16/20 1840)   sodium chloride 0.9 % bolus infusion 1,000 mL (has no administration in time range)   propofol (DIPRIVAN) 10 mg/mL infusion (10 mcg/kg/min × 82.6 kg IntraVENous New Bag 7/16/20 2224)   fentaNYL citrate (PF) injection 50 mcg (50 mcg IntraVENous Given 7/16/20 2208)   midazolam (VERSED) injection 2 mg (has no administration in time range)   NOREPINephrine (LEVOPHED) 8 mg in 0.9% NS 250ml infusion (15 mcg/min IntraVENous Rate Change 7/16/20 2227)   sodium chloride 0.9 % bolus infusion 1,000 mL (0 mL IntraVENous IV Completed 7/16/20 1857)   ondansetron (ZOFRAN) injection 8 mg (8 mg IntraVENous Given 7/16/20 1615)   morphine injection 4 mg (4 mg IntraVENous Given 7/16/20 1615)   dextrose 10 % infusion (250 mL  New Bag 7/16/20 1700)   naloxone (NARCAN) 1 mg/mL injection (1 mg  Given 7/16/20 1707)   naloxone (NARCAN) 1 mg/mL injection (4 mg  Given 7/16/20 1842)   etomidate (AMIDATE) 2 mg/mL injection 20 mg (20 mg IntraVENous Given 7/16/20 1937)   succinylcholine (ANECTINE) injection 120 mg (120 mg IntraVENous Given 7/16/20 1937)   ketamine (KETALAR) 50 mg/mL injection 80 mg (80 mg IntraVENous Given 7/16/20 2046)   0.9% sodium chloride infusion 1,000 mL (1,000 mL IntraVENous New Bag 7/16/20 2126)   EPINEPHrine (ADRENALIN) 0.1 mg/mL syringe 0.5 mg (0.5 mg IntraVENous Given 7/16/20 2145)         Lab findings:  Recent Results (from the past 12 hour(s))   CULTURE, BLOOD    Collection Time: 07/16/20  2:41 PM    Specimen: Blood   Result Value Ref Range    Special Requests: PERIPHERAL      Culture result: PENDING    CULTURE, BLOOD    Collection Time: 07/16/20  3:00 PM    Specimen: Blood   Result Value Ref Range    Special Requests: PERIPHERAL      Culture result: PENDING    CBC WITH AUTOMATED DIFF    Collection Time: 07/16/20  3:31 PM   Result Value Ref Range    WBC 7.3 4.6 - 13.2 K/uL    RBC 3.03 (L) 4.70 - 5.50 M/uL    HGB 9.8 (L) 13.0 - 16.0 g/dL    HCT 30.7 (L) 36.0 - 48.0 %    .3 (H) 74.0 - 97.0 FL    MCH 32.3 24.0 - 34.0 PG    MCHC 31.9 31.0 - 37.0 g/dL    RDW 12.2 11.6 - 14.5 %    PLATELET 296 208 - 064 K/uL    MPV 10.1 9.2 - 11.8 FL    NEUTROPHILS 78 (H) 40 - 73 %    LYMPHOCYTES 10 (L) 21 - 52 %    MONOCYTES 11 (H) 3 - 10 %    EOSINOPHILS 1 0 - 5 %    BASOPHILS 0 0 - 2 %    ABS. NEUTROPHILS 5.7 1.8 - 8.0 K/UL    ABS. LYMPHOCYTES 0.8 (L) 0.9 - 3.6 K/UL    ABS. MONOCYTES 0.8 0.05 - 1.2 K/UL    ABS. EOSINOPHILS 0.1 0.0 - 0.4 K/UL    ABS. BASOPHILS 0.0 0.0 - 0.1 K/UL    DF AUTOMATED     PROTHROMBIN TIME + INR    Collection Time: 07/16/20  3:31 PM   Result Value Ref Range    Prothrombin time 15.1 11.5 - 15.2 sec    INR 1.2 0.8 - 1.2     METABOLIC PANEL, COMPREHENSIVE    Collection Time: 07/16/20  3:31 PM   Result Value Ref Range    Sodium 143 136 - 145 mmol/L    Potassium 3.5 3.5 - 5.5 mmol/L    Chloride 112 (H) 100 - 111 mmol/L    CO2 28 21 - 32 mmol/L    Anion gap 3 3.0 - 18 mmol/L    Glucose 52 (LL) 74 - 99 mg/dL    BUN 35 (H) 7.0 - 18 MG/DL    Creatinine 1.95 (H) 0.6 - 1.3 MG/DL    BUN/Creatinine ratio 18 12 - 20      GFR est AA 40 (L) >60 ml/min/1.73m2    GFR est non-AA 33 (L) >60 ml/min/1.73m2    Calcium 7.7 (L) 8.5 - 10.1 MG/DL    Bilirubin, total 0.3 0.2 - 1.0 MG/DL    ALT (SGPT) 17 16 - 61 U/L    AST (SGOT) 24 10 - 38 U/L    Alk.  phosphatase 80 45 - 117 U/L    Protein, total 6.3 (L) 6.4 - 8.2 g/dL    Albumin 2.3 (L) 3.4 - 5.0 g/dL    Globulin 4.0 2.0 - 4.0 g/dL    A-G Ratio 0.6 (L) 0.8 - 1.7     NT-PRO BNP    Collection Time: 07/16/20  3:31 PM   Result Value Ref Range    NT pro-BNP 2,716 (H) 0 - 1,800 PG/ML   TROPONIN I    Collection Time: 07/16/20  3:31 PM   Result Value Ref Range    Troponin-I, QT 0.08 (H) 0.0 - 0.045 NG/ML   LIPASE    Collection Time: 07/16/20  3:31 PM   Result Value Ref Range    Lipase 882 (H) 73 - 393 U/L   MAGNESIUM    Collection Time: 07/16/20  3:31 PM   Result Value Ref Range    Magnesium 1.8 1.6 - 2.6 mg/dL   LACTIC ACID    Collection Time: 07/16/20  3:31 PM   Result Value Ref Range    Lactic acid 0.4 0.4 - 2.0 MMOL/L   GLUCOSE, POC    Collection Time: 07/16/20  5:02 PM   Result Value Ref Range    Glucose (POC) 151 (H) 70 - 110 mg/dL   GLUCOSE, POC    Collection Time: 07/16/20  5:12 PM   Result Value Ref Range    Glucose (POC) 174 (H) 70 - 110 mg/dL   POC G3    Collection Time: 07/16/20  8:53 PM   Result Value Ref Range    Device: VENT      FIO2 (POC) 100 %    pH (POC) 7.13 (LL) 7.35 - 7.45      pCO2 (POC) 77.8 (H) 35.0 - 45.0 MMHG    pO2 (POC) 75 (L) 80 - 100 MMHG    HCO3 (POC) 26.2 (H) 22 - 26 MMOL/L    sO2 (POC) 89 (L) 92 - 97 %    Base deficit (POC) 3 mmol/L    Mode ASSIST CONTROL      Tidal volume 500 ml    Set Rate 16 bpm    PEEP/CPAP (POC) 8 cmH2O    PIP (POC) 35      Allens test (POC) YES      Inspiratory Time 0.9 sec    Total resp. rate 17      Site RIGHT RADIAL      Patient temp. 97.6      Specimen type (POC) ARTERIAL      Performed by Renetta Lout     Volume control plus YES     CBC WITH AUTOMATED DIFF    Collection Time: 07/16/20  9:40 PM   Result Value Ref Range    WBC 8.0 4.6 - 13.2 K/uL    RBC 3.00 (L) 4.70 - 5.50 M/uL    HGB 9.7 (L) 13.0 - 16.0 g/dL    HCT 31.7 (L) 36.0 - 48.0 %    .7 (H) 74.0 - 97.0 FL    MCH 32.3 24.0 - 34.0 PG    MCHC 30.6 (L) 31.0 - 37.0 g/dL    RDW 12.3 11.6 - 14.5 %    PLATELET 683 927 - 584 K/uL    MPV 9.7 9.2 - 11.8 FL    NEUTROPHILS 87 (H) 40 - 73 %    LYMPHOCYTES 8 (L) 21 - 52 %    MONOCYTES 5 3 - 10 %    EOSINOPHILS 0 0 - 5 %    BASOPHILS 0 0 - 2 %    ABS.  NEUTROPHILS 7.0 1.8 - 8.0 K/UL    ABS. LYMPHOCYTES 0.6 (L) 0.9 - 3.6 K/UL    ABS. MONOCYTES 0.4 0.05 - 1.2 K/UL    ABS. EOSINOPHILS 0.0 0.0 - 0.4 K/UL    ABS. BASOPHILS 0.0 0.0 - 0.1 K/UL    DF AUTOMATED     GLUCOSE, POC    Collection Time: 07/16/20 10:01 PM   Result Value Ref Range    Glucose (POC) 230 (H) 70 - 110 mg/dL       EKG interpretation by ED Physician:      X-Ray, CT or other radiology findings or impressions:  CT ABD PELV WO CONT   Final Result   IMPRESSION:   1. Prominent gastric distention and proximal small bowel dilation extending to   the right lower quadrant small bowel anastomosis concerning for high-grade ileus   versus developing small bowel obstruction. No definite transition point is   identified. No pneumatosis or portal venous gas. Recommend surgical   consultation. 2.  Free intraperitoneal fluid measures higher than simple fluid density. Limited evaluation for infection, peritonitis or leak given lack of contrast.    3.  New right lower lung consolidation may represent aspiration and/or   developing pneumonia. Recommend repeat imaging following completion of   appropriate medical therapy. 4.  Bladder wall thickening may be due to underdistention though superimposed   infection cannot be excluded. Recommend correlation for cystitis. 5.  Indeterminant 1.8 cm structure in the right lower kidney may represent a   proteinaceous or complex cyst however neoplasm cannot be entirely excluded. Contrast enhanced CT or MRI may be used for further characterization as   clinically indicated.       Findings discussed with Dr. Helen Burleson by Dr. Jsoe Martinez MD, PhD at 4:23 AM   on 7/16/2020                     CT HEAD WO CONT    (Results Pending)   XR ABD PORT  1 V    (Results Pending)   XR CHEST PORT    (Results Pending)   XR CHEST PORT    (Results Pending)       Progress notes, Consult notes or additional Procedure notes:     Central Line    Date/Time: 7/16/2020 10:07 PM  Performed by: Bibiana Olsen MD  Authorized by: Rosa Alarcon MD     Consent:     Consent obtained:  Emergent situation  Pre-procedure details:     Hand hygiene: Hand hygiene performed prior to insertion      Sterile barrier technique: All elements of maximal sterile technique followed      Skin preparation:  ChloraPrep  Procedure details:     Location:  R internal jugular    Patient position:  Trendelenburg    Procedural supplies:  Triple lumen    Catheter size:  7 Fr    Landmarks identified: yes      Ultrasound guidance: yes      Sterile ultrasound techniques: Sterile gel and sterile probe covers were used      Number of attempts:  2    Successful placement: yes    Post-procedure details:     Post-procedure:  Dressing applied and line sutured    Assessment:  Blood return through all ports, free fluid flow, no pneumothorax on x-ray and placement verified by x-ray    Patient tolerance of procedure: Tolerated well, no immediate complications    Patient had already been admitted I was made aware patient that he had developed acute respiratory failure and was intubated prior to my arrival.  Patient lacked appropriate IV access. Discussed with the hospitalist who contacted the ICU attending, Dr. Lazarus Rainwater, who gave additional orders. Unable to obtain IV access despite multiple attempts. Patient blood pressure was dropping acutely thus I gave order for 0.5 mg of epinephrine which improved his blood pressure. Dr. Lazarus Rainwater was approximately 30 minutes away from the hospital and patient was becoming increasingly unstable and at risk to have cardiac arrest thus emergent access was required so I placed a central venous catheter in the right internal jugular with the procedure note noted above    Repeat chest x-ray shows central line in appropriate position. There is now increased consolidation on the right side.   No pneumothorax per my interpretation    ED Critical Care Note    System at risk for life threatening failure: Neuro, cardiac, pulmonary, renal  Associated problems: Hypotension, hypoxia, respiratory failure, infection    Critical Care services provided: Bedside management of acute hypotension, documentation, bedside reassessment  Excluded procedures (time not included in critical care): Central line placement, pulse ox interpretation, x-ray interpretation    Total Critical Care Time (in minutes) 32          Reevaluation of patient:   Critical condition    Disposition:  Diagnosis:   1. SBO (small bowel obstruction) ([facility] [address]    2. Cystitis    3. Aspiration pneumonia of right lower lobe, unspecified aspiration pneumonia type [facility]    4. Hypoglycemia        Disposition: admit    Follow-up Information    None           Patient's Medications   Start Taking    No medications on file   Continue Taking    ALBUTEROL (PROVENTIL HFA) 90 MCG/ACTUATION INHALER    Take 1 Puff by inhalation daily. ASPIRIN 81 MG CHEWABLE TABLET    Take 1 Tab by mouth daily. ATORVASTATIN (LIPITOR) 80 MG TABLET    Take 80 mg by mouth daily. BENZONATATE (TESSALON) 100 MG CAPSULE    Take 100 mg by mouth three (3) times daily as needed for Cough. CETIRIZINE 10 MG CAP    Take  by mouth. GLIPIZIDE (GLUCOTROL) 10 MG TABLET    Take 10 mg by mouth two (2) times a day. HYDROCHLOROTHIAZIDE (HYDRODIURIL) 25 MG TABLET    Take 25 mg by mouth daily. METOPROLOL TARTRATE (LOPRESSOR) 25 MG TABLET    Take 1 Tab by mouth every twelve (12) hours.    These Medications have changed    No medications on file   Stop Taking    No medications on file

## 2020-07-17 NOTE — CONSULTS
[employer] [facility]  Pulmonary, Critical Care, and Sleep Medicine    Name: Vikas Amos MRN: [id number]  : 1935 Hospital: [facility]  Date: 2020       [facility] Initial Patient Consult                                              Consult requesting physician: Aniket Arita MD    Reason for Consult: Vent dependent respiratory failure    I have reviewed the flowsheet and notes. Events, vitals, medications and notes from last 24 hours reviewed. Care plan discussed with staff    Subjective:  2020     IMPRESSION and PLAN:  Patient Active Problem List   Diagnosis Code    Diabetes ([facility] [address] [facility] E11.9    SBO (small bowel obstruction) ([facility] [facility] K56.609    HTN (hypertension) I10    MARCI (acute kidney injury) ([facility] [facility] N17.9    Troponin level elevated R79.89    Paroxysmal atrial fibrillation (HCC) I48.0    Aspiration pneumonia ([facility] [address] [facility] J69.0    Hypoglycemia S07.5    Metabolic encephalopathy E90.09     Acute respiratory failure with hypoxia and hypercapnia/vent dependent respiratory failure -patient's ABG postintubation showed significant hypercapnia and the rate has been adjusted by the respiratory therapist.  The etiology of hypercapnia is likely pain medications. Per chart patient does not appear to have a history of COPD. Shocklikely hypovolemic. I will give a bolus of IV fluids. I will also start the patient on Levophed and titrate pressors to keep map more than 65. Repeat CBC now  Aspiration pneumonia versus atelectasis -patient's WBC count is normal today. Acute kidney injury -patient's BUN and creatinine have risen significantly from the discharge on 2020. This is likely secondary to dehydration. Hydrate with IV fluids and monitor  Recent small bowel obstruction status post expiratory laparotomy lysis of adhesions and bowel resection on 2020. Patient was discharged on 2020.   Surgery has already been called by the ER physician and recommended to admit to the hospital with supportive care. Patient's lactic acid level is only 0.4. Patient is a CT scan abdomen shows high-grade ileus versus developing small bowel obstruction. Patient has been empirically started on Zosyn and clindamycin. Encephalopathylikely etiology is morphine given for pain control. Patient had a CT scan head done which does not show any acute intracranial abnormalities  Anemia -monitor hemoglobin closely  History of diabetes mellitus -patient had an episode of hypoglycemia in the ER. He has not been started on D5 half-normal saline drip. Sedationpatient is on propofol. I will also add as needed fentanyl and Versed  History of hypertension  History of proximal atrial fibrillation    OTHER:  Glycemic Control. Glucose stabilizer per ICU protocol when on insulin drip. Maintain blood glucose 140-180. Replace electrolytes per ICU electrolyte replacement protocol  Ventilator bundle & Sedation protocol followed. Daily morning sedation holiday, assessment for readiness for SBT & weaning from ventilator; and then re-titrate if required. Aim to keep peak plateau pressure 43-39ZU H2O in ARDS patient. [place] tube to suction at 20-30 cm H2O, Maintain [place] tube with 5-10ml air every 4 hours. Chlorhexidine mouth washes and routine oral care every 4 hours. Stress ulcer and DVT prophylaxis. HOB >=30 degree elevation all the time. HOB >=30 degree elevation all the time. Aggressive pulmonary toileting. Incentive spirometry when appropriate. Aspiration precautions. Sepsis bundle and protocol followed. Follow serial lactic acid when >2, fluid resuscitation. Draw cultures before antibiotic. Follow cultures. Antibiotic choice. Administer antibiotic within 1 hr ICU admission and 3 hours of ED arrival. Deescalate antibiotic when appropriate. Vasopressor when appropriate with MAP goal >65 mmHg. Central Line bundle followed, remove when not needed. Large bore IV line or CVP when appropriate.    [place] bundle followed, remove mathur catheter when not critically ill. Skin & Wound care. PT/OT eval and treat. OOB/IS when appropriate. Further recommendations will be based on the patient's response to recommended treatment and results of the investigation ordered. Quality Care: Stress ulcer prophylaxis, DVT prophylaxis, HOB elevated, Infection control all reviewed and addressed. Events and notes from last 24 hours reviewed. Care plan discussed with nursing. See my orders for detail. CC TIME: >90 min   Further management depending on test results and work up as outlined above. History of Present Illness:    Marisol Montenegro has been seen and evaluated in ER. Patient is a 80 y.o. male with PMHx significant for recent admission with abdominal surgery who came back to the hospital with complaints of diffuse abdominal pain, nausea, vomiting. The pain is waxing and waning in nature. It is diffuse. While in the ER patient was given morphine 4 mg for pain control. Around 5 PM patient became diaphoretic and was not responding to painful stimuli. Patient was given Narcan and the blood sugars were checked. Patient did wake up slightly by that per RN and was sent for the CT scan. Patient also had increased oxygen carbon and was placed on nonrebreather mask. Around 730 patient was intubated by the ER MD.  Per RT note patient did not have any cough or gag reflex. I was called by the ER nurse to come in and place a central line. I did so promptly however by the time I got to the ER, the ER physician had already placed a central line. On my assessment patient is sedated on the ventilator. No response to voice at this time. Patient is moving all extremities and is requiring sedation by the nurses    The patient is critically ill and can not provide additional history due to Ventilated.    History taken from chart, , RN    Review of Systems:  Review of systems not obtained due to patient factors. Medication Reviewed      Allergies   Allergen Reactions    Iodinated Contrast Media Swelling    Lisinopril Swelling      Past Medical History:   Diagnosis Date    Diabetes ([address]     Hypertension       No past surgical history on file. Social History     Tobacco Use    Smoking status: Never Smoker    Smokeless tobacco: Never Used   Substance Use Topics    Alcohol use: Not on file      No family history on file. Prior to Admission medications    Medication Sig Start Date End Date Taking? Authorizing Provider   metoprolol tartrate (LOPRESSOR) 25 mg tablet Take 1 Tab by mouth every twelve (12) hours. 7/13/20   ReprogleGodwin PA   aspirin 81 mg chewable tablet Take 1 Tab by mouth daily. 7/13/20   ReprogleGodwin PA   Cetirizine 10 mg cap Take  by mouth. Other, MD Kiah   benzonatate (TESSALON) 100 mg capsule Take 100 mg by mouth three (3) times daily as needed for Cough. Other, MD Kiah   atorvastatin (LIPITOR) 80 mg tablet Take 80 mg by mouth daily. Provider, Historical   glipiZIDE (GLUCOTROL) 10 mg tablet Take 10 mg by mouth two (2) times a day. Provider, Historical   hydroCHLOROthiazide (HYDRODIURIL) 25 mg tablet Take 25 mg by mouth daily. Provider, Historical   albuterol (PROVENTIL HFA) 90 mcg/actuation inhaler Take 1 Puff by inhalation daily.     Provider, Historical     Current Facility-Administered Medications   Medication Dose Route Frequency    lidocaine (XYLOCAINE) 10 mg/mL (1 %) injection 5 mL  5 mL Nebulization ONCE    clindamycin (CLEOCIN) 600mg NS 100mL IVPB   600 mg IntraVENous Q8H    dextrose 5% - 0.45% NaCl with KCl 20 mEq/L infusion  125 mL/hr IntraVENous CONTINUOUS    piperacillin-tazobactam (ZOSYN) 3.375 g in 0.9% sodium chloride (MBP/ADV) 100 mL MBP  3.375 g IntraVENous Q6H    naloxone (NARCAN) injection 2 mg  2 mg IntraVENous ONCE    naloxone (NARCAN) injection 2 mg  2 mg IntraVENous ONCE    sodium chloride 0.9 % bolus infusion 1,000 mL  1,000 mL IntraVENous ONCE    propofol (DIPRIVAN) 10 mg/mL infusion  10 mcg/kg/min IntraVENous TITRATE    0.9% sodium chloride infusion 1,000 mL  1,000 mL IntraVENous ONCE     Peripheral Intravenous Line:  Peripheral IV 20 Right Forearm (Active)   Site Assessment Clean, dry, & intact 20 1537   Phlebitis Assessment 0 20 1537   Infiltration Assessment 0 20 1537   Dressing Status Clean, dry, & intact 20 1537   Dressing Type Transparent 20 153   Hub Color/Line Status Pink;Flushed 20 153   Action Taken Blood drawn 20       Peripheral IV 20 Left Hand (Active)        Drain(s):  Nasogastric Tube 20 (Active)     Airway:  Airway - Endotracheal Tube 20 Oral (Active)   Insertion Depth (cm) 25 cm 20   Line Guido Lips 20   Side Secured Centered 20   Site Assessment Clean, dry, & intact 20       Objective:  Vital Signs:    Visit Vitals  /68   Pulse 95   Temp 97.7 °F (36.5 °C)   Resp 16   Wt 82.6 kg (182 lb)   SpO2 99%   BMI 28.51 kg/m²      O2 Device: Ventilator  O2 Flow Rate (L/min): 2 l/min  Temp (24hrs), Av.7 °F (36.5 °C), Min:97.7 °F (36.5 °C), Max:97.7 °F (36.5 °C)      Intake/Output:   Last shift:      1901 -  07  In: 100 [I.V.:100]  Out: -       Intake/Output Summary (Last 24 hours) at 2020  Last data filed at 2020  Gross per 24 hour   Intake 1100 ml   Output    Net 1100 ml       Ventilator Settings:  Mode Rate Tidal Volume Pressure FiO2 PEEP  Assist control, VC+   500 ml    100 % 8 cm H20    Peak airway pressure: 37 cm H2O   Plateau pressure:    Tidal volume:   Minute ventilation: 7.55 l/min  SPO2     ARDS network Guidelines: Lung protective strategy and Plateau pressure goals less than or equal to 30      Physical Exam:   General/Neurology: Sedated on the ventilator  Head:   Normocephalic, without obvious abnormality  Eye:   Pallor present  Oral:   Mucus membranes moist  Neck:   Supple  Lung:   B/l air entry is decreased  Heart:   S1 S2 present. Abdomen: Soft, incision site appears to be clean  Extremities:  Pedal edema present    Data:      Recent Results (from the past 24 hour(s))   CULTURE, BLOOD    Collection Time: 07/16/20  2:41 PM    Specimen: Blood   Result Value Ref Range    Special Requests: PERIPHERAL      Culture result: PENDING    CULTURE, BLOOD    Collection Time: 07/16/20  3:00 PM    Specimen: Blood   Result Value Ref Range    Special Requests: PERIPHERAL      Culture result: PENDING    CBC WITH AUTOMATED DIFF    Collection Time: 07/16/20  3:31 PM   Result Value Ref Range    WBC 7.3 4.6 - 13.2 K/uL    RBC 3.03 (L) 4.70 - 5.50 M/uL    HGB 9.8 (L) 13.0 - 16.0 g/dL    HCT 30.7 (L) 36.0 - 48.0 %    .3 (H) 74.0 - 97.0 FL    MCH 32.3 24.0 - 34.0 PG    MCHC 31.9 31.0 - 37.0 g/dL    RDW 12.2 11.6 - 14.5 %    PLATELET 303 667 - 256 K/uL    MPV 10.1 9.2 - 11.8 FL    NEUTROPHILS 78 (H) 40 - 73 %    LYMPHOCYTES 10 (L) 21 - 52 %    MONOCYTES 11 (H) 3 - 10 %    EOSINOPHILS 1 0 - 5 %    BASOPHILS 0 0 - 2 %    ABS. NEUTROPHILS 5.7 1.8 - 8.0 K/UL    ABS. LYMPHOCYTES 0.8 (L) 0.9 - 3.6 K/UL    ABS. MONOCYTES 0.8 0.05 - 1.2 K/UL    ABS. EOSINOPHILS 0.1 0.0 - 0.4 K/UL    ABS.  BASOPHILS 0.0 0.0 - 0.1 K/UL    DF AUTOMATED     PROTHROMBIN TIME + INR    Collection Time: 07/16/20  3:31 PM   Result Value Ref Range    Prothrombin time 15.1 11.5 - 15.2 sec    INR 1.2 0.8 - 1.2     METABOLIC PANEL, COMPREHENSIVE    Collection Time: 07/16/20  3:31 PM   Result Value Ref Range    Sodium 143 136 - 145 mmol/L    Potassium 3.5 3.5 - 5.5 mmol/L    Chloride 112 (H) 100 - 111 mmol/L    CO2 28 21 - 32 mmol/L    Anion gap 3 3.0 - 18 mmol/L    Glucose 52 (LL) 74 - 99 mg/dL    BUN 35 (H) 7.0 - 18 MG/DL    Creatinine 1.95 (H) 0.6 - 1.3 MG/DL    BUN/Creatinine ratio 18 12 - 20      GFR est AA 40 (L) >60 ml/min/1.73m2    GFR est non-AA 33 (L) >60 ml/min/1.73m2    Calcium 7.7 (L) 8.5 - 10.1 MG/DL Bilirubin, total 0.3 0.2 - 1.0 MG/DL    ALT (SGPT) 17 16 - 61 U/L    AST (SGOT) 24 10 - 38 U/L    Alk. phosphatase 80 45 - 117 U/L    Protein, total 6.3 (L) 6.4 - 8.2 g/dL    Albumin 2.3 (L) 3.4 - 5.0 g/dL    Globulin 4.0 2.0 - 4.0 g/dL    A-G Ratio 0.6 (L) 0.8 - 1.7     NT-PRO BNP    Collection Time: 07/16/20  3:31 PM   Result Value Ref Range    NT pro-BNP 2,716 (H) 0 - 1,800 PG/ML   TROPONIN I    Collection Time: 07/16/20  3:31 PM   Result Value Ref Range    Troponin-I, QT 0.08 (H) 0.0 - 0.045 NG/ML   LIPASE    Collection Time: 07/16/20  3:31 PM   Result Value Ref Range    Lipase 882 (H) 73 - 393 U/L   MAGNESIUM    Collection Time: 07/16/20  3:31 PM   Result Value Ref Range    Magnesium 1.8 1.6 - 2.6 mg/dL   LACTIC ACID    Collection Time: 07/16/20  3:31 PM   Result Value Ref Range    Lactic acid 0.4 0.4 - 2.0 MMOL/L   GLUCOSE, POC    Collection Time: 07/16/20  5:02 PM   Result Value Ref Range    Glucose (POC) 151 (H) 70 - 110 mg/dL   GLUCOSE, POC    Collection Time: 07/16/20  5:12 PM   Result Value Ref Range    Glucose (POC) 174 (H) 70 - 110 mg/dL   POC G3    Collection Time: 07/16/20  8:53 PM   Result Value Ref Range    Device: VENT      FIO2 (POC) 100 %    pH (POC) 7.13 (LL) 7.35 - 7.45      pCO2 (POC) 77.8 (H) 35.0 - 45.0 MMHG    pO2 (POC) 75 (L) 80 - 100 MMHG    HCO3 (POC) 26.2 (H) 22 - 26 MMOL/L    sO2 (POC) 89 (L) 92 - 97 %    Base deficit (POC) 3 mmol/L    Mode ASSIST CONTROL      Tidal volume 500 ml    Set Rate 16 bpm    PEEP/CPAP (POC) 8 cmH2O    PIP (POC) 35      Allens test (POC) YES      Inspiratory Time 0.9 sec    Total resp.  rate 17      Site RIGHT RADIAL      Patient temp. 97.6      Specimen type (POC) ARTERIAL      Performed by Darlene Martinez     Volume control plus YES           Chemistry   Recent Labs     07/16/20  1531   GLU 52*      K 3.5   *   CO2 28   BUN 35*   CREA 1.95*   CA 7.7*   MG 1.8   AGAP 3   BUCR 18   AP 80   TP 6.3*   ALB 2.3*   GLOB 4.0   AGRAT 0.6*       CBC w/Diff   Recent Labs     07/16/20  1531   WBC 7.3   RBC 3.03*   HGB 9.8*   HCT 30.7*      GRANS 78*   LYMPH 10*   EOS 1       ABG    Recent Labs     07/16/20  2053   PHI 7.13*   PCO2I 77.8*   PO2I 75*   HCO3I 26.2*   FIO2I 100       Micro     Recent Labs     07/16/20  1500 07/16/20  1441   CULT PENDING PENDING     Recent Labs     07/16/20  1500 07/16/20  1441   CULT PENDING PENDING       CT (Most Recent)    Results from [place] encounter on 07/16/20   CT ABD PELV WO CONT    Narrative EXAM: CT ABD PELV WO CONT    CLINICAL INDICATION/HISTORY: abd pain, post-op. Shortness of breath and  increased abdominal pain, recent abdominal surgery 7/3/2020. COMPARISON: CT: 7/3/2020. TECHNIQUE:   CT of the abdomen and pelvis without intravenous contrast  administration. Coronal and sagittal reformats were generated and reviewed. One or more dose reduction techniques were used on this CT: automated exposure  control, adjustment of the mAs and/or kVp according to patient size, and  iterative reconstruction techniques. The specific techniques used on this CT  exam have been documented in the patient's electronic medical record. Digital  Imaging and Communications in Medicine (DICOM) format image data are available  to nonaffiliated external healthcare facilities or entities on a secure, media  free, reciprocally searchable basis with patient authorization for at least a  12-month period after this study. _______________    FINDINGS: Suboptimal evaluation given lack of intravenous contrast.    LOWER THORAX: Heart is normal size with aortic calcifications. Atherosclerosis  including the coronary arteries. Bilateral dependent opacities likely reflect  atelectasis. Diffuse bronchial wall thickening and bronchiectasis, similar to  prior study, may be sequela of chronic aspiration. Consolidative airspace  opacities in the right lower lobe (series 3 image 11) are new since prior study.   No pleural effusion. Calcified pleural plaques are again seen in the left lung  base. LIVER: Normal contours. No suspicious liver lesions on this unenhanced CT. GALLBLADDER AND BILIARY: Cholelithiasis. No biliary dilation. SPLEEN: Normal.    PANCREAS: Normal.    ADRENALS: Normal.    KIDNEYS: Bilateral fluid density renal cyst largest exophytic from the right  upper kidney contains internal septations, incompletely characterized given lack  of contrast. Low-attenuation structure in the right lower kidney measures 1.8 x  1.7 cm, 22 HU (series 3 image 91). No perinephric stranding, radiopaque stone or  hydronephrosis. BLADDER: Circumferential wall thickening throughout the partially distended  bladder. LYMPH NODES: No mesenteric or retroperitoneal lymphadenopathy. GI TRACT: The stomach is dilated with diffusely dilated proximal small bowel  with air-fluid levels that extends to the right lower quadrant side to side  small bowel anastomosis, small bowel distal to the anastomosis is decompressed. There is air and fluid within the nondilated colon. Prior partial sigmoidectomy;  distal colonic anastomosis appears normal.    PELVIC ORGANS: Fiducial markers are again seen in the prostate. VASCULATURE: Normal caliber lower thoracic and abdominal aorta. Atherosclerotic calcifications throughout the aorta and branch vessels. IVC is  flattened suggesting hypovolemia. OTHER: No free intraperitoneal air. Small volume. Peritoneal fluid measures  higher than simple fluid density. OSSEOUS STRUCTURES:  Moderate degenerative changes in the lumbar spine and both  hips. No acute fracture, dislocation, or destructive osseous lesion. _______________      Impression IMPRESSION:  1. Prominent gastric distention and proximal small bowel dilation extending to  the right lower quadrant small bowel anastomosis concerning for high-grade ileus  versus developing small bowel obstruction.  No definite transition point is  identified. No pneumatosis or portal venous gas. Recommend surgical  consultation. 2.  Free intraperitoneal fluid measures higher than simple fluid density. Limited evaluation for infection, peritonitis or leak given lack of contrast.   3.  New right lower lung consolidation may represent aspiration and/or  developing pneumonia. Recommend repeat imaging following completion of  appropriate medical therapy. 4.  Bladder wall thickening may be due to underdistention though superimposed  infection cannot be excluded. Recommend correlation for cystitis. 5.  Indeterminant 1.8 cm structure in the right lower kidney may represent a  proteinaceous or complex cyst however neoplasm cannot be entirely excluded. Contrast enhanced CT or MRI may be used for further characterization as  clinically indicated. Findings discussed with Dr. Tiffany Rosas by Dr. Chichi Mueller MD, PhD at 4:23 AM  on 7/16/2020                 XR (Most Recent). CXR reviewed by me and compared with previous CXR   Results from [place] encounter on 07/03/20   XR ABD FLAT/ ERECT    Narrative EXAM: ABDOMINAL RADIOGRAPH    CLINICAL INDICATION/HISTORY: Follow up on SBO  -Additional: None    COMPARISON: 7/4/2020    TECHNIQUE: Abdominal radiographs.    _______________    FINDINGS:    BOWEL GAS PATTERN: Enteric feeding tube tip within the stomach. Persistent  gaseous distention of multiple small bowel loops. Colonic gas. BONES: Unremarkable. OTHER: None.    _______________      Impression IMPRESSION:    Persistent gaseous distention of multiple small bowel loops.           High complexity decision making was performed during the evaluation of this patient at high risk for decompensation with multiple organ involvement     Above mentioned total time spent on reviewing the case/medical record/data/notes/EMR/patient examination/documentation/coordinating care with nurse/consultants, exclusive of procedures with complex decision making performed and > 50% time spent in face to face evaluation.     This note has been dictated using the dragon dictation system    Jose R Willis MD  7/16/2020

## 2020-07-17 NOTE — PROGRESS NOTES
1035 self extubated  1820  Dropped O2 saturations, very shallow  agonal type respirations, eyes open staring into space. Awakens as if startled to vigorous stimulus but slow to respond to commands; oral suctioned, NT suctioned for mod amt blood tinged yellow sputum, spontaneous recovery of SpO2 to 97%. Antonio to noxious stimulus, strong cough reflex when NT suctioned. Tachycardia noted. 1900  Bedside and Verbal shift change report given to Ting Houser RN (oncoming nurse) by Marino Alberto RN (offgoing nurse). Report included the following information ST.   Bedside and Verbal shift change report given to Ting Houser RN (oncoming nurse) by Jason Shearer RN (offgoing nurse). Report included the following information SBAR, Kardex, Intake/Output, MAR, Accordion, Recent Results, Cardiac Rhythm ST, Alarm Parameters  and Quality Measures.

## 2020-07-17 NOTE — PROGRESS NOTES
Called to ED for intubation of patient with no cough, no gag. Patient found on NRB at 15LPM with SPO2 93%. Respirations normal, but shallow. Patient does not respond to stimulation. Patient intubated with 7. 5ETT on the first attempt, secured at 25cm at the lip. ETCO2 positive, breath sounds ausculted bilaterally. Patient suctioned for a small amount of thick, pale yellow secretions via ET tube. Alarms are set and functioning. Emergency equipment is available at the bedside. Will continue to monitor. 2045: Post-intubation ABG drawn. Results d/w respiratory acidosis. AC rate increased to 20. Critical results to Dr. Lenny Noyola. 2134: Called to ED for desaturation to the 70s. On my arrival, SPO2 has corrected and SPO2 is currently 95% on 100%. Patient is hypotensive. Dr. Lenny Noyola at bedside inserting central line. 0500: Called for transport to ICU. Patient transported with continuous monitoring. Vital signs remained stable throughout transport. Report given to receiving RT.             0600: AM ABG drawn. Rate decreased back to 16 following ABG (PaCO2 34). FIO2 weaned to 60%.

## 2020-07-17 NOTE — ED NOTES
Called report to floor nurse who states she is going to call Hospitalist to see if patient needs to be a COVID rule out being that he came from SNF. Nurse states she will call ED back if pt will not be switched to COVID r/o.

## 2020-07-17 NOTE — PROGRESS NOTES
Problem: Discharge Planning  Goal: *Discharge to safe environment  Outcome: Progressing Towards Goal  Plan is tbd    Reason for Readmission:    Acute abd pain 20%          RUR Score/Risk Level:   20%      PCP: Randy Johnson First and Last name:     Name of Practice:    Are you a current patient: Yes/No: yes   Approximate date of last visit:  It was this year- virtual visit- on smart TV   Can you participate in a virtual visit with your PCP:     Is a Care Conference indicated:       Did you attend your follow up appointment (s): If not, why not:  Just left hospital a couple days ago       Resources/supports as identified by patient/family:   wife     Top Challenges facing patient (as identified by patient/family and CM): Finances/Medication cost?       Transportation      Wife- used stretcher transport to get to [facility] or lack thereof? Living arrangements? Self-care/ADLs/Cognition? Had been independent with ADL before last admission       Current Advanced Directive/Advance Care Plan:  He refused last week           Plan for utilizing home health:   TBD             Transition of Care Plan:    Based on readmission, the patient's previous Plan of Care   has been evaluated and/or modified. The current Transition of Care Plan is:     TBD-Chart reviewed . Patient left this hospital just a few days ago. He went to [facility]. for snf. Then this bad abdominal pain started and he came back. He went unresponsive, was intubated and went to ICU. Covid testing being done. Pt extubated himself and is on nasal canula. Plan is tbd - hopefully back to [facility]- depending on covid testing and pt's medical condition. Emily Ang.  CHUCKIE William  [facility]  [phone], Pager [phone]  [email or web address]

## 2020-07-17 NOTE — PROGRESS NOTES
Internal Medicine Progress Note        NAME: Jordy Nelson   :  1935  MRM:  [id number]    Date/Time: 2020        ASSESSMENT/PLAN:      # Metabolic encephalopathy. Improved   in ER initially thought due to hypoglycemia but after correction of blood sugar he continued to be unresponsive. Other possibility due to 4 mg IV morphine given for pains by ER staff. Given IV Narcan doses. CT head no acute lesion. follow progress. # SBO (small bowel obstruction) ([facility] (7/3/2020). Admitted to the hospital for further management. Surgery consulted. N.p.o.  IV fluid. Antibiotic. # Short time off intubation due to respiratory failure due to altered mental state and possibly due to pneumonia. Overnight patient extubated himself. Did not intubated as maintained good saturations and he is more alert currently      #  Asp[facility] pneumonia ([facility] (2020). CT scan with possible right lower lobe pneumonia. IV Zosyn. Maintain oxygenation. Follow chest x-ray    # Hypotension. IV pressors as needed. Hold blood pressure medicine. #   Diabetes uncontrolled with hypoglycemia. .  Blood sugar improved  Hypoglycemia likely due to poor p.o. intake. Improved in the ER with IV glucose infusion. We will continue close monitoring. Provide SSI, hypoglycemia protocol and frequent Accu checks. Provide SSI, hypoglycemia protocol and frequent Accu checks. Education,  diabetic educator  . Diabetic Diet       #  HTN (hypertension) (7/3/2020). Control blood pressure. Currently of his home blood pressure medication     #   MARCI (acute kidney injury) ([facility] (7/3/2020). IV fluid   Monitor Renal function and other labs as indicated. Avoid nephrotoxins , iv Contrast, NSAID. Renally dosing medications. Monitor urine out put.          #  Troponin level elevated (7/3/2020). EKG. Telemetry. Serial troponin.     # History of paroxysmal atrial fibrillation ([facility] (2020).   Telemetry bed      # Right kidney mass. Need follow-up as an outpatient for further management.         -DVT prophylaxis : Heparin.   - Code Status : Full    IP CONSULT TO HOSPITALIST  IP CONSULT TO GENERAL SURGERY    Lab Review:     Recent Labs     07/17/20  0550 07/16/20  2140 07/16/20  1531   WBC 13.3* 8.0 7.3   HGB 8.5* 9.7* 9.8*   HCT 27.1* 31.7* 30.7*    356 383     Recent Labs     07/17/20  0550 07/16/20  2140 07/16/20  1531    143 143   K 4.0 4.2 3.5   * 112* 112*   CO2 21 25 28   * 203* 52*   BUN 37* 37* 35*   CREA 2.70* 2.40* 1.95*   CA 6.7* 6.9* 7.7*   MG  --   --  1.8   ALB 1.8*  --  2.3*   TBILI 0.4  --  0.3   ALT 15*  --  17   INR  --   --  1.2     Lab Results   Component Value Date/Time    Glucose (POC) 230 (H) 07/16/2020 10:01 PM    Glucose (POC) 174 (H) 07/16/2020 05:12 PM    Glucose (POC) 151 (H) 07/16/2020 05:02 PM    Glucose (POC) 146 (H) 07/13/2020 11:53 AM    Glucose (POC) 157 (H) 07/13/2020 01:23 AM     No results for input(s): PH, PCO2, PO2, HCO3, FIO2 in the last 72 hours. Recent Labs     07/16/20  1531   INR 1.2       No results found for: SDES  Lab Results   Component Value Date/Time    Culture result: NO GROWTH AFTER 14 HOURS 07/16/2020 03:00 PM    Culture result: NO GROWTH AFTER 14 HOURS 07/16/2020 02:41 PM       All Cardiac Markers in the last 24 hours:   Lab Results   Component Value Date/Time     (H) 07/17/2020 02:00 PM     (H) 07/17/2020 05:50 AM    CPK 90 07/16/2020 09:40 PM    CKMB 3.0 07/17/2020 02:00 PM    CKMB 3.1 07/17/2020 05:50 AM    CKMB 3.2 07/16/2020 09:40 PM    CKND1 0.5 07/17/2020 02:00 PM    CKND1 0.7 07/17/2020 05:50 AM    CKND1 3.6 07/16/2020 09:40 PM    TROIQ 0.17 (H) 07/17/2020 02:00 PM    TROIQ 0.13 (H) 07/17/2020 05:50 AM    TROIQ 0.09 (H) 07/16/2020 49:16 PM    TROIQ DUPLICATE REQUEST 03/14/0697 09:40 PM           Intervals noted   Pt s/e @ bedside     Subjective:     Chief Complaint:      Denying complaints  Coherent.     ROS:  (bold if positive,otherwise negative)    Fever/chills ,  Dysuria   Cough , Sputum , SOB/TREVIÑO , Chest Pain     Diarrhea ,Nausea/Vomit , Abd Pain , Constipation        Objective:     Vitals:  Last 24hrs VS reviewed since prior progress note. Most recent are:    Visit Vitals  /41   Pulse (!) 123   Temp 100.3 °F (37.9 °C)   Resp 24   Wt 86.7 kg (191 lb 1.6 oz)   SpO2 98%   BMI 29.93 kg/m²     SpO2 Readings from Last 6 Encounters:   07/17/20 98%   07/13/20 97%   01/31/19 100%    O2 Flow Rate (L/min): 5 l/min       Intake/Output Summary (Last 24 hours) at 7/17/2020 1740  Last data filed at 7/17/2020 1048  Gross per 24 hour   Intake 2927.52 ml   Output 900 ml   Net 2027.52 ml          Physical Exam:   Gen:  Appear stated age, Well-developed,   in no acute distress  HEENT:  Head atraumatic, normocephalic , hearing intact to voice, moist mucous membranes. Neck:   Trachea midline , No apparent JVD, Supple   Resp: Poor inspiratory effort but lungs are clear overall. No accessory muscle use,Bilateral BS present   Card: + murmur, normal S1, S2 without Gallop .+ lower leg peripheral edema. Abd: Lower abdomen midline scar with staples on. No active discharges from the surgical wound. Abdomen slightly tense. Reduced bowel sounds. Unable to assess tenderness as patient is unresponsive. Musc:  No cyanosis or clubbing. Skin:  No rashes or ulcers, skin turgor is good. Neuro: Unresponsive to sternal rub and voices.   Unable to follows commands appropriately     Medications Reviewed: (see below)    Lab Data Reviewed: (see below)    ______________________________________________________________________    Medications:     Current Facility-Administered Medications   Medication Dose Route Frequency    naloxone (NARCAN) injection 0.4 mg  0.4 mg IntraVENous PRN    piperacillin-tazobactam (ZOSYN) 3.375 g in 0.9% sodium chloride (MBP/ADV) 100 mL MBP \"EXTENDED 4 HOUR INFUSION ###  3.375 g IntraVENous Q8H    lidocaine (XYLOCAINE) 2 % jelly   Mucous Membrane PRN    sodium chloride (NS) flush 5-10 mL  5-10 mL IntraVENous PRN    clindamycin (CLEOCIN) 600mg NS 100mL IVPB   600 mg IntraVENous Q8H    dextrose 5% - 0.45% NaCl with KCl 20 mEq/L infusion  50 mL/hr IntraVENous CONTINUOUS    fentaNYL citrate (PF) injection 50 mcg  50 mcg IntraVENous Q2H PRN    NOREPINephrine (LEVOPHED) 8 mg in 0.9% NS 250ml infusion  0.5-30 mcg/min IntraVENous TITRATE    sodium chloride (NS) flush 5-40 mL  5-40 mL IntraVENous PRN    famotidine (PF) (PEPCID) 20 mg in 0.9% sodium chloride 10 mL injection  20 mg IntraVENous Q12H    ondansetron (ZOFRAN) injection 4 mg  4 mg IntraVENous Q6H PRN          Total time spent with patient: 35 minutes                  Care Plan discussed with: Patient, Nursing Staff, Consultant/Specialist and >50% of time spent in counseling and coordination of care    Discussed:  Care Plan    Prophylaxis:  Hep SQ    Disposition:  Home w/Family           This document in whole or part of it has been produced using voice recognition software. Unrecognized errors in transcription may be present.     Attending Physician: Toni Cowan MD

## 2020-07-17 NOTE — PROGRESS NOTES
Problem: Ventilator Management  Goal: *Adequate oxygenation and ventilation  Outcome: Progressing Towards Goal   VENTILATOR CARE PLAN    Problem: Ventilator Management  Goal: *Adequate oxygenation/ ventilation/ and extubation      Patient:        Carlene Crockett     80 y.o.   male     7/17/2020  8:55 AM  Patient Active Problem List   Diagnosis Code    Diabetes ([facility] E11.9    SBO (small bowel obstruction) ([facility] K56.609    HTN (hypertension) I10    MARCI (acute kidney injury) ([facility] N17.9    Troponin level elevated R79.89    Paroxysmal atrial fibrillation (HCC) I48.0    Aspiration pneumonia (HCC) J69.0    Hypoglycemia R73.4    Metabolic encephalopathy Y32.49    Acute respiratory failure with hypoxia and hypercapnia (HCC) J96.01, J96.02       SBO (small bowel obstruction) ([facility] [K56.609]  Acute respiratory failure with hypoxia and hypercapnia ([facility] [J96.01, J96.02]    Reason patient intubated: Airway protection    Ventilator day: 2     Ventilator settings: ACVC+ 16 / 500 / +8 / 40%. ETT Size/Placement: 7.5 - 25 lip       ABG:  Date:7/17/2020  Lab Results   Component Value Date/Time    PHI 7.35 07/17/2020 05:53 AM    PCO2I 34.0 (L) 07/17/2020 05:53 AM    PO2I 138 (H) 07/17/2020 05:53 AM    HCO3I 18.6 (L) 07/17/2020 05:53 AM    FIO2I 80 07/17/2020 05:53 AM       Chest X-ray:  Date:7/17/2020  Results from Hospital Encounter encounter on 07/16/20   XR CHEST PORT    Narrative EXAM: PORTABLE FRONTAL CHEST RADIOGRAPH    CLINICAL INDICATION/HISTORY: Central line placement. COMPARISON: 7/16/2020 at 2008 hours    TECHNIQUE: Portable frontal view of the chest    _______________    FINDINGS:    SUPPORT DEVICES:   -Right internal jugular venous catheter tip terminates in the distal SVC. -Endotracheal tube is in place with its tip 4.5 cm from the juan francisco. -EKG leads overlie the patient. HEART AND MEDIASTINUM: Normal heart size and mediastinal contours. Atherosclerotic calcifications present.     LUNGS: Airspace opacities are again seen in both lungs with air bronchograms in  the left lower lobe. PLEURAL SPACES:No large pneumothorax. No large pleural effusion. Calcified  pleural plaques notably in the left lung. BONY THORAX AND SOFT TISSUES: No acute abnormality. _______________      Impression IMPRESSION:   1. Interval right internal jugular venous catheter appears appropriately  positioned. No pneumothorax. 2.  Patchy airspace opacities in both lungs concerning for pneumonia, while  nonspecific this may be seen in the setting of COVID or other pneumonic process.          Lab Test:  Date:7/17/2020  WBC:   Lab Results   Component Value Date/Time    WBC 13.3 (H) 07/17/2020 05:50 AM   HGB:   Lab Results   Component Value Date/Time    HGB 8.5 (L) 07/17/2020 05:50 AM    PLTS:   Lab Results   Component Value Date/Time    PLATELET 127 55/81/6775 05:50 AM       SaO2%/flow: @[id number](3)@    Vital Signs:   Patient Vitals for the past 8 hrs:   Temp Pulse Resp BP SpO2   07/17/20 0851 -- 99 18 -- 99 %   07/17/20 0700 -- 87 16 130/45 99 %   07/17/20 0610 96.9 °F (36.1 °C) 86 16 121/45 99 %   07/17/20 0601 -- -- -- -- 98 %   07/17/20 0600 -- 89 18 (!) 62/35 98 %   07/17/20 0543 -- 86 20 -- 100 %   07/17/20 0539 -- 85 20 129/41 100 %   07/17/20 0415 (!) 102.8 °F (39.3 °C) 86 20 125/45 100 %   07/17/20 0400 (!) 102.8 °F (39.3 °C) 86 20 129/50 100 %   07/17/20 0345 (!) 102.8 °F (39.3 °C) 85 20 (!) 125/38 100 %   07/17/20 0330 (!) 102.8 °F (39.3 °C) 85 20 121/46 100 %   07/17/20 0315 (!) 102.7 °F (39.3 °C) 95 20 91/47 100 %   07/17/20 0300 (!) 102.7 °F (39.3 °C) 84 20 122/50 100 %   07/17/20 0245 (!) 102.6 °F (39.2 °C) 84 20 116/47 100 %   07/17/20 0230 (!) 102.6 °F (39.2 °C) 83 20 117/46 100 %   07/17/20 0215 (!) 102.5 °F (39.2 °C) 83 20 108/54 100 %   07/17/20 0200 (!) 102.4 °F (39.1 °C) 85 20 118/61 100 %   07/17/20 0145 (!) 102.3 °F (39.1 °C) 81 20 114/53 100 %   07/17/20 0130 (!) 102.2 °F (39 °C) 86 20 (!) 73/46 98 %   07/17/20 0115 (!) 102 °F (38.9 °C) 79 20 (!) 88/49 98 %   07/17/20 0112 -- -- -- -- 100 %   07/17/20 0105 -- -- -- -- 100 %   07/17/20 0100 (!) 101.9 °F (38.8 °C) 80 20 97/46 99 %       Wean Screen Pass (Yes or No): no   Wean Screen Reason for Failure: peep 8   Duration of Weaning Trial:  Additional Comments:        PLAN OF CARE: Wean patient as tolerated.        GOAL: adequate oxygenation and ventilation

## 2020-07-17 NOTE — CONSULTS
General Surgery Consult    Meryl Tang  Admit date: 2020    MRN: [id number]     : 1935     Age: 80 y.o. Attending Physician: Bonnie Vergara MD [facility]      History of Present Illness:      Meryl Tang is a 80 y.o. male who is postoperative day 10 from exploratory laparotomy with small bowel resection secondary to adhesion for small bowel obstruction. The patient had a slow recovery in the hospital and he was discharged to rehab nursing home at [facility]. I did get a call yesterday from the physician assistant at [facility] stating that the patient has been doing well for the first 2 days however for the last 1 to 2 days at [facility] tater he has been having some respiratory issue as well as nausea and abdominal pain. It seems to try to give him IV fluids and rehydrate him but the patient Not looking great so when they called me I told him that he needs to go to the emergency room and be evaluated by the medicine team.  He was brought to the emergency room yesterday where he was found to have a pneumonia. Unfortunately in the ER he did get worse and he was intubated and transferred to the ICU and he is on isolation unit because of the possibility of COVID-19. The patient is currently intubated so I took the history from the nursing team and the chart and earlier from the emergency room when they called me yesterday. The patient is having high-grade fever currently. His WBC is normal as of yesterday. It seems that they try to place an NG tube in the emergency room but they were not successful. Currently the patient is intubated for hypoxemia and hypercapnia. However he was able to open his eyes for me. His CT scan of abdomen and pelvis showed prominent gastric distention and proximal small bowel dilation extending to the right lower quadrant small bowel anastomosis concerning for high-grade ileus versus developing small bowel obstruction.  No definite transition point is identified. No pneumatosis or portal venous gas. Free intraperitoneal fluid measures higher than simple fluid density. New right lower lung consolidation may represent aspiration and/or developing pneumonia. Patient Active Problem List    Diagnosis Date Noted    Aspiration pneumonia ([facility] 07/16/2020    Hypoglycemia 93/70/5723    Metabolic encephalopathy 40/78/8281    Acute respiratory failure with hypoxia and hypercapnia (HCC) 07/16/2020    Paroxysmal atrial fibrillation ([facility] 07/09/2020    Diabetes ([facility] 07/03/2020    SBO (small bowel obstruction) ([facility] 07/03/2020    HTN (hypertension) 07/03/2020    MARCI (acute kidney injury) ([facility] 07/03/2020    Troponin level elevated 07/03/2020     Past Medical History:   Diagnosis Date    Diabetes ([facility]     Hypertension       No past surgical history on file. Social History     Tobacco Use    Smoking status: Never Smoker    Smokeless tobacco: Never Used   Substance Use Topics    Alcohol use: Not on file      Social History     Tobacco Use   Smoking Status Never Smoker   Smokeless Tobacco Never Used     No family history on file.    Current Facility-Administered Medications   Medication Dose Route Frequency    lidocaine (XYLOCAINE) 2 % jelly   Mucous Membrane PRN    sodium chloride (NS) flush 5-10 mL  5-10 mL IntraVENous PRN    clindamycin (CLEOCIN) 600mg NS 100mL IVPB   600 mg IntraVENous Q8H    dextrose 5% - 0.45% NaCl with KCl 20 mEq/L infusion  125 mL/hr IntraVENous CONTINUOUS    piperacillin-tazobactam (ZOSYN) 3.375 g in 0.9% sodium chloride (MBP/ADV) 100 mL MBP  3.375 g IntraVENous Q6H    propofol (DIPRIVAN) 10 mg/mL infusion  10 mcg/kg/min IntraVENous TITRATE    fentaNYL citrate (PF) injection 50 mcg  50 mcg IntraVENous Q2H PRN    midazolam (VERSED) injection 2 mg  2 mg IntraVENous Q4H PRN    NOREPINephrine (LEVOPHED) 8 mg in 0.9% NS 250ml infusion  0.5-30 mcg/min IntraVENous TITRATE    sodium chloride (NS) flush 5-40 mL  5-40 mL IntraVENous Q8H  sodium chloride (NS) flush 5-40 mL  5-40 mL IntraVENous PRN    famotidine (PF) (PEPCID) 20 mg in 0.9% sodium chloride 10 mL injection  20 mg IntraVENous Q12H    ondansetron (ZOFRAN) injection 4 mg  4 mg IntraVENous Q6H PRN      Allergies   Allergen Reactions    Iodinated Contrast Media Swelling    Lisinopril Swelling        Review of Systems:  Review of systems not obtained due to patient factors. Objective:     Visit Vitals  /45   Pulse 86   Temp (!) 102.8 °F (39.3 °C)   Resp 20   Wt 82.6 kg (182 lb)   SpO2 98%   BMI 28.51 kg/m²       Physical Exam:      General:  Patient is intubated. Eyes:  conjunctivae and sclerae normal, pupils equal, round, reactive to light   Throat & Neck: no erythema or exudates noted and neck supple and symmetrical; no palpable masses   Lungs:   Intubated. Clear. Heart:  Regular rate and rhythm   Abdomen:    Examination of the abdomen revealed that it is not distended and it is non tender. There is a douglas around the midline wound as if there was some type of erythema but on my exam there is no evidence of any wound infection with no erythema or drainage. The staples are in place. There is no evidence of any peritoneal signs.    There is no abdominal wall hernias   Extremities: extremities normal, atraumatic, no cyanosis or edema   Skin: Normal.       Imaging and Lab Review:     CBC:   Lab Results   Component Value Date/Time    WBC 8.0 07/16/2020 09:40 PM    RBC 3.00 (L) 07/16/2020 09:40 PM    HGB 9.7 (L) 07/16/2020 09:40 PM    HCT 31.7 (L) 07/16/2020 09:40 PM    PLATELET 288 35/58/4838 09:40 PM     BMP:   Lab Results   Component Value Date/Time    Glucose 155 (H) 07/17/2020 05:50 AM    Sodium 142 07/17/2020 05:50 AM    Potassium 4.0 07/17/2020 05:50 AM    Chloride 113 (H) 07/17/2020 05:50 AM    CO2 21 07/17/2020 05:50 AM    BUN 37 (H) 07/17/2020 05:50 AM    Creatinine 2.70 (H) 07/17/2020 05:50 AM    Calcium 6.7 (L) 07/17/2020 05:50 AM     CMP:  Lab Results Component Value Date/Time    Glucose 155 (H) 07/17/2020 05:50 AM    Sodium 142 07/17/2020 05:50 AM    Potassium 4.0 07/17/2020 05:50 AM    Chloride 113 (H) 07/17/2020 05:50 AM    CO2 21 07/17/2020 05:50 AM    BUN 37 (H) 07/17/2020 05:50 AM    Creatinine 2.70 (H) 07/17/2020 05:50 AM    Calcium 6.7 (L) 07/17/2020 05:50 AM    Anion gap 8 07/17/2020 05:50 AM    BUN/Creatinine ratio 14 07/17/2020 05:50 AM    Alk. phosphatase PENDING 07/17/2020 05:50 AM    Protein, total PENDING 07/17/2020 05:50 AM    Albumin 1.8 (L) 07/17/2020 05:50 AM    Globulin PENDING 07/17/2020 05:50 AM    A-G Ratio PENDING 07/17/2020 05:50 AM       Recent Results (from the past 24 hour(s))   CULTURE, BLOOD    Collection Time: 07/16/20  2:41 PM    Specimen: Blood   Result Value Ref Range    Special Requests: PERIPHERAL      Culture result: PENDING    CULTURE, BLOOD    Collection Time: 07/16/20  3:00 PM    Specimen: Blood   Result Value Ref Range    Special Requests: PERIPHERAL      Culture result: PENDING    CBC WITH AUTOMATED DIFF    Collection Time: 07/16/20  3:31 PM   Result Value Ref Range    WBC 7.3 4.6 - 13.2 K/uL    RBC 3.03 (L) 4.70 - 5.50 M/uL    HGB 9.8 (L) 13.0 - 16.0 g/dL    HCT 30.7 (L) 36.0 - 48.0 %    .3 (H) 74.0 - 97.0 FL    MCH 32.3 24.0 - 34.0 PG    MCHC 31.9 31.0 - 37.0 g/dL    RDW 12.2 11.6 - 14.5 %    PLATELET 386 643 - 876 K/uL    MPV 10.1 9.2 - 11.8 FL    NEUTROPHILS 78 (H) 40 - 73 %    LYMPHOCYTES 10 (L) 21 - 52 %    MONOCYTES 11 (H) 3 - 10 %    EOSINOPHILS 1 0 - 5 %    BASOPHILS 0 0 - 2 %    ABS. NEUTROPHILS 5.7 1.8 - 8.0 K/UL    ABS. LYMPHOCYTES 0.8 (L) 0.9 - 3.6 K/UL    ABS. MONOCYTES 0.8 0.05 - 1.2 K/UL    ABS. EOSINOPHILS 0.1 0.0 - 0.4 K/UL    ABS.  BASOPHILS 0.0 0.0 - 0.1 K/UL    DF AUTOMATED     PROTHROMBIN TIME + INR    Collection Time: 07/16/20  3:31 PM   Result Value Ref Range    Prothrombin time 15.1 11.5 - 15.2 sec    INR 1.2 0.8 - 1.2     METABOLIC PANEL, COMPREHENSIVE    Collection Time: 07/16/20  3:31 PM   Result Value Ref Range    Sodium 143 136 - 145 mmol/L    Potassium 3.5 3.5 - 5.5 mmol/L    Chloride 112 (H) 100 - 111 mmol/L    CO2 28 21 - 32 mmol/L    Anion gap 3 3.0 - 18 mmol/L    Glucose 52 (LL) 74 - 99 mg/dL    BUN 35 (H) 7.0 - 18 MG/DL    Creatinine 1.95 (H) 0.6 - 1.3 MG/DL    BUN/Creatinine ratio 18 12 - 20      GFR est AA 40 (L) >60 ml/min/1.73m2    GFR est non-AA 33 (L) >60 ml/min/1.73m2    Calcium 7.7 (L) 8.5 - 10.1 MG/DL    Bilirubin, total 0.3 0.2 - 1.0 MG/DL    ALT (SGPT) 17 16 - 61 U/L    AST (SGOT) 24 10 - 38 U/L    Alk.  phosphatase 80 45 - 117 U/L    Protein, total 6.3 (L) 6.4 - 8.2 g/dL    Albumin 2.3 (L) 3.4 - 5.0 g/dL    Globulin 4.0 2.0 - 4.0 g/dL    A-G Ratio 0.6 (L) 0.8 - 1.7     NT-PRO BNP    Collection Time: 07/16/20  3:31 PM   Result Value Ref Range    NT pro-BNP 2,716 (H) 0 - 1,800 PG/ML   TROPONIN I    Collection Time: 07/16/20  3:31 PM   Result Value Ref Range    Troponin-I, QT 0.08 (H) 0.0 - 0.045 NG/ML   LIPASE    Collection Time: 07/16/20  3:31 PM   Result Value Ref Range    Lipase 882 (H) 73 - 393 U/L   MAGNESIUM    Collection Time: 07/16/20  3:31 PM   Result Value Ref Range    Magnesium 1.8 1.6 - 2.6 mg/dL   LACTIC ACID    Collection Time: 07/16/20  3:31 PM   Result Value Ref Range    Lactic acid 0.4 0.4 - 2.0 MMOL/L   GLUCOSE, POC    Collection Time: 07/16/20  5:02 PM   Result Value Ref Range    Glucose (POC) 151 (H) 70 - 110 mg/dL   GLUCOSE, POC    Collection Time: 07/16/20  5:12 PM   Result Value Ref Range    Glucose (POC) 174 (H) 70 - 110 mg/dL   POC G3    Collection Time: 07/16/20  8:53 PM   Result Value Ref Range    Device: VENT      FIO2 (POC) 100 %    pH (POC) 7.13 (LL) 7.35 - 7.45      pCO2 (POC) 77.8 (H) 35.0 - 45.0 MMHG    pO2 (POC) 75 (L) 80 - 100 MMHG    HCO3 (POC) 26.2 (H) 22 - 26 MMOL/L    sO2 (POC) 89 (L) 92 - 97 %    Base deficit (POC) 3 mmol/L    Mode ASSIST CONTROL      Tidal volume 500 ml    Set Rate 16 bpm    PEEP/CPAP (POC) 8 cmH2O    PIP (POC) 35 Allens test (POC) YES      Inspiratory Time 0.9 sec    Total resp. rate 17      Site RIGHT RADIAL      Patient temp. 97.6      Specimen type (POC) ARTERIAL      Performed by Marialuisa Post     Volume control plus YES     CBC WITH AUTOMATED DIFF    Collection Time: 07/16/20  9:40 PM   Result Value Ref Range    WBC 8.0 4.6 - 13.2 K/uL    RBC 3.00 (L) 4.70 - 5.50 M/uL    HGB 9.7 (L) 13.0 - 16.0 g/dL    HCT 31.7 (L) 36.0 - 48.0 %    .7 (H) 74.0 - 97.0 FL    MCH 32.3 24.0 - 34.0 PG    MCHC 30.6 (L) 31.0 - 37.0 g/dL    RDW 12.3 11.6 - 14.5 %    PLATELET 330 899 - 244 K/uL    MPV 9.7 9.2 - 11.8 FL    NEUTROPHILS 87 (H) 40 - 73 %    LYMPHOCYTES 8 (L) 21 - 52 %    MONOCYTES 5 3 - 10 %    EOSINOPHILS 0 0 - 5 %    BASOPHILS 0 0 - 2 %    ABS. NEUTROPHILS 7.0 1.8 - 8.0 K/UL    ABS. LYMPHOCYTES 0.6 (L) 0.9 - 3.6 K/UL    ABS. MONOCYTES 0.4 0.05 - 1.2 K/UL    ABS. EOSINOPHILS 0.0 0.0 - 0.4 K/UL    ABS.  BASOPHILS 0.0 0.0 - 0.1 K/UL    DF AUTOMATED     METABOLIC PANEL, BASIC    Collection Time: 07/16/20  9:40 PM   Result Value Ref Range    Sodium 143 136 - 145 mmol/L    Potassium 4.2 3.5 - 5.5 mmol/L    Chloride 112 (H) 100 - 111 mmol/L    CO2 25 21 - 32 mmol/L    Anion gap 6 3.0 - 18 mmol/L    Glucose 203 (H) 74 - 99 mg/dL    BUN 37 (H) 7.0 - 18 MG/DL    Creatinine 2.40 (H) 0.6 - 1.3 MG/DL    BUN/Creatinine ratio 15 12 - 20      GFR est AA 31 (L) >60 ml/min/1.73m2    GFR est non-AA 26 (L) >60 ml/min/1.73m2    Calcium 6.9 (L) 8.5 - 10.1 MG/DL   TROPONIN I    Collection Time: 07/16/20  9:40 PM   Result Value Ref Range    Troponin-I, QT 0.09 (H) 0.0 - 0.045 NG/ML   CARDIAC PANEL,(CK, CKMB & TROPONIN)    Collection Time: 07/16/20  9:40 PM   Result Value Ref Range    CK - MB 3.2 <3.6 ng/ml    CK-MB Index 3.6 0.0 - 4.0 %    CK 90 39 - 308 U/L    Troponin-I, QT DUPLICATE REQUEST NG/ML   GLUCOSE, POC    Collection Time: 07/16/20 10:01 PM   Result Value Ref Range    Glucose (POC) 230 (H) 70 - 110 mg/dL   URINALYSIS W/ RFLX MICROSCOPIC    Collection Time: 07/16/20 10:25 PM   Result Value Ref Range    Color DARK YELLOW      Appearance CLOUDY      Specific gravity 1.022 1.005 - 1.030      pH (UA) 5.0 5.0 - 8.0      Protein 100 (A) NEG mg/dL    Glucose Negative NEG mg/dL    Ketone TRACE (A) NEG mg/dL    Bilirubin SMALL (A) NEG      Blood Negative NEG      Urobilinogen 1.0 0.2 - 1.0 EU/dL    Nitrites Negative NEG      Leukocyte Esterase Negative NEG     URINE MICROSCOPIC ONLY    Collection Time: 07/16/20 10:25 PM   Result Value Ref Range    WBC 0 to 3 0 - 4 /hpf    RBC 0 to 3 0 - 5 /hpf    Epithelial cells FEW 0 - 5 /lpf    Bacteria FEW (A) NEG /hpf    Amorphous Crystals 2+ (A) NEG   TYPE & SCREEN    Collection Time: 07/16/20 11:00 PM   Result Value Ref Range    Crossmatch Expiration 07/19/2020     ABO/Rh(D) B POSITIVE     Antibody screen NEG    LACTIC ACID    Collection Time: 07/17/20  1:29 AM   Result Value Ref Range    Lactic acid 1.4 0.4 - 2.0 MMOL/L   SARS-COV-2    Collection Time: 07/17/20  4:45 AM   Result Value Ref Range    SARS-CoV-2 PENDING     Specimen source Nasopharyngeal     METABOLIC PANEL, COMPREHENSIVE    Collection Time: 07/17/20  5:50 AM   Result Value Ref Range    Sodium 142 136 - 145 mmol/L    Potassium 4.0 3.5 - 5.5 mmol/L    Chloride 113 (H) 100 - 111 mmol/L    CO2 21 21 - 32 mmol/L    Anion gap 8 3.0 - 18 mmol/L    Glucose 155 (H) 74 - 99 mg/dL    BUN 37 (H) 7.0 - 18 MG/DL    Creatinine 2.70 (H) 0.6 - 1.3 MG/DL    BUN/Creatinine ratio 14 12 - 20      GFR est AA 27 (L) >60 ml/min/1.73m2    GFR est non-AA 23 (L) >60 ml/min/1.73m2    Calcium 6.7 (L) 8.5 - 10.1 MG/DL    Bilirubin, total PENDING MG/DL    ALT (SGPT) 15 (L) 16 - 61 U/L    AST (SGOT) 29 10 - 38 U/L    Alk.  phosphatase PENDING U/L    Protein, total PENDING g/dL    Albumin 1.8 (L) 3.4 - 5.0 g/dL    Globulin PENDING g/dL    A-G Ratio PENDING     POC G3    Collection Time: 07/17/20  5:53 AM   Result Value Ref Range    Device: VENT      FIO2 (POC) 80 %    pH (POC) 7.35 7.35 - 7.45      pCO2 (POC) 34.0 (L) 35.0 - 45.0 MMHG    pO2 (POC) 138 (H) 80 - 100 MMHG    HCO3 (POC) 18.6 (L) 22 - 26 MMOL/L    sO2 (POC) 99 (H) 92 - 97 %    Base deficit (POC) 7 mmol/L    Mode ASSIST CONTROL      Tidal volume 500 ml    Set Rate 20 bpm    PEEP/CPAP (POC) 8 cmH2O    PIP (POC) 27      Allens test (POC) YES      Inspiratory Time 0.9 sec    Total resp. rate 20      Site RIGHT RADIAL      Specimen type (POC) ARTERIAL      Performed by Farzaneh Santamaira     Volume control plus YES         images and reports reviewed    Assessment:   Regina Rosa is a 80 y.o. male who is postop day 10 after a small bowel resection for small bowel obstruction. Based on my exam and review of his labs and imaging I do not believe that his worsening clinical condition is secondary to his abdomen. Though his CT scan is concerning for possible small bowel obstruction versus ileus, I believe it is ileus is the reason for his abdominal distention that happened earlier and the nausea. I believe that this ileus is secondary to his pneumonia. That being said we definitely have to continue to rule out any intra-abdominal pathology like leak or abscess. Once the patient is more stable I think we should do a CT scan with p.o. contrast.  But for now on my exam his abdomen is completely soft and nondistended and nontender and if the reason for his severe worsening clinical condition is an intra-abdominal pathology we should have some evidence of that on examination. Based on his high fever and his pneumonia on CT scan the patient could be COVID-19 positive especially that he started getting worse 2 days after entering the rehab place. I also discussed with the nursing team the importance of inserting an NG tube or OG tube since he is intubated. Plan:     I appreciate the medicine admission and management  I appreciate the ICU team management  IV fluids for his renal failure.   Nephrology consult may be needed since they were consulted during his first admission  NG tube/OG tube is a must because his stomach was distended and because he may have aspiration pneumonia  Await result of COVID-19 test  Once the patient is more stable we will do a CT scan with p.o. contrast to the OG tube  IV antibiotics for his pneumonia and fever.   Consider ID consultation  Close observation and repeat physical examination of his abdomen  DVT prophylaxis  We will follow closely    Please call me if you have any questions (cell phone: [phone])     Signed By: Sterling Zheng MD     July 17, 2020

## 2020-07-18 NOTE — PROGRESS NOTES
Problem: Diabetes Self-Management  Goal: *Disease process and treatment process  Description: Define diabetes and identify own type of diabetes; list 3 options for treating diabetes. Outcome: Progressing Towards Goal  Goal: *Incorporating nutritional management into lifestyle  Description: Describe effect of type, amount and timing of food on blood glucose; list 3 methods for planning meals. Outcome: Progressing Towards Goal  Goal: *Incorporating physical activity into lifestyle  Description: State effect of exercise on blood glucose levels. Outcome: Progressing Towards Goal  Goal: *Developing strategies to promote health/change behavior  Description: Define the ABC's of diabetes; identify appropriate screenings, schedule and personal plan for screenings. Outcome: Progressing Towards Goal  Goal: *Using medications safely  Description: State effect of diabetes medications on diabetes; name diabetes medication taking, action and side effects. Outcome: Progressing Towards Goal  Goal: *Monitoring blood glucose, interpreting and using results  Description: Identify recommended blood glucose targets  and personal targets. Outcome: Progressing Towards Goal  Goal: *Prevention, detection, treatment of acute complications  Description: List symptoms of hyper- and hypoglycemia; describe how to treat low blood sugar and actions for lowering  high blood glucose level. Outcome: Progressing Towards Goal  Goal: *Prevention, detection and treatment of chronic complications  Description: Define the natural course of diabetes and describe the relationship of blood glucose levels to long term complications of diabetes.   Outcome: Progressing Towards Goal  Goal: *Developing strategies to address psychosocial issues  Description: Describe feelings about living with diabetes; identify support needed and support network  Outcome: Progressing Towards Goal  Goal: *Insulin pump training  Outcome: Progressing Towards Goal  Goal: *Sick day guidelines  Outcome: Progressing Towards Goal  Goal: *Patient Specific Goal (EDIT GOAL, INSERT TEXT)  Outcome: Progressing Towards Goal     Problem: Patient Education: Go to Patient Education Activity  Goal: Patient/Family Education  Outcome: Progressing Towards Goal     Problem: Ventilator Management  Goal: *Adequate oxygenation and ventilation  Outcome: Progressing Towards Goal  Goal: *Patient maintains clear airway/free of aspiration  Outcome: Progressing Towards Goal  Goal: *Absence of infection signs and symptoms  Outcome: Progressing Towards Goal  Goal: *Normal spontaneous ventilation  Outcome: Progressing Towards Goal     Problem: Patient Education: Go to Patient Education Activity  Goal: Patient/Family Education  Outcome: Progressing Towards Goal     Problem: Non-Violent Restraints  Goal: *Removal from restraints as soon as assessed to be safe  Outcome: Progressing Towards Goal  Goal: *No harm/injury to patient while restraints in use  Outcome: Progressing Towards Goal  Goal: *Patient's dignity will be maintained  Outcome: Progressing Towards Goal  Goal: *Patient Specific Goal (EDIT GOAL, INSERT TEXT)  Outcome: Progressing Towards Goal  Goal: Non-violent Restaints:Standard Interventions  Outcome: Progressing Towards Goal  Goal: Non-violent Restraints:Patient Interventions  Outcome: Progressing Towards Goal  Goal: Patient/Family Education  Outcome: Progressing Towards Goal     Problem: Falls - Risk of  Goal: *Absence of Falls  Description: Document Jose Fall Risk and appropriate interventions in the flowsheet.   Outcome: Progressing Towards Goal  Note: Fall Risk Interventions:       Mentation Interventions: Bed/chair exit alarm, Adequate sleep, hydration, pain control, Door open when patient unattended    Medication Interventions: Bed/chair exit alarm    Elimination Interventions: Call light in reach              Problem: Patient Education: Go to Patient Education Activity  Goal: Patient/Family Education  Outcome: Progressing Towards Goal     Problem: Pressure Injury - Risk of  Goal: *Prevention of pressure injury  Description: Document Francis Scale and appropriate interventions in the flowsheet. Outcome: Progressing Towards Goal  Note: Pressure Injury Interventions:  Sensory Interventions: Assess need for specialty bed    Moisture Interventions: Absorbent underpads, Internal/External urinary devices    Activity Interventions: Pressure redistribution bed/mattress(bed type)    Mobility Interventions: HOB 30 degrees or less, Pressure redistribution bed/mattress (bed type), Turn and reposition approx.  every two hours(pillow and wedges)    Nutrition Interventions: Document food/fluid/supplement intake    Friction and Shear Interventions: HOB 30 degrees or less, Foam dressings/transparent film/skin sealants                Problem: Patient Education: Go to Patient Education Activity  Goal: Patient/Family Education  Outcome: Progressing Towards Goal     Problem: Discharge Planning  Goal: *Discharge to safe environment  Outcome: Progressing Towards Goal

## 2020-07-18 NOTE — ANESTHESIA PROCEDURE NOTES
Emergent Intubation  Performed by: Segundo Ramírez CRNA  Authorized by: Segundo Ramírez CRNA     Emergent Intubation:   Location:  ICU  Date/Time:  7/17/2020 9:28 PM  Indications:  Impending respiratory failure  Spontaneous Ventilation: present    Level of Consciousness: unresponsive  Preoxygenated: Yes      Airway Documentation:   Airway:  ETT - Cuffed  Technique:  Direct laryngoscopy  Advanced Technique:  Glide scope  Insertion Site:  Oral  Blade Type:  Danelle  Blade Size:  3  ETT size (mm):  7.5  ETT Line Guido:  Lips  ETT Insertion depth (cm):  25  Placement verified by: EtCO2    Attempts:  1  Difficult airway: No    Called to intubate patient that had self extubated earlier but was now less responsive. In effective respirations. GS laryngoscopy x1, 7.5 ETT, 25 cm at lip, +ETCO2, OG placed and confirmed placement by RN.  VSS

## 2020-07-18 NOTE — PROGRESS NOTES
Internal Medicine Progress Note        NAME: Hari Devlin   :  1935  MRM:  [id number]    Date/Time: 2020        ASSESSMENT/PLAN :       # Acute hypoxic hypercapnic respiratory failure. Intubated twice , one shortly after admission thought due to AMS due to pain medicine and pneumonia then he self extubate and second on  when he sustained hypoxia. Vent management per PCCM      # Metabolic encephalopathy      in ER initially thought due to hypoglycemia but after correction of blood sugar he continued to be unresponsive. Other possibility due to 4 mg IV morphine given for pains by ER staff. Given IV Narcan doses. CT head no acute lesion. # SBO (small bowel obstruction) . Admitted to the hospital for further management. Surgery consulted. N.p.o.  IV fluid. Antibiotic.     #  Aspiration pneumonia ([address] (2020). CT scan with possible right lower lobe pneumonia. IV Zosyn. Maintain oxygenation. Follow chest x-ray , follow blood culture  Hypothermia reported today , warming as needed. Repeat blood culture , LA bundle. # Hypotension. IV pressors as needed. Hold blood pressure medicine. #   Diabetes uncontrolled with hypoglycemia. .  Blood sugar improved  Hypoglycemia likely due to poor p.o. intake. Improved in the ER with IV glucose infusion. We will continue close monitoring. Provide SSI, hypoglycemia protocol and frequent Accu checks. Provide SSI, hypoglycemia protocol and frequent Accu checks. Education,  diabetic educator  . Diabetic Diet       #  HTN (hypertension) (7/3/2020). Control blood pressure. Currently of his home blood pressure medication     #   MARCI (acute kidney injury) ([facility] [address] (7/3/2020). IV fluid   Monitor Renal function and other labs as indicated. Avoid nephrotoxins , iv Contrast, NSAID. Renally dosing medications. Monitor urine out put.          #  Troponin level elevated (7/3/2020). EKG. Telemetry.   Serial troponin.     # History of paroxysmal atrial fibrillation ([facility] [address] (7/9/2020). Telemetry bed      # Right kidney mass. Need follow-up as an outpatient for further management.         -DVT prophylaxis : Heparin.   - Code Status : Full    IP CONSULT TO HOSPITALIST  IP CONSULT TO GENERAL SURGERY  IP CONSULT TO NEPHROLOGY  IP CONSULT TO INFECTIOUS DISEASES    Lab Review:     Recent Labs     07/18/20 0347 07/17/20 2027 07/17/20  0550   WBC 14.3* 21.6* 13.3*   HGB 8.1* 9.2* 8.5*   HCT 25.5* 29.8* 27.1*    398 331     Recent Labs     07/18/20 0347 07/17/20 2027 07/17/20  0550  07/16/20  1531    141 142   < > 143   K 4.7 5.1 4.0   < > 3.5   * 113* 113*   < > 112*   CO2 20* 23 21   < > 28   * 215* 155*   < > 52*   BUN 46* 43* 37*   < > 35*   CREA 4.07* 3.56* 2.70*   < > 1.95*   CA 6.6* 6.7* 6.7*   < > 7.7*   MG 2.2 1.4*  --   --  1.8   PHOS 4.3 6.9*  --   --   --    ALB 1.6*  --  1.8*  --  2.3*   TBILI 0.3  --  0.4  --  0.3   ALT 15*  --  15*  --  17   INR  --   --   --   --  1.2    < > = values in this interval not displayed. Lab Results   Component Value Date/Time    Glucose (POC) 125 (H) 07/18/2020 02:58 PM    Glucose (POC) 225 (H) 07/18/2020 04:57 AM    Glucose (POC) 237 (H) 07/18/2020 12:09 AM    Glucose (POC) 234 (H) 07/17/2020 09:19 PM    Glucose (POC) 230 (H) 07/16/2020 10:01 PM     No results for input(s): PH, PCO2, PO2, HCO3, FIO2 in the last 72 hours. Recent Labs     07/16/20  1531   INR 1.2       No results found for: SDES  Lab Results   Component Value Date/Time    Culture result: NO GROWTH 2 DAYS 07/16/2020 03:00 PM    Culture result: NO GROWTH 2 DAYS 07/16/2020 02:41 PM       All Cardiac Markers in the last 24 hours:   No results found for: CPK, CK, CKMMB, CKMB, RCK3, CKMBT, CKNDX, CKND1, JOHN, TROPT, TROIQ, NEGRITO, TROPT, TNIPOC, BNP, BNPP        Intervals noted   Pt s/e @ bedside     Subjective:     Chief Complaint:      Intubated.    Open eyes and nodding  Agree on if has some pains in his abd ROS:  (bold if positive,otherwise negative)    Fever/chills ,  Dysuria   Cough , Sputum , SOB/TREVIÑO , Chest Pain     Diarrhea ,Nausea/Vomit , Abd Pain , Constipation        Objective:     Vitals:  Last 24hrs VS reviewed since prior progress note. Most recent are:    Visit Vitals  BP (!) 87/66   Pulse 84   Temp (!) 95.8 °F (35.4 °C)   Resp 16   Wt 86.7 kg (191 lb 1.6 oz)   SpO2 98%   BMI 29.93 kg/m²     SpO2 Readings from Last 6 Encounters:   07/18/20 98%   07/13/20 97%   01/31/19 100%    O2 Flow Rate (L/min): 5 l/min       Intake/Output Summary (Last 24 hours) at 7/18/2020 1606  Last data filed at 7/18/2020 0800  Gross per 24 hour   Intake 2944.53 ml   Output 35 ml   Net 2909.53 ml          Physical Exam:   Gen:  Appear stated age, Well-developed,   in no acute distress  HEENT:  Head atraumatic, normocephalic , hearing intact to voice, moist mucous membranes. Neck:   Trachea midline , No apparent JVD, Supple   Resp: Poor inspiratory effort but lungs are clear overall. No accessory muscle use,Bilateral BS present   Card: + murmur, normal S1, S2 without Gallop .+ lower leg peripheral edema. Abd: slight mid abd tenderness,  Lower abdomen midline scar with staples on. Abdomen slightly tense. Reduced bowel sounds. Musc:  No cyanosis or clubbing. Skin:  No rashes or ulcers, skin turgor is good.   Neuro: intubated, alert , no clear area of focal weakness     Medications Reviewed: (see below)    Lab Data Reviewed: (see below)    ______________________________________________________________________    Medications:     Current Facility-Administered Medications   Medication Dose Route Frequency    acetaminophen (TYLENOL) solution 650 mg  650 mg Oral Q6H PRN    NOREPINephrine (LEVOPHED) 8 mg in 0.9% NS 250ml infusion  0.5-30 mcg/min IntraVENous TITRATE    fentaNYL (PF) 1,500 mcg/30 mL (50 mcg/mL) infusion  0-200 mcg/hr IntraVENous TITRATE    insulin glargine (LANTUS) injection 8 Units  8 Units SubCUTAneous DAILY  albuterol-ipratropium (DUO-NEB) 2.5 MG-0.5 MG/3 ML  3 mL Nebulization QID RT    aspirin chewable tablet 81 mg  81 mg Oral DAILY    atorvastatin (LIPITOR) tablet 80 mg  80 mg Oral DAILY    heparin (porcine) injection 5,000 Units  5,000 Units SubCUTAneous Q8H    dextrose 10% infusion 125-250 mL  125-250 mL IntraVENous PRN    naloxone (NARCAN) injection 0.4 mg  0.4 mg IntraVENous PRN    piperacillin-tazobactam (ZOSYN) 3.375 g in 0.9% sodium chloride (MBP/ADV) 100 mL MBP \"EXTENDED 4 HOUR INFUSION ###  3.375 g IntraVENous Q8H    insulin lispro (HUMALOG) injection   SubCUTAneous Q6H    glucose chewable tablet 16 g  4 Tab Oral PRN    glucagon (GLUCAGEN) injection 1 mg  1 mg IntraMUSCular PRN    dextrose 10% infusion 125-250 mL  125-250 mL IntraVENous PRN    vasopressin (VASOSTRICT) 20 Units in 0.9% sodium chloride 100 mL infusion  0-0.1 Units/min IntraVENous TITRATE    methylPREDNISolone (PF) (SOLU-MEDROL) injection 40 mg  40 mg IntraVENous Q12H    midazolam (VERSED) injection 1 mg  1 mg IntraVENous Q3H PRN    fentaNYL citrate (PF) injection 50 mcg  50 mcg IntraVENous Q4H PRN    lidocaine (XYLOCAINE) 2 % jelly   Mucous Membrane PRN    sodium chloride (NS) flush 5-10 mL  5-10 mL IntraVENous PRN    fentaNYL citrate (PF) injection 50 mcg  50 mcg IntraVENous Q2H PRN    sodium chloride (NS) flush 5-40 mL  5-40 mL IntraVENous PRN    famotidine (PF) (PEPCID) 20 mg in 0.9% sodium chloride 10 mL injection  20 mg IntraVENous Q12H    ondansetron (ZOFRAN) injection 4 mg  4 mg IntraVENous Q6H PRN          Total time spent with patient: 35 minutes                  Care Plan discussed with: Patient, Nursing Staff, Consultant/Specialist and >50% of time spent in counseling and coordination of care    Discussed:  Care Plan    Prophylaxis:  Hep SQ    Disposition:  Home w/Family           This document in whole or part of it has been produced using voice recognition software.  Unrecognized errors in transcription may be present.     Attending Physician: Cony Wagner MD

## 2020-07-18 NOTE — PROGRESS NOTES
Problem: Ventilator Management  Goal: *Adequate oxygenation and ventilation  Outcome: Progressing Towards Goal  Goal: *Patient maintains clear airway/free of aspiration  Outcome: Progressing Towards Goal   VENTILATOR CARE PLAN    Problem: Ventilator Management  Goal: *Adequate oxygenation/ ventilation/ and extubation      Patient:        Eric Huang     80 y.o.   male     7/18/2020  6:28 PM  Patient Active Problem List   Diagnosis Code    Diabetes ([facility] [address] E11.9    SBO (small bowel obstruction) ([facility] [address] K56.609    HTN (hypertension) I10    MARCI (acute kidney injury) ([facility] [address] N17.9    Troponin level elevated R79.89    Paroxysmal atrial fibrillation (HCC) I48.0    Aspiration pneumonia (HCC) J69.0    Hypoglycemia U28.1    Metabolic encephalopathy D09.29    Acute respiratory failure with hypoxia and hypercapnia (HCC) J96.01, J96.02       SBO (small bowel obstruction) ([facility] [address] [K56.609]  Acute respiratory failure with hypoxia and hypercapnia ([facility] [J96.01, J96.02]    Reason patient intubated:    Ventilator day:    Ventilator settings:    ETT Size/Placement:       ABG:  Date:7/18/2020  Lab Results   Component Value Date/Time    PHI 7.24 (LL) 07/18/2020 04:59 AM    PCO2I 41.2 07/18/2020 04:59 AM    PO2I 105 (H) 07/18/2020 04:59 AM    HCO3I 17.5 (L) 07/18/2020 04:59 AM    FIO2I 40 07/18/2020 04:59 AM       Chest X-ray:  Date:7/18/2020  Results from Hospital Encounter encounter on 07/16/20   XR ABD (KUB)    Narrative EXAM: Frontal view of the abdomen    CLINICAL INDICATION/HISTORY: GI tube placement    COMPARISON: None.    _______________    FINDINGS:    NG/OG tube in place with the tip in the central abdomen in the region of the  gastric body or antrum. Scattered gas-filled bowel loops identified in the  abdomen. Degenerative changes in the spine.    _______________      Impression IMPRESSION:    1. NG/OG tube is in satisfactory position. Preliminary report was provided by radiology resident.        Lab Test:  Date:7/18/2020  WBC:   Lab Results   Component Value Date/Time    WBC 14.3 (H) 07/18/2020 03:47 AM   HGB:   Lab Results   Component Value Date/Time    HGB 8.1 (L) 07/18/2020 03:47 AM    PLTS:   Lab Results   Component Value Date/Time    PLATELET 153 77/45/2851 03:47 AM       SaO2%/flow: @[place] [address](9)@    Vital Signs:   Patient Vitals for the past 8 hrs:   Temp Pulse Resp BP SpO2   07/18/20 1629 -- 98 16 -- 98 %   07/18/20 1627 -- -- -- -- 98 %   07/18/20 1545 (!) 96.1 °F (35.6 °C) 83 14 104/47 99 %   07/18/20 1530 (!) 96 °F (35.6 °C) 86 11 105/48 99 %   07/18/20 1515 (!) 96.7 °F (35.9 °C) 88 (!) 0 101/46 98 %   07/18/20 1500 (!) 95.9 °F (35.5 °C) 100 18 108/49 97 %   07/18/20 1445 (!) 94 °F (34.4 °C) (!) 109 21 131/51 100 %   07/18/20 1430 (!) 95.8 °F (35.4 °C) 84 16 (!) 87/66 98 %   07/18/20 1415 (!) 96.5 °F (35.8 °C) 85 16 107/45 97 %   07/18/20 1400 (!) 96.6 °F (35.9 °C) 87 16 107/44 98 %   07/18/20 1345 (!) 95.5 °F (35.3 °C) 96 25 -- 98 %   07/18/20 1330 (!) 96 °F (35.6 °C) 86 16 104/45 99 %   07/18/20 1315 97 °F (36.1 °C) 97 15 125/49 98 %   07/18/20 1300 (!) 94.6 °F (34.8 °C) (!) 112 19 141/66 99 %   07/18/20 1245 (!) 96 °F (35.6 °C) 82 16 100/48 98 %   07/18/20 1230 (!) 95.4 °F (35.2 °C) 82 16 100/45 99 %   07/18/20 1215 (!) 96 °F (35.6 °C) 88 18 110/46 99 %   07/18/20 1208 -- 89 16 -- 99 %   07/18/20 1205 -- -- -- -- 99 %   07/18/20 1200 (!) 96.2 °F (35.7 °C) 93 16 117/48 100 %   07/18/20 1145 (!) 96.3 °F (35.7 °C) (!) 110 21 128/76 97 %   07/18/20 1130 (!) 95.6 °F (35.3 °C) 81 16 105/49 99 %   07/18/20 1115 (!) 96.4 °F (35.8 °C) 82 16 90/44 98 %   07/18/20 1100 97 °F (36.1 °C) 83 16 (!) 85/44 98 %   07/18/20 1045 (!) 96 °F (35.6 °C) 81 16 91/41 98 %   07/18/20 1030 (!) 94.6 °F (34.8 °C) 82 16 105/48 99 %       Wean Screen Pass (Yes or No):   Wean Screen Reason for Failure:  Duration of Weaning Trial:  Additional Comments:        PLAN OF CARE:Acute respiratory failure       GOAL: Extubation      OUTCOME: Monitor Fio2/ PEEP

## 2020-07-18 NOTE — PROGRESS NOTES
Received patient on Vent. Tidal volume 500, Rate 16 FIO2= 40% peep 8. Ambu Bag and Mask. Well continue to monitor. No wean Today  Per physician.

## 2020-07-18 NOTE — PROGRESS NOTES
Patient seen and examined. He was reintubated yesterday. His call for test is not back yet. I spoke with the nursing team and there were no issues overnight with his abdomen. He is still febrile. Slightly tachycardic. His WBC today is 14,000 down from 21,000. His creatinine keeps increasing and today is 4 up from 3.5. His abdomen is soft and nondistended and nontender. His midline wound is healing well with no evidence of infection. His OG tube output is minimal to none. Plan:    There is no evidence that his worsening clinical condition is related to his abdomen. His exam showed a benign abdomen with no tenderness and distention. Also it seems that his worsening clinical condition is related to his pulmonary issues. I appreciate the medicine admission and management  I appreciate the ICU team management  Nephrology consult for his worsening kidney function  Infectious disease consultation  Though the OG tube output is minimal continue with the OG tube for now because he is intubated  IV fluids for his renal failure. Await result of COVID-19 test  IV antibiotics for his pneumonia and fever.    DVT prophylaxis  We will follow closely

## 2020-07-18 NOTE — PROGRESS NOTES
0700: Assumed care of patient from Jordan Rose RN    Gtts verified. 1200: Bedside shift change report given to Yasmine Mcdonald RN (oncoming nurse) by Bonifacio Mckeon RN (offgoing nurse). Report included the following information SBAR, MAR and Recent Results.

## 2020-07-18 NOTE — PROGRESS NOTES
Problem: Diabetes Self-Management  Goal: *Disease process and treatment process  Description: Define diabetes and identify own type of diabetes; list 3 options for treating diabetes. Outcome: Progressing Towards Goal  Goal: *Incorporating nutritional management into lifestyle  Description: Describe effect of type, amount and timing of food on blood glucose; list 3 methods for planning meals. Outcome: Progressing Towards Goal  Goal: *Incorporating physical activity into lifestyle  Description: State effect of exercise on blood glucose levels. Outcome: Progressing Towards Goal  Goal: *Developing strategies to promote health/change behavior  Description: Define the ABC's of diabetes; identify appropriate screenings, schedule and personal plan for screenings. Outcome: Progressing Towards Goal  Goal: *Using medications safely  Description: State effect of diabetes medications on diabetes; name diabetes medication taking, action and side effects. Outcome: Progressing Towards Goal  Goal: *Monitoring blood glucose, interpreting and using results  Description: Identify recommended blood glucose targets  and personal targets. Outcome: Progressing Towards Goal  Goal: *Prevention, detection, treatment of acute complications  Description: List symptoms of hyper- and hypoglycemia; describe how to treat low blood sugar and actions for lowering  high blood glucose level. Outcome: Progressing Towards Goal  Goal: *Prevention, detection and treatment of chronic complications  Description: Define the natural course of diabetes and describe the relationship of blood glucose levels to long term complications of diabetes.   Outcome: Progressing Towards Goal  Goal: *Developing strategies to address psychosocial issues  Description: Describe feelings about living with diabetes; identify support needed and support network  Outcome: Progressing Towards Goal  Goal: *Insulin pump training  Outcome: Progressing Towards Goal  Goal: *Sick day guidelines  Outcome: Progressing Towards Goal  Goal: *Patient Specific Goal (EDIT GOAL, INSERT TEXT)  Outcome: Progressing Towards Goal     Problem: Patient Education: Go to Patient Education Activity  Goal: Patient/Family Education  Outcome: Progressing Towards Goal     Problem: Ventilator Management  Goal: *Adequate oxygenation and ventilation  Outcome: Progressing Towards Goal  Goal: *Patient maintains clear airway/free of aspiration  Outcome: Progressing Towards Goal  Goal: *Absence of infection signs and symptoms  Outcome: Progressing Towards Goal  Goal: *Normal spontaneous ventilation  Outcome: Progressing Towards Goal     Problem: Patient Education: Go to Patient Education Activity  Goal: Patient/Family Education  Outcome: Progressing Towards Goal     Problem: Non-Violent Restraints  Goal: *Removal from restraints as soon as assessed to be safe  Outcome: Progressing Towards Goal  Goal: *No harm/injury to patient while restraints in use  Outcome: Progressing Towards Goal  Goal: *Patient's dignity will be maintained  Outcome: Progressing Towards Goal  Goal: *Patient Specific Goal (EDIT GOAL, INSERT TEXT)  Outcome: Progressing Towards Goal  Goal: Non-violent Restaints:Standard Interventions  Outcome: Progressing Towards Goal  Goal: Non-violent Restraints:Patient Interventions  Outcome: Progressing Towards Goal  Goal: Patient/Family Education  Outcome: Progressing Towards Goal     Problem: Falls - Risk of  Goal: *Absence of Falls  Description: Document Jose Fall Risk and appropriate interventions in the flowsheet.   Outcome: Progressing Towards Goal  Note: Fall Risk Interventions:       Mentation Interventions: Adequate sleep, hydration, pain control, Bed/chair exit alarm    Medication Interventions: Assess postural VS orthostatic hypotension, Bed/chair exit alarm    Elimination Interventions: Call light in reach, Elevated toilet seat              Problem: Patient Education: Go to Patient Education Activity  Goal: Patient/Family Education  Outcome: Progressing Towards Goal     Problem: Pressure Injury - Risk of  Goal: *Prevention of pressure injury  Description: Document Francis Scale and appropriate interventions in the flowsheet.   Outcome: Progressing Towards Goal  Note: Pressure Injury Interventions:  Sensory Interventions: Assess changes in LOC, Assess need for specialty bed    Moisture Interventions: Absorbent underpads, Apply protective barrier, creams and emollients    Activity Interventions: Assess need for specialty bed, Chair cushion    Mobility Interventions: Assess need for specialty bed, Chair cushion    Nutrition Interventions: Document food/fluid/supplement intake, Discuss nutritional consult with provider    Friction and Shear Interventions: Apply protective barrier, creams and emollients, Feet elevated on foot rest                Problem: Patient Education: Go to Patient Education Activity  Goal: Patient/Family Education  Outcome: Progressing Towards Goal     Problem: Discharge Planning  Goal: *Discharge to safe environment  Outcome: Progressing Towards Goal

## 2020-07-18 NOTE — PROGRESS NOTES
Assumed care from [facility].     2125> Patient is obtunded, opens eyes to sternal rub, no cough or gag, having some guppy breathing, O2 Sats in the 90's. Called Respiratory therapy. ABG done, pH = 6.99, CO2 = 92. Paged Dr. Dania Lee, Anesthesia paged. 2130> Patient intubated without difficulty. 2245> Informed Dr. Vinnie Wolf on patient's condition. Orders received. 0000> Patient now opening eyes spontaneously. VSS. Will continue to monitor. 0430> Patient awake and agitated. Fever at 101.2. Tylenol and fentanyl given. Will continue to monitor. Bedside and Verbal shift change report given to Michelle Sultana RN (oncoming nurse) by Maximino Aguilar RN (offgoing nurse). Report included the following information SBAR, Kardex, Intake/Output, MAR, Recent Results and Cardiac Rhythm NSR - Sinus tach.

## 2020-07-18 NOTE — PROGRESS NOTES
Remote Note:    Will not be rounding at [facility] this weekend. Patient's Name appeared on my rounding list so discussed with Sierra Lopez and Alec Kahn. 79 y/o Pedro Flores male s/p exlap, SB resection 7/7 for SBO due to adhesions. Developed abdominal pain, nausea and vomiting while convalescing at [facility] and sent to [facility] ED 7/16. Febrile with CTAP showing gastric distention, proximal SB dilation: high grade ileus vs SBO and new RLL consolidation possibly aspiration. He was intubated and admitted to ICU on Zosyn. Covid is being ruled out. Agree with [facility] and will coordinate with Dr. Nika Eduardo by phone as needed until I return Monday 7/20.     Timmy San MD, [address] [facility]  Infectious Diseases  [phone]

## 2020-07-18 NOTE — PROGRESS NOTES
[facility] Progress Note                                              I have reviewed the flowsheet and previous days notes. Events, vitals, medications and notes from last 24 hours reviewed. Care plan discussed with staff and on multidisciplinary rounds. Subjective:  7/18/2020   Patient is sedated on the ventilator    Impression and Plan  Patient Active Problem List   Diagnosis Code    Diabetes ([facility] E11.9    SBO (small bowel obstruction) ([facility] K56.609    HTN (hypertension) I10    MARCI (acute kidney injury) ([facility] [facility] N17.9    Troponin level elevated R79.89    Paroxysmal atrial fibrillation (HCC) I48.0    Aspiration pneumonia ([facility] [address] [facility] J69.0    Hypoglycemia O49.8    Metabolic encephalopathy U28.26    Acute respiratory failure with hypoxia and hypercapnia (HCC) J96.01, J96.02     Acute respiratory failure with hypoxia and hypercapnia/vent dependent respiratory failure -patient self extubated himself in the morning yesterday and he was not reintubated as he was doing okay and saturating well on nasal cannula oxygen. Last night patient became obtunded again. An ABG was done which showed significant acidosis and hypercapnia though his saturations were still in the 90s. Patient was intubated by anesthesia and placed back on the ventilator. Unclear etiology for this recurrent significant hypercapnia. Patient's repeat ABG today still shows acidosis though much better than last night. Hypercapnia has also improved. I will continue with the current ventilator settings. Titrate FiO2 to keep sats more than 90%  Shock  possibly septic. Patient has already received IV fluid resuscitation. Echo on 7/9/2020 showed an EF of 55 to 60%. Patient's troponin I was minimally elevated at 0.17. Patient is currently on Levophed and vasopressin. Titrate to keep systolic blood pressure more than 90  Right lower lobe pneumonia possibly aspiration -patient's WBC count is lower today than yesterday. Continue with Zosyn.   Blood culture shows no growth as yet. COVID-19 test has also been sent  Sepsis -patient's lactic acid level yesterday was only is 1.4 and it was only 0.4 at the time of admission. However his procalcitonin level is very elevated. Patient WBC count is lower today than yesterday. He is afebrile. Blood culture shows no growth as yet. Continue with Zosyn  Acute kidney injury -patient's BUN and creatinine are both rising. His BNP is also elevated. Nephrology is consulted and I will defer further management to them  Recent small bowel obstruction status post expiratory laparotomy lysis of adhesions and bowel resection on 7/7/2020. Destin Paulino was discharged on 7/13/2020.  CT scan abdomen on 7/16/20 shows high-grade ileus versus developing small bowel obstruction.  Patient has been empirically started on Zosyn and clindamycin. Surgery is also consulted, follow-up with them  Asthma -patient is having recurrent hypercapnia and has bilateral rhonchi. I will start him on IV Solu-Medrol. Start neb treatments once his COVID-19 test comes back negative. Patient will benefit from outpatient PFTs. Anemia -patient's hemoglobin is lower today than yesterday. Monitor hemoglobin closely  History of diabetes mellitus -patient is on D5 half-normal saline drip due to episodes of hypoglycemia. However his blood sugar is now high and his BNP is also rising so I will stop the IV fluids  Sedationpatient is on fentanyl drip  History of hypertension  History of proximal atrial fibrillation -patient is on aspirin. Lopressor has been held off secondary to hypotension  DVT and GI prophylaxis patient is on heparin and Pepcid    OTHER:  Glycemic Control. Glucose stabilizer per ICU protocol when on insulin drip. Maintain blood glucose 140-180. Replace electrolytes per ICU electrolyte replacement protocol  Ventilator bundle & Sedation protocol followed.  Daily morning sedation holiday, assessment for readiness for SBT & weaning from ventilator; and then re-titrate if required. Aim to keep peak plateau pressure 74-71IY H2O in ARDS patient. Rocio tube to suction at 20-30 cm H2O, Maintain [place] tube with 5-10ml air every 4 hours. Chlorhexidine mouth washes and routine oral care every 4 hours. Stress ulcer and DVT prophylaxis. HOB >=30 degree elevation all the time. HOB >=30 degree elevation all the time. Aggressive pulmonary toileting. Incentive spirometry when appropriate. Aspiration precautions. Sepsis bundle and protocol followed. Deescalate antibiotic when appropriate. Vasopressor when appropriate with MAP goal >65 mmHg. Central Line bundle followed, remove when not needed. Large bore IV line or CVP when appropriate. PT/OT eval and treat. OOB/IS when appropriate. Quality Care: Stress ulcer prophylaxis, DVT prophylaxis, HOB elevated, Infection control all reviewed and addressed. Events and notes from last 24 hours reviewed. Care plan discussed with nursing. D/w patient's family above medical problems and answered all questions to their satisfaction. I talked to patient's wife and updated her in detail. Answered all her questions to her satisfaction    CC TIME: >45 min     Medication Reviewed: Allergies   Allergen Reactions    Iodinated Contrast Media Swelling    Lisinopril Swelling      Past Medical History:   Diagnosis Date    Diabetes ([address]     Hypertension       No past surgical history on file. Social History     Tobacco Use    Smoking status: Never Smoker    Smokeless tobacco: Never Used   Substance Use Topics    Alcohol use: Not on file      No family history on file. Prior to Admission medications    Medication Sig Start Date End Date Taking? Authorizing Provider   metoprolol tartrate (LOPRESSOR) 25 mg tablet Take 1 Tab by mouth every twelve (12) hours. 7/13/20   Mya Birch PA   aspirin 81 mg chewable tablet Take 1 Tab by mouth daily.  7/13/20   Mya Birch PA   Cetirizine 10 mg cap Take by mouth. Other, MD Kiah   benzonatate (TESSALON) 100 mg capsule Take 100 mg by mouth three (3) times daily as needed for Cough. Other, MD Kiah   atorvastatin (LIPITOR) 80 mg tablet Take 80 mg by mouth daily. Provider, Historical   glipiZIDE (GLUCOTROL) 10 mg tablet Take 10 mg by mouth two (2) times a day. Provider, Historical   hydroCHLOROthiazide (HYDRODIURIL) 25 mg tablet Take 25 mg by mouth daily. Provider, Historical   albuterol (PROVENTIL HFA) 90 mcg/actuation inhaler Take 1 Puff by inhalation daily. Provider, Historical     Current Facility-Administered Medications   Medication Dose Route Frequency    albuterol-ipratropium (DUO-NEB) 2.5 MG-0.5 MG/3 ML  3 mL Nebulization QID RT    aspirin chewable tablet 81 mg  81 mg Oral DAILY    atorvastatin (LIPITOR) tablet 80 mg  80 mg Oral DAILY    metoprolol tartrate (LOPRESSOR) tablet 25 mg  25 mg Oral Q12H    dextrose 5% - 0.45% NaCl with KCl 20 mEq/L infusion  75 mL/hr IntraVENous CONTINUOUS    heparin (porcine) injection 5,000 Units  5,000 Units SubCUTAneous Q8H    piperacillin-tazobactam (ZOSYN) 3.375 g in 0.9% sodium chloride (MBP/ADV) 100 mL MBP \"EXTENDED 4 HOUR INFUSION ###  3.375 g IntraVENous Q8H    insulin lispro (HUMALOG) injection   SubCUTAneous Q6H    vasopressin (VASOSTRICT) 20 Units in 0.9% sodium chloride 100 mL infusion  0-0.1 Units/min IntraVENous TITRATE    methylPREDNISolone (PF) (SOLU-MEDROL) injection 40 mg  40 mg IntraVENous Q12H    NOREPINephrine (LEVOPHED) 8 mg in 0.9% NS 250ml infusion  0.5-30 mcg/min IntraVENous TITRATE    famotidine (PF) (PEPCID) 20 mg in 0.9% sodium chloride 10 mL injection  20 mg IntraVENous Q12H       Lines: All central lines examined by me. No signs of erythema, induration, discharge.      Central Venous Catheter:  Triple Lumen 07/16/20 Right Subclavian (Active)   Central Line Being Utilized Yes 07/18/20 0400   Criteria for Appropriate Use Hemodynamically unstable, requiring monitoring lines, vasopressors, or volume resuscitation 20 0400   Site Assessment Clean, dry, & intact 20 0400   Infiltration Assessment 0 20 0400   Affected Extremity/Extremities Color distal to insertion site pink (or appropriate for race) 20 0400   Date of Last Dressing Change 20 0400   Dressing Status Clean, dry, & intact 20 0400   Dressing Type Tape;Transparent 20 0400   Action Taken Open ports on tubing capped 20 0400   Proximal Hub Color/Line Status White;Capped 20 0400   Positive Blood Return (Medial Site) Yes 20 0400   Medial Hub Color/Line Status Blue; Infusing 20 0400   Positive Blood Return (Lateral Site) Yes 20 0400   Distal Hub Color/Line Status Brown; Infusing 20 0400   Positive Blood Return (Site #3) Yes 20 0400   Alcohol Cap Used Yes 20 0400      Drain(s):  Orogastric Tube 20 (Active)   Site Assessment Clean, dry, & intact 20 0400   Securement Device Tape 20 0400   G Port Status Clamped 20 0400   External Insertion Guido (cms) 60 cms 200   Drainage Description Green 20 0000     Airway:  Airway - Endotracheal Tube 20 Oral (Active)   Insertion Depth (cm) 25 cm 20   Line Guido Lips 20   Side Secured Centered 20   Cuff Pressure 28 cmH20 20 0455   Site Assessment Clean, dry, & intact 20 0455       Objective:  Vital Signs:    Visit Vitals  /45   Pulse 96   Temp 100.3 °F (37.9 °C)   Resp 17   Wt 86.7 kg (191 lb 1.6 oz)   SpO2 99%   BMI 29.93 kg/m²      O2 Device: Ventilator, Endotracheal tube  O2 Flow Rate (L/min): 5 l/min  Temp (24hrs), Av.5 °F (38.1 °C), Min:97.6 °F (36.4 °C), Max:101.5 °F (38.6 °C)      Intake/Output:   Last shift:      No intake/output data recorded.     Last 3 shifts:  1901 -  0700  In: 5846.2 [I.V.:5846.2]  Out: 937 [Urine:37]      Intake/Output Summary (Last 24 hours) at 2020 0835  Last data filed at 7/18/2020 0700  Gross per 24 hour   Intake 3689.53 ml   Output 237 ml   Net 3452.53 ml       Last 3 Recorded Weights in this Encounter    07/16/20 1505 07/17/20 0610   Weight: 82.6 kg (182 lb) 86.7 kg (191 lb 1.6 oz)       Ventilator Settings:  Mode Rate Tidal Volume Pressure FiO2 PEEP  Assist control, VC+   500 ml    40 % 8 cm H20    Peak airway pressure: 28 cm H2O   Plateau pressure:    Tidal volume:   Minute ventilation: 7.38 l/min  SPO2     ARDS network Guidelines: Lung protective strategy and Plateau pressure goals less than or equal to 30    Physical Exam:     General/Neurology: Sedated on the ventilator  Head:   Normocephalic, without obvious abnormality  Eye:   no scleral icterus, no pallor  Oral:   Mucus membranes moist  Neck:   Supple  Lung:   B/l air entry is decreased, bilateral rhonchi present, bilateral basal rales present  Heart:   S1 S2 present.    Abdomen/: Soft, non tender, incision line appears to be clean  Extremities:  Pedal edema present    Data:      Recent Results (from the past 24 hour(s))   CARDIAC PANEL,(CK, CKMB & TROPONIN)    Collection Time: 07/17/20  2:00 PM   Result Value Ref Range    CK - MB 3.0 <3.6 ng/ml    CK-MB Index 0.5 0.0 - 4.0 %     (H) 39 - 308 U/L    Troponin-I, QT 0.17 (H) 0.0 - 4.348 NG/ML   METABOLIC PANEL, BASIC    Collection Time: 07/17/20  8:27 PM   Result Value Ref Range    Sodium 141 136 - 145 mmol/L    Potassium 5.1 3.5 - 5.5 mmol/L    Chloride 113 (H) 100 - 111 mmol/L    CO2 23 21 - 32 mmol/L    Anion gap 5 3.0 - 18 mmol/L    Glucose 215 (H) 74 - 99 mg/dL    BUN 43 (H) 7.0 - 18 MG/DL    Creatinine 3.56 (H) 0.6 - 1.3 MG/DL    BUN/Creatinine ratio 12 12 - 20      GFR est AA 20 (L) >60 ml/min/1.73m2    GFR est non-AA 16 (L) >60 ml/min/1.73m2    Calcium 6.7 (L) 8.5 - 10.1 MG/DL   CBC WITH AUTOMATED DIFF    Collection Time: 07/17/20  8:27 PM   Result Value Ref Range    WBC 21.6 (H) 4.6 - 13.2 K/uL    RBC 2.81 (L) 4.70 - 5.50 M/uL    HGB 9.2 (L) 13.0 - 16.0 g/dL    HCT 29.8 (L) 36.0 - 48.0 %    .0 (H) 74.0 - 97.0 FL    MCH 32.7 24.0 - 34.0 PG    MCHC 30.9 (L) 31.0 - 37.0 g/dL    RDW 12.7 11.6 - 14.5 %    PLATELET 945 716 - 732 K/uL    MPV 10.0 9.2 - 11.8 FL    NEUTROPHILS 90 (H) 40 - 73 %    LYMPHOCYTES 4 (L) 21 - 52 %    MONOCYTES 6 3 - 10 %    EOSINOPHILS 0 0 - 5 %    BASOPHILS 0 0 - 2 %    ABS. NEUTROPHILS 19.4 (H) 1.8 - 8.0 K/UL    ABS. LYMPHOCYTES 0.9 0.9 - 3.6 K/UL    ABS. MONOCYTES 1.3 (H) 0.05 - 1.2 K/UL    ABS. EOSINOPHILS 0.0 0.0 - 0.4 K/UL    ABS. BASOPHILS 0.0 0.0 - 0.1 K/UL    PLATELET COMMENTS ADEQUATE PLATELETS      RBC COMMENTS ANISOCYTOSIS  1+        RBC COMMENTS MACROCYTOSIS  1+        DF SMEAR SCANNED     MAGNESIUM    Collection Time: 07/17/20  8:27 PM   Result Value Ref Range    Magnesium 1.4 (L) 1.6 - 2.6 mg/dL   PHOSPHORUS    Collection Time: 07/17/20  8:27 PM   Result Value Ref Range    Phosphorus 6.9 (H) 2.5 - 4.9 MG/DL   GLUCOSE, POC    Collection Time: 07/17/20  9:19 PM   Result Value Ref Range    Glucose (POC) 234 (H) 70 - 110 mg/dL   POC G3    Collection Time: 07/17/20  9:21 PM   Result Value Ref Range    Device: NASAL CANNULA      Flow rate (POC) 5 L/M    FIO2 (POC) 40 %    pH (POC) 7.00 (LL) 7.35 - 7.45      pCO2 (POC) 92.0 (H) 35.0 - 45.0 MMHG    pO2 (POC) 90 80 - 100 MMHG    HCO3 (POC) 22.6 22 - 26 MMOL/L    sO2 (POC) 90 (L) 92 - 97 %    Base deficit (POC) 9 mmol/L    Allens test (POC) YES      Total resp.  rate 32      Site RIGHT RADIAL      Specimen type (POC) ARTERIAL      Performed by Dameon, POC    Collection Time: 07/18/20 12:09 AM   Result Value Ref Range    Glucose (POC) 237 (H) 70 - 110 mg/dL   CBC WITH AUTOMATED DIFF    Collection Time: 07/18/20  3:47 AM   Result Value Ref Range    WBC 14.3 (H) 4.6 - 13.2 K/uL    RBC 2.51 (L) 4.70 - 5.50 M/uL    HGB 8.1 (L) 13.0 - 16.0 g/dL    HCT 25.5 (L) 36.0 - 48.0 %    .6 (H) 74.0 - 97.0 FL    MCH 32.3 24.0 - 34.0 PG    MCHC 31.8 31.0 - 37.0 g/dL RDW 12.3 11.6 - 14.5 %    PLATELET 779 901 - 551 K/uL    MPV 9.9 9.2 - 11.8 FL    NEUTROPHILS 94 (H) 40 - 73 %    LYMPHOCYTES 4 (L) 21 - 52 %    MONOCYTES 2 (L) 3 - 10 %    EOSINOPHILS 0 0 - 5 %    BASOPHILS 0 0 - 2 %    ABS. NEUTROPHILS 13.4 (H) 1.8 - 8.0 K/UL    ABS. LYMPHOCYTES 0.5 (L) 0.9 - 3.6 K/UL    ABS. MONOCYTES 0.3 0.05 - 1.2 K/UL    ABS. EOSINOPHILS 0.0 0.0 - 0.4 K/UL    ABS. BASOPHILS 0.0 0.0 - 0.1 K/UL    DF AUTOMATED     METABOLIC PANEL, COMPREHENSIVE    Collection Time: 07/18/20  3:47 AM   Result Value Ref Range    Sodium 141 136 - 145 mmol/L    Potassium 4.7 3.5 - 5.5 mmol/L    Chloride 114 (H) 100 - 111 mmol/L    CO2 20 (L) 21 - 32 mmol/L    Anion gap 7 3.0 - 18 mmol/L    Glucose 208 (H) 74 - 99 mg/dL    BUN 46 (H) 7.0 - 18 MG/DL    Creatinine 4.07 (H) 0.6 - 1.3 MG/DL    BUN/Creatinine ratio 11 (L) 12 - 20      GFR est AA 17 (L) >60 ml/min/1.73m2    GFR est non-AA 14 (L) >60 ml/min/1.73m2    Calcium 6.6 (L) 8.5 - 10.1 MG/DL    Bilirubin, total 0.3 0.2 - 1.0 MG/DL    ALT (SGPT) 15 (L) 16 - 61 U/L    AST (SGOT) 32 10 - 38 U/L    Alk.  phosphatase 74 45 - 117 U/L    Protein, total 5.0 (L) 6.4 - 8.2 g/dL    Albumin 1.6 (L) 3.4 - 5.0 g/dL    Globulin 3.4 2.0 - 4.0 g/dL    A-G Ratio 0.5 (L) 0.8 - 1.7     NT-PRO BNP    Collection Time: 07/18/20  3:47 AM   Result Value Ref Range    NT pro-BNP 12,394 (H) 0 - 1,800 PG/ML   MAGNESIUM    Collection Time: 07/18/20  3:47 AM   Result Value Ref Range    Magnesium 2.2 1.6 - 2.6 mg/dL   PHOSPHORUS    Collection Time: 07/18/20  3:47 AM   Result Value Ref Range    Phosphorus 4.3 2.5 - 4.9 MG/DL   GLUCOSE, POC    Collection Time: 07/18/20  4:57 AM   Result Value Ref Range    Glucose (POC) 225 (H) 70 - 110 mg/dL   POC G3    Collection Time: 07/18/20  4:59 AM   Result Value Ref Range    Device: VENT      FIO2 (POC) 40 %    pH (POC) 7.24 (LL) 7.35 - 7.45      pCO2 (POC) 41.2 35.0 - 45.0 MMHG    pO2 (POC) 105 (H) 80 - 100 MMHG    HCO3 (POC) 17.5 (L) 22 - 26 MMOL/L    sO2 (POC) 96 92 - 97 %    Base deficit (POC) 9 mmol/L    Mode ASSIST CONTROL      Tidal volume 500 ml    Set Rate 16 bpm    PEEP/CPAP (POC) 8 cmH2O    PIP (POC) 29      Allens test (POC) YES      Inspiratory Time 0.9 sec    Total resp. rate 16      Site RIGHT RADIAL      Patient temp. 101.3      Specimen type (POC) ARTERIAL      Performed by Yovana Xie     Volume control plus YES           Chemistry   Recent Labs     07/18/20 0347 07/17/20 2027 07/17/20  0550  07/16/20  1531   * 215* 155*   < > 52*    141 142   < > 143   K 4.7 5.1 4.0   < > 3.5   * 113* 113*   < > 112*   CO2 20* 23 21   < > 28   BUN 46* 43* 37*   < > 35*   CREA 4.07* 3.56* 2.70*   < > 1.95*   CA 6.6* 6.7* 6.7*   < > 7.7*   MG 2.2 1.4*  --   --  1.8   PHOS 4.3 6.9*  --   --   --    AGAP 7 5 8   < > 3   BUCR 11* 12 14   < > 18   AP 74  --  64  --  80   TP 5.0*  --  4.8*  --  6.3*   ALB 1.6*  --  1.8*  --  2.3*   GLOB 3.4  --  3.0  --  4.0   AGRAT 0.5*  --  0.6*  --  0.6*    < > = values in this interval not displayed. CBC w/Diff   Recent Labs     07/18/20 0347 07/17/20 2027 07/17/20  0550   WBC 14.3* 21.6* 13.3*   RBC 2.51* 2.81* 2.67*   HGB 8.1* 9.2* 8.5*   HCT 25.5* 29.8* 27.1*    398 331   GRANS 94* 90* 86*   LYMPH 4* 4* 4*   EOS 0 0 0       ABG    Recent Labs     07/18/20  0459 07/17/20 2121 07/17/20  0553   PHI 7.24* 7.00* 7.35   PCO2I 41.2 92.0* 34.0*   PO2I 105* 90 138*   HCO3I 17.5* 22.6 18.6*   FIO2I 40 40 80       Micro     Recent Labs     07/16/20  1500 07/16/20  1441   CULT NO GROWTH 2 DAYS NO GROWTH 2 DAYS     Recent Labs     07/16/20  1500 07/16/20  1441   CULT NO GROWTH 2 DAYS NO GROWTH 2 DAYS       CT (Most Recent)    Results from [place] encounter on 07/16/20   CT HEAD WO CONT    Narrative EXAM: CT HEAD W/O CONTRAST    INDICATION: Altered mental status/level of consciousness, unresponsive    COMPARISON: No prior comparison study.     TECHNIQUE: Axial CT imaging of the head was performed without intravenous  contrast.  Additional coronal and sagittal reconstructions were performed. One  or more dose reduction techniques were used on this CT: automated exposure  control, adjustment of the mAs and/or kVp according to patient's size, and  iterative reconstruction techniques. The specific techniques utilized on this CT  exam have been documented in the patient's electronic medical record. Digital  Imaging and Communications in Medicine (DICOM) format image data are available  to nonaffiliated external healthcare facilities or entities on a secure, media  free, reciprocally searchable basis with patient authorization for at least a  12-month period after this study. _______________    FINDINGS:    VENTRICLES/EXTRA-AXIAL SPACES: The ventricles and sulci are mildly enlarged  consistent with diffuse volume loss. No extra-axial fluid collection is present. BRAIN PARENCHYMA: There is no evidence of acute intracranial hemorrhage, mass  effect, midline shift, or herniation. No definite CT evidence of acute cortical  infarct is seen. White matter CT    ORBITS: Right ocular lens is absent most likely from prior cataract surgery. PARANASAL SINUSES: Mucoperiosteal thickening scattered throughout the paranasal  sinuses. Sclerotic wall thickening suggested in the partially visualized  maxillary antra from chronic sinusitis. No air-fluid levels are seen. MASTOID AIR CELLS: Visualized mastoid air cells are clear. OSSEOUS STRUCTURES: No fracture is seen. OTHER: Atherosclerotic calcifications are present.    _______________      Impression IMPRESSION:    1. No acute intracranial hemorrhage, mass effect, midline shift, or herniation. No definite CT evidence of acute cortical infarct is seen. Please note that  noncontrast head CT may be normal in early acute infarct. 2. Mild nonspecific white matter disease likely representing chronic small  vessel changes.     Preliminary report provided by on-call radiology resident. XR (Most Recent). CXR reviewed by me and compared with previous CXR   Results from Hospital Encounter encounter on 07/16/20   XR CHEST PORT    Narrative EXAM: PORTABLE FRONTAL CHEST RADIOGRAPH    CLINICAL INDICATION/HISTORY: Central line placement. COMPARISON: 7/16/2020 at 2008 hours    TECHNIQUE: Portable frontal view of the chest    _______________    FINDINGS:    SUPPORT DEVICES:   -Right internal jugular venous catheter tip terminates in the distal SVC. -Endotracheal tube is in place with its tip 4.5 cm from the juan francisco. -EKG leads overlie the patient. HEART AND MEDIASTINUM: Normal heart size and mediastinal contours. Atherosclerotic calcifications present. LUNGS: Airspace opacities are again seen in both lungs with air bronchograms in  the left lower lobe. PLEURAL SPACES:No large pneumothorax. No large pleural effusion. Calcified  pleural plaques notably in the left lung. BONY THORAX AND SOFT TISSUES: No acute abnormality. _______________      Impression IMPRESSION:   1. Interval right internal jugular venous catheter appears appropriately  positioned. No pneumothorax. 2.  Patchy airspace opacities in both lungs concerning for pneumonia, while  nonspecific this may be seen in the setting of COVID or other pneumonic process. High complexity decision making was performed during the evaluation of this patient at high risk for decompensation with multiple organ involvement     Above mentioned total time spent on reviewing the case/medical record/data/notes/EMR/patient examination/documentation/coordinating care with nurse/consultants, exclusive of procedures with complex decision making performed and > 50% time spent in face to face evaluation.     This note has been dictated using the dragon dictation system    Anna Veloz MD  7/18/2020

## 2020-07-18 NOTE — PROGRESS NOTES
Called to bedside by RN due to patient's unresponsiveness. Patient found on 5LPM NC with SPO2 98%. HR 130s, RR 28-30, shallow. Patient had bilateral rhonchi. Attempted to suction patient, patient had no cough or gag reflex. ABG done and critical results called to Dr. eFrn Ibanez by RN. Order received for intubation. 2130: Patient intubated on first attempt with 8. 0ETT, secured at 25cm at the lips. ETCO2 positive, breath sounds equal bilaterally. Suctioned for large amounts of pink, frothy secretions. Patient placed on previous settings of AC 16, Vt 500, 40% and 8cmH20 PEEP.

## 2020-07-19 NOTE — PROGRESS NOTES
Problem: Ventilator Management  Goal: *Adequate oxygenation and ventilation  Outcome: Progressing Towards Goal  Goal: *Patient maintains clear airway/free of aspiration  Outcome: Progressing Towards Goal   VENTILATOR CARE PLAN    Problem: Ventilator Management  Goal: *Adequate oxygenation/ ventilation/ and extubation      Patient:        Sarah El     80 y.o.   male     7/19/2020  2:23 PM  Patient Active Problem List   Diagnosis Code    Diabetes ([facility] E11.9    SBO (small bowel obstruction) ([facility] K56.609    HTN (hypertension) I10    MARCI (acute kidney injury) ([facility] N17.9    Troponin level elevated R79.89    Paroxysmal atrial fibrillation (HCC) I48.0    Aspiration pneumonia (HCC) J69.0    Hypoglycemia M21.1    Metabolic encephalopathy L50.29    Acute respiratory failure with hypoxia and hypercapnia (HCC) J96.01, J96.02       SBO (small bowel obstruction) ([facility] [K56.609]  Acute respiratory failure with hypoxia and hypercapnia (HCC) [J96.01, J96.02]    Reason patient intubated: Respiratory failure    Ventilator day: 3    Ventilator settings: AC/VC+ 16/500/40/8    ETT Size/Placement: 8mm at 25cm at lips       ABG:  Date:7/19/2020  Lab Results   Component Value Date/Time    PHI 7.28 (L) 07/19/2020 05:00 AM    PCO2I 36.6 07/19/2020 05:00 AM    PO2I 117 (H) 07/19/2020 05:00 AM    HCO3I 17.3 (L) 07/19/2020 05:00 AM    FIO2I 40 07/19/2020 05:00 AM       Chest X-ray:  Date:7/19/2020  Results from Hospital Encounter encounter on 07/16/20   XR CHEST PORT    Narrative EXAM: XR CHEST PORT    CLINICAL INDICATION/HISTORY: Pneumonia.  -Additional: Acute kidney insufficiency. Pneumonia and respiratory failure. COMPARISON: 7/17/2020    TECHNIQUE: Portable frontal view of the chest    _______________    FINDINGS:    SUPPORT DEVICES: Tip of the endotracheal tube is 3.4 cm above the juan francisco. Tip of  the enteric tube extends below the level of the gastroesophageal junction.     HEART AND MEDIASTINUM: Mild cardiomegaly is present. LUNGS AND PLEURAL SPACES: Pleural calcifications are present. BONY THORAX AND SOFT TISSUES: Unremarkable.    _______________      Impression IMPRESSION:    No significant change since 7/17/2020. Lab Test:  Date:7/19/2020  WBC:   Lab Results   Component Value Date/Time    WBC 11.8 07/19/2020 05:04 AM   HGB:   Lab Results   Component Value Date/Time    HGB 7.2 (L) 07/19/2020 05:04 AM    PLTS:   Lab Results   Component Value Date/Time    PLATELET 494 35/40/2371 05:04 AM       SaO2%/flow: @ECRNAXF9(9)@    Vital Signs:   Patient Vitals for the past 8 hrs:   Temp Pulse Resp BP SpO2   07/19/20 1232  90 20  100 %   07/19/20 0945 97.8 °F (36.6 °C) 89 16 121/54 99 %   07/19/20 0930 97.6 °F (36.4 °C) 100 19 140/57 100 %   07/19/20 0920  93 17  99 %   07/19/20 0915 97.8 °F (36.6 °C) 88 19 131/63 99 %   07/19/20 0900 97.7 °F (36.5 °C) 78 16 121/54 99 %   07/19/20 0845 97.7 °F (36.5 °C) 81 18 119/53 99 %   07/19/20 0830 97.7 °F (36.5 °C) 78 16 122/54 100 %   07/19/20 0815 97.7 °F (36.5 °C) 76 17 113/54 100 %   07/19/20 0800 97.6 °F (36.4 °C) 71 16 116/53 100 %   07/19/20 0745 97.7 °F (36.5 °C) 72 16 123/53 100 %   07/19/20 0730 97.6 °F (36.4 °C) 72 16 122/53 100 %   07/19/20 0715 97.6 °F (36.4 °C) 71 16 119/53 100 %   07/19/20 0700 97.7 °F (36.5 °C) 71 16 119/53 100 %   07/19/20 0645 97.8 °F (36.6 °C) 77 16 123/56 100 %   07/19/20 0630 97.6 °F (36.4 °C) 69 16 110/53 100 %       Wean Screen Pass (Yes or No): No  Wean Screen Reason for Failure: Peep of 8  Duration of Weaning Trial:  Additional Comments:        PLAN OF CARE: Maintain ventilatory support, Duoneb Q4hrs. GOAL: Adequate ventilation and oxygenation.

## 2020-07-19 NOTE — PROGRESS NOTES
Bedside shift report received from Eugene Gross RN.  1500pm- Pt is kicking his footies off the bed. Pt appears restless and biting the ET Tube. Vent alarm is going off stating breathing above respiratory rate. Increase Fentanyl IV gtt up from 50 mcg/hg to 75mcg/hr to maintain RASS -1.  1745pm- Hypothermic temperature noted via esophageal temp. Lactic acid 1.7 yesterday evening. Notify Dr. Araseli Durant r/t redrawing lactic acid. As per MD,redraw lactic acid. Blood culture x2 drawn (1 peripheral and 1 via central line). Rechk temp- 98.3 axillary. 1800pm- Notify Dr. Sb Young r/t pt is kicking off his float heel pads and pulling his EKG leads. Ok to put bilateral mitts on pt as per MD. MD is aware that pt has bilateral wrists restraints on.  1900pm Bedside shift change report given to [facility], RN by Tiffany Barrett RN. Report included the following information SBAR, Kardex, Intake/Output, MAR, Recent Results, Med Rec Status, Cardiac Rhythm NSR/ ST and Alarm Parameters .

## 2020-07-19 NOTE — CONSULTS
Consult Note    Assessment:   · MARCI. Etio- ischemic atn in a setting of recurrence of sbo/pneumonia/sepsis/resp failure. · Pneumonia/resp failure. · S/p  exploratory laparotomy with extensive lysis of adhesions, small bowel resection x2 on 7/7 with concern for recurrence of sbo vs ileus. · Septic shock. · Hyperphosphatemia. Recommendations:   · Continue pressors to keep map 65-70. · Continue empiric abx. · Resume gentle ivf but overall avoid volume overload. · Avoid NSAID's, IV dye. · Avoid Gadolinium due to its association with nephrogenic systemic fibrosis in a patients with severe ARF and ESRD. · Avoid fleets enemas due to concern for acute phosphate nephropathy. · Please dose all medications for approximate creatinine clearance  <15. I reduced the dose of zosyn. · Will monitor closely. Will likely need acute dialysis. Thank you. Consult requested by: Otto Dumont MD    ADMIT DATE: 7/16/2020  CONSULT DATE: July 18, 2020                 Admission diagnosis: SBO (small bowel obstruction) [facility])   Reason for Nephrology Consultation: marci. HPI: Angelica Cruz is a 80 y.o. male PATIENT REFUSED with h/o of dm and htn who was seen by me earlier this month for marci in a setting of sbo, paf with rvr. Patient is s/p s/p exploratory laparotomy with extensive lysis of adhesions, small bowel resection x2 on 7/7. Patient's marci resolved by the time he was d/sanjay to snf 7/13. Patient was readmitted with recurrence of abd pain. H/o is based on medical records. Patient was intubated for pneumonia/resp failure. He was placed on abx and pressors for septic shock. He has been febrile on/off. Surgery is on the case with concern for ileus vs sbo. COVID is negx1. Scr was 1.95 on admission, up to 4.07 today, patient is oliguric. Past Medical History:   Diagnosis Date    Diabetes ([facility]     Hypertension       No past surgical history on file.     Social History Socioeconomic History    Marital status:      Spouse name: Not on file    Number of children: Not on file    Years of education: Not on file    Highest education level: Not on file   Occupational History    Not on file   Social Needs    Financial resource strain: Not on file    Food insecurity     Worry: Not on file     Inability: Not on file    Transportation needs     Medical: Not on file     Non-medical: Not on file   Tobacco Use    Smoking status: Never Smoker    Smokeless tobacco: Never Used   Substance and Sexual Activity    Alcohol use: Not on file    Drug use: Not on file    Sexual activity: Not on file   Lifestyle    Physical activity     Days per week: Not on file     Minutes per session: Not on file    Stress: Not on file   Relationships    Social connections     Talks on phone: Not on file     Gets together: Not on file     Attends Confucianism service: Not on file     Active member of club or organization: Not on file     Attends meetings of clubs or organizations: Not on file     Relationship status: Not on file    Intimate partner violence     Fear of current or ex partner: Not on file     Emotionally abused: Not on file     Physically abused: Not on file     Forced sexual activity: Not on file   Other Topics Concern    Not on file   Social History Narrative    Not on file       No family history on file. Allergies   Allergen Reactions    Iodinated Contrast Media Swelling    Lisinopril Swelling        Home Medications:     Medications Prior to Admission   Medication Sig    metoprolol tartrate (LOPRESSOR) 25 mg tablet Take 1 Tab by mouth every twelve (12) hours.  aspirin 81 mg chewable tablet Take 1 Tab by mouth daily.  Cetirizine 10 mg cap Take  by mouth.  benzonatate (TESSALON) 100 mg capsule Take 100 mg by mouth three (3) times daily as needed for Cough.  atorvastatin (LIPITOR) 80 mg tablet Take 80 mg by mouth daily.     glipiZIDE (GLUCOTROL) 10 mg tablet Take 10 mg by mouth two (2) times a day.  hydroCHLOROthiazide (HYDRODIURIL) 25 mg tablet Take 25 mg by mouth daily.  albuterol (PROVENTIL HFA) 90 mcg/actuation inhaler Take 1 Puff by inhalation daily.        Current Inpatient Medications:     Current Facility-Administered Medications   Medication Dose Route Frequency    acetaminophen (TYLENOL) solution 650 mg  650 mg Oral Q6H PRN    NOREPINephrine (LEVOPHED) 8 mg in 0.9% NS 250ml infusion  0.5-30 mcg/min IntraVENous TITRATE    fentaNYL (PF) 1,500 mcg/30 mL (50 mcg/mL) infusion  0-200 mcg/hr IntraVENous TITRATE    insulin glargine (LANTUS) injection 8 Units  8 Units SubCUTAneous DAILY    albuterol-ipratropium (DUO-NEB) 2.5 MG-0.5 MG/3 ML  3 mL Nebulization QID RT    aspirin chewable tablet 81 mg  81 mg Oral DAILY    atorvastatin (LIPITOR) tablet 80 mg  80 mg Oral DAILY    heparin (porcine) injection 5,000 Units  5,000 Units SubCUTAneous Q8H    dextrose 10% infusion 125-250 mL  125-250 mL IntraVENous PRN    naloxone (NARCAN) injection 0.4 mg  0.4 mg IntraVENous PRN    piperacillin-tazobactam (ZOSYN) 3.375 g in 0.9% sodium chloride (MBP/ADV) 100 mL MBP \"EXTENDED 4 HOUR INFUSION ###  3.375 g IntraVENous Q8H    insulin lispro (HUMALOG) injection   SubCUTAneous Q6H    glucose chewable tablet 16 g  4 Tab Oral PRN    glucagon (GLUCAGEN) injection 1 mg  1 mg IntraMUSCular PRN    dextrose 10% infusion 125-250 mL  125-250 mL IntraVENous PRN    vasopressin (VASOSTRICT) 20 Units in 0.9% sodium chloride 100 mL infusion  0-0.1 Units/min IntraVENous TITRATE    methylPREDNISolone (PF) (SOLU-MEDROL) injection 40 mg  40 mg IntraVENous Q12H    midazolam (VERSED) injection 1 mg  1 mg IntraVENous Q3H PRN    fentaNYL citrate (PF) injection 50 mcg  50 mcg IntraVENous Q4H PRN    lidocaine (XYLOCAINE) 2 % jelly   Mucous Membrane PRN    sodium chloride (NS) flush 5-10 mL  5-10 mL IntraVENous PRN    fentaNYL citrate (PF) injection 50 mcg  50 mcg IntraVENous Q2H PRN  sodium chloride (NS) flush 5-40 mL  5-40 mL IntraVENous PRN    famotidine (PF) (PEPCID) 20 mg in 0.9% sodium chloride 10 mL injection  20 mg IntraVENous Q12H    ondansetron (ZOFRAN) injection 4 mg  4 mg IntraVENous Q6H PRN       Review of Systems:   Unobtainable. Physical Assessment:     Vitals:    07/18/20 1930 07/18/20 1945 07/18/20 2000 07/18/20 2015   BP: 115/47 101/49 110/47 118/53   Pulse: 85 79 81 83   Resp: 16 16 16 18   Temp: 98.3 °F (36.8 °C) 98.3 °F (36.8 °C) 98.3 °F (36.8 °C) 98.3 °F (36.8 °C)   SpO2: 99% 100% 100% 100%   Weight:         Last 3 Recorded Weights in this Encounter    07/16/20 1505 07/17/20 0610   Weight: 82.6 kg (182 lb) 86.7 kg (191 lb 1.6 oz)     Admission weight: Weight: 82.6 kg (182 lb) (07/16/20 1505)      Intake/Output Summary (Last 24 hours) at 7/18/2020 2041  Last data filed at 7/18/2020 1900  Gross per 24 hour   Intake 1625.89 ml   Output 330 ml   Net 1295.89 ml       Intubated, sedated but wakes up to voice, appears agitated. HEENT: Head is normocephalic and atraumatic. Pupils are round, equal, reactive to light. Sclerae are anicteric. Et tube in place. Neck: no cervical lymphadenopathy or thyromegaly. Lungs: good air entry, bl exp rhonchi. Trachea at the midline. Cardiovascular system: S1, S2, regular rate and rhythm. No murmurs, gallops or rubs. No jvd. Carotid upstroke 2 + bilaterally. Abdomen: soft, diffusely tender to palpation, distended. Positive bowel sounds. No hepatosplenomegaly. No abdominal bruits. Extremities: no clubbing, cyanosis. 1+ bl le edema. Delayed capillary refill on the toes bilaterally. Integumentary: skin is grossly intact. Neurologic: intubated, sedated but arousalbe. Moves all 4 extremities.      Data Review:    Labs: Results:       Chemistry Recent Labs     07/18/20  0347 07/17/20 2027 07/17/20  0550  07/16/20  1531   * 215*  --  155*   < > 52*    141  --  142   < > 143   K 4.7 5.1  --  4.0   < > 3.5   * 113*  --  113*   < > 112*   CO2 20* 23  --  21   < > 28   BUN 46* 43*  --  37*   < > 35*   CREA 4.07* 3.56*  --  2.70*   < > 1.95*   CA 6.6* 6.7*  --  6.7*   < > 7.7*   AGAP 7 5  --  8   < > 3   BUCR 11* 12  --  14   < > 18   AP 74  --   --  64  --  80   TP 5.0*  --   --  4.8*  --  6.3*   ALB 1.6*  --   --  1.8*  --  2.3*   GLOB 3.4  --   --  3.0  --  4.0   AGRAT 0.5*  --   --  0.6*  --  0.6*   PHOS 4.3 6.9*   < >  --   --   --     < > = values in this interval not displayed. CBC w/Diff Recent Labs     07/18/20  0347 07/17/20 2027 07/17/20  0550   WBC 14.3* 21.6* 13.3*   RBC 2.51* 2.81* 2.67*   HGB 8.1* 9.2* 8.5*   HCT 25.5* 29.8* 27.1*    398 331   GRANS 94* 90* 86*   LYMPH 4* 4* 4*   EOS 0 0 0         Iron/Ferritin No results for input(s): IRON in the last 72 hours. No lab exists for component: TIBCCALC   PTH/VIT D No results for input(s): PTH in the last 72 hours.     No lab exists for component: VITD           Dana Leal M.D  [facility]  Office [phone]  Pager [phone]    July 18, 2020

## 2020-07-19 NOTE — PROGRESS NOTES
Assumed care from [place], [address] [place] [address]. Patient is awake and alert, sedated, no distress noted. 0000> Patient resting. VSS    0400> VSS    Bedside and Verbal shift change report given to Rashad Degroot RN (oncoming nurse) by Petra Payton RN (offgoing nurse). Report included the following information SBAR, Kardex, Intake/Output, MAR, Recent Results and Cardiac Rhythm NSR. Self

## 2020-07-19 NOTE — PROGRESS NOTES
Patient seen and examined. There are no major issues overnight  He is still intubated but awake and alert and he is asking for the ET tube to be removed0  When I asked him if he has any abdominal pain he denies that  Also per the nursing team he did not complain of any pain overnight  He also is passing flatus and had a bowel movement yesterday. Also the NG tube output is almost 0  He has no fever or tachycardia  WBC keep trending down  On exam his abdomen is soft and non-tender and non-distended  His rapid COVID-19 test came back as negative but awaiting the final result. Plan:    There is no evidence that his worsening clinical condition is related to his abdomen. His exam showed a benign abdomen with no tenderness and distention.     I appreciate the medicine admission and management  I appreciate the ICU team management  Appreciate nephrology and ID input  Once the patient is extubated we can take the NG tube and start liquid diet if he passes SLP to make sure he does not have any more aspiration  Await the final results of the COVID-19 test  Continue with IV antibiotics and IV fluids  Close observation  We will follow closely

## 2020-07-19 NOTE — PROGRESS NOTES
Problem: Diabetes Self-Management  Goal: *Disease process and treatment process  Description: Define diabetes and identify own type of diabetes; list 3 options for treating diabetes. Outcome: Progressing Towards Goal  Goal: *Incorporating nutritional management into lifestyle  Description: Describe effect of type, amount and timing of food on blood glucose; list 3 methods for planning meals. Outcome: Progressing Towards Goal  Goal: *Incorporating physical activity into lifestyle  Description: State effect of exercise on blood glucose levels. Outcome: Progressing Towards Goal  Goal: *Developing strategies to promote health/change behavior  Description: Define the ABC's of diabetes; identify appropriate screenings, schedule and personal plan for screenings. Outcome: Progressing Towards Goal  Goal: *Using medications safely  Description: State effect of diabetes medications on diabetes; name diabetes medication taking, action and side effects. Outcome: Progressing Towards Goal  Goal: *Monitoring blood glucose, interpreting and using results  Description: Identify recommended blood glucose targets  and personal targets. Outcome: Progressing Towards Goal  Goal: *Prevention, detection, treatment of acute complications  Description: List symptoms of hyper- and hypoglycemia; describe how to treat low blood sugar and actions for lowering  high blood glucose level. Outcome: Progressing Towards Goal  Goal: *Prevention, detection and treatment of chronic complications  Description: Define the natural course of diabetes and describe the relationship of blood glucose levels to long term complications of diabetes.   Outcome: Progressing Towards Goal  Goal: *Developing strategies to address psychosocial issues  Description: Describe feelings about living with diabetes; identify support needed and support network  Outcome: Progressing Towards Goal  Goal: *Insulin pump training  Outcome: Progressing Towards Goal  Goal: *Sick day guidelines  Outcome: Progressing Towards Goal  Goal: *Patient Specific Goal (EDIT GOAL, INSERT TEXT)  Outcome: Progressing Towards Goal     Problem: Patient Education: Go to Patient Education Activity  Goal: Patient/Family Education  Outcome: Progressing Towards Goal     Problem: Ventilator Management  Goal: *Adequate oxygenation and ventilation  Outcome: Progressing Towards Goal  Goal: *Patient maintains clear airway/free of aspiration  Outcome: Progressing Towards Goal  Goal: *Absence of infection signs and symptoms  Outcome: Progressing Towards Goal  Goal: *Normal spontaneous ventilation  Outcome: Progressing Towards Goal     Problem: Patient Education: Go to Patient Education Activity  Goal: Patient/Family Education  Outcome: Progressing Towards Goal     Problem: Non-Violent Restraints  Goal: *Removal from restraints as soon as assessed to be safe  Outcome: Progressing Towards Goal  Goal: *No harm/injury to patient while restraints in use  Outcome: Progressing Towards Goal  Goal: *Patient's dignity will be maintained  Outcome: Progressing Towards Goal  Goal: *Patient Specific Goal (EDIT GOAL, INSERT TEXT)  Outcome: Progressing Towards Goal  Goal: Non-violent Restaints:Standard Interventions  Outcome: Progressing Towards Goal  Goal: Non-violent Restraints:Patient Interventions  Outcome: Progressing Towards Goal  Goal: Patient/Family Education  Outcome: Progressing Towards Goal     Problem: Falls - Risk of  Goal: *Absence of Falls  Description: Document Jose Fall Risk and appropriate interventions in the flowsheet.   Outcome: Progressing Towards Goal  Note: Fall Risk Interventions:       Mentation Interventions: Adequate sleep, hydration, pain control, Bed/chair exit alarm    Medication Interventions: Assess postural VS orthostatic hypotension, Bed/chair exit alarm    Elimination Interventions: Bed/chair exit alarm, Call light in reach              Problem: Patient Education: Go to Patient Education Activity  Goal: Patient/Family Education  Outcome: Progressing Towards Goal     Problem: Pressure Injury - Risk of  Goal: *Prevention of pressure injury  Description: Document Francis Scale and appropriate interventions in the flowsheet.   Outcome: Progressing Towards Goal     Problem: Patient Education: Go to Patient Education Activity  Goal: Patient/Family Education  Outcome: Progressing Towards Goal     Problem: Discharge Planning  Goal: *Discharge to safe environment  Outcome: Progressing Towards Goal

## 2020-07-19 NOTE — PROGRESS NOTES
RENAL PROGRESS NOTE        Armando Bains         Assessment/Plan:   · MARCI (ischemic atn in a setting of pneumonia/sepsis/resp failure). Oliguric, scr is up sharply, will likely need dialysis in the next 24-48 hours. · Pneumonia/resp failure. On abx. · S/p  exploratory laparotomy with extensive lysis of adhesions, small bowel resection x2 on 7/7. No sbo per surgery. · Septic shock. On abx, continue pressors to keep map 65-70. · Hyperphosphatemia. Monitor. Subjective:  Patient complaints off: intubated. Pressors requirements are less.      Patient Active Problem List   Diagnosis Code    Diabetes ([facility] [address] E11.9    SBO (small bowel obstruction) ([facility] [address] [facility] K56.609    HTN (hypertension) I10    MARCI (acute kidney injury) ([facility] [address] N17.9    Troponin level elevated R79.89    Paroxysmal atrial fibrillation (HCC) I48.0    Aspiration pneumonia ([facility] [address] J69.0    Hypoglycemia U58.4    Metabolic encephalopathy W39.82    Acute respiratory failure with hypoxia and hypercapnia (HCC) J96.01, J96.02       Current Facility-Administered Medications   Medication Dose Route Frequency Provider Last Rate Last Dose    acetaminophen (TYLENOL) solution 650 mg  650 mg Oral Q6H PRN Maria Luisa Lane MD   650 mg at 07/18/20 0438    NOREPINephrine (LEVOPHED) 8 mg in 0.9% NS 250ml infusion  0.5-30 mcg/min IntraVENous TITRATE Xavier Salinas MD   Stopped at 07/19/20 0803    fentaNYL (PF) 1,500 mcg/30 mL (50 mcg/mL) infusion  0-200 mcg/hr IntraVENous TITRATE Xavier Salinas MD 2.5 mL/hr at 07/19/20 1246 125 mcg/hr at 07/19/20 1246    insulin glargine (LANTUS) injection 8 Units  8 Units SubCUTAneous DAILY Maryjane Quispe MD   8 Units at 07/19/20 0816    piperacillin-tazobactam (ZOSYN) 3.375 g in 0.9% sodium chloride ###EXTENDED 4 HOUR INFUSION###  3.375 g IntraVENous Q12H Jaida Kapoor MD 25 mL/hr at 07/19/20 1528 3.375 g at 07/19/20 1528    lactated Ringers infusion  50 mL/hr IntraVENous CONTINUOUS Tim Blackburn MD 50 mL/hr at 07/19/20 1326 50 mL/hr at 07/19/20 1326    albuterol-ipratropium (DUO-NEB) 2.5 MG-0.5 MG/3 ML  3 mL Nebulization QID RT Fabienne Momin MD   3 mL at 07/19/20 1547    aspirin chewable tablet 81 mg  81 mg Oral DAILY Fabienne Momin MD   81 mg at 07/19/20 0817    atorvastatin (LIPITOR) tablet 80 mg  80 mg Oral DAILY Fabienne Momin MD   80 mg at 07/19/20 0817    heparin (porcine) injection 5,000 Units  5,000 Units SubCUTAneous Q8H Fabienne Momin MD   5,000 Units at 07/19/20 1126    dextrose 10% infusion 125-250 mL  125-250 mL IntraVENous PRN Fabienne Momin MD        [facility]) injection 0.4 mg  0.4 mg IntraVENous PRN Fabienne Momin MD       [address] [place] [address] insulin lispro (HUMALOG) injection   SubCUTAneous Q6H Fabienne Momin MD   Stopped at 07/19/20 0000    glucose chewable tablet 16 g  4 Tab Oral PRN Fabienne Momin MD        glucagon (GLUCAGEN) injection 1 mg  1 mg IntraMUSCular PRN Fabienne Momin MD        methylPREDNISolone (PF) (SOLU-MEDROL) injection 40 mg  40 mg IntraVENous Q12H Ilya Mendoza MD   40 mg at 07/19/20 0816    midazolam (VERSED) injection 1 mg  1 mg IntraVENous Q3H PRN Ilya Mendoza MD   1 mg at 07/19/20 1607    fentaNYL citrate (PF) injection 50 mcg  50 mcg IntraVENous Q4H PRN Ilya Mendoza MD        lidocaine (XYLOCAINE) 2 % jelly   Mucous Membrane PRN Fabienne Momin MD        sodium chloride (NS) flush 5-10 mL  5-10 mL IntraVENous PRN Fabienne Momin MD   10 mL at 07/16/20 1832    sodium chloride (NS) flush 5-40 mL  5-40 mL IntraVENous PRN Ilya Mendoza MD        famotidine (PF) (PEPCID) 20 mg in 0.9% sodium chloride 10 mL injection  20 mg IntraVENous Q12H Ilya Mendoza MD   20 mg at 07/19/20 0817    ondansetron (ZOFRAN) injection 4 mg  4 mg IntraVENous Q6H PRN Ilya Mendoza MD Objective  Vitals:    07/19/20 1615 07/19/20 1630 07/19/20 1645 07/19/20 1650   BP: 131/55 116/54 118/61    Pulse: 91 75 73 81   Resp: 16 16 16 16   Temp: 97.5 °F (36.4 °C) 97.3 °F (36.3 °C) 97.3 °F (36.3 °C)    SpO2: 99% 99% 100% 100%   Weight:             Intake/Output Summary (Last 24 hours) at 7/19/2020 1821  Last data filed at 7/19/2020 1600  Gross per 24 hour   Intake 1175.7 ml   Output 70 ml   Net 1105.7 ml           Admission weight: Weight: 82.6 kg (182 lb) (07/16/20 1505)  Last Weight Metrics:  Weight Loss Metrics 7/19/2020 7/13/2020 1/31/2019   Today's Wt 198 lb 9.6 oz 183 lb 13.8 oz 181 lb 9 oz   BMI 31.11 kg/m2 28.8 kg/m2 28.44 kg/m2             Physical Assessment:     General: intubated but alert, agitated, follows commands. ET tube in place. Neck: No jvd. LUNGS: diminished air entry at the bases, bl exp rhonchi. CVS EXM: S1, S2  RRR. Abdomen: less distended,  non tender. Lower Extremities: 1+ bl thigh edema. Lab    CBC w/Diff Recent Labs     07/19/20  1614 07/19/20  0504 07/18/20 0347   WBC 11.5 11.8 14.3*   RBC 2.50* 2.26* 2.51*   HGB 8.0* 7.2* 8.1*   HCT 25.0* 22.5* 25.5*    262 320   GRANS 91* 94* 94*   LYMPH 6* 4* 4*   EOS 0 0 0        Chemistry Recent Labs     07/19/20  0504 07/18/20  0347 07/17/20 2027 07/17/20  0550   * 208* 215*  --  155*    141 141  --  142   K 4.8 4.7 5.1  --  4.0   * 114* 113*  --  113*   CO2 19* 20* 23  --  21   BUN 61* 46* 43*  --  37*   CREA 5.45* 4.07* 3.56*  --  2.70*   CA 7.1* 6.6* 6.7*  --  6.7*   AGAP 8 7 5  --  8   BUCR 11* 11* 12  --  14   AP 82 74  --   --  64   TP 4.9* 5.0*  --   --  4.8*   ALB 1.6* 1.6*  --   --  1.8*   GLOB 3.3 3.4  --   --  3.0   AGRAT 0.5* 0.5*  --   --  0.6*   PHOS 5.9* 4.3 6.9*   < >  --     < > = values in this interval not displayed.          No results found for: IRON, FE, TIBC, IBCT, PSAT, FERR   Lab Results   Component Value Date/Time    Calcium 7.1 (L) 07/19/2020 05:04 AM Phosphorus 5.9 (H) 07/19/2020 05:04 AM        Arlen Arrieta M.D.   [facility]  Phone

## 2020-07-19 NOTE — PROGRESS NOTES
0700: Bedside shift change report given to Morgan Lance RN (oncoming nurse) by Court García, SANDRO (offgoing nurse). Report included the following information SBAR, MAR and Recent Results. Fentanyl and Levo verified. VSS. RASS -1.    0800: Dr. Fox العراقي at bedside to examine patient. States that he looks good. Midline incision with staples-well approximated, and soft non-tender abdomen. <200ml output from NGT overnight. 1800: Pt's ventilator alarming \"Volume not delivered. \" Suctioned patient without any success. Irrigated ETT with 10ml saline, and suctioned afterwards with success. RASS -1 to 0. Pt states he is still in a little bit of pain. I will increase his fentanyl PCA. VSS.    1900: Bedside shift change report given to Janina Murray RN (oncoming nurse) by Morgan Lance RN (offgoing nurse). Report included the following information SBAR, MAR and Recent Results.

## 2020-07-19 NOTE — ACP (ADVANCE CARE PLANNING)
Advance Care Planning     Advance Care Planning Clinical Specialist  Conversation Note      Date of ACP Conversation: 07/19/20    Conversation Conducted with:   Healthcare Decision Maker: Next of Kin by law (only applies in absence of above) (name) Ellen Harada    ACP Clinical Specialist: Rigo Vieira RN    *When Decision Maker makes decisions on behalf of the incapacitated patient: Decision Maker is asked to consider and make decisions based on patient values, known preferences, or best interests. Health Care Decision Maker:  Ellen Harada    Current Designated Health Care Decision Maker:   (If there is a valid Health Care Decision Maker named in the [address]" box in the ACP activity, but it is not visible above, be sure to open that field and then select the health care decision maker relationship (ie \"primary\") in the blank space to the right of the name.) Validate  this information as still accurate & up-to-date; edit [place] field as needed.)    Note: Assess and validate information in current ACP documents, as indicated. If no Decision Maker listed above or available through scanned documents, then:    If no Authorized Decision Maker has previously been identified, then patient chooses [place]:  \"Who would you like to name as your primary health care decision-maker? \"    Name: Ellen Harada   Relationship: Wife Phone number: [phone]  [place] Rather this person be reached easily? \" YES  \"Who would you like to name as your back-up decision maker? \"   Name: Katie Rivers  Relationship: Daughter Phone number: [phone]  [place] Rather this person be reached easily? \" YES    Note: If the relationship of these Decision-Makers to the patient does NOT follow your state's Next of Kin hierarchy, recommend that patient complete ACP document that meets state-specific requirements to allow them to act on the patient's behalf when appropriate.     Care Preferences    Hospitalization: \"If your health worsens and it becomes clear that your chance of recovery is unlikely, what would your preference be regarding hospitalization? \"    Choice:  []  The patient wants hospitalization  []  The patient prefers comfort-focused treatment without hospitalization. Ventilation: \"If you were in your present state of health and suddenly became very ill and were unable to breathe on your own, what would your preference be about the use of a ventilator (breathing machine) if it were available to you? \"      If patient would desire the use of a ventilator (breathing machine), answer \"yes\", if not \"no\":yes    \"If your health worsens and it becomes clear that your chance of recovery is unlikely, what would your preference be about the use of a ventilator (breathing machine) if it were available to you? \"     Would the patient desire the use of a ventilator (breathing machine)? YES      Resuscitation  \"CPR works best to restart the heart when there is a sudden event, like a heart attack, in someone who is otherwise healthy. Unfortunately, CPR does not typically restart the heart for people who have serious health conditions or who are very sick. \"    \"In the event your heart stopped as a result of an underlying serious health condition, would you want attempts to be made to restart your heart (answer \"yes\" for attempt to resuscitate) or would you prefer a natural death (answer \"no\" for do not attempt to resuscitate)? \" yes      NOTE: If the patient has a valid advance directive AND now provides care preference(s) that are inconsistent with that prior directive, advise the patient to consider either: creating a new advance directive that complies with state-specific requirements; or, if that is not possible, orally revoking that prior directive in accordance with state-specific requirements, which must be documented in the EHR.     [] Yes  [] No   Educated Patient / Susi Body regarding differences between Advance Directives and portable DNR orders. Length of ACP Conversation in minutes:      Conversation Outcomes:  [x] ACP discussion completed  [] Existing advance directive reviewed with patient; no changes to patient's previously recorded wishes   [] New Advance Directive completed   [] Portable Do Not Resuscitate prepared for Provider review and signature  [] POLST/POST/MOLST/MOST prepared for Provider review and signature      Follow-up plan:    [] Schedule follow-up conversation to continue planning  [] Referred individual to Provider for additional questions/concerns   [x] Advised patient/agent/surrogate to review completed ACP document and update if needed with changes in condition, patient preferences or care setting   This note routed to one or more involved healthcare providers      Cristian Fair.  JUVENAL LópezN  [facility]  [phone], Pager [phone]               [email or web address]

## 2020-07-19 NOTE — PROGRESS NOTES
Internal Medicine Progress Note        NAME: Pina Thompson   :  1935  MRM:  [id number]    Date/Time: 2020        ASSESSMENT/PLAN :                          Likely will need HD ,Waiting for line placement . [facility]  [facility] , will address in am.      # Acute hypoxic hypercapnic respiratory failure. Intubated twice , one shortly after admission thought due to AMS due to pain medicine and pneumonia then he self extubate and second on  when he sustained hypoxia. Vent management per [facility]. #  Metabolic encephalopathy. in ER initially thought due to hypoglycemia but after correction of blood sugar he continued to be unresponsive. Other possibility due to 4 mg IV morphine given for pains by ER staff. Given IV Narcan doses. CT head no acute lesion. # SBO (small bowel obstruction) . Admitted to the hospital for further management. Surgery consulted. N.p.o.  IV fluid. Antibiotic. #  Aspiration pneumonia ([facility] [address] (2020). CT scan with possible right lower lobe pneumonia. IV Zosyn. Maintain oxygenation. Follow chest x-ray , follow blood culture. Repeat blood culture , LA bundle. # Hypotension. IV pressors as needed. Hold blood pressure medicine . #   Diabetes uncontrolled with hypoglycemia. .  Blood sugar improved  Hypoglycemia likely due to poor p.o. intake. Improved in the ER with IV glucose infusion. We will continue close monitoring . Provide SSI, hypoglycemia protocol and frequent Accu checks. Provide SSI, hypoglycemia protocol and frequent Accu checks. Education,  diabetic educator  . Diabetic Diet       # HTN (hypertension) (7/3/2020). Control blood pressure. Currently of his home blood pressure medication     # MARCI (acute kidney injury) (, worsening renal function. IV fluid   Monitor Renal function and other labs as indicated. Avoid nephrotoxins , iv Contrast, NSAID. Renally dosing medications.  Monitor urine out put.          # Troponin level elevated . EKG. Telemetry. Serial troponin.     # History of paroxysmal atrial fibrillation ([address] (7/9/2020). Telemetry bed      # Right kidney mass. Need follow-up as an outpatient for further management.         -DVT prophylaxis : Heparin.   - Code Status : Full    IP CONSULT TO HOSPITALIST  IP CONSULT TO GENERAL SURGERY  IP CONSULT TO NEPHROLOGY  IP CONSULT TO INFECTIOUS DISEASES    Lab Review:     Recent Labs     07/19/20  0504 07/18/20  0347 07/17/20 2027   WBC 11.8 14.3* 21.6*   HGB 7.2* 8.1* 9.2*   HCT 22.5* 25.5* 29.8*    320 398     Recent Labs     07/19/20  0504 07/18/20  0347 07/17/20 2027 07/17/20  0550  07/16/20  1531    141 141 142   < > 143   K 4.8 4.7 5.1 4.0   < > 3.5   * 114* 113* 113*   < > 112*   CO2 19* 20* 23 21   < > 28   * 208* 215* 155*   < > 52*   BUN 61* 46* 43* 37*   < > 35*   CREA 5.45* 4.07* 3.56* 2.70*   < > 1.95*   CA 7.1* 6.6* 6.7* 6.7*   < > 7.7*   MG 2.3 2.2 1.4*  --   --  1.8   PHOS 5.9* 4.3 6.9*  --   --   --    ALB 1.6* 1.6*  --  1.8*  --  2.3*   TBILI 0.3 0.3  --  0.4  --  0.3   ALT 17 15*  --  15*  --  17   INR  --   --   --   --   --  1.2    < > = values in this interval not displayed. Lab Results   Component Value Date/Time    Glucose (POC) 114 (H) 07/19/2020 11:40 AM    Glucose (POC) 128 (H) 07/19/2020 05:16 AM    Glucose (POC) 118 (H) 07/19/2020 12:26 AM    Glucose (POC) 174 (H) 07/18/2020 05:46 PM    Glucose (POC) 125 (H) 07/18/2020 02:58 PM     No results for input(s): PH, PCO2, PO2, HCO3, FIO2 in the last 72 hours.   Recent Labs     07/16/20  1531   INR 1.2       No results found for: SDES  Lab Results   Component Value Date/Time    Culture result: NO GROWTH AFTER 12 HOURS 07/18/2020 05:49 PM    Culture result: NO GROWTH AFTER 12 HOURS 07/18/2020 05:45 PM    Culture result: NO GROWTH 3 DAYS 07/16/2020 03:00 PM       All Cardiac Markers in the last 24 hours:   No results found for: CPK, CK, CKMMB, CKMB, RCK3, CKMBT, CKNDX, CKND1, JOHN, TROPT, TROIQ, NEGRITO, TROPT, TNIPOC, BNP, BNPP      Intervals noted   Pt s/e @ bedside     Subjective:     Chief Complaint:      Intubated , sedated  DW his nurse : CORINNE and follow commands on sedation. UOP low about 5 cc whole time today. 200 cc out of NGT. Off pressors now. No fever. Dw surgery , unlikely abd is the main issue     Objective:     Vitals:  Last 24hrs VS reviewed since prior progress note. Most recent are:    Visit Vitals  /54   Pulse 90   Temp 97.8 °F (36.6 °C)   Resp 20   Wt 90.1 kg (198 lb 9.6 oz)   SpO2 100%   BMI 31.11 kg/m²     SpO2 Readings from Last 6 Encounters:   07/19/20 100%   07/13/20 97%   01/31/19 100%    O2 Flow Rate (L/min): 5 l/min       Intake/Output Summary (Last 24 hours) at 7/19/2020 1300  Last data filed at 7/19/2020 1152  Gross per 24 hour   Intake 1103.2 ml   Output 370 ml   Net 733.2 ml          Physical Exam:   Gen:  Appear stated age, Well-developed,   in no acute distress  HEENT:  Head atraumatic, normocephalic , hearing intact to voice, moist mucous membranes. Neck:   Trachea midline , No apparent JVD, Supple   Resp: Poor inspiratory effort but lungs are clear overall. No accessory muscle use,Bilateral BS present   Card: + murmur, normal S1, S2 without Gallop .+ lower leg peripheral edema. Abd: slight mid abd tenderness,  Lower abdomen midline scar with staples on. Abdomen slightly tense. Reduced bowel sounds. Musc:  No cyanosis or clubbing. Skin:  No rashes or ulcers, skin turgor is good.   Neuro: intubated, alert , no clear area of focal weakness     Medications Reviewed: (see below)    Lab Data Reviewed: (see below)    ______________________________________________________________________    Medications:     Current Facility-Administered Medications   Medication Dose Route Frequency    acetaminophen (TYLENOL) solution 650 mg  650 mg Oral Q6H PRN    NOREPINephrine (LEVOPHED) 8 mg in 0.9% NS 250ml infusion  0.5-30 mcg/min IntraVENous TITRATE    fentaNYL (PF) 1,500 mcg/30 mL (50 mcg/mL) infusion  0-200 mcg/hr IntraVENous TITRATE    insulin glargine (LANTUS) injection 8 Units  8 Units SubCUTAneous DAILY    piperacillin-tazobactam (ZOSYN) 3.375 g in 0.9% sodium chloride ###EXTENDED 4 HOUR INFUSION###  3.375 g IntraVENous Q12H    lactated Ringers infusion  50 mL/hr IntraVENous CONTINUOUS    albuterol-ipratropium (DUO-NEB) 2.5 MG-0.5 MG/3 ML  3 mL Nebulization QID RT    aspirin chewable tablet 81 mg  81 mg Oral DAILY    atorvastatin (LIPITOR) tablet 80 mg  80 mg Oral DAILY    heparin (porcine) injection 5,000 Units  5,000 Units SubCUTAneous Q8H    dextrose 10% infusion 125-250 mL  125-250 mL IntraVENous PRN    naloxone (NARCAN) injection 0.4 mg  0.4 mg IntraVENous PRN    insulin lispro (HUMALOG) injection   SubCUTAneous Q6H    glucose chewable tablet 16 g  4 Tab Oral PRN    glucagon (GLUCAGEN) injection 1 mg  1 mg IntraMUSCular PRN    methylPREDNISolone (PF) (SOLU-MEDROL) injection 40 mg  40 mg IntraVENous Q12H    midazolam (VERSED) injection 1 mg  1 mg IntraVENous Q3H PRN    fentaNYL citrate (PF) injection 50 mcg  50 mcg IntraVENous Q4H PRN    lidocaine (XYLOCAINE) 2 % jelly   Mucous Membrane PRN    sodium chloride (NS) flush 5-10 mL  5-10 mL IntraVENous PRN    sodium chloride (NS) flush 5-40 mL  5-40 mL IntraVENous PRN    famotidine (PF) (PEPCID) 20 mg in 0.9% sodium chloride 10 mL injection  20 mg IntraVENous Q12H    ondansetron (ZOFRAN) injection 4 mg  4 mg IntraVENous Q6H PRN       Total time spent with patient: 35 minutes                  Care Plan discussed with: Patient, Nursing Staff, Consultant/Specialist and >50% of time spent in counseling and coordination of care    Discussed:  Care Plan    Prophylaxis:  Hep SQ    Disposition:  Home w/Family           This document in whole or part of it has been produced using voice recognition software. Unrecognized errors in transcription may be present.     Attending Physician: Beverly Goyal MD

## 2020-07-19 NOTE — PROGRESS NOTES
Problem: Diabetes Self-Management  Goal: *Disease process and treatment process  Description: Define diabetes and identify own type of diabetes; list 3 options for treating diabetes. Outcome: Progressing Towards Goal  Goal: *Incorporating nutritional management into lifestyle  Description: Describe effect of type, amount and timing of food on blood glucose; list 3 methods for planning meals. Outcome: Progressing Towards Goal  Goal: *Incorporating physical activity into lifestyle  Description: State effect of exercise on blood glucose levels. Outcome: Progressing Towards Goal  Goal: *Developing strategies to promote health/change behavior  Description: Define the ABC's of diabetes; identify appropriate screenings, schedule and personal plan for screenings. Outcome: Progressing Towards Goal  Goal: *Using medications safely  Description: State effect of diabetes medications on diabetes; name diabetes medication taking, action and side effects. Outcome: Progressing Towards Goal  Goal: *Monitoring blood glucose, interpreting and using results  Description: Identify recommended blood glucose targets  and personal targets. Outcome: Progressing Towards Goal  Goal: *Prevention, detection, treatment of acute complications  Description: List symptoms of hyper- and hypoglycemia; describe how to treat low blood sugar and actions for lowering  high blood glucose level. Outcome: Progressing Towards Goal  Goal: *Prevention, detection and treatment of chronic complications  Description: Define the natural course of diabetes and describe the relationship of blood glucose levels to long term complications of diabetes.   Outcome: Progressing Towards Goal  Goal: *Developing strategies to address psychosocial issues  Description: Describe feelings about living with diabetes; identify support needed and support network  Outcome: Progressing Towards Goal  Goal: *Insulin pump training  Outcome: Progressing Towards Goal  Goal: *Sick day guidelines  Outcome: Progressing Towards Goal  Goal: *Patient Specific Goal (EDIT GOAL, INSERT TEXT)  Outcome: Progressing Towards Goal     Problem: Patient Education: Go to Patient Education Activity  Goal: Patient/Family Education  Outcome: Progressing Towards Goal     Problem: Ventilator Management  Goal: *Adequate oxygenation and ventilation  Outcome: Progressing Towards Goal  Goal: *Patient maintains clear airway/free of aspiration  Outcome: Progressing Towards Goal  Goal: *Absence of infection signs and symptoms  Outcome: Progressing Towards Goal  Goal: *Normal spontaneous ventilation  Outcome: Progressing Towards Goal     Problem: Patient Education: Go to Patient Education Activity  Goal: Patient/Family Education  Outcome: Progressing Towards Goal     Problem: Non-Violent Restraints  Goal: *Removal from restraints as soon as assessed to be safe  Outcome: Progressing Towards Goal  Goal: *No harm/injury to patient while restraints in use  Outcome: Progressing Towards Goal  Goal: *Patient's dignity will be maintained  Outcome: Progressing Towards Goal  Goal: *Patient Specific Goal (EDIT GOAL, INSERT TEXT)  Outcome: Progressing Towards Goal  Goal: Non-violent Restaints:Standard Interventions  Outcome: Progressing Towards Goal  Goal: Non-violent Restraints:Patient Interventions  Outcome: Progressing Towards Goal  Goal: Patient/Family Education  Outcome: Progressing Towards Goal     Problem: Falls - Risk of  Goal: *Absence of Falls  Description: Document Jose Fall Risk and appropriate interventions in the flowsheet.   Outcome: Progressing Towards Goal  Note: Fall Risk Interventions:       Mentation Interventions: Adequate sleep, hydration, pain control    Medication Interventions: Assess postural VS orthostatic hypotension    Elimination Interventions: Call light in reach              Problem: Patient Education: Go to Patient Education Activity  Goal: Patient/Family Education  Outcome: Progressing Towards Goal     Problem: Pressure Injury - Risk of  Goal: *Prevention of pressure injury  Description: Document Francis Scale and appropriate interventions in the flowsheet.   Outcome: Progressing Towards Goal     Problem: Patient Education: Go to Patient Education Activity  Goal: Patient/Family Education  Outcome: Progressing Towards Goal     Problem: Discharge Planning  Goal: *Discharge to safe environment  Outcome: Progressing Towards Goal

## 2020-07-19 NOTE — PROGRESS NOTES
[facility]                                              I have reviewed the flowsheet and previous days notes. Events, vitals, medications and notes from last 24 hours reviewed. Care plan discussed with staff and on multidisciplinary rounds. Subjective:  7/19/2020   Patient is sedated on the ventilator    Impression and Plan  Patient Active Problem List   Diagnosis Code    Diabetes ([facility] [address] E11.9    SBO (small bowel obstruction) ([facility] [address] K56.609    HTN (hypertension) I10    MARCI (acute kidney injury) ([facility] [address] N17.9    Troponin level elevated R79.89    Paroxysmal atrial fibrillation (HCC) I48.0    Aspiration pneumonia ([facility] [address] J69.0    Hypoglycemia I42.6    Metabolic encephalopathy M27.66    Acute respiratory failure with hypoxia and hypercapnia (HCC) J96.01, J96.02     Acute respiratory failure with hypoxia and hypercapnia/vent dependent respiratory failure -patient was intubated in the ER and then self extubated the next day and then was reintubated the same day for hypercapnia and altered mental status. ABG today shows metabolic acidosis. At this time I will continue with the current ventilator settings. Patient may need to go on acute hemodialysis, so I will hold off on weaning and extubating him today till his acidosis is corrected. Titrate FiO2 to keep sats more than 90%  Shock  improving. Patient is off both the Levophed and vasopressin at this time  Right lower lobe pneumonia possibly aspiration -patient WBC count has been decreasing and is in the normal range today. Patient is afebrile. Continue with Zosyn  Sepsis -patient's blood culture does not show any growth as yet. WBC count is coming down and patient is afebrile. Continue with Zosyn  Acute kidney injury -patient's BUN and creatinine are both worse today. His BNP is also elevated. Pt may need acute HD. Defer to Nephrology.    Recent small bowel obstruction status post expiratory laparotomy lysis of adhesions and bowel resection on 7/7/2020. Anton Lopez was discharged on 7/13/2020.  CT scan abdomen on 7/16/20 shows high-grade ileus versus developing small bowel obstruction.  Patient has been empirically started on Zosyn and clindamycin. Surgery is also consulted, follow-up with them  Asthma -patient is on Solu-Medrol and duo nebs  Anemia -patient's hemoglobin is lower today than yesterday. Monitor hemoglobin closely  History of diabetes mellitus -patient is on Lantus and insulin sliding scale  Sedationpatient is on fentanyl drip  History of hypertension -Home meds are on hold secondary to the hypotension  History of proximal atrial fibrillation -patient is on aspirin. Lopressor has been held off secondary to hypotension  DVT and GI prophylaxis patient is on heparin and Pepcid    OTHER:  Glycemic Control. Glucose stabilizer per ICU protocol when on insulin drip. Maintain blood glucose 140-180. Replace electrolytes per ICU electrolyte replacement protocol  Ventilator bundle & Sedation protocol followed. Daily morning sedation holiday, assessment for readiness for SBT & weaning from ventilator; and then re-titrate if required. Aim to keep peak plateau pressure 71-88JK H2O in ARDS patient. Rocio tube to suction at 20-30 cm H2O, Maintain Rocio tube with 5-10ml air every 4 hours. Chlorhexidine mouth washes and routine oral care every 4 hours. Stress ulcer and DVT prophylaxis. HOB >=30 degree elevation all the time. HOB >=30 degree elevation all the time. Aggressive pulmonary toileting. Incentive spirometry when appropriate. Aspiration precautions. Sepsis bundle and protocol followed. Deescalate antibiotic when appropriate. Vasopressor when appropriate with MAP goal >65 mmHg. Central Line bundle followed, remove when not needed. Large bore IV line or CVP when appropriate. PT/OT eval and treat. OOB/IS when appropriate.      Quality Care: Stress ulcer prophylaxis, DVT prophylaxis, HOB elevated, Infection control all reviewed and addressed. Events and notes from last 24 hours reviewed. Care plan discussed with nursing. D/w patient's family above medical problems and answered all questions to their satisfaction. I talked to patient's wife and updated her in detail. Answered all her questions to her satisfaction    CC TIME: >40 min     Medication Reviewed: Allergies   Allergen Reactions    Iodinated Contrast Media Swelling    Lisinopril Swelling      Past Medical History:   Diagnosis Date    Diabetes ([address]     Hypertension       No past surgical history on file. Social History     Tobacco Use    Smoking status: Never Smoker    Smokeless tobacco: Never Used   Substance Use Topics    Alcohol use: Not on file      No family history on file. Prior to Admission medications    Medication Sig Start Date End Date Taking? Authorizing Provider   metoprolol tartrate (LOPRESSOR) 25 mg tablet Take 1 Tab by mouth every twelve (12) hours. 7/13/20   Yoselin Birch PA   aspirin 81 mg chewable tablet Take 1 Tab by mouth daily. 7/13/20   Yoselin Birch PA   Cetirizine 10 mg cap Take  by mouth. Other, MD Kiah   benzonatate (TESSALON) 100 mg capsule Take 100 mg by mouth three (3) times daily as needed for Cough. OtherKiah MD   atorvastatin (LIPITOR) 80 mg tablet Take 80 mg by mouth daily. Provider, Historical   glipiZIDE (GLUCOTROL) 10 mg tablet Take 10 mg by mouth two (2) times a day. Provider, Historical   hydroCHLOROthiazide (HYDRODIURIL) 25 mg tablet Take 25 mg by mouth daily. Provider, Historical   albuterol (PROVENTIL HFA) 90 mcg/actuation inhaler Take 1 Puff by inhalation daily.     Provider, Historical     Current Facility-Administered Medications   Medication Dose Route Frequency    NOREPINephrine (LEVOPHED) 8 mg in 0.9% NS 250ml infusion  0.5-30 mcg/min IntraVENous TITRATE    fentaNYL (PF) 1,500 mcg/30 mL (50 mcg/mL) infusion  0-200 mcg/hr IntraVENous TITRATE    insulin glargine (LANTUS) injection 8 Units  8 Units SubCUTAneous DAILY    piperacillin-tazobactam (ZOSYN) 3.375 g in 0.9% sodium chloride ###EXTENDED 4 HOUR INFUSION###  3.375 g IntraVENous Q12H    lactated Ringers infusion  50 mL/hr IntraVENous CONTINUOUS    albuterol-ipratropium (DUO-NEB) 2.5 MG-0.5 MG/3 ML  3 mL Nebulization QID RT    aspirin chewable tablet 81 mg  81 mg Oral DAILY    atorvastatin (LIPITOR) tablet 80 mg  80 mg Oral DAILY    heparin (porcine) injection 5,000 Units  5,000 Units SubCUTAneous Q8H    insulin lispro (HUMALOG) injection   SubCUTAneous Q6H    vasopressin (VASOSTRICT) 20 Units in 0.9% sodium chloride 100 mL infusion  0-0.1 Units/min IntraVENous TITRATE    methylPREDNISolone (PF) (SOLU-MEDROL) injection 40 mg  40 mg IntraVENous Q12H    famotidine (PF) (PEPCID) 20 mg in 0.9% sodium chloride 10 mL injection  20 mg IntraVENous Q12H       Lines: All central lines examined by me. No signs of erythema, induration, discharge. Central Venous Catheter:  Triple Lumen 07/16/20 Right Subclavian (Active)   Central Line Being Utilized Yes 07/18/20 0400   Criteria for Appropriate Use Hemodynamically unstable, requiring monitoring lines, vasopressors, or volume resuscitation 07/18/20 0400   Site Assessment Clean, dry, & intact 07/18/20 0400   Infiltration Assessment 0 07/18/20 0400   Affected Extremity/Extremities Color distal to insertion site pink (or appropriate for race) 07/18/20 0400   Date of Last Dressing Change 07/17/20 07/18/20 0400   Dressing Status Clean, dry, & intact 07/18/20 0400   Dressing Type Tape;Transparent 07/18/20 0400   Action Taken Open ports on tubing capped 07/18/20 0400   Proximal Hub Color/Line Status White;Capped 07/18/20 0400   Positive Blood Return (Medial Site) Yes 07/18/20 0400   Medial Hub Color/Line Status Blue; Infusing 07/18/20 0400   Positive Blood Return (Lateral Site) Yes 07/18/20 0400   Distal Hub Color/Line Status Brown; Infusing 07/18/20 0400   Positive Blood Return (Site #3) Yes 07/18/20 0400   Alcohol Cap Used Yes 20 0400      Drain(s):  Orogastric Tube 20 (Active)   Site Assessment Clean, dry, & intact 20 0400   Securement Device Tape 20 0400   G Port Status Clamped 20 0400   External Insertion Guido (cms) 60 cms 20 0400   Drainage Description Green 20 0000     Airway:  Airway - Endotracheal Tube 20 Oral (Active)   Insertion Depth (cm) 25 cm 20 0455   Line Guido Lips 20 0455   Side Secured Centered 20 0455   Cuff Pressure 28 cmH20 20 0455   Site Assessment Clean, dry, & intact 20 0455       Objective:  Vital Signs:    Visit Vitals  /54   Pulse 89   Temp 97.8 °F (36.6 °C)   Resp 16   Wt 90.1 kg (198 lb 9.6 oz)   SpO2 99%   BMI 31.11 kg/m²      O2 Device: Endotracheal tube  O2 Flow Rate (L/min): 5 l/min  Temp (24hrs), Av.6 °F (36.4 °C), Min:94.6 °F (34.8 °C), Max:98.5 °F (36.9 °C)      Intake/Output:   Last shift:      701 - 1900  In: 92.1 [I.V.:92.1]  Out: -     Last 3 shifts: 1901 -  07  In: 2302.8 [I.V.:2302.8]  Out: 335 [Urine:35]      Intake/Output Summary (Last 24 hours) at 2020 1019  Last data filed at 2020 0739  Gross per 24 hour   Intake 897.16 ml   Output 315 ml   Net 582.16 ml       Last 3 Recorded Weights in this Encounter    20 1505 20 0610 20 0520   Weight: 82.6 kg (182 lb) 86.7 kg (191 lb 1.6 oz) 90.1 kg (198 lb 9.6 oz)       Ventilator Settings:  Mode Rate Tidal Volume Pressure FiO2 PEEP  Assist control, VC+   500 ml    40 % 8 cm H20    Peak airway pressure: 277 cm H2O   Plateau pressure:    Tidal volume:   Minute ventilation: 7.36 l/min  SPO2     ARDS network Guidelines: Lung protective strategy and Plateau pressure goals less than or equal to 30    Physical Exam:     General/Neurology: Sedated on the ventilator  Head:   Normocephalic, without obvious abnormality  Eye:   Pallor present  Oral:   Mucus membranes moist  Neck:   Supple  Lung:   B/l air entry is decreased, bilateral basal rales present  Heart:   S1 S2 present. Abdomen/: Soft, non tender, incision line appears to be clean  Extremities:  Pedal edema present    Data:      Recent Results (from the past 24 hour(s))   GLUCOSE, POC    Collection Time: 07/18/20  2:58 PM   Result Value Ref Range    Glucose (POC) 125 (H) 70 - 110 mg/dL   CULTURE, BLOOD    Collection Time: 07/18/20  5:45 PM    Specimen: Blood   Result Value Ref Range    Special Requests: NO SPECIAL REQUESTS      Culture result: NO GROWTH AFTER 12 HOURS     LACTIC ACID    Collection Time: 07/18/20  5:45 PM   Result Value Ref Range    Lactic acid 0.8 0.4 - 2.0 MMOL/L   GLUCOSE, POC    Collection Time: 07/18/20  5:46 PM   Result Value Ref Range    Glucose (POC) 174 (H) 70 - 110 mg/dL   CULTURE, BLOOD    Collection Time: 07/18/20  5:49 PM    Specimen: Blood   Result Value Ref Range    Special Requests: NO SPECIAL REQUESTS      Culture result: NO GROWTH AFTER 12 HOURS     GLUCOSE, POC    Collection Time: 07/19/20 12:26 AM   Result Value Ref Range    Glucose (POC) 118 (H) 70 - 110 mg/dL   POC G3    Collection Time: 07/19/20  5:00 AM   Result Value Ref Range    Device: VENT      FIO2 (POC) 40 %    pH (POC) 7.28 (L) 7.35 - 7.45      pCO2 (POC) 36.6 35.0 - 45.0 MMHG    pO2 (POC) 117 (H) 80 - 100 MMHG    HCO3 (POC) 17.3 (L) 22 - 26 MMOL/L    sO2 (POC) 98 (H) 92 - 97 %    Base deficit (POC) 10 mmol/L    Mode ASSIST CONTROL      Tidal volume 500 ml    Set Rate 16 bpm    PEEP/CPAP (POC) 8.0 cmH2O    PIP (POC) 34      Allens test (POC) YES      Inspiratory Time 0.90 sec    Nitric-ppm (POC) 0 ppm    Total resp. rate 16      Site RIGHT RADIAL      Patient temp.  98.00      Specimen type (POC) ARTERIAL      Performed by Juwan Rodas     Volume control plus YES     CBC WITH AUTOMATED DIFF    Collection Time: 07/19/20  5:04 AM   Result Value Ref Range    WBC 11.8 4.6 - 13.2 K/uL    RBC 2.26 (L) 4.70 - 5.50 M/uL    HGB 7.2 (L) 13.0 - 16.0 g/dL    HCT 22.5 (L) 36.0 - 48.0 %    MCV 99.6 (H) 74.0 - 97.0 FL    MCH 31.9 24.0 - 34.0 PG    MCHC 32.0 31.0 - 37.0 g/dL    RDW 12.4 11.6 - 14.5 %    PLATELET 521 564 - 774 K/uL    MPV 9.9 9.2 - 11.8 FL    NEUTROPHILS 94 (H) 40 - 73 %    LYMPHOCYTES 4 (L) 21 - 52 %    MONOCYTES 2 (L) 3 - 10 %    EOSINOPHILS 0 0 - 5 %    BASOPHILS 0 0 - 2 %    ABS. NEUTROPHILS 11.2 (H) 1.8 - 8.0 K/UL    ABS. LYMPHOCYTES 0.4 (L) 0.9 - 3.6 K/UL    ABS. MONOCYTES 0.2 0.05 - 1.2 K/UL    ABS. EOSINOPHILS 0.0 0.0 - 0.4 K/UL    ABS. BASOPHILS 0.0 0.0 - 0.1 K/UL    DF AUTOMATED     METABOLIC PANEL, COMPREHENSIVE    Collection Time: 07/19/20  5:04 AM   Result Value Ref Range    Sodium 142 136 - 145 mmol/L    Potassium 4.8 3.5 - 5.5 mmol/L    Chloride 115 (H) 100 - 111 mmol/L    CO2 19 (L) 21 - 32 mmol/L    Anion gap 8 3.0 - 18 mmol/L    Glucose 134 (H) 74 - 99 mg/dL    BUN 61 (H) 7.0 - 18 MG/DL    Creatinine 5.45 (H) 0.6 - 1.3 MG/DL    BUN/Creatinine ratio 11 (L) 12 - 20      GFR est AA 12 (L) >60 ml/min/1.73m2    GFR est non-AA 10 (L) >60 ml/min/1.73m2    Calcium 7.1 (L) 8.5 - 10.1 MG/DL    Bilirubin, total 0.3 0.2 - 1.0 MG/DL    ALT (SGPT) 17 16 - 61 U/L    AST (SGOT) 31 10 - 38 U/L    Alk.  phosphatase 82 45 - 117 U/L    Protein, total 4.9 (L) 6.4 - 8.2 g/dL    Albumin 1.6 (L) 3.4 - 5.0 g/dL    Globulin 3.3 2.0 - 4.0 g/dL    A-G Ratio 0.5 (L) 0.8 - 1.7     NT-PRO BNP    Collection Time: 07/19/20  5:04 AM   Result Value Ref Range    NT pro-BNP 15,959 (H) 0 - 1,800 PG/ML   MAGNESIUM    Collection Time: 07/19/20  5:04 AM   Result Value Ref Range    Magnesium 2.3 1.6 - 2.6 mg/dL   PHOSPHORUS    Collection Time: 07/19/20  5:04 AM   Result Value Ref Range    Phosphorus 5.9 (H) 2.5 - 4.9 MG/DL   GLUCOSE, POC    Collection Time: 07/19/20  5:16 AM   Result Value Ref Range    Glucose (POC) 128 (H) 70 - 110 mg/dL         Chemistry   Recent Labs     07/19/20  0504 07/18/20  0347 07/17/20 2027 07/17/20  0550   * 208* 215* 155*    141 141 142   K 4.8 4.7 5.1 4.0   * 114* 113* 113*   CO2 19* 20* 23 21   BUN 61* 46* 43* 37*   CREA 5.45* 4.07* 3.56* 2.70*   CA 7.1* 6.6* 6.7* 6.7*   MG 2.3 2.2 1.4*  --    PHOS 5.9* 4.3 6.9*  --    AGAP 8 7 5 8   BUCR 11* 11* 12 14   AP 82 74  --  64   TP 4.9* 5.0*  --  4.8*   ALB 1.6* 1.6*  --  1.8*   GLOB 3.3 3.4  --  3.0   AGRAT 0.5* 0.5*  --  0.6*       CBC w/Diff   Recent Labs     07/19/20  0504 07/18/20  0347 07/17/20 2027   WBC 11.8 14.3* 21.6*   RBC 2.26* 2.51* 2.81*   HGB 7.2* 8.1* 9.2*   HCT 22.5* 25.5* 29.8*    320 398   GRANS 94* 94* 90*   LYMPH 4* 4* 4*   EOS 0 0 0       ABG    Recent Labs     07/19/20  0500 07/18/20  0459 07/17/20  2121   PHI 7.28* 7.24* 7.00*   PCO2I 36.6 41.2 92.0*   PO2I 117* 105* 90   HCO3I 17.3* 17.5* 22.6   FIO2I 40 40 40       Micro     Recent Labs     07/18/20  1749 07/18/20  1745 07/16/20  1500   CULT NO GROWTH AFTER 12 HOURS NO GROWTH AFTER 12 HOURS NO GROWTH 3 DAYS     Recent Labs     07/18/20  1749 07/18/20  1745 07/16/20  1500 07/16/20  1441   CULT NO GROWTH AFTER 12 HOURS NO GROWTH AFTER 12 HOURS NO GROWTH 3 DAYS NO GROWTH 3 DAYS       CT (Most Recent)    Results from Hospital Encounter encounter on 07/16/20   CT HEAD WO CONT    Narrative EXAM: CT HEAD W/O CONTRAST    INDICATION: Altered mental status/level of consciousness, unresponsive    COMPARISON: No prior comparison study. TECHNIQUE: Axial CT imaging of the head was performed without intravenous  contrast.  Additional coronal and sagittal reconstructions were performed. One  or more dose reduction techniques were used on this CT: automated exposure  control, adjustment of the mAs and/or kVp according to patient's size, and  iterative reconstruction techniques. The specific techniques utilized on this CT  exam have been documented in the patient's electronic medical record.  Digital  Imaging and Communications in Medicine (DICOM) format image data are available  to nonaffiliated external healthcare facilities or entities on a secure, media  free, reciprocally searchable basis with patient authorization for at least a  12-month period after this study. _______________    FINDINGS:    VENTRICLES/EXTRA-AXIAL SPACES: The ventricles and sulci are mildly enlarged  consistent with diffuse volume loss. No extra-axial fluid collection is present. BRAIN PARENCHYMA: There is no evidence of acute intracranial hemorrhage, mass  effect, midline shift, or herniation. No definite CT evidence of acute cortical  infarct is seen. White matter CT    ORBITS: Right ocular lens is absent most likely from prior cataract surgery. PARANASAL SINUSES: Mucoperiosteal thickening scattered throughout the paranasal  sinuses. Sclerotic wall thickening suggested in the partially visualized  maxillary antra from chronic sinusitis. No air-fluid levels are seen. MASTOID AIR CELLS: Visualized mastoid air cells are clear. OSSEOUS STRUCTURES: No fracture is seen. OTHER: Atherosclerotic calcifications are present.    _______________      Impression IMPRESSION:    1. No acute intracranial hemorrhage, mass effect, midline shift, or herniation. No definite CT evidence of acute cortical infarct is seen. Please note that  noncontrast head CT may be normal in early acute infarct. 2. Mild nonspecific white matter disease likely representing chronic small  vessel changes. Preliminary report provided by on-call radiology resident. XR (Most Recent). CXR reviewed by me and compared with previous CXR   Results from Hospital Encounter encounter on 07/16/20   XR CHEST PORT    Narrative EXAM: XR CHEST PORT    CLINICAL INDICATION/HISTORY: Pneumonia.  -Additional: Acute kidney insufficiency. Pneumonia and respiratory failure. COMPARISON: 7/17/2020    TECHNIQUE: Portable frontal view of the chest    _______________    FINDINGS:    SUPPORT DEVICES: Tip of the endotracheal tube is 3.4 cm above the juan francisco.  Tip of  the enteric tube extends below the level of the gastroesophageal junction. HEART AND MEDIASTINUM: Mild cardiomegaly is present. LUNGS AND PLEURAL SPACES: Pleural calcifications are present. BONY THORAX AND SOFT TISSUES: Unremarkable.    _______________      Impression IMPRESSION:    No significant change since 7/17/2020. High complexity decision making was performed during the evaluation of this patient at high risk for decompensation with multiple organ involvement     Above mentioned total time spent on reviewing the case/medical record/data/notes/EMR/patient examination/documentation/coordinating care with nurse/consultants, exclusive of procedures with complex decision making performed and > 50% time spent in face to face evaluation.     This note has been dictated using the dragon dictation system    Bijal Redd MD  7/19/2020

## 2020-07-20 NOTE — PROGRESS NOTES
7/20/2020    UAI was submitted for processing. UAI was inititated on 7/13/2020 but d/t  issue with  NPI number/error message could not submit at that time without automatic denial. This issue has been resolved.     Mireille Corral RN    Outcomes Manager  [phone]  [phone]

## 2020-07-20 NOTE — PROGRESS NOTES
Patient seen and examined. He remains to be intubated. His COVID-19 test is negative and he is taken off droplet precaution. He has no fever or tachycardia. His WBC is normal. Unfortunately his kidney function keeps worsening and his creatinine is going up and per nephrology recommendation he may need dialysis in the next 1 to 2 days. On exam his abdomen is soft and non-tender and non-distended    Imp/Plan:    From the standpoint of the surgery, there is no evidence of leak or bowel obstruction. The patient abdomen is soft and non-tender and non-distended and he had a small bowel movement. Also his NG tube output is minimal and in the last 24 hours it was 425 cc and 24 hours before it was 300 cc. I appreciate medicine admission and management  Follow the recommendations of the nephrology, ID and ICU team  Wean to extubate when possible.  Once the patient is extubated we can take the NG tube and start liquid diet if he passes SLP to make sure he does not have any more aspiration  I will follow

## 2020-07-20 NOTE — CDMP QUERY
Pt admitted with  Small bowel Obstruction, Acute Respiratory failure and Aspiration Pneumonia, axel and Metabolic Encephalopathy. Shock has been documented by Pulm and Renal MD.   After review,  Please document  In  the progress notes and d/c summary if you are evaluating and / or treating any of the following:     Cardiogenic Shock   Hemorrhagic Shock   Neurogenic Shock   Traumatic Shock   Septic Shock   Hypovolemic Shock   Other Shock, Please specify   Other, please specify   Clinically unable to determine    The medical record reflects the following:       Risk Factors:  Axel, age, hypoglycemia, pain meds, bp  92-39, intubated , h/h 9-30,        Clinical Indicators:   ICU 7/16    Shocklikely hypovolemic. I will give a bolus of IV fluids. I will also start the patient on Levophed and titrate pressors to keep map more than 65. Repeat CBC now. 7/17  Icu:  Shock possibly hypovolemic or septic. Patient had received IV resuscitation overnight. He is currently on Levophed, titrate pressors to keep map more than 65  7/18  Renal:  · Septic shock.         Treatment:  IVF Bolus, Levophed     Thank you,  Timbo Veras RN/CDI  [phone]

## 2020-07-20 NOTE — PROGRESS NOTES
0800: Bedside and Verbal shift change report given to Ting Calero (oncoming nurse) by Cherise Whitney (offgoing nurse). Report included the following information SBAR, Kardex, Procedure Summary, Intake/Output, MAR and Cardiac Rhythm NSR.     1000: IDRs completed to include the following plan: Patient to start tube feeding today per surgery's OK that he have liquid diet; temporary dialysis catheter to be placed pending family consent for dialysis tomorrow; patient will remain intubated until after dialysis considering reintubation on 7/17.     1500: Telephone consent received by TREY Brasher to place temporary dialysis catheter. I personally confirmed understand from patient's wife. Time out was completed, and all sterile precautions taken place for a left femoral power trialysis catheter placement. Procedure successful with no complication. Patient's wife aware and thankful.    1900: Bedside and Verbal shift change report given to Cherise Whitney (oncoming nurse) by Ting Calero (offgoing nurse). Report included the following information SBAR, Kardex, Intake/Output, MAR, Recent Results, Cardiac Rhythm NSR and Alarm Parameters .

## 2020-07-20 NOTE — PROGRESS NOTES
RENAL PROGRESS NOTE        Armando Bains         Assessment/Plan:   · MARCI (ischemic atn in a setting of pneumonia/sepsis/resp failure). Oliguric, scr is up sharply. Given trajectory of patient's illness quick spontaneous improvement of renal function is unlikely and initiation of acute dialysis is indicated. I called the wife for consent but there was no answer, will call later. ICU team kindly agreed to place temporary dialysis catheter. First treatment likely later today or tomorrow. Reduce ivf. · Pneumonia/resp failure. On abx. · S/p  exploratory laparotomy with extensive lysis of adhesions, small bowel resection x2 on 7/7. No sbo per surgery. · Septic shock. On abx, off pressors. · Hyperphosphatemia. Monitor. Dialysis should address. Subjective:  Patient complaints off: intubated, sedated, off pressors.      Patient Active Problem List   Diagnosis Code    Diabetes ([facility] E11.9    SBO (small bowel obstruction) ([facility] K56.609    HTN (hypertension) I10    MARCI (acute kidney injury) ([facility] N17.9    Troponin level elevated R79.89    Paroxysmal atrial fibrillation (HCC) I48.0    Aspiration pneumonia ([facility] J69.0    Hypoglycemia T55.5    Metabolic encephalopathy B28.78    Acute respiratory failure with hypoxia and hypercapnia (HCC) J96.01, J96.02       Current Facility-Administered Medications   Medication Dose Route Frequency Provider Last Rate Last Dose    acetaminophen (TYLENOL) solution 650 mg  650 mg Oral Q6H PRN Maureen Galicia MD   650 mg at 07/18/20 0438    NOREPINephrine (LEVOPHED) 8 mg in 0.9% NS 250ml infusion  0.5-30 mcg/min IntraVENous TITRATE Monique Mtz MD   Stopped at 07/19/20 0803    fentaNYL (PF) 1,500 mcg/30 mL (50 mcg/mL) infusion  0-200 mcg/hr IntraVENous TITRATE Monique Mtz MD 3 mL/hr at 07/20/20 0832 150 mcg/hr at 07/20/20 8654    insulin glargine (LANTUS) injection 8 Units  8 Units SubCUTAneous DAILY Dez Gupta MD   8 Units at 07/20/20 0900    piperacillin-tazobactam (ZOSYN) 3.375 g in 0.9% sodium chloride ###EXTENDED 4 HOUR INFUSION###  3.375 g IntraVENous Q12H Romulo Swift MD 25 mL/hr at 07/20/20 0232 3.375 g at 07/20/20 0232    lactated Ringers infusion  75 mL/hr IntraVENous CONTINUOUS Romulo Swift MD 75 mL/hr at 07/20/20 0832 75 mL/hr at 07/20/20 0832    albuterol-ipratropium (DUO-NEB) 2.5 MG-0.5 MG/3 ML  3 mL Nebulization QID RT Dez Gupta MD   3 mL at 07/20/20 3369    aspirin chewable tablet 81 mg  81 mg Oral DAILY Dez Gupta MD   81 mg at 07/20/20 0904    atorvastatin (LIPITOR) tablet 80 mg  80 mg Oral DAILY Dez Gupta MD   80 mg at 07/20/20 1446    heparin (porcine) injection 5,000 Units  5,000 Units SubCUTAneous Q8H Dez Gupta MD   5,000 Units at 07/20/20 0333    dextrose 10% infusion 125-250 mL  125-250 mL IntraVENous PRN Dez Gupta MD        naloxone [facility]) injection 0.4 mg  0.4 mg IntraVENous PRN Dez Gupta MD        insulin lispro (HUMALOG) injection   SubCUTAneous Q6H Dez Gupta MD   Stopped at 07/19/20 0000    glucose chewable tablet 16 g  4 Tab Oral PRN Dez Gupta MD        glucagon (GLUCAGEN) injection 1 mg  1 mg IntraMUSCular PRN Dez Gupta MD        methylPREDNISolone (PF) (SOLU-MEDROL) injection 40 mg  40 mg IntraVENous Q12H Alyson Francisco MD   40 mg at 07/20/20 7369    midazolam (VERSED) injection 1 mg  1 mg IntraVENous Q3H PRN Alyson Francisco MD   1 mg at 07/20/20 0759    fentaNYL citrate (PF) injection 50 mcg  50 mcg IntraVENous Q4H PRN Alyson Francisco MD        lidocaine (XYLOCAINE) 2 % jelly   Mucous Membrane PRN Dez Gupta MD        sodium chloride (NS) flush 5-10 mL  5-10 mL IntraVENous PRN Dez Gupta MD   10 mL at 07/16/20 1832    sodium chloride (NS) flush 5-40 mL  5-40 mL IntraVENous PRN Marek Quick MD        famotidine (PF) (PEPCID) 20 mg in 0.9% sodium chloride 10 mL injection  20 mg IntraVENous Q12H Marek Quick MD   20 mg at 07/20/20 0904    ondansetron (ZOFRAN) injection 4 mg  4 mg IntraVENous Q6H PRN Marek Quick MD           Objective  Vitals:    07/20/20 0600 07/20/20 0630 07/20/20 0700 07/20/20 0822   BP:  129/55     Pulse: 86 88 80 84   Resp: 16 17 16 16   Temp: 98.1 °F (36.7 °C) 98.3 °F (36.8 °C) 98.2 °F (36.8 °C)    SpO2: 100% 100% 100% 100%   Weight:             Intake/Output Summary (Last 24 hours) at 7/20/2020 0915  Last data filed at 7/20/2020 0700  Gross per 24 hour   Intake 1719.24 ml   Output 415 ml   Net 1304.24 ml           Admission weight: Weight: 82.6 kg (182 lb) (07/16/20 1505)  Last Weight Metrics:  Weight Loss Metrics 7/19/2020 7/13/2020 1/31/2019   Today's Wt 198 lb 9.6 oz 183 lb 13.8 oz 181 lb 9 oz   BMI 31.11 kg/m2 28.8 kg/m2 28.44 kg/m2             Physical Assessment:     General: intubated, sedated. ET tube in place. Neck: No jvd. LUNGS: diminished air entry at the bases, bl exp rhonchi. CVS EXM: S1, S2  RRR. Abdomen: less distended, appears to be tender in the periumbilical area. Lower Extremities: 1+ bl thigh edema.        Lab    CBC w/Diff Recent Labs     07/20/20  0350 07/19/20  1614 07/19/20  0504   WBC 9.9 11.5 11.8   RBC 2.43* 2.50* 2.26*   HGB 7.7* 8.0* 7.2*   HCT 24.1* 25.0* 22.5*    283 262   GRANS 91* 91* 94*   LYMPH 5* 6* 4*   EOS 0 0 0        Chemistry Recent Labs     07/20/20  0350 07/19/20  0504 07/18/20  0347   GLU 98 134* 208*    142 141   K 5.1 4.8 4.7   * 115* 114*   CO2 18* 19* 20*   BUN 71* 61* 46*   CREA 6.70* 5.45* 4.07*   CA 7.4* 7.1* 6.6*   AGAP 10 8 7   BUCR 11* 11* 11*   AP  --  82 74   TP  --  4.9* 5.0*   ALB 1.8* 1.6* 1.6*   GLOB  --  3.3 3.4   AGRAT  --  0.5* 0.5*   PHOS 7.1* 5.9* 4.3         No results found for: IRON, FE, TIBC, IBCT, PSAT, FERR   Lab Results   Component Value Date/Time    Calcium 7.4 (L) 07/20/2020 03:50 AM    Phosphorus 7.1 (H) 07/20/2020 03:50 AM        Quita Lucas M.D.   [facility]  Phone

## 2020-07-20 NOTE — PROGRESS NOTES
Problem: Diabetes Self-Management  Goal: *Disease process and treatment process  Description: Define diabetes and identify own type of diabetes; list 3 options for treating diabetes. Outcome: Progressing Towards Goal  Goal: *Incorporating nutritional management into lifestyle  Description: Describe effect of type, amount and timing of food on blood glucose; list 3 methods for planning meals. Outcome: Progressing Towards Goal  Goal: *Incorporating physical activity into lifestyle  Description: State effect of exercise on blood glucose levels. Outcome: Progressing Towards Goal  Goal: *Developing strategies to promote health/change behavior  Description: Define the ABC's of diabetes; identify appropriate screenings, schedule and personal plan for screenings. Outcome: Progressing Towards Goal  Goal: *Using medications safely  Description: State effect of diabetes medications on diabetes; name diabetes medication taking, action and side effects. Outcome: Progressing Towards Goal  Goal: *Monitoring blood glucose, interpreting and using results  Description: Identify recommended blood glucose targets  and personal targets. Outcome: Progressing Towards Goal  Goal: *Prevention, detection, treatment of acute complications  Description: List symptoms of hyper- and hypoglycemia; describe how to treat low blood sugar and actions for lowering  high blood glucose level. Outcome: Progressing Towards Goal  Goal: *Prevention, detection and treatment of chronic complications  Description: Define the natural course of diabetes and describe the relationship of blood glucose levels to long term complications of diabetes.   Outcome: Progressing Towards Goal  Goal: *Developing strategies to address psychosocial issues  Description: Describe feelings about living with diabetes; identify support needed and support network  Outcome: Progressing Towards Goal  Goal: *Insulin pump training  Outcome: Progressing Towards Goal  Goal: *Sick day guidelines  Outcome: Progressing Towards Goal  Goal: *Patient Specific Goal (EDIT GOAL, INSERT TEXT)  Outcome: Progressing Towards Goal     Problem: Patient Education: Go to Patient Education Activity  Goal: Patient/Family Education  Outcome: Progressing Towards Goal     Problem: Ventilator Management  Goal: *Adequate oxygenation and ventilation  Outcome: Progressing Towards Goal  Goal: *Patient maintains clear airway/free of aspiration  Outcome: Progressing Towards Goal  Goal: *Absence of infection signs and symptoms  Outcome: Progressing Towards Goal  Goal: *Normal spontaneous ventilation  Outcome: Progressing Towards Goal     Problem: Patient Education: Go to Patient Education Activity  Goal: Patient/Family Education  Outcome: Progressing Towards Goal     Problem: Non-Violent Restraints  Goal: *Removal from restraints as soon as assessed to be safe  Outcome: Progressing Towards Goal  Goal: *No harm/injury to patient while restraints in use  Outcome: Progressing Towards Goal  Goal: *Patient's dignity will be maintained  Outcome: Progressing Towards Goal  Goal: *Patient Specific Goal (EDIT GOAL, INSERT TEXT)  Outcome: Progressing Towards Goal  Goal: Non-violent Restaints:Standard Interventions  Outcome: Progressing Towards Goal  Goal: Non-violent Restraints:Patient Interventions  Outcome: Progressing Towards Goal  Goal: Patient/Family Education  Outcome: Progressing Towards Goal     Problem: Falls - Risk of  Goal: *Absence of Falls  Description: Document Jose Fall Risk and appropriate interventions in the flowsheet.   Outcome: Progressing Towards Goal  Note: Fall Risk Interventions:       Mentation Interventions: Adequate sleep, hydration, pain control, Bed/chair exit alarm, Door open when patient unattended    Medication Interventions: Assess postural VS orthostatic hypotension, Bed/chair exit alarm    Elimination Interventions: Bed/chair exit alarm, Call light in reach              Problem: Patient Education: Go to Patient Education Activity  Goal: Patient/Family Education  Outcome: Progressing Towards Goal     Problem: Pressure Injury - Risk of  Goal: *Prevention of pressure injury  Description: Document Francis Scale and appropriate interventions in the flowsheet. Outcome: Progressing Towards Goal  Note: Pressure Injury Interventions:  Sensory Interventions: Assess changes in LOC, Avoid rigorous massage over bony prominences, Keep linens dry and wrinkle-free, Maintain/enhance activity level, Minimize linen layers    Moisture Interventions: Absorbent underpads, Apply protective barrier, creams and emollients, Internal/External urinary devices    Activity Interventions: Assess need for specialty bed    Mobility Interventions: Assess need for specialty bed, Float heels, HOB 30 degrees or less, Turn and reposition approx.  every two hours(pillow and wedges)    Nutrition Interventions: Document food/fluid/supplement intake, Discuss nutritional consult with provider    Friction and Shear Interventions: Apply protective barrier, creams and emollients, HOB 30 degrees or less, Minimize layers, Transferring/repositioning devices                Problem: Patient Education: Go to Patient Education Activity  Goal: Patient/Family Education  Outcome: Progressing Towards Goal     Problem: Discharge Planning  Goal: *Discharge to safe environment  Outcome: Progressing Towards Goal     Problem: Nutrition Deficit  Goal: *Optimize nutritional status  Outcome: Progressing Towards Goal     Problem: Patient Education: Go to Patient Education Activity  Goal: Patient/Family Education  Outcome: Progressing Towards Goal

## 2020-07-20 NOTE — PROGRESS NOTES
Problem: Diabetes Self-Management  Goal: *Disease process and treatment process  Description: Define diabetes and identify own type of diabetes; list 3 options for treating diabetes. Outcome: Progressing Towards Goal  Goal: *Incorporating nutritional management into lifestyle  Description: Describe effect of type, amount and timing of food on blood glucose; list 3 methods for planning meals. Outcome: Progressing Towards Goal  Goal: *Incorporating physical activity into lifestyle  Description: State effect of exercise on blood glucose levels. Outcome: Progressing Towards Goal  Goal: *Developing strategies to promote health/change behavior  Description: Define the ABC's of diabetes; identify appropriate screenings, schedule and personal plan for screenings. Outcome: Progressing Towards Goal  Goal: *Using medications safely  Description: State effect of diabetes medications on diabetes; name diabetes medication taking, action and side effects. Outcome: Progressing Towards Goal  Goal: *Monitoring blood glucose, interpreting and using results  Description: Identify recommended blood glucose targets  and personal targets. Outcome: Progressing Towards Goal  Goal: *Prevention, detection, treatment of acute complications  Description: List symptoms of hyper- and hypoglycemia; describe how to treat low blood sugar and actions for lowering  high blood glucose level. Outcome: Progressing Towards Goal  Goal: *Prevention, detection and treatment of chronic complications  Description: Define the natural course of diabetes and describe the relationship of blood glucose levels to long term complications of diabetes.   Outcome: Progressing Towards Goal  Goal: *Developing strategies to address psychosocial issues  Description: Describe feelings about living with diabetes; identify support needed and support network  Outcome: Progressing Towards Goal  Goal: *Insulin pump training  Outcome: Progressing Towards Goal  Goal: *Sick day guidelines  Outcome: Progressing Towards Goal  Goal: *Patient Specific Goal (EDIT GOAL, INSERT TEXT)  Outcome: Progressing Towards Goal     Problem: Patient Education: Go to Patient Education Activity  Goal: Patient/Family Education  Outcome: Progressing Towards Goal     Problem: Ventilator Management  Goal: *Adequate oxygenation and ventilation  Outcome: Progressing Towards Goal  Goal: *Patient maintains clear airway/free of aspiration  Outcome: Progressing Towards Goal  Goal: *Absence of infection signs and symptoms  Outcome: Progressing Towards Goal  Goal: *Normal spontaneous ventilation  Outcome: Progressing Towards Goal     Problem: Patient Education: Go to Patient Education Activity  Goal: Patient/Family Education  Outcome: Progressing Towards Goal     Problem: Non-Violent Restraints  Goal: *Removal from restraints as soon as assessed to be safe  Outcome: Progressing Towards Goal  Goal: *No harm/injury to patient while restraints in use  Outcome: Progressing Towards Goal  Goal: *Patient's dignity will be maintained  Outcome: Progressing Towards Goal  Goal: *Patient Specific Goal (EDIT GOAL, INSERT TEXT)  Outcome: Progressing Towards Goal  Goal: Non-violent Restaints:Standard Interventions  Outcome: Progressing Towards Goal  Goal: Non-violent Restraints:Patient Interventions  Outcome: Progressing Towards Goal  Goal: Patient/Family Education  Outcome: Progressing Towards Goal     Problem: Falls - Risk of  Goal: *Absence of Falls  Description: Document Jose Fall Risk and appropriate interventions in the flowsheet.   Outcome: Progressing Towards Goal  Note: Fall Risk Interventions:       Mentation Interventions: Adequate sleep, hydration, pain control, Bed/chair exit alarm    Medication Interventions: Assess postural VS orthostatic hypotension, Bed/chair exit alarm    Elimination Interventions: Bed/chair exit alarm, Call light in reach              Problem: Patient Education: Go to Patient Education Activity  Goal: Patient/Family Education  Outcome: Progressing Towards Goal     Problem: Pressure Injury - Risk of  Goal: *Prevention of pressure injury  Description: Document Francis Scale and appropriate interventions in the flowsheet.   Outcome: Progressing Towards Goal  Note: Pressure Injury Interventions:  Sensory Interventions: Assess changes in LOC, Assess need for specialty bed    Moisture Interventions: Absorbent underpads, Apply protective barrier, creams and emollients    Activity Interventions: Assess need for specialty bed, Chair cushion    Mobility Interventions: Assess need for specialty bed, Chair cushion    Nutrition Interventions: Document food/fluid/supplement intake, Discuss nutritional consult with provider    Friction and Shear Interventions: Apply protective barrier, creams and emollients, Feet elevated on foot rest                Problem: Patient Education: Go to Patient Education Activity  Goal: Patient/Family Education  Outcome: Progressing Towards Goal     Problem: Discharge Planning  Goal: *Discharge to safe environment  Outcome: Progressing Towards Goal

## 2020-07-20 NOTE — PROCEDURES
[facility]   DIALYSIS CATH (NON-TUNNELED) Procedure Note with US guidance     Indication: Acute renal failure requiring urgent HD       Risks, benefits, alternatives explained to Pt's wife (MPOA) and 2-person verified consent obtained over telephone from pt's Wife Lani Médnez. Patient positioned in supine position. Full sterile barrier precautions used. (NOTE: Groin site used since this was a temp dialysis cath, and to avoid neck and chest blood vessel injury for future perm-cath and AV fistulas.)    Sterility Protocol followed. (cap, mask sterile gown, sterile gloves, large sterile sheet, hand hygiene, 2% chlorhexidine for cutaneous antisepsis, sterile probe cover). Site cleaned with chloroprep total of 5 times. Pt given 2mg PRN Versed prior to procedure; remained on Fentanyl gtt    CATH: BARD Power-Trialysis Triple lumen 13 Fr, 24 cm   Using ultrasound guidance, 13 Fr triple lumen cath placed and secured at 24 cm. LEFT Femoral VEIN cannulated x 1 attempt(s) utilizing the Seldinger technique. Dark, oozing, non-pulsatile blood return noted  Position of wire confirmed in vein using US before dilating. Wire was removed. Do to technical difficulties, picture not obtained. No immediate complications. Good, dark, non pulsatile blood return, carefully flushed. Catheter secured & Biopatch applied. Sterile Tegaderm placed. Pt tolerated procedure well  No immediate complications.      Nate Stinson PA-C  07/20/20      Pulmonary Critical Care Medicine  [facility]

## 2020-07-20 NOTE — PROGRESS NOTES
Chart reviewed. Pt was at [facility]. for rehab prior to admit, plan return when medically stable. Posted in cc link in epic. Will cont to follow. Esther Rouse RN,ext [phone].

## 2020-07-20 NOTE — CONSULTS
[facility]  (A Division of [address] [place] [address])      Consultation Note      Date of Admission: 7/16/2020    Date of Consultation: 7/20/2020    Referred by: Dr. Lexy Real    Reason for Referral: Sepsis, aspiration pneumonia, shock, ileus      Current Antimicrobials:    Prior Antimicrobials:  Zosyn 7/16 - 4 Clindamycin 7/16 - 1       Assessment: Rec / Plan:   Sepsis  - fever, tachycardia, tachypnea, leukocytosis present on admission  - due to pneumonia, ileus   - blcx 7/16 NGTD x 2, 7/18 NGTD x 2  - resolving -> continue Zosyn  -> will aim for 7 day abx course but extend if indicated   New RL lung consolidation  - seen on CTAP 7/16  - likely aspiration pneumonia  - well covered with zosyn -> abx as above  -> spcx   Acute hypoxic respiratory failure  - [facility]   Shock  - septic, hypovolemic  - off pressors -> observe   MARCI  - due to above  - cr 6.7 today  - Temporary dialysis catheter placed today 7/20 -> HD per Renal   High grade ileus vs developing SBO  - on CTAP 7/16 -> Surgery followng   SBO due to adhesions  - s/p Ex lap w/ extensive DONNA, SB rsxn x 2 7/7 by Dr. Alma Elizalde    DM    HTN    PAF        Microbiology:      7/16 blcx NGTD x 2  7/18 blcx NGTD x 2    Lines / Catheters:    piv    HPI:  80year-old [place] male with DM, HTN, PAF, s/p recent SB resection admitted to [facility] 7/16/2020 due to abdominal pain and tenderness, vomiting. He underwent exploratory laparotomy with extensive lysis of adhesions and small bowel resection x 2 on 7/7/2020 by Dr. Alma Elizalde. Transferred to [facility] 7/13, he developed diffuse abdominal pain and tenderness and vomiting for which he was sent back to [facility] 7/16. CTAP showed prominent gastric distention and small bowel dilatation extending to the right lower quadrant small bowel anastamosis concerning for high-grade ileus vs developing small bowel obstruction. There was also a new right lower lung consolidation. In the ED he became unresponsive and required intubation. NTpro BNP was 2,716. He was admitted 7/16 on Zosyn and clindamycin, the latter being stopped on 7/17. Initially febrile to 102.8 and with leukocytosis to 21.6 on 7/17, both fever and leukocytosis have resolved on zosyn alone. SARS CoV 2 PCR on 7/17 was negative but he has developed progressive worsening of his creatinine reaching 6.7 today 7/20 and NT pro BNP reaching 19,075. A temporary dialysis catheter has been placed. Active Hospital Problems    Diagnosis Date Noted    Aspiration pneumonia ([facility] 07/16/2020    Hypoglycemia 18/20/4619    Metabolic encephalopathy 71/34/4709    Acute respiratory failure with hypoxia and hypercapnia (HCC) 07/16/2020    Paroxysmal atrial fibrillation ([facility] 07/09/2020    Diabetes ([facility] 07/03/2020    SBO (small bowel obstruction) ([facility] 07/03/2020    HTN (hypertension) 07/03/2020    Troponin level elevated 07/03/2020    MARCI (acute kidney injury) ([facility] 07/03/2020     Past Medical History:   Diagnosis Date    Diabetes ([facility]     Hypertension      No past surgical history on file. No family history on file.   Social History     Socioeconomic History    Marital status:      Spouse name: Not on file    Number of children: Not on file    Years of education: Not on file    Highest education level: Not on file   Occupational History    Not on file   Social Needs    Financial resource strain: Not on file    Food insecurity     Worry: Not on file     Inability: Not on file    Transportation needs     Medical: Not on file     Non-medical: Not on file   Tobacco Use    Smoking status: Never Smoker    Smokeless tobacco: Never Used   Substance and Sexual Activity    Alcohol use: Not on file    Drug use: Not on file    Sexual activity: Not on file   Lifestyle    Physical activity     Days per week: Not on file     Minutes per session: Not on file    Stress: Not on file   Relationships    Social connections     Talks on phone: Not on file     Gets together: Not on file     Attends Taoist service: Not on file     Active member of club or organization: Not on file     Attends meetings of clubs or organizations: Not on file     Relationship status: Not on file    Intimate partner violence     Fear of current or ex partner: Not on file     Emotionally abused: Not on file     Physically abused: Not on file     Forced sexual activity: Not on file   Other Topics Concern    Not on file   Social History Narrative    Not on file       Allergies:  Iodinated contrast media and Lisinopril     Medications:  Current Facility-Administered Medications   Medication Dose Route Frequency    [START ON 7/21/2020] insulin glargine (LANTUS) injection 5 Units  5 Units SubCUTAneous DAILY    acetaminophen (TYLENOL) solution 650 mg  650 mg Oral Q6H PRN    fentaNYL (PF) 1,500 mcg/30 mL (50 mcg/mL) infusion  0-200 mcg/hr IntraVENous TITRATE    piperacillin-tazobactam (ZOSYN) 3.375 g in 0.9% sodium chloride ###EXTENDED 4 HOUR INFUSION###  3.375 g IntraVENous Q12H    lactated Ringers infusion  50 mL/hr IntraVENous CONTINUOUS    albuterol-ipratropium (DUO-NEB) 2.5 MG-0.5 MG/3 ML  3 mL Nebulization QID RT    aspirin chewable tablet 81 mg  81 mg Oral DAILY    atorvastatin (LIPITOR) tablet 80 mg  80 mg Oral DAILY    heparin (porcine) injection 5,000 Units  5,000 Units SubCUTAneous Q8H    dextrose 10% infusion 125-250 mL  125-250 mL IntraVENous PRN    naloxone (NARCAN) injection 0.4 mg  0.4 mg IntraVENous PRN    insulin lispro (HUMALOG) injection   SubCUTAneous Q6H    glucose chewable tablet 16 g  4 Tab Oral PRN    glucagon (GLUCAGEN) injection 1 mg  1 mg IntraMUSCular PRN    methylPREDNISolone (PF) (SOLU-MEDROL) injection 40 mg  40 mg IntraVENous Q12H    midazolam (VERSED) injection 1 mg  1 mg IntraVENous Q3H PRN    fentaNYL citrate (PF) injection 50 mcg  50 mcg IntraVENous Q4H PRN    lidocaine (XYLOCAINE) 2 % jelly Mucous Membrane PRN    sodium chloride (NS) flush 5-10 mL  5-10 mL IntraVENous PRN    sodium chloride (NS) flush 5-40 mL  5-40 mL IntraVENous PRN    famotidine (PF) (PEPCID) 20 mg in 0.9% sodium chloride 10 mL injection  20 mg IntraVENous Q12H    ondansetron (ZOFRAN) injection 4 mg  4 mg IntraVENous Q6H PRN        ROS:  Unobtainable - intubated, unresponsive     Physical Exam:    HPI:  Temp (24hrs), Av.8 °F (36.6 °C), Min:96.6 °F (35.9 °C), Max:98.3 °F (36.8 °C)    Visit Vitals  /61   Pulse 100   Temp 97.7 °F (36.5 °C)   Resp 16   Ht 5' 7\" (1.702 m)   Wt 90.1 kg (198 lb 9.6 oz)   SpO2 100%   BMI 31.11 kg/m²       General: Well developed, obese 80 y.o. [place] male in no acute distress. ENT: ENT exam normal, no neck nodes or sinus tenderness  Head: normocephalic, without obvious abnormality  Mouth:  not examined, orotracheally intubated  Neck: supple, symmetrical, trachea midline   Cardio:  regular rate and rhythm, S1, S2 normal, no murmur, click, rub or gallop  Chest: inspection normal - no chest wall deformities or tenderness  Lungs: expiratory rhonchi and decreased breath sounds  Abdomen: soft, non-tender. Bowel sounds normal. No masses, no organomegaly.   Extremities:  no redness or tenderness in the calves or thighs, thigh, LE edema 1+  Neuro: intubated, unresponsive       Lab results:    Chemistry  Recent Labs     20  0350 20  0504 20  0347   GLU 98 134* 208*    142 141   K 5.1 4.8 4.7   * 115* 114*   CO2 18* 19* 20*   BUN 71* 61* 46*   CREA 6.70* 5.45* 4.07*   CA 7.4* 7.1* 6.6*   AGAP 10 8 7   BUCR 11* 11* 11*   AP  --  82 74   TP  --  4.9* 5.0*   ALB 1.8* 1.6* 1.6*   GLOB  --  3.3 3.4   AGRAT  --  0.5* 0.5*       CBC w/ Diff  Recent Labs     20  0350 20  1614 20  0504   WBC 9.9 11.5 11.8   RBC 2.43* 2.50* 2.26*   HGB 7.7* 8.0* 7.2*   HCT 24.1* 25.0* 22.5*    283 262   GRANS 91* 91* 94*   LYMPH 5* 6* 4*   EOS 0 0 0       Microbiology  All Micro Results     Procedure Component Value Units Date/Time    CULTURE, BLOOD [[id number]] Collected:  07/18/20 1749    Order Status:  Completed Specimen:  Blood Updated:  07/20/20 0832     Special Requests: NO SPECIAL REQUESTS        Culture result: NO GROWTH 2 DAYS       CULTURE, BLOOD [[id number]] Collected:  07/18/20 1745    Order Status:  Completed Specimen:  Blood Updated:  07/20/20 0832     Special Requests: NO SPECIAL REQUESTS        Culture result: NO GROWTH 2 DAYS       CULTURE, BLOOD [[id number]] Collected:  07/16/20 1441    Order Status:  Completed Specimen:  Blood Updated:  07/20/20 0832     Special Requests: PERIPHERAL        Culture result: NO GROWTH 4 DAYS       CULTURE, BLOOD [[id number]] Collected:  07/16/20 1500    Order Status:  Completed Specimen:  Blood Updated:  07/20/20 0832     Special Requests: PERIPHERAL        Culture result: NO GROWTH 4 DAYS              Keerthi Amos MD, [facility]  Infectious Diseases  [phone]  7/20/2020   6:27 PM

## 2020-07-20 NOTE — PROGRESS NOTES
Physical Exam  Skin:                Primary Nurse Kelley Meyers RN and Cyrus Boeck, RN performed a dual skin assessment on this patient Impairment noted- see wound doc flow sheet.

## 2020-07-20 NOTE — PROGRESS NOTES
Patient is unable to communicate at this time.  offered prayer. Chaplains will continue to follow and will provide pastoral care on an as needed/requested basis.     [address] [facility]   Staff [address] [place] [address]   [phone]

## 2020-07-20 NOTE — CDMP QUERY
Pt admitted with  Axel, Metabolic Encephalopathy, Aspiration Pneumonia. Icu/Pulm and Renal have documented Sepsis in their progress notes, after review  if you concur, please document in your progress notes to reflect the diagnosis of Sepsis or Sepsis ruled out.  Sepsis, present on admission, suspected or probable causative organism (please specify cause of Sepsis.      Sepsis, now resolved, suspected or probable causative organism (please specify)     Sepsis, not present on admission, suspected or probable causative organism (please specify)     No Sepsis, suspected or probable localized infection (please specify)     Sepsis was ruled out (include corresponding diagnosis for patients clinical picture and treatment)     Other, please specify   Clinically unable to determine    The medical record reflects the following:     Risk Factors:  SBO, Aspiration Pneumonia, Acute Resp failure, Metabolic Encephalopathy , axel with Atn, shock wit Levophed      Clinical Indicators:  Aspiration Pneumonia, Encephalopathy, Acute resp failure     Treatment: IV abx, , Levophed, labs, Grant Wright RN/CDI  [phone]

## 2020-07-20 NOTE — PROGRESS NOTES
Problem: Ventilator Management  Goal: *Adequate oxygenation and ventilation  Outcome: Progressing Towards Goal  VENTILATOR CARE PLAN    Problem: Ventilator Management  Goal: *Adequate oxygenation/ ventilation/ and extubation      Patient:        Gomez Phillip     80 y.o.   male     7/20/2020  8:29 AM  Patient Active Problem List   Diagnosis Code    Diabetes ([facility] E11.9    SBO (small bowel obstruction) ([facility] K56.609    HTN (hypertension) I10    MARCI (acute kidney injury) ([facility] N17.9    Troponin level elevated R79.89    Paroxysmal atrial fibrillation (HCC) I48.0    Aspiration pneumonia (HCC) J69.0    Hypoglycemia N06.0    Metabolic encephalopathy C26.99    Acute respiratory failure with hypoxia and hypercapnia (HCC) J96.01, J96.02       SBO (small bowel obstruction) ([facility] [K56.609]  Acute respiratory failure with hypoxia and hypercapnia ([facility] [J96.01, J96.02]    Reason patient intubated: reintubated 7/17    Ventilator day: 4    Ventilator settings: ACVC+ 16 / 500 / +8 / 40%. ETT Size/Placement: Size 8 ETT and 25 at the lip. ABG:  Date:7/20/2020  Lab Results   Component Value Date/Time    PHI 7.25 (L) 07/20/2020 04:38 AM    PCO2I 38.6 07/20/2020 04:38 AM    PO2I 117 (H) 07/20/2020 04:38 AM    HCO3I 17.0 (L) 07/20/2020 04:38 AM    FIO2I 40 07/20/2020 04:38 AM       Chest X-ray:  Date:7/20/2020  Results from Hospital Encounter encounter on 07/16/20   XR CHEST PORT    Narrative EXAM: XR CHEST PORT    CLINICAL INDICATION/HISTORY: Intubated  -Additional: None    COMPARISON: One day prior    TECHNIQUE: Portable frontal view of the chest    _______________    FINDINGS:    SUPPORT DEVICES: Right IJV central venous catheter, ET and enteric tubes  unchanged. [facility] HEART AND MEDIASTINUM: Cardiomegaly. LUNGS AND PLEURAL SPACES: Calcified pleural plaques. Left basilar opacity,  effusion/atelectasis. _______________      Impression IMPRESSION:    Calcified pleural plaques.  Left basilar opacity, effusion/atelectasis.        Lab Test:  Date:7/20/2020  WBC:   Lab Results   Component Value Date/Time    WBC 9.9 07/20/2020 03:50 AM   HGB:   Lab Results   Component Value Date/Time    HGB 7.7 (L) 07/20/2020 03:50 AM    PLTS:   Lab Results   Component Value Date/Time    PLATELET 968 19/75/3628 03:50 AM       SaO2%/flow: @OSDEIHL0(9)@    Vital Signs:   Patient Vitals for the past 8 hrs:   Temp Pulse Resp BP SpO2   07/20/20 0822 -- 84 16 -- 100 %   07/20/20 0700 98.2 °F (36.8 °C) 80 16 -- 100 %   07/20/20 0630 98.3 °F (36.8 °C) 88 17 129/55 100 %   07/20/20 0600 98.1 °F (36.7 °C) 86 16 -- 100 %   07/20/20 0500 98.2 °F (36.8 °C) 76 16 115/52 100 %   07/20/20 0424 -- 78 16 -- 98 %   07/20/20 0400 98.3 °F (36.8 °C) 85 16 128/53 99 %   07/20/20 0300 98.2 °F (36.8 °C) 79 16 130/64 100 %   07/20/20 0200 98.1 °F (36.7 °C) 77 16 132/62 100 %   07/20/20 0100 98.2 °F (36.8 °C) 84 16 134/60 100 %       Wean Screen Pass (Yes or No): yes  Wean Screen Reason for Failure:  Duration of Weaning Trial:  Additional Comments:         PLAN OF CARE: Per MD.        GOAL: Extubation

## 2020-07-20 NOTE — PROGRESS NOTES
Internal Medicine Progress Note    Patient's Name: Pedro Coulter  Admit Date: 7/16/2020  Length of Stay: 4      Assessment/Plan     Active Hospital Problems    Diagnosis Date Noted    Aspiration pneumonia ([facility] 07/16/2020    Hypoglycemia 73/28/0883    Metabolic encephalopathy 88/96/4708    Acute respiratory failure with hypoxia and hypercapnia (HCC) 07/16/2020    [facility]ox[facility]mal [facility] ([facility] 07/09/2020    Diabetes ([facility] 07/03/2020    SBO (small bowel obstruction) ([facility] 07/03/2020    HTN (hypertension) 07/03/2020    Troponin level elevated 07/03/2020    MARCI (acute kidney injury) ([facility] 07/03/2020   Sepsis with Septic Shock, present on admission, secondary to RLL PNA    - Vent mgmt per ICU  - Vent bundle  - WBCs down, afebrile  - Cont IV zosyn  - Cr trending up  - Concern for need for acute dialysis per nephro  - Appreciate surg, no concern for issues at this time  - Cont NGT  - Appreciate pulm/CC  - Cont acceptable home medications for chronic conditions   - DVT protocol    I have personally reviewed all pertinent labs and films that have officially resulted over the last 24 hours. I have personally checked for all pending labs that are awaiting final results.     Subjective     Pt s/e @ bedside  No major events overnight  Remains intubated and sedated    Objective     Visit Vitals  /60   Pulse 77   Temp 97.7 °F (36.5 °C)   Resp 16   Wt 90.1 kg (198 lb 9.6 oz)   SpO2 99%   BMI 31.11 kg/m²       Physical Exam:  General Appearance: Intubated and sedated  HENT: normocephalic/atraumatic, + ETT  Neck: No JVD, supple  Lungs: B/L rhonchi with normal respiratory effort  CV: RRR, no m/r/g  Abdomen: soft, non-tender, normal bowel sounds  Extremities: no cyanosis, no peripheral edema  Neuro: No focal deficits, motor/sensory intact  Skin: Normal color, intact  Lymphatics: No cervical or supraclavicular lymphadenopathy    Intake and Output:  Current Shift:  07/20 0701 - 07/20 1900  In: 155 [I.V.:75]  Out: -   Last three shifts:  07/18 1901 - 07/20 0700  In: 2446.9 [I.V.:2446.9]  Out: 470 [Urine:45]    Lab/Data Reviewed:  CMP:   Lab Results   Component Value Date/Time     07/20/2020 03:50 AM    K 5.1 07/20/2020 03:50 AM     (H) 07/20/2020 03:50 AM    CO2 18 (L) 07/20/2020 03:50 AM    AGAP 10 07/20/2020 03:50 AM    GLU 98 07/20/2020 03:50 AM    BUN 71 (H) 07/20/2020 03:50 AM    CREA 6.70 (H) 07/20/2020 03:50 AM    GFRAA 10 (L) 07/20/2020 03:50 AM    GFRNA 8 (L) 07/20/2020 03:50 AM    CA 7.4 (L) 07/20/2020 03:50 AM    PHOS 7.1 (H) 07/20/2020 03:50 AM    ALB 1.8 (L) 07/20/2020 03:50 AM     CBC:   Lab Results   Component Value Date/Time    WBC 9.9 07/20/2020 03:50 AM    HGB 7.7 (L) 07/20/2020 03:50 AM    HCT 24.1 (L) 07/20/2020 03:50 AM     07/20/2020 03:50 AM       Imaging Reviewed:  Xr Chest Port    Result Date: 7/20/2020  EXAM: XR CHEST PORT CLINICAL INDICATION/HISTORY: Intubated -Additional: None COMPARISON: One day prior TECHNIQUE: Portable frontal view of the chest _______________ FINDINGS: SUPPORT DEVICES: Right IJV central venous catheter, ET and enteric tubes unchanged. [facility] HEART AND MEDIASTINUM: Cardiomegaly. LUNGS AND PLEURAL SPACES: Calcified pleural plaques. Left basilar opacity, effusion/atelectasis. _______________     IMPRESSION: Calcified pleural plaques. Left basilar opacity, effusion/atelectasis.       Medications Reviewed:  Current Facility-Administered Medications   Medication Dose Route Frequency    acetaminophen (TYLENOL) solution 650 mg  650 mg Oral Q6H PRN    fentaNYL (PF) 1,500 mcg/30 mL (50 mcg/mL) infusion  0-200 mcg/hr IntraVENous TITRATE    insulin glargine (LANTUS) injection 8 Units  8 Units SubCUTAneous DAILY    piperacillin-tazobactam (ZOSYN) 3.375 g in 0.9% sodium chloride ###EXTENDED 4 HOUR INFUSION###  3.375 g IntraVENous Q12H    lactated Ringers infusion  50 mL/hr IntraVENous CONTINUOUS    albuterol-ipratropium (DUO-NEB) 2.5 MG-0.5 MG/3 ML  3 mL Nebulization QID RT    aspirin chewable tablet 81 mg  81 mg Oral DAILY    atorvastatin (LIPITOR) tablet 80 mg  80 mg Oral DAILY    heparin (porcine) injection 5,000 Units  5,000 Units SubCUTAneous Q8H    dextrose 10% infusion 125-250 mL  125-250 mL IntraVENous PRN    naloxone (NARCAN) injection 0.4 mg  0.4 mg IntraVENous PRN    insulin lispro (HUMALOG) injection   SubCUTAneous Q6H    glucose chewable tablet 16 g  4 Tab Oral PRN    glucagon (GLUCAGEN) injection 1 mg  1 mg IntraMUSCular PRN    methylPREDNISolone (PF) (SOLU-MEDROL) injection 40 mg  40 mg IntraVENous Q12H    midazolam (VERSED) injection 1 mg  1 mg IntraVENous Q3H PRN    fentaNYL citrate (PF) injection 50 mcg  50 mcg IntraVENous Q4H PRN    lidocaine (XYLOCAINE) 2 % jelly   Mucous Membrane PRN    sodium chloride (NS) flush 5-10 mL  5-10 mL IntraVENous PRN    sodium chloride (NS) flush 5-40 mL  5-40 mL IntraVENous PRN    famotidine (PF) (PEPCID) 20 mg in 0.9% sodium chloride 10 mL injection  20 mg IntraVENous Q12H    ondansetron (ZOFRAN) injection 4 mg  4 mg IntraVENous Q6H PRN           Terrance Aparicio, DO  Internal Medicine, Hospitalist  Pager: [phone]  [address] [place] [address]

## 2020-07-20 NOTE — DIABETES MGMT
NUTRITIONAL ASSESSMENT GLYCEMIC CONTROL/ PLAN OF CARE     Armando MISTRY [address]           80 y.o.           7/16/2020                 1. SBO (small bowel obstruction) ([facility]    2. Cystitis    3. Aspiration pneumonia of right lower lobe, unspecified aspiration pneumonia type ([facility]    4. Hypoglycemia    5. Ileus ([facility]       INTERVENTIONS/PLAN:   1. Nepro tube feeding at 20 ml/hr ( 854 calories, 38 grams protein, 340 ml free water/day, appropriate at this time). 2. Suggest lowering Lantus to 5 units/day for now (BG today - 101, 88 mg/dL). ASSESSMENT:   Nutritional Status:  Pt is 134% ideal weight;  BMI: 28.7  kg/m2 (overweight weight classification based on prior weight of 83.2 kg - suspect current weight to be falsely high due to edema). Nutrition Diagnoses: Altered nutrition related labs due to T2DM and MARCI, hyperphosphatemia as evidenced by A1C of 7.4% and GFR - 10, phos - 7.1, pending HD. Inadequate oral food and beverage intake due to intubation as evidenced by NPO status. Overweight due to excess energy intake as evidenced by BMI - 28.7 kg/m2. Diabetes Management:   Blood glucose trending down. Receiving steroids. Recent blood glucose:    7/20:  101, 88  7/19:  118, 128, 114, 111   Within target range (non-ICU: <140; ICU<180): [x] Yes   []  No    Current Insulin regimen:   Lantus  8 units/day  Corrective lispro, normal insulin sensitivity q 6 hours  Home medication/insulin regimen: per chart, glipizide 10 mg BID  HbA1c: 7.4% - equivalent  to ave Blood Glucose of  162 mg/dl for 2-3 months prior to admission  Adequate glycemic control PTA:  [x] Yes  [] No    SUBJECTIVE/OBJECTIVE:   Information obtained from: chart review, ICU rounds  Pt with history of recent small bowel obstruction status post exploratory laparotomy lysis of adhesions and bowel resection on 7/7/2020 for SBO,  T2DM now admitted with respiratory issues, intubated. 7/20:  Pt starting low rate tube feeding today. Pending HD. [place] Edematous. Diet: NPO with orders written for Nepro at 20 ml/hr via OGT. This will provide 854 calories, 38 grams protein, 340 ml free water/day. Water flushes - 100 ml q 8 hours      Patient Vitals for the past 100 hrs:   % Diet Eaten   07/17/20 1200 0 %   07/17/20 0800 0 %     Medications: [x]                Reviewed   Pertinent:  Solu Medrol at 40 mg q 12 hours  IVF:  LR at 50 ml/hr    Most Recent POC Glucose:   Recent Labs     07/20/20  0350 07/19/20  0504 07/18/20  0347 07/17/20 2027   GLU 98 134* 208* 215*         Labs:   Lab Results   Component Value Date/Time    Hemoglobin A1c 7.4 (H) 07/04/2020 02:21 AM     Lab Results   Component Value Date/Time    Sodium 143 07/20/2020 03:50 AM    Potassium 5.1 07/20/2020 03:50 AM    Chloride 115 (H) 07/20/2020 03:50 AM    CO2 18 (L) 07/20/2020 03:50 AM    Anion gap 10 07/20/2020 03:50 AM    Glucose 98 07/20/2020 03:50 AM    BUN 71 (H) 07/20/2020 03:50 AM    Creatinine 6.70 (H) 07/20/2020 03:50 AM    Calcium 7.4 (L) 07/20/2020 03:50 AM    Magnesium 2.3 07/19/2020 05:04 AM    Phosphorus 7.1 (H) 07/20/2020 03:50 AM    Albumin 1.8 (L) 07/20/2020 03:50 AM       Anthropometrics: IBW : 67.1 kg (148 lb),  , 134% ideal weight; Wt Readings from Last 1 Encounters:   07/19/20 90.1 kg (198 lb 9.6 oz)     Last Weight Metrics:  Weight Loss Metrics 7/19/2020 7/13/2020 1/31/2019   Today's Wt 198 lb 9.6 oz 183 lb 13.8 oz 181 lb 9 oz   BMI 31.11 kg/m2 28.8 kg/m2 28.44 kg/m2       Ht Readings from Last 1 Encounters:   07/19/20 5' 7\" (1.702 m)     Estimated Nutrition Needs:  2080 Kcals/day, Protein (g): 87 g    Based on: weight from 7/13/20 at prior admission (83.2 kg)    Nutrition Interventions:  Enteral feedings  Insulin adjustments  Goal:   Blood glucose will be within target range of  mg/dL by 7/23/20. Provision of adequate nutrition by 7/25/20.      Nutrition Monitoring and Evaluation      []     Monitor po intake on meal rounds  [x]     Continue inpatient monitoring and intervention  []     Other:    Nutrition Risk:  [x]   High     []  Moderate    []  Minimal/Uncompromised    Christine López, [address] [place] [address], [facility]   Office:  [address] [place] [address] Pager:  [phone]

## 2020-07-20 NOTE — PROGRESS NOTES
[facility] Progress Note                                              I have reviewed the flowsheet and previous days notes. Events, vitals, medications and notes from last 24 hours reviewed. Care plan discussed with staff and on multidisciplinary rounds. Subjective:  7/20/2020   Patient is sedated on the ventilator  Anticipating dialysis-nephrology has been trying to contact family for consent. Not able to reach so far. Remains intubated on ventilatory support-tolerating SBT but do not plan to extubate until after dialysis completed    Impression and Plan  Patient Active Problem List   Diagnosis Code    Diabetes ([facility] [address] E11.9    SBO (small bowel obstruction) ([facility] [address] K56.609    HTN (hypertension) I10    MARCI (acute kidney injury) ([facility] [address] N17.9    Troponin level elevated R79.89    Paroxysmal atrial fibrillation (HCC) I48.0    Aspiration pneumonia ([facility] [address] J69.0    Hypoglycemia D28.7    Metabolic encephalopathy A48.51    Acute respiratory failure with hypoxia and hypercapnia (HCC) J96.01, J96.02     Acute respiratory failure with hypoxia and hypercapnia/vent dependent respiratory failure -patient was intubated in the ER and then self extubated the next day and then was reintubated the same day for hypercapnia and altered mental status. ABG today shows metabolic acidosis. At this time I will continue with the current ventilator settings. Patient may need to go on acute hemodialysis, so I will hold off on weaning and extubating him today till his acidosis is corrected. Titrate FiO2 to keep sats more than 90%  Shock  improved. Patient is off both the Levophed and vasopressin at this time  Right lower lobe pneumonia possibly aspiration -patient WBC count has been decreasing and is in the normal range today. Patient is afebrile. Continue with Zosyn  Sepsis -patient's blood culture does not show any growth as yet. WBC count is coming down and patient is afebrile.   Continue with Zosyn  Acute kidney injury -patient's BUN and creatinine are both worse today. His BNP is also elevated. Pt may need acute HD. Defer to Nephrology. Recent small bowel obstruction status post expiratory laparotomy lysis of adhesions and bowel resection on 7/7/2020. Neal Keller was discharged on 7/13/2020.  CT scan abdomen on 7/16/20 shows high-grade ileus versus developing small bowel obstruction.  Patient has been empirically started on Zosyn and clindamycin. Surgery is also consulted, follow-up with them  Asthma -patient is on Solu-Medrol and duo nebs  Anemia -patient's hemoglobin is lower today than yesterday. Monitor hemoglobin closely  History of diabetes mellitus -patient is on Lantus and insulin sliding scale  Sedationpatient is on fentanyl drip   History of hypertension -Home meds are on hold secondary to the hypotension  History of proximal atrial fibrillation -patient is on aspirin. Lopressor has been held off secondary to hypotension  DVT and GI prophylaxis patient is on heparin and Pepcid    OTHER:  Glycemic Control. Glucose stabilizer per ICU protocol when on insulin drip. Maintain blood glucose 140-180. Replace electrolytes per ICU electrolyte replacement protocol  Ventilator bundle & Sedation protocol followed. Daily morning sedation holiday, assessment for readiness for SBT & weaning from ventilator; and then re-titrate if required. Aim to keep peak plateau pressure 74-16LA H2O in ARDS patient. Rocio tube to suction at 20-30 cm H2O, Maintain Weston tube with 5-10ml air every 4 hours. Chlorhexidine mouth washes and routine oral care every 4 hours. Stress ulcer and DVT prophylaxis. HOB >=30 degree elevation all the time. HOB >=30 degree elevation all the time. Aggressive pulmonary toileting. Incentive spirometry when appropriate. Aspiration precautions. Sepsis bundle and protocol followed. Deescalate antibiotic when appropriate. Vasopressor when appropriate with MAP goal >65 mmHg.    Central Line bundle followed, remove when not needed. Large bore IV line or CVP when appropriate. PT/OT eval and treat. OOB/IS when appropriate. Quality Care: Stress ulcer prophylaxis, DVT prophylaxis, HOB elevated, Infection control all reviewed and addressed. Events and notes from last 24 hours reviewed. Care plan discussed with nursing, members of care team including nephrology. May need placement of dialysis catheter today    Medication Reviewed: Allergies   Allergen Reactions    Iodinated Contrast Media Swelling    Lisinopril Swelling      Past Medical History:   Diagnosis Date    Diabetes ([facility] [address]     Hypertension       Prior to Admission medications    Medication Sig Start Date End Date Taking? Authorizing Provider   metoprolol tartrate (LOPRESSOR) 25 mg tablet Take 1 Tab by mouth every twelve (12) hours. 7/13/20   LoretoglAna estrella PA   aspirin 81 mg chewable tablet Take 1 Tab by mouth daily. 7/13/20   Ana Birch PA   Cetirizine 10 mg cap Take  by mouth. Other, MD Kiah   benzonatate (TESSALON) 100 mg capsule Take 100 mg by mouth three (3) times daily as needed for Cough. Other, MD Kiah   atorvastatin (LIPITOR) 80 mg tablet Take 80 mg by mouth daily. Provider, Historical   glipiZIDE (GLUCOTROL) 10 mg tablet Take 10 mg by mouth two (2) times a day. Provider, Historical   hydroCHLOROthiazide (HYDRODIURIL) 25 mg tablet Take 25 mg by mouth daily. Provider, Historical   albuterol (PROVENTIL HFA) 90 mcg/actuation inhaler Take 1 Puff by inhalation daily.     Provider, Historical     Current Facility-Administered Medications   Medication Dose Route Frequency    fentaNYL (PF) 1,500 mcg/30 mL (50 mcg/mL) infusion  0-200 mcg/hr IntraVENous TITRATE    insulin glargine (LANTUS) injection 8 Units  8 Units SubCUTAneous DAILY    piperacillin-tazobactam (ZOSYN) 3.375 g in 0.9% sodium chloride ###EXTENDED 4 HOUR INFUSION###  3.375 g IntraVENous Q12H    lactated Ringers infusion  50 mL/hr IntraVENous CONTINUOUS    albuterol-ipratropium (DUO-NEB) 2.5 MG-0.5 MG/3 ML  3 mL Nebulization QID RT    aspirin chewable tablet 81 mg  81 mg Oral DAILY    atorvastatin (LIPITOR) tablet 80 mg  80 mg Oral DAILY    heparin (porcine) injection 5,000 Units  5,000 Units SubCUTAneous Q8H    insulin lispro (HUMALOG) injection   SubCUTAneous Q6H    methylPREDNISolone (PF) (SOLU-MEDROL) injection 40 mg  40 mg IntraVENous Q12H    famotidine (PF) (PEPCID) 20 mg in 0.9% sodium chloride 10 mL injection  20 mg IntraVENous Q12H       Lines: All central lines examined by me. No signs of erythema, induration, discharge. Central Venous Catheter:  Triple Lumen 07/16/20 Right Subclavian (Active)   Central Line Being Utilized Yes 07/18/20 0400   Criteria for Appropriate Use Hemodynamically unstable, requiring monitoring lines, vasopressors, or volume resuscitation 07/18/20 0400   Site Assessment Clean, dry, & intact 07/18/20 0400   Infiltration Assessment 0 07/18/20 0400   Affected Extremity/Extremities Color distal to insertion site pink (or appropriate for race) 07/18/20 0400   Date of Last Dressing Change 07/17/20 07/18/20 0400   Dressing Status Clean, dry, & intact 07/18/20 0400   Dressing Type Tape;Transparent 07/18/20 0400   Action Taken Open ports on tubing capped 07/18/20 0400   Proximal Hub Color/Line Status White;Capped 07/18/20 0400   Positive Blood Return (Medial Site) Yes 07/18/20 0400   Medial Hub Color/Line Status Blue; Infusing 07/18/20 0400   Positive Blood Return (Lateral Site) Yes 07/18/20 0400   Distal Hub Color/Line Status Brown; Infusing 07/18/20 0400   Positive Blood Return (Site #3) Yes 07/18/20 0400   Alcohol Cap Used Yes 07/18/20 0400      Drain(s):  Orogastric Tube 07/17/20 (Active)   Site Assessment Clean, dry, & intact 07/18/20 0400   Securement Device Tape 07/18/20 0400   G Port Status Clamped 07/18/20 0400   External Insertion Guido (cms) 60 cms 07/18/20 0400   Drainage Description Green 07/18/20 0000     Airway:  Airway - Endotracheal Tube 20 Oral (Active)   Insertion Depth (cm) 25 cm 20   Line Guido Lips 20   Side Secured Centered 20   Cuff Pressure 28 cmH20 20   Site Assessment Clean, dry, & intact 20       Objective:  Vital Signs:    Visit Vitals  /52   Pulse 75   Temp 97.8 °F (36.6 °C)   Resp 16   Wt 90.1 kg (198 lb 9.6 oz)   SpO2 100%   BMI 31.11 kg/m²      O2 Device: Ventilator  O2 Flow Rate (L/min): 5 l/min  Temp (24hrs), Av.5 °F (36.4 °C), Min:96.5 °F (35.8 °C), Max:98.3 °F (36.8 °C)      Intake/Output:   Last shift:      701 - 1900  In: 155 [I.V.:75]  Out: -     Last 3 shifts: 1901 -  07  In: 2446.9 [I.V.:2446.9]  Out: 470 [Urine:45]      Intake/Output Summary (Last 24 hours) at 2020 1151  Last data filed at 2020 1000  Gross per 24 hour   Intake 1874.24 ml   Output 410 ml   Net 1464.24 ml       Last 3 Recorded Weights in this Encounter    20 1505 20 0610 20 0520   Weight: 82.6 kg (182 lb) 86.7 kg (191 lb 1.6 oz) 90.1 kg (198 lb 9.6 oz)       Ventilator Settings:  Mode Rate Tidal Volume Pressure FiO2 PEEP  Assist control, VC+   500 ml  8 cm H2O 30 % 8 cm H20    Peak airway pressure: 25 cm H2O   Plateau pressure:    Tidal volume:   Minute ventilation: 7.36 l/min  SPO2     ARDS network Guidelines: Lung protective strategy and Plateau pressure goals less than or equal to 30    Physical Exam:     General/Neurology: Sedated on the ventilator  Head:   Normocephalic, without obvious abnormality  Eye:   Pallor present  Oral:   Mucus membranes moist  Neck:   Supple  Lung:   B/l air entry is decreased, bilateral basal rales present  Heart:   S1 S2 present.    Abdomen/: Soft, non tender, incision line appears to be clean  Extremities:  Pedal edema present    Data:      Recent Results (from the past 24 hour(s))   CBC WITH AUTOMATED DIFF    Collection Time: 20  4:14 PM   Result Value Ref Range    WBC 11.5 4.6 - 13.2 K/uL    RBC 2.50 (L) 4.70 - 5.50 M/uL    HGB 8.0 (L) 13.0 - 16.0 g/dL    HCT 25.0 (L) 36.0 - 48.0 %    .0 (H) 74.0 - 97.0 FL    MCH 32.0 24.0 - 34.0 PG    MCHC 32.0 31.0 - 37.0 g/dL    RDW 12.4 11.6 - 14.5 %    PLATELET 309 551 - 250 K/uL    MPV 10.1 9.2 - 11.8 FL    NEUTROPHILS 91 (H) 40 - 73 %    LYMPHOCYTES 6 (L) 21 - 52 %    MONOCYTES 3 3 - 10 %    EOSINOPHILS 0 0 - 5 %    BASOPHILS 0 0 - 2 %    ABS. NEUTROPHILS 10.5 (H) 1.8 - 8.0 K/UL    ABS. LYMPHOCYTES 0.7 (L) 0.9 - 3.6 K/UL    ABS. MONOCYTES 0.3 0.05 - 1.2 K/UL    ABS. EOSINOPHILS 0.0 0.0 - 0.4 K/UL    ABS. BASOPHILS 0.0 0.0 - 0.1 K/UL    DF AUTOMATED     GLUCOSE, POC    Collection Time: 07/19/20 11:36 PM   Result Value Ref Range    Glucose (POC) 111 (H) 70 - 110 mg/dL   RENAL FUNCTION PANEL    Collection Time: 07/20/20  3:50 AM   Result Value Ref Range    Sodium 143 136 - 145 mmol/L    Potassium 5.1 3.5 - 5.5 mmol/L    Chloride 115 (H) 100 - 111 mmol/L    CO2 18 (L) 21 - 32 mmol/L    Anion gap 10 3.0 - 18 mmol/L    Glucose 98 74 - 99 mg/dL    BUN 71 (H) 7.0 - 18 MG/DL    Creatinine 6.70 (H) 0.6 - 1.3 MG/DL    BUN/Creatinine ratio 11 (L) 12 - 20      GFR est AA 10 (L) >60 ml/min/1.73m2    GFR est non-AA 8 (L) >60 ml/min/1.73m2    Calcium 7.4 (L) 8.5 - 10.1 MG/DL    Phosphorus 7.1 (H) 2.5 - 4.9 MG/DL    Albumin 1.8 (L) 3.4 - 5.0 g/dL   CBC WITH AUTOMATED DIFF    Collection Time: 07/20/20  3:50 AM   Result Value Ref Range    WBC 9.9 4.6 - 13.2 K/uL    RBC 2.43 (L) 4.70 - 5.50 M/uL    HGB 7.7 (L) 13.0 - 16.0 g/dL    HCT 24.1 (L) 36.0 - 48.0 %    MCV 99.2 (H) 74.0 - 97.0 FL    MCH 31.7 24.0 - 34.0 PG    MCHC 32.0 31.0 - 37.0 g/dL    RDW 12.4 11.6 - 14.5 %    PLATELET 317 047 - 811 K/uL    MPV 10.2 9.2 - 11.8 FL    NEUTROPHILS 91 (H) 40 - 73 %    LYMPHOCYTES 5 (L) 21 - 52 %    MONOCYTES 4 3 - 10 %    EOSINOPHILS 0 0 - 5 %    BASOPHILS 0 0 - 2 %    ABS. NEUTROPHILS 9.1 (H) 1.8 - 8.0 K/UL    ABS. LYMPHOCYTES 0.5 (L) 0.9 - 3.6 K/UL    ABS.  MONOCYTES 0.4 0.05 - 1.2 K/UL    ABS. EOSINOPHILS 0.0 0.0 - 0.4 K/UL    ABS. BASOPHILS 0.0 0.0 - 0.1 K/UL    DF AUTOMATED     NT-PRO BNP    Collection Time: 07/20/20  3:50 AM   Result Value Ref Range    NT pro-BNP 19,075 (H) 0 - 1,800 PG/ML   POC G3    Collection Time: 07/20/20  4:38 AM   Result Value Ref Range    Device: VENT      FIO2 (POC) 40 %    pH (POC) 7.25 (L) 7.35 - 7.45      pCO2 (POC) 38.6 35.0 - 45.0 MMHG    pO2 (POC) 117 (H) 80 - 100 MMHG    HCO3 (POC) 17.0 (L) 22 - 26 MMOL/L    sO2 (POC) 98 (H) 92 - 97 %    Base deficit (POC) 10 mmol/L    Mode ASSIST CONTROL      Tidal volume 500 ml    Set Rate 16 bpm    PEEP/CPAP (POC) 8 cmH2O    Allens test (POC) YES      Inspiratory Time 1.25 sec    Total resp.  rate 16      Site RIGHT RADIAL      Patient temp. 98.2      Specimen type (POC) ARTERIAL      Performed by Joe Mcdermott     Volume control plus YES     GLUCOSE, POC    Collection Time: 07/20/20  6:04 AM   Result Value Ref Range    Glucose (POC) 101 70 - 110 mg/dL         Chemistry   Recent Labs     07/20/20  0350 07/19/20  0504 07/18/20  0347 07/17/20 2027   GLU 98 134* 208* 215*    142 141 141   K 5.1 4.8 4.7 5.1   * 115* 114* 113*   CO2 18* 19* 20* 23   BUN 71* 61* 46* 43*   CREA 6.70* 5.45* 4.07* 3.56*   CA 7.4* 7.1* 6.6* 6.7*   MG  --  2.3 2.2 1.4*   PHOS 7.1* 5.9* 4.3 6.9*   AGAP 10 8 7 5   BUCR 11* 11* 11* 12   AP  --  82 74  --    TP  --  4.9* 5.0*  --    ALB 1.8* 1.6* 1.6*  --    GLOB  --  3.3 3.4  --    AGRAT  --  0.5* 0.5*  --        CBC w/Diff   Recent Labs     07/20/20  0350 07/19/20  1614 07/19/20  0504   WBC 9.9 11.5 11.8   RBC 2.43* 2.50* 2.26*   HGB 7.7* 8.0* 7.2*   HCT 24.1* 25.0* 22.5*    283 262   GRANS 91* 91* 94*   LYMPH 5* 6* 4*   EOS 0 0 0       ABG    Recent Labs     07/20/20  0438 07/19/20  0500 07/18/20  0459   PHI 7.25* 7.28* 7.24*   PCO2I 38.6 36.6 41.2   PO2I 117* 117* 105*   HCO3I 17.0* 17.3* 17.5*   FIO2I 40 40 40       Micro     Recent Labs     07/18/20  3035 07/18/20  1745   CULT NO GROWTH 2 DAYS NO GROWTH 2 DAYS     Recent Labs     07/18/20  1749 07/18/20  1745   CULT NO GROWTH 2 DAYS NO GROWTH 2 DAYS       CT (Most Recent)    [facility]   CT HEAD WO CONT    Narrative EXAM: CT HEAD W/O CONTRAST    INDICATION: Altered mental status/level of consciousness, unresponsive    COMPARISON: No prior comparison study. TECHNIQUE: Axial CT imaging of the head was performed without intravenous  contrast.  Additional coronal and sagittal reconstructions were performed. One  or more dose reduction techniques were used on this CT: automated exposure  control, adjustment of the mAs and/or kVp according to patient's size, and  iterative reconstruction techniques. The specific techniques utilized on this CT  exam have been documented in the patient's electronic medical record. Digital  Imaging and Communications in Medicine (DICOM) format image data are available  to nonaffiliated external healthcare facilities or entities on a secure, media  free, reciprocally searchable basis with patient authorization for at least a  12-month period after this study. _______________    FINDINGS:    VENTRICLES/EXTRA-AXIAL SPACES: The ventricles and sulci are mildly enlarged  consistent with diffuse volume loss. No extra-axial fluid collection is present. BRAIN PARENCHYMA: There is no evidence of acute intracranial hemorrhage, mass  effect, midline shift, or herniation. No definite CT evidence of acute cortical  infarct is seen. White matter CT    ORBITS: Right ocular lens is absent most likely from prior cataract surgery. PARANASAL SINUSES: Mucoperiosteal thickening scattered throughout the paranasal  sinuses. Sclerotic wall thickening suggested in the partially visualized  maxillary antra from chronic sinusitis. No air-fluid levels are seen. MASTOID AIR CELLS: Visualized mastoid air cells are clear.     OSSEOUS STRUCTURES: No fracture is seen.    OTHER: Atherosclerotic calcifications are present.    _______________      Impression IMPRESSION:    1. No acute intracranial hemorrhage, mass effect, midline shift, or herniation. No definite CT evidence of acute cortical infarct is seen. Please note that  noncontrast head CT may be normal in early acute infarct. 2. Mild nonspecific white matter disease likely representing chronic small  vessel changes. Preliminary report provided by on-call radiology resident. XR (Most Recent). CXR reviewed by me and compared with previous CXR   Results from Hospital Encounter encounter on 07/16/20   XR CHEST PORT    Narrative EXAM: XR CHEST PORT    CLINICAL INDICATION/HISTORY: Intubated  -Additional: None    COMPARISON: One day prior    TECHNIQUE: Portable frontal view of the chest    _______________    FINDINGS:    SUPPORT DEVICES: Right IJV central venous catheter, ET and enteric tubes  unchanged. Jessica Nims [facility]: Cardiomegaly. LUNGS AND PLEURAL SPACES: Calcified pleural plaques. Left basilar opacity,  effusion/atelectasis. _______________      Impression IMPRESSION:    Calcified pleural plaques. Left basilar opacity, effusion/atelectasis. Critical Care Time:  The services I provided to this patient were to treat and/or prevent clinically significant deterioration that could result in the failure of one or more body systems and/or organ systems due to respiratory distress, hypoxia, cardiac dysrhythmia.     Services included the following:  -reviewing nursing notes and old charts  -vital sign assessments  -direct patient care  -medication orders and management  -interpreting and reviewing diagnostic studies/labs  -re-evaluations  -documentation time     Aggregate critical care time was 40 minutes, which includes only time during which I was engaged in work directly related to the patient's care as described above.     During this entire length of time I was immediately available to the patient. The reason for providing this level of medical care for this critically ill patient was due a critical illness that impaired one or more vital organ systems such that there was a high probability of imminent or life threatening deterioration in the patients condition.  This care involved high complexity decision making to assess, manipulate, and support vital system functions, to treat this degreee vital organ system failure and to prevent further life threatening deterioration of the patients condition    Oh Figueroa MD    This note has been dictated using the dragon dictation system    Luis M Dodd MD  7/20/2020

## 2020-07-20 NOTE — PROGRESS NOTES
INTERIM UPDATE - 7251 EST on 7/20/2020    Nursing Staff reports that SARS-CoV-2 Test results return negative. Chart is reviewed and there are no findings of evidence for high suspicion of SARS-CoV-2 Infection. Plan:  Discontinue Droplet Plus Precautions.

## 2020-07-21 NOTE — PROGRESS NOTES
[facility]  (A Division of [address] [place] [address])    Follow-up Note      Date of Admission: 7/16/2020       Date of Note:  7/21/2020    Summary:    79 y/o Pedro Flores male w/ DM, HTN, PAF, s/p recent SB rsxn adm 7/16 w/ abd pain, tenderness, vomiting.  s/p xlap w/ extensive DONNA and SB rsxn x 2 on 7/7 by Dr. Bhargavi Sauceda. Trans to [facility] 7/13,  then diffuse abd pain, tenderness,vomiting so sent back to [facility] 7/16. CTAP: prominent gastric distention, SB dilatation extending to RLQ SB anastamosis concerning for high-grade ileus vs developing SBO. Also new RLL consolidation. In ED became unresponsive, required intubation. NTpro BNP 2,716. Admitted 7/16 on Zosyn,clindamycin, latter stopped 7/17. Initially febrile 102.8 with leukocytosis 21.6 on 7/17, both resolved on zosyn alone. SARS CoV 2 PCR 7/17 negative but worsening creatinine reaching 6.7 7/20, NT pro BNP 19,075. Temporary dialysis catheter placed in L CFV. Interval History:  Intubated and alert. Responsive to questions in Pedro Flores. Having dialysis in ICU.        Current Antimicrobials:    Prior Antimicrobials:  Zosyn 7/16 - 5 Clindamycin 7/16 -1       Assessment: Rec / Plan:   Sepsis  - fever, tachycardia, tachypnea, leukocytosis present on admission  - due to pneumonia, ileus   - blcx 7/16 NGTD x 2, 7/18 NGTD x 2  - resolving -> continue zosyn   New RL lung consolidation  - seen on CTAP 7/16  - likely aspiration pneumonia  - well covered with zosyn -> continue zosyn 2 more days   Acute hypoxic respiratory failure  - intubated 7/16 -> extubation planned per [facility]   Shock  - septic, hypovolemic  - off pressors -> monitor   MARCI  - due to above  - cr 6.7 today  - Temporary dialysis catheter placed today 7/20 -> dialysis per Renal   High grade ileus vs developing SBO  - on CTAP 7/16    SBO due to adhesions  - s/p Ex lap w/ extensive DONNA, SB rsxn x 2 7/7 by Dr. Bhargavi Sauceda    DM    HTN    PAF        Microbiology:       7/16 blcx NGTD x 2  7/18      blcx NGTD x 2     Lines / Catheters:     piv  LCFV dialysis cath      Patient Active Problem List   Diagnosis Code    Diabetes ([facility] [address] E11.9    SBO (small bowel obstruction) ([facility] [address] K56.609    HTN (hypertension) I10    MARCI (acute kidney injury) ([facility] [address] N17.9    Troponin level elevated R79.89    Paroxysmal atrial fibrillation (HCC) I48.0    Aspiration pneumonia (HCC) J69.0    Hypoglycemia I28.3    Metabolic encephalopathy I18.23    Acute respiratory failure with hypoxia and hypercapnia (HCC) J96.01, J96.02       Current Facility-Administered Medications   Medication Dose Route Frequency    0.9% sodium chloride infusion  50 mL/hr IntraVENous DIALYSIS PRN    heparin (porcine) 1,000 unit/mL injection 1,000 Units  1,000 Units InterCATHeter DIALYSIS PRN    insulin glargine (LANTUS) injection 5 Units  5 Units SubCUTAneous DAILY    acetaminophen (TYLENOL) solution 650 mg  650 mg Oral Q6H PRN    fentaNYL (PF) 1,500 mcg/30 mL (50 mcg/mL) infusion  0-200 mcg/hr IntraVENous TITRATE    piperacillin-tazobactam (ZOSYN) 3.375 g in 0.9% sodium chloride ###EXTENDED 4 HOUR INFUSION###  3.375 g IntraVENous Q12H    albuterol-ipratropium (DUO-NEB) 2.5 MG-0.5 MG/3 ML  3 mL Nebulization QID RT    aspirin chewable tablet 81 mg  81 mg Oral DAILY    atorvastatin (LIPITOR) tablet 80 mg  80 mg Oral DAILY    heparin (porcine) injection 5,000 Units  5,000 Units SubCUTAneous Q8H    dextrose 10% infusion 125-250 mL  125-250 mL IntraVENous PRN    naloxone (NARCAN) injection 0.4 mg  0.4 mg IntraVENous PRN    insulin lispro (HUMALOG) injection   SubCUTAneous Q6H    glucose chewable tablet 16 g  4 Tab Oral PRN    glucagon (GLUCAGEN) injection 1 mg  1 mg IntraMUSCular PRN    methylPREDNISolone (PF) (SOLU-MEDROL) injection 40 mg  40 mg IntraVENous Q12H    midazolam (VERSED) injection 1 mg  1 mg IntraVENous Q3H PRN    fentaNYL citrate (PF) injection 50 mcg  50 mcg IntraVENous Q4H PRN    lidocaine (XYLOCAINE) 2 % jelly   Mucous Membrane PRN    sodium chloride (NS) flush 5-10 mL  5-10 mL IntraVENous PRN    sodium chloride (NS) flush 5-40 mL  5-40 mL IntraVENous PRN    famotidine (PF) (PEPCID) 20 mg in 0.9% sodium chloride 10 mL injection  20 mg IntraVENous Q12H    ondansetron (ZOFRAN) injection 4 mg  4 mg IntraVENous Q6H PRN         Review of Systems - Negative except as in interval history       Objective:    Visit Vitals  /67   Pulse 98   Temp 98.2 °F (36.8 °C)   Resp 13   Ht 5' 7\" (1.702 m)   Wt 90.1 kg (198 lb 9.1 oz)   SpO2 97%   BMI 31.10 kg/m²       Temp (24hrs), Av.9 °F (36.6 °C), Min:97.7 °F (36.5 °C), Max:98.2 °F (36.8 °C)      General: Well developed, obese 80 y.o. [place] male in no acute distress. Orotracheally intubated but awake, alert, responsive to questions  ENT: ENT exam normal, no neck nodes or sinus tenderness  Head: normocephalic, without obvious abnormality  Mouth:  not examined, orotracheally intubated  Neck: supple, symmetrical, trachea midline   Cardio:  regular rate and rhythm, S1, S2 normal, no murmur, click, rub or gallop  Chest: inspection normal - no chest wall deformities or tenderness  Lungs: expiratory rhonchi and decreased breath sounds  Abdomen: soft, non-tender. Bowel sounds normal. No masses, no organomegaly.   Extremities:  no redness or tenderness in the calves or thighs, thigh, LE edema 1+       Lab results:    Chemistry  Recent Labs     20  0330 20  0350 20  0504   * 98 134*    143 142   K 5.2 5.1 4.8   * 115* 115*   CO2 18* 18* 19*   BUN 82* 71* 61*   CREA 7.37* 6.70* 5.45*   CA 7.1* 7.4* 7.1*   AGAP 9 10 8   BUCR 11* 11* 11*   AP  --   --  82   TP  --   --  4.9*   ALB 1.8* 1.8* 1.6*   GLOB  --   --  3.3   AGRAT  --   --  0.5*       CBC w/ Diff  Recent Labs     20  0330 20  0350 20  1614 20  0504   WBC 7.7 9.9 11.5 11.8   RBC 2.49* 2.43* 2.50* 2.26*   HGB 8.0* 7.7* 8.0* 7.2*   HCT 24.7* 24.1* 25.0* 22.5*    285 283 262   GRANS  --  91* 91* 94*   LYMPH  --  5* 6* 4*   EOS  --  0 0 0       Microbiology  All Micro Results     Procedure Component Value Units Date/Time    CULTURE, BLOOD [[id number]] Collected:  07/18/20 1749    Order Status:  Completed Specimen:  Blood Updated:  07/21/20 0641     Special Requests: NO SPECIAL REQUESTS        Culture result: NO GROWTH 3 DAYS       CULTURE, BLOOD [[id number]] Collected:  07/18/20 1745    Order Status:  Completed Specimen:  Blood Updated:  07/21/20 0641     Special Requests: NO SPECIAL REQUESTS        Culture result: NO GROWTH 3 DAYS       CULTURE, BLOOD [[id number]] Collected:  07/16/20 1441    Order Status:  Completed Specimen:  Blood Updated:  07/21/20 0641     Special Requests: PERIPHERAL        Culture result: NO GROWTH 5 DAYS       CULTURE, BLOOD [[id number]] Collected:  07/16/20 1500    Order Status:  Completed Specimen:  Blood Updated:  07/21/20 0641     Special Requests: PERIPHERAL        Culture result: NO GROWTH 5 DAYS                Jasmina Neri MD, [facility]  Infectious Diseases  [phone]   7/21/2020   1:41 PM

## 2020-07-21 NOTE — PROGRESS NOTES
ICU PROGRESS NOTE:                                              HPI:    Mr. Sammie Miller is an 80year-old male patient with a Past Medical History of DM2, HNT, atrial fibrillation and SBO with Surgery on 7/03/20, who was readmitted on 7/16/20 with another episode of SBO, which this time was successfully treatment with conservative management. Patient developed MARCI this admission and today he will receive his first HD session. My plan is to extubate him once HD is done. 7/21/2020   See above. Patient is on minimum ventilator settings and able to follow commands. His chances of being extubated successfully are in the 90 percent range. Patient Active Problem List   Diagnosis Code    Diabetes ([facility] [address] E11.9    SBO (small bowel obstruction) ([facility] [address] K56.609    HTN (hypertension) I10    MARCI (acute kidney injury) ([facility] [address] N17.9    Troponin level elevated R79.89    Paroxysmal atrial fibrillation (HCC) I48.0    Aspiration pneumonia ([facility] [address] J69.0    Hypoglycemia K06.9    Metabolic encephalopathy Y37.96    Acute respiratory failure with hypoxia and hypercapnia (HCC) J96.01, J96.02     Medication Reviewed: Allergies   Allergen Reactions    Iodinated Contrast Media Swelling    Lisinopril Swelling      Past Medical History:   Diagnosis Date    Diabetes ([facility] [address]     Hypertension       Prior to Admission medications    Medication Sig Start Date End Date Taking? Authorizing Provider   metoprolol tartrate (LOPRESSOR) 25 mg tablet Take 1 Tab by mouth every twelve (12) hours. 7/13/20   Ayad Birch PA   aspirin 81 mg chewable tablet Take 1 Tab by mouth daily. 7/13/20   Ayad Birch PA   Cetirizine 10 mg cap Take  by mouth. Andie, MD Kiah   benzonatate (TESSALON) 100 mg capsule Take 100 mg by mouth three (3) times daily as needed for Cough. Kiah Chaves MD   atorvastatin (LIPITOR) 80 mg tablet Take 80 mg by mouth daily.     Provider, Drea   glipiZIDE (GLUCOTROL) 10 mg tablet Take 10 mg by mouth two (2) times a day. Provider, Historical   hydroCHLOROthiazide (HYDRODIURIL) 25 mg tablet Take 25 mg by mouth daily. Provider, Historical   albuterol (PROVENTIL HFA) 90 mcg/actuation inhaler Take 1 Puff by inhalation daily. Provider, Historical     Current Facility-Administered Medications   Medication Dose Route Frequency    insulin glargine (LANTUS) injection 5 Units  5 Units SubCUTAneous DAILY    fentaNYL (PF) 1,500 mcg/30 mL (50 mcg/mL) infusion  0-200 mcg/hr IntraVENous TITRATE    piperacillin-tazobactam (ZOSYN) 3.375 g in 0.9% sodium chloride ###EXTENDED 4 HOUR INFUSION###  3.375 g IntraVENous Q12H    albuterol-ipratropium (DUO-NEB) 2.5 MG-0.5 MG/3 ML  3 mL Nebulization QID RT    aspirin chewable tablet 81 mg  81 mg Oral DAILY    atorvastatin (LIPITOR) tablet 80 mg  80 mg Oral DAILY    heparin (porcine) injection 5,000 Units  5,000 Units SubCUTAneous Q8H    insulin lispro (HUMALOG) injection   SubCUTAneous Q6H    methylPREDNISolone (PF) (SOLU-MEDROL) injection 40 mg  40 mg IntraVENous Q12H    famotidine (PF) (PEPCID) 20 mg in 0.9% sodium chloride 10 mL injection  20 mg IntraVENous Q12H       Lines: All central lines examined by me. No signs of erythema, induration, discharge.      Central Venous Catheter:  Triple Lumen 07/16/20 Right Subclavian (Active)   Central Line Being Utilized Yes 07/18/20 0400   Criteria for Appropriate Use Hemodynamically unstable, requiring monitoring lines, vasopressors, or volume resuscitation 07/18/20 0400   Site Assessment Clean, dry, & intact 07/18/20 0400   Infiltration Assessment 0 07/18/20 0400   Affected Extremity/Extremities Color distal to insertion site pink (or appropriate for race) 07/18/20 0400   Date of Last Dressing Change 07/17/20 07/18/20 0400   Dressing Status Clean, dry, & intact 07/18/20 0400   Dressing Type Tape;Transparent 07/18/20 0400   Action Taken Open ports on tubing capped 07/18/20 0400   Proximal Hub Color/Line Status White;Capped 07/18/20 0400   Positive Blood Return (Medial Site) Yes 20 0400   Medial Hub Color/Line Status Blue; Infusing 20 0400   Positive Blood Return (Lateral Site) Yes 20 0400   Distal Hub Color/Line Status Brown; Infusing 20 0400   Positive Blood Return (Site #3) Yes 20 0400   Alcohol Cap Used Yes 20 0400      Drain(s):  Orogastric Tube 20 (Active)   Site Assessment Clean, dry, & intact 20 0400   Securement Device Tape 20 0400   G Port Status Clamped 20 0400   External Insertion Guido (cms) 60 cms 20 0400   Drainage Description Green 20 0000     Airway:  Airway - Endotracheal Tube 20 Oral (Active)   Insertion Depth (cm) 25 cm 20 0455   Line Guido Lips 20 0455   Side Secured Centered 20 0455   Cuff Pressure 28 cmH20 20 0455   Site Assessment Clean, dry, & intact 20 0455       Objective:  Vital Signs:    Visit Vitals  /56   Pulse 74   Temp 98.2 °F (36.8 °C)   Resp 16   Ht 5' 7\" (1.702 m)   Wt 90.1 kg (198 lb 9.1 oz)   SpO2 100%   BMI 31.10 kg/m²      O2 Device: Endotracheal tube, Ventilator  O2 Flow Rate (L/min): 5 l/min  Temp (24hrs), Av.9 °F (36.6 °C), Min:97.7 °F (36.5 °C), Max:98.2 °F (36.8 °C)      Intake/Output:   Last shift:      701 - 1900  In: 134 [I.V.:4]  Out: -     Last 3 shifts: 1901 - 700  In: 3240.9 [I.V.:2640.9]  Out: 142 [Urine:67]      Intake/Output Summary (Last 24 hours) at 2020 1144  Last data filed at 2020 0800  Gross per 24 hour   Intake 1705.43 ml   Output 22 ml   Net 1683.43 ml       Last 3 Recorded Weights in this Encounter    20 0610 20 0520 20 0634   Weight: 86.7 kg (191 lb 1.6 oz) 90.1 kg (198 lb 9.6 oz) 90.1 kg (198 lb 9.1 oz)       Ventilator Settings:  Mode Rate Tidal Volume Pressure FiO2 PEEP  Assist control, VC+   500 ml  8 cm H2O 30 % 5 cm H20    Peak airway pressure: 26 cm H2O   Plateau pressure:    Tidal volume:   Minute ventilation: 7.03 l/min  SPO2     ARDS network Guidelines: Lung protective strategy and Plateau pressure goals less than or equal to 30. Physical Exam:     General/Neurology: Sedated on the ventilator, on PS trial.    Head: Normocephalic, without obvious abnormality. Eye: Conjunctival pallor is present    Oral: Mucus membranes are moist.    Neck: Supple. Lung: Air entry is decreased, bilateral basal rales are present. Heart: S1 S2 present, no S3, S4 or murmurs. Abdomen/: Soft, non tender, incision line appears to be clean. Extremities: Pedal edema present.       Data:      Recent Results (from the past 24 hour(s))   GLUCOSE, POC    Collection Time: 07/20/20 12:08 PM   Result Value Ref Range    Glucose (POC) 88 70 - 110 mg/dL   GLUCOSE, POC    Collection Time: 07/20/20  7:14 PM   Result Value Ref Range    Glucose (POC) 115 (H) 70 - 110 mg/dL   GLUCOSE, POC    Collection Time: 07/20/20 11:51 PM   Result Value Ref Range    Glucose (POC) 126 (H) 70 - 110 mg/dL   RENAL FUNCTION PANEL    Collection Time: 07/21/20  3:30 AM   Result Value Ref Range    Sodium 141 136 - 145 mmol/L    Potassium 5.2 3.5 - 5.5 mmol/L    Chloride 114 (H) 100 - 111 mmol/L    CO2 18 (L) 21 - 32 mmol/L    Anion gap 9 3.0 - 18 mmol/L    Glucose 127 (H) 74 - 99 mg/dL    BUN 82 (H) 7.0 - 18 MG/DL    Creatinine 7.37 (H) 0.6 - 1.3 MG/DL    BUN/Creatinine ratio 11 (L) 12 - 20      GFR est AA 9 (L) >60 ml/min/1.73m2    GFR est non-AA 7 (L) >60 ml/min/1.73m2    Calcium 7.1 (L) 8.5 - 10.1 MG/DL    Phosphorus 8.1 (H) 2.5 - 4.9 MG/DL    Albumin 1.8 (L) 3.4 - 5.0 g/dL   CBC W/O DIFF    Collection Time: 07/21/20  3:30 AM   Result Value Ref Range    WBC 7.7 4.6 - 13.2 K/uL    RBC 2.49 (L) 4.70 - 5.50 M/uL    HGB 8.0 (L) 13.0 - 16.0 g/dL    HCT 24.7 (L) 36.0 - 48.0 %    MCV 99.2 (H) 74.0 - 97.0 FL    MCH 32.1 24.0 - 34.0 PG    MCHC 32.4 31.0 - 37.0 g/dL    RDW 12.2 11.6 - 14.5 %    PLATELET 928 092 - 784 K/uL    MPV 10.1 9.2 - 11.8 FL   POC G3 Collection Time: 07/21/20  5:22 AM   Result Value Ref Range    Device: VENT      FIO2 (POC) 0.30 %    pH (POC) 7.20 (LL) 7.35 - 7.45      pCO2 (POC) 41.8 35.0 - 45.0 MMHG    pO2 (POC) 110 (H) 80 - 100 MMHG    HCO3 (POC) 16.2 (L) 22 - 26 MMOL/L    sO2 (POC) 97 92 - 97 %    Base deficit (POC) 12 mmol/L    Mode ASSIST CONTROL      Tidal volume 500 ml    Set Rate 16 bpm    PEEP/CPAP (POC) 8.0 cmH2O    PIP (POC) 25      Allens test (POC) YES      Inspiratory Time 1,024 sec    Nitric-ppm (POC) 0 ppm    Total resp.  rate 16      Site RIGHT RADIAL      Patient temp. 98.10      Specimen type (POC) ARTERIAL      Performed by Eulalia Gallagher     Volume control plus YES     GLUCOSE, POC    Collection Time: 07/21/20  6:30 AM   Result Value Ref Range    Glucose (POC) 137 (H) 70 - 110 mg/dL         Chemistry   Recent Labs     07/21/20  0330 07/20/20  0350 07/19/20  0504   * 98 134*    143 142   K 5.2 5.1 4.8   * 115* 115*   CO2 18* 18* 19*   BUN 82* 71* 61*   CREA 7.37* 6.70* 5.45*   CA 7.1* 7.4* 7.1*   MG  --   --  2.3   PHOS 8.1* 7.1* 5.9*   AGAP 9 10 8   BUCR 11* 11* 11*   AP  --   --  82   TP  --   --  4.9*   ALB 1.8* 1.8* 1.6*   GLOB  --   --  3.3   AGRAT  --   --  0.5*       CBC w/Diff   Recent Labs     07/21/20  0330 07/20/20  0350 07/19/20  1614 07/19/20  0504   WBC 7.7 9.9 11.5 11.8   RBC 2.49* 2.43* 2.50* 2.26*   HGB 8.0* 7.7* 8.0* 7.2*   HCT 24.7* 24.1* 25.0* 22.5*    285 283 262   GRANS  --  91* 91* 94*   LYMPH  --  5* 6* 4*   EOS  --  0 0 0       ABG    Recent Labs     07/21/20  0522 07/20/20  0438 07/19/20  0500   PHI 7.20* 7.25* 7.28*   PCO2I 41.8 38.6 36.6   PO2I 110* 117* 117*   HCO3I 16.2* 17.0* 17.3*   FIO2I 0.30 40 40       Micro     Recent Labs     07/18/20  1749 07/18/20  1745   CULT NO GROWTH 3 DAYS NO GROWTH 3 DAYS     Recent Labs     07/18/20  1749 07/18/20  1745   CULT NO GROWTH 3 DAYS NO GROWTH 3 DAYS       CT (Most Recent)    Results from Hospital Encounter encounter on 07/16/20   CT HEAD WO CONT    Narrative EXAM: CT HEAD W/O CONTRAST    INDICATION: Altered mental status/level of consciousness, unresponsive    COMPARISON: No prior comparison study. TECHNIQUE: Axial CT imaging of the head was performed without intravenous  contrast.  Additional coronal and sagittal reconstructions were performed. One  or more dose reduction techniques were used on this CT: automated exposure  control, adjustment of the mAs and/or kVp according to patient's size, and  iterative reconstruction techniques. The specific techniques utilized on this CT  exam have been documented in the patient's electronic medical record. Digital  Imaging and Communications in Medicine (DICOM) format image data are available  to nonaffiliated external healthcare facilities or entities on a secure, media  free, reciprocally searchable basis with patient authorization for at least a  12-month period after this study. _______________    FINDINGS:    VENTRICLES/EXTRA-AXIAL SPACES: The ventricles and sulci are mildly enlarged  consistent with diffuse volume loss. No extra-axial fluid collection is present. BRAIN PARENCHYMA: There is no evidence of acute intracranial hemorrhage, mass  effect, midline shift, or herniation. No definite CT evidence of acute cortical  infarct is seen. White matter CT    ORBITS: Right ocular lens is absent most likely from prior cataract surgery. PARANASAL SINUSES: Mucoperiosteal thickening scattered throughout the paranasal  sinuses. Sclerotic wall thickening suggested in the partially visualized  maxillary antra from chronic sinusitis. No air-fluid levels are seen. MASTOID AIR CELLS: Visualized mastoid air cells are clear. OSSEOUS STRUCTURES: No fracture is seen. OTHER: Atherosclerotic calcifications are present.    _______________      Impression IMPRESSION:    1. No acute intracranial hemorrhage, mass effect, midline shift, or herniation.    No definite CT evidence of acute cortical infarct is seen. Please note that  noncontrast head CT may be normal in early acute infarct. 2. Mild nonspecific white matter disease likely representing chronic small  vessel changes. Preliminary report provided by on-call radiology resident. XR (Most Recent). CXR reviewed by me and compared with previous CXR   Results from Hospital Encounter encounter on 07/16/20   XR CHEST PORT    Narrative EXAM: PORTABLE  FRONTAL CHEST RADIOGRAPH    INDICATION: Intubated. History of hypertension and diabetes. COMPARISON: 7/20/2020. TECHNIQUE: Portable frontal view of the chest    _______________    FINDINGS:    SUPPORT DEVICES:   -Enteric tube terminates approximately 3 cm above the juan francisco.  -Single enteric tube courses along the esophagus, tip terminates below the  inferior margin of the image.  -Right internal jugular venous catheter tip terminates in the SVC.  -EKG leads overlie the patient. HEART AND MEDIASTINUM: Stable heart size. Atherosclerotic calcifications  present. Mild central pulmonary vascular congestion. LUNGS: Hazy perihilar opacities extending to both lower lobes. PLEURAL SPACES:No large pneumothorax. No large pleural effusion. Pleural  calcifications again noted predominantly on the left. BONY THORAX AND SOFT TISSUES: No acute abnormality. _______________      Impression IMPRESSION:   1. Lines and devices appear stably positioned, as detailed. 2.  Similar bilateral lower lung opacities may represent pulmonary edema,  atelectasis, though superimposed infection cannot be excluded. Recommend  continued attention on follow-up. Assessment and Plan:    Respiratory:    See above. Respiratory failure has resolved. I will extubate him after his first HD session. Cardiovascular:    Patient is no longer in shock. He has been of NE and vasopressin for 48 hours. ID:     RLL infiltrate has been empirically covered with PIP/TAZO.  We will discontinue antibiotics after 5 days of treatment. Renal:    MARCI soon to be on HD. Nephrology is following. HD is not needed for weaning, but extubation will have to wait until the end of the session because HD disequilibrium syndrome can happen during fist HD and can increase the chances of extubation failure. Endocrine:    Our target glucose is 140 to 180. Control is appropriate. DVT prophylaxis: UFH. Stress ulcer prophylaxis: Famotidine. OTHER:  Glycemic Control. Glucose stabilizer per ICU protocol when on insulin drip. Maintain blood glucose 140-180. Replace electrolytes per ICU electrolyte replacement protocol  Ventilator bundle & Sedation protocol followed. Daily morning sedation holiday, assessment for readiness for SBT & weaning from ventilator; and then re-titrate if required. Aim to keep peak plateau pressure 87-37BP H2O in ARDS patient. [place] tube to suction at 20-30 cm H2O, Maintain Rocio tube with 5-10ml air every 4 hours. Chlorhexidine mouth washes and routine oral care every 4 hours. Stress ulcer and DVT prophylaxis. HOB >=30 degree elevation all the time. HOB >=30 degree elevation all the time. Aggressive pulmonary toileting. Incentive spirometry when appropriate. Aspiration precautions. Sepsis bundle and protocol followed. Deescalate antibiotic when appropriate. Vasopressor when appropriate with MAP goal >65 mmHg. Central Line bundle followed, remove when not needed. Large bore IV line or CVP when appropriate. PT/OT eval and treat. OOB/IS when appropriate. Quality Care: Stress ulcer prophylaxis, DVT prophylaxis, HOB elevated, Infection control all reviewed and addressed. Events and notes from last 24 hours reviewed.  Care plan discussed with nursing, members of care team.        Critical Care Time:    The services I provided to this patient were to treat and/or prevent clinically significant deterioration that could result in the failure of one or more body systems and/or organ systems due to respiratory distress, hypoxia, cardiac dysrhythmia.     Services included the following:  -reviewing nursing notes and old charts  -vital sign assessments  -direct patient care  -medication orders and management  -interpreting and reviewing diagnostic studies/labs  -re-evaluations  -documentation time     Aggregate critical care time was 40 minutes, which includes only time during which I was engaged in work directly related to the patient's care as described above. During this entire length of time I was immediately available to the patient. The reason for providing this level of medical care for this critically ill patient was due a critical illness that impaired one or more vital organ systems such that there was a high probability of imminent or life threatening deterioration in the patients condition.  This care involved high complexity decision making to assess, manipulate, and support vital system functions, to treat this degreee vital organ system failure and to prevent further life threatening deterioration of the patients condition    Alma Alegria MD   Intensivist  7/21/2020

## 2020-07-21 NOTE — PROGRESS NOTES
Shwetha Arizmendi Running at the bedside and assessed patient on his wean. Orders to extubate patient. 0559 Patient extubated and placed on 4 lpm via NC. SpO2 97%, .   - No respiratory distress or issues noted.

## 2020-07-21 NOTE — PROGRESS NOTES
1900: Bedside and Verbal shift change report given to General Guo  (oncoming nurse) by Grisel Aburto (offgoing nurse). Report included the following information SBAR, Kardex, Intake/Output, MAR, Accordion and Quality Measures. Pt resting in bed, alert and oriented x2, says he is really confused and doesn't understand what's going on. 2000: Pt attempting to get out of bed and remove all his clothing, able to reorient, continues to be very confused     0000: Assessment completed, continues to be very confused. Screaming in a different language. Attempted to reorient but still very confused       0130: Az at bedside, asked if we could get him something PRN for pain. Orders placed    0400: Reassessment and labs completed, pt states \" we are trying to kill him\", then proceeded to yoel Andre RN and Panda at me. MD called. 0410: New orders received, see MAR    0500: Youseff at bedside    0700: Bedside and Verbal shift change report given to Grisel Aburto (oncoming nurse) by General Guo  (offgoing nurse). Report included the following information SBAR, Kardex, Intake/Output, MAR, Accordion and Quality Measures.

## 2020-07-21 NOTE — PROGRESS NOTES
Patient seen and examined. Transferred to non-covid ICU unit. Intubated. He is awake and respond by nodding his head. He has no fever or tachycardia. His WBC is still normal.  Unfortunately his kidney function keeps worsening and his creatinine is going up and per nephrology recommendation he will need dialysis  On exam his abdomen is soft and non-tender and non-distended. Midline wound is clean  OG tube output is zero    Imp/Plan:     D/C staplers. ICU team agreed to remove them for me. I appreciate medicine admission and management. Follow the recommendations of the nephrology, ID and ICU team  From the standpoint of the surgery, there is no evidence of any abdominal pathologies. I will sign off for now. Please call me for any changes or concerns.

## 2020-07-21 NOTE — DIALYSIS
DELIA        ACUTE HEMODIALYSIS FLOW SHEET      HEMODIALYSIS ORDERS: Physician: Namrata Medellin     Dialyzer: revaclear   Duration: 4 hr  BFR: 300   DFR: 800   Dialysate:  Temp 36-37*C  K+   2    Ca+  2.5 Na 140 Bicarb 35   Weight:   Wt Readings from Last 1 Encounters:   07/21/20 90.1 kg (198 lb 9.1 oz)     UF Goal: 1000 Access Udal Needle Gauge     Heparin []  Bolus      Units    [] Hourly       Units    []None      Catheter locking solution Heparin   Pre BP:   128/56    Pulse:     105       Respirations: 14  Temperature:   97   Labs: Pre        Post:        [] N/A   Additional Orders(medications, blood products, hypotension management):       [] N/A     [x] Sammieita Consent Verified     CATHETER ACCESS: []N/A   []Right   [x]Left   []IJ     [x]Fem   []transhepatic   [] First use X-ray verified     []Permanent                [] Temporary   [x]No S/S infection  []Redness  []Drainage []Cultured []Swelling []Pain   [x]Medical Aseptic Prep Utilized   []Dressing Changed  [] Biopatch  Date:       []Clotted   []Patent   Flows: []Good  []Poor  []Reversed   If access problem,  notified: []Yes    []N/A  Date:           GRAFT/FISTULA ACCESS:  []N/A     []Right     []Left     []UE     []LE   []AVG   []AVF        []Buttonhole    []Medical Aseptic Prep Utilized   []No S/S infection  []Redness  []Drainage []Cultured []Swelling []Pain    Bruit:   [] Strong    [] Weak       Thrill :   [] Strong    [] Weak       Needle Gauge:    Length:     If access problem,  notified: []Yes     []N/A  Date:        Please describe access if present and not used                            GENERAL ASSESSMENT:      LUNGS:  Rate  SaO2%        [] N/A    [] Clear  [x] Coarse  [] Crackles  [] Wheezing        [x] Diminished     Location : [x]RLL   [x]LLL    []RUL  []XOCHILT     Cough: []Productive  []Dry  [x]N/A   Respirations:  [x]Easy  []Labored     Therapy:   []RA  []NC  l/min    Mask: []NRB []Venti       O2%                  [x]Ventilator  [x]Intubated  [] Trach  [] BiPaP     CARDIAC: []Regular      [x] Irregular   [] Pericardial Rub  [] JVD        [x]  Monitored  [x] Bedside  [] Remotely monitored [] N/A  Rhythm:      EDEMA: [] None  [x]Generalized  [] Pitting [] 1    [] 2    [] 3    [] 4                 [] Facial  [x] Pedal  [x]  UE  [x] LE     SKIN:   [x] Warm  [] Hot     [] Cold   [x] Dry     [] Pale   [] Diaphoretic                  [] Flushed  [] Jaundiced  [] Cyanotic  [] Rash  [] Weeping     LOC:    [x] Alert      [x]Oriented:    [x] Person     [x] Place  [x]Time               [] Confused  [] Lethargic  [] Medicated  [] Non-responsive     GI / ABDOMEN: [] Flat    [] Distended    [x] Soft    [] Firm   []  Obese                             [] Diarrhea  [] Bowel Sounds  [] Nausea  [] Vomiting       / URINE ASSESSMENT:[] Voiding   [x] Oliguria  [] Anuria   []  [place]     [] Incontinent    []  Incontinent Brief      []  Bathroom Privileges       PAIN: [x] 0 []1  []2   []3   []4   []5   []6   []7   []8   []9   []10              Scale 0-10  Action/Follow Up:      MOBILITY:  [] Amb    [] Amb/Assist    [x] Bed    [] Wheelchair  [] Stretcher      All Vitals and Treatment Details on Attached [address]: [facility]          Room # 2604/01      [] 1st Time Acute  [] Stat  [x] Routine  [] Urgent     [] Acute Room  []  Bedside  [x] ICU/CCU  [] ER   Isolation Precautions:   There are currently no Active Isolations      Special Considerations:         [] Blood Consent Verified []N/A     ALLERGIES:   Allergies   Allergen Reactions    Iodinated Contrast Media Swelling    Lisinopril Swelling               Code Status:Full Code        Hepatitis Status:    2nd RN check:                     No results found for: HAMAT, HAAB, HABT, HAAT, HBSAG, HBSB, HBSAT, HBABN, HBCM, HBCAB, HBCAT, XBCABS, HBEAB, HBEAG, XHEPCS, [id number], [address], [facility], [facility], [address] [facility], [id number], [id number], [address] [place] [address], [id number], HBCMLT, [id number], HCGAT                  Current Labs:   Lab Results   Component Value Date/Time    Sodium 141 07/21/2020 03:30 AM    Potassium 5.2 07/21/2020 03:30 AM    Chloride 114 (H) 07/21/2020 03:30 AM    CO2 18 (L) 07/21/2020 03:30 AM    Anion gap 9 07/21/2020 03:30 AM    Glucose 127 (H) 07/21/2020 03:30 AM    BUN 82 (H) 07/21/2020 03:30 AM    Creatinine 7.37 (H) 07/21/2020 03:30 AM    BUN/Creatinine ratio 11 (L) 07/21/2020 03:30 AM    GFR est AA 9 (L) 07/21/2020 03:30 AM    GFR est non-AA 7 (L) 07/21/2020 03:30 AM    Calcium 7.1 (L) 07/21/2020 03:30 AM      Lab Results   Component Value Date/Time    WBC 7.7 07/21/2020 03:30 AM    HGB 8.0 (L) 07/21/2020 03:30 AM    HCT 24.7 (L) 07/21/2020 03:30 AM    PLATELET 273 42/27/8471 03:30 AM    MCV 99.2 (H) 07/21/2020 03:30 AM                                                                                     DIET: DIET NPO  DIET TUBE FEEDING       PRIMARY NURSE REPORT: First initial/Last name/Title      Pre Dialysis: Arn Raring     Time: 1200      EDUCATION:    [] Patient [] Other         Knowledge Basis: []None [x]Minimal [] Substantial   Barriers to learning ( Intubation)  []N/A   [] Access Care     [] S&S of infection     [] Fluid Management     []K+     [x]Procedural    []Albumin     [] Medications     [] Tx Options     [] Transplant     [] Diet     [] Other   Teaching Tools:  [x] Explain  [] Demo  [] Handouts [] Video  Patient response:   [] Verbalized understanding  [] Teach back  [] Return demonstration [x] Requires follow up   Inappropriate due to            [x]1245 Time Out/Safety Check  [x]Extracorporeal Circuit Tested for integrity       RO/HEMODIALYSIS MACHINE SAFETY CHECKS  Before each treatment:     Machine Number:                   [facility]                                   [] Unit Machine #  with centralized RO                                  [] Portable Machine #1/RO serial # [id number]                                  [] Portable Machine #2/RO serial # [id number]                                  [] Portable Machine #4/RO serial # [id number]                                                     [address] [place] [address]                                  [] Portable Machine #11/RO serial # [id number]                                   [] Portable Machine #12/RO serial # [id number]                                  [] Portable Machine #13/RO serial #  [id number]      Alarm Test:  Pass time 3906               [x] RO/Machine Log Complete      Temp    36             Dialysate: pH  7.4 Conductivity: Meter   14     HD Machine   14.1                  TCD: 40  Dialyzer Lot # [id number]            Blood Tubing Lot # [id number]          Saline Lot #  -[id number]     CHLORINE TESTING-Before each treatment and every 4 hours    Total Chlorine: [] less than 0.1 ppm  Time: 1230 4 Hr/2nd Check Time: 1400   (if greater than 0.1 ppm from Primary then every 30 minutes from Secondary)     TREATMENT INITIATION  with Dialysis Precautions:   [x] All Connections Secured                 [x] Saline Line Double Clamped   [x] Venous Parameters Set                  [x] Arterial Parameters Set    [x] Prime Given 250ml                          [x]Air Foam Detector Engaged      Treatment Initiation Note:Receivd patient in ICU bed awake intubated follows command. No acute distress noted. Left fem [place] catheter accessed flushes and aspirates well. No s/s of infection. HD initiated without difficulty. During Treatment Notes: 1330  Patient awake tolerating treatment. Face and access visible and connected   1400     Patient awake tolerating treatment. Face and access visible and connected  1430      Patient awake tolerating treatment. Face and access visible and connected  1500  RT making rounds, suctioned patient. Pt. Tolerating treatment    65   RT extubated patient. Patient tolerating tx.  And resting quietly     Medication Dose Volume Route Time [facility] name Title         RN         RN         RN           RN                   Post Assessment:   Dialyzer Cleared: [x] Good [] Fair [] Poor  Blood processed: 69  L  UF Removed  500 Ml  POst BP:   148/63       Pulse: 120        Respirations: 16  Temperature: 98 Lungs:     [] Clear      [] Course         [] Crackles    [] Wheezing         [] Diminished   Post Tx Vascular Access:   AVF/AVG: Bleeding stopped   Art  min. José Miguel. Min   N/A Cardiac:   [] Regular   [] Irregular   [x] Monitor  [] N/A      Rhythm:       Catheter:   Locking solution: Heparin 1:1000   Art. 1.6  José Miguel. 1.6  N/A    Skin:   Pain:    [x] Warm  [x] Dry [] Diaphoretic    [] Flushed    [] Pale [] Cyanotic [x]0  []1  []2   []3  []4   []5   []6   []7   []8   []9   []10     Post Treatment Note:   Patient completed and tolerated 4 hrs of HD, 500 ml of fluid removed.  Patient remains admitted in ICU        POST TREATMENT PRIMARY NURSE HANDOFF REPORT:     First initial/Last name/Title         Post Dialysis: Radha Eli Time:  0057     Abbreviations: AVG-arterial venous graft, AVF-arterial venous fistula, IJ-Internal Jugular, Subcl-Subclavian, Fem-Femoral, Tx-treatment, AP/HR-apical heart rate, DFR-dialysate flow rate, BFR-blood flow rate, AP-arterial pressure, -venous pressure, UF-ultrafiltrate, TMP-transmembrane pressure, José Miguel-Venous, Art-Arterial, RO-Reverse Osmosis

## 2020-07-21 NOTE — PROGRESS NOTES
Problem: Diabetes Self-Management  Goal: *Disease process and treatment process  Description: Define diabetes and identify own type of diabetes; list 3 options for treating diabetes. Outcome: Progressing Towards Goal  Goal: *Incorporating nutritional management into lifestyle  Description: Describe effect of type, amount and timing of food on blood glucose; list 3 methods for planning meals. Outcome: Progressing Towards Goal  Goal: *Incorporating physical activity into lifestyle  Description: State effect of exercise on blood glucose levels. Outcome: Progressing Towards Goal  Goal: *Developing strategies to promote health/change behavior  Description: Define the ABC's of diabetes; identify appropriate screenings, schedule and personal plan for screenings. Outcome: Progressing Towards Goal  Goal: *Using medications safely  Description: State effect of diabetes medications on diabetes; name diabetes medication taking, action and side effects. Outcome: Progressing Towards Goal  Goal: *Monitoring blood glucose, interpreting and using results  Description: Identify recommended blood glucose targets  and personal targets. Outcome: Progressing Towards Goal  Goal: *Prevention, detection, treatment of acute complications  Description: List symptoms of hyper- and hypoglycemia; describe how to treat low blood sugar and actions for lowering  high blood glucose level. Outcome: Progressing Towards Goal  Goal: *Prevention, detection and treatment of chronic complications  Description: Define the natural course of diabetes and describe the relationship of blood glucose levels to long term complications of diabetes.   Outcome: Progressing Towards Goal  Goal: *Developing strategies to address psychosocial issues  Description: Describe feelings about living with diabetes; identify support needed and support network  Outcome: Progressing Towards Goal  Goal: *Insulin pump training  Outcome: Progressing Towards Goal  Goal: *Sick day guidelines  Outcome: Progressing Towards Goal  Goal: *Patient Specific Goal (EDIT GOAL, INSERT TEXT)  Outcome: Progressing Towards Goal     Problem: Patient Education: Go to Patient Education Activity  Goal: Patient/Family Education  Outcome: Progressing Towards Goal     Problem: Ventilator Management  Goal: *Adequate oxygenation and ventilation  Outcome: Progressing Towards Goal  Goal: *Patient maintains clear airway/free of aspiration  Outcome: Progressing Towards Goal  Goal: *Absence of infection signs and symptoms  Outcome: Progressing Towards Goal  Goal: *Normal spontaneous ventilation  Outcome: Progressing Towards Goal     Problem: Patient Education: Go to Patient Education Activity  Goal: Patient/Family Education  Outcome: Progressing Towards Goal     Problem: Non-Violent Restraints  Goal: *Removal from restraints as soon as assessed to be safe  Outcome: Progressing Towards Goal  Goal: *No harm/injury to patient while restraints in use  Outcome: Progressing Towards Goal  Goal: *Patient's dignity will be maintained  Outcome: Progressing Towards Goal  Goal: *Patient Specific Goal (EDIT GOAL, INSERT TEXT)  Outcome: Progressing Towards Goal  Goal: Non-violent Restaints:Standard Interventions  Outcome: Progressing Towards Goal  Goal: Non-violent Restraints:Patient Interventions  Outcome: Progressing Towards Goal  Goal: Patient/Family Education  Outcome: Progressing Towards Goal     Problem: Falls - Risk of  Goal: *Absence of Falls  Description: Document Jose Fall Risk and appropriate interventions in the flowsheet.   Outcome: Progressing Towards Goal  Note: Fall Risk Interventions:       Mentation Interventions: Door open when patient unattended, Bed/chair exit alarm    Medication Interventions: Bed/chair exit alarm    Elimination Interventions: Call light in reach, Bed/chair exit alarm              Problem: Patient Education: Go to Patient Education Activity  Goal: Patient/Family Education  Outcome: Progressing Towards Goal     Problem: Pressure Injury - Risk of  Goal: *Prevention of pressure injury  Description: Document Francis Scale and appropriate interventions in the flowsheet.   Outcome: Progressing Towards Goal     Problem: Patient Education: Go to Patient Education Activity  Goal: Patient/Family Education  Outcome: Progressing Towards Goal     Problem: Discharge Planning  Goal: *Discharge to safe environment  Outcome: Progressing Towards Goal     Problem: Nutrition Deficit  Goal: *Optimize nutritional status  Outcome: Progressing Towards Goal     Problem: Patient Education: Go to Patient Education Activity  Goal: Patient/Family Education  Outcome: Progressing Towards Goal

## 2020-07-21 NOTE — PROGRESS NOTES
0700:  Report received from Sona Ennis, [facility]. Pt is intubated and sedated. No pressors. VSS.     0930:  Staples removed from pts abdomen per Dr. Tyrell Schulte. Wound is clean dry and intact, open to air     1000:  Rounded on pt with care team.  Plan is for dialysis today     1121: fentanyl held for SBT. 1200: Dialysis nurse at bedside. Pt is able to follow commands, nods appropriately. 1559: Pt successfully extubated, see RTs note     1710: Pt became very agitated and anxious saying he needed to get out of bed, pt was straining and trying to sit upright. Pt abdominal incision dehisced and split open about 2 inches long, bleeding. I placed an ABD pad and tape over it and rahul a Douglas in shaprie around it to monitor the blood spread. Pt concurrently had a bowel movement during this episode, so I believe he was straining due to the discomfort of having to poop and he strained so hard he burst open his incision. I called Dr. Tyrell Schulte to update him on the events. No new orders. 1800: Called intensivist, no response at this time. Pt extremely agitated, pulled out central venous line, actively visually hallucinating. I spoke with Dr. Fauzia Geiger and received a haldol PRN for now until I am able to receive further orders from intensivist. Will have the charge nurse reach out to intensivist or Cony Blankenship. 1813: Spoke with Dr. Raffy Reese, received orders for geodon IM.      0994: 30mg geodon given IM. 1900: Bedside and Verbal shift change report given to Sona Ennis RN (oncoming nurse) by Sury Hebert   (offgoing nurse). Report included the following information SBAR, Kardex, MAR, Med Rec Status and Cardiac Rhythm NSR, sinus tach .

## 2020-07-21 NOTE — PROGRESS NOTES
7/21/2020  Patient on vent day #5- per chart awake, can nod head to questions. Surgery signed off. Nephrology recommending dialysis for worsening kidney function. ID says [facility] untill at least 7/23, maybe longer. RT note says goal is to extubate after dialysis. Plan is back to [facility], [facility] they have accepted back in chart. Unsure if plan to go back to [facility].  to change if dialysis is permanent. Malachi Ashby.  JUVENAL DavisN  [facility]  [phone], Pager [phone]  [email or web address]

## 2020-07-21 NOTE — PROGRESS NOTES
Problem: Diabetes Self-Management  Goal: *Disease process and treatment process  Description: Define diabetes and identify own type of diabetes; list 3 options for treating diabetes. Outcome: Progressing Towards Goal  Goal: *Incorporating nutritional management into lifestyle  Description: Describe effect of type, amount and timing of food on blood glucose; list 3 methods for planning meals. Outcome: Progressing Towards Goal  Goal: *Incorporating physical activity into lifestyle  Description: State effect of exercise on blood glucose levels. Outcome: Progressing Towards Goal  Goal: *Developing strategies to promote health/change behavior  Description: Define the ABC's of diabetes; identify appropriate screenings, schedule and personal plan for screenings. Outcome: Progressing Towards Goal  Goal: *Using medications safely  Description: State effect of diabetes medications on diabetes; name diabetes medication taking, action and side effects. Outcome: Progressing Towards Goal  Goal: *Monitoring blood glucose, interpreting and using results  Description: Identify recommended blood glucose targets  and personal targets. Outcome: Progressing Towards Goal  Goal: *Prevention, detection, treatment of acute complications  Description: List symptoms of hyper- and hypoglycemia; describe how to treat low blood sugar and actions for lowering  high blood glucose level. Outcome: Progressing Towards Goal  Goal: *Prevention, detection and treatment of chronic complications  Description: Define the natural course of diabetes and describe the relationship of blood glucose levels to long term complications of diabetes.   Outcome: Progressing Towards Goal  Goal: *Developing strategies to address psychosocial issues  Description: Describe feelings about living with diabetes; identify support needed and support network  Outcome: Progressing Towards Goal  Goal: *Insulin pump training  Outcome: Progressing Towards Goal  Goal: *Sick day guidelines  Outcome: Progressing Towards Goal  Goal: *Patient Specific Goal (EDIT GOAL, INSERT TEXT)  Outcome: Progressing Towards Goal     Problem: Patient Education: Go to Patient Education Activity  Goal: Patient/Family Education  Outcome: Progressing Towards Goal     Problem: Ventilator Management  Goal: *Adequate oxygenation and ventilation  Outcome: Progressing Towards Goal  Goal: *Patient maintains clear airway/free of aspiration  Outcome: Progressing Towards Goal  Goal: *Absence of infection signs and symptoms  Outcome: Progressing Towards Goal  Goal: *Normal spontaneous ventilation  Outcome: Progressing Towards Goal     Problem: Patient Education: Go to Patient Education Activity  Goal: Patient/Family Education  Outcome: Progressing Towards Goal     Problem: Non-Violent Restraints  Goal: *Removal from restraints as soon as assessed to be safe  Outcome: Progressing Towards Goal  Goal: *No harm/injury to patient while restraints in use  Outcome: Progressing Towards Goal  Goal: *Patient's dignity will be maintained  Outcome: Progressing Towards Goal  Goal: *Patient Specific Goal (EDIT GOAL, INSERT TEXT)  Outcome: Progressing Towards Goal  Goal: Non-violent Restaints:Standard Interventions  Outcome: Progressing Towards Goal  Goal: Non-violent Restraints:Patient Interventions  Outcome: Progressing Towards Goal  Goal: Patient/Family Education  Outcome: Progressing Towards Goal     Problem: Falls - Risk of  Goal: *Absence of Falls  Description: Document Jose Fall Risk and appropriate interventions in the flowsheet.   Outcome: Progressing Towards Goal  Note: Fall Risk Interventions:       Mentation Interventions: Adequate sleep, hydration, pain control, Bed/chair exit alarm, Door open when patient unattended    Medication Interventions: Assess postural VS orthostatic hypotension, Bed/chair exit alarm    Elimination Interventions: Bed/chair exit alarm, Call light in reach, Toileting schedule/hourly rounds Problem: Patient Education: Go to Patient Education Activity  Goal: Patient/Family Education  Outcome: Progressing Towards Goal     Problem: Pressure Injury - Risk of  Goal: *Prevention of pressure injury  Description: Document Francis Scale and appropriate interventions in the flowsheet. Outcome: Progressing Towards Goal  Note: Pressure Injury Interventions:  Sensory Interventions: Assess changes in LOC, Float heels, Keep linens dry and wrinkle-free, Minimize linen layers    Moisture Interventions: Absorbent underpads, Check for incontinence Q2 hours and as needed, Internal/External urinary devices    Activity Interventions: Pressure redistribution bed/mattress(bed type)    Mobility Interventions: HOB 30 degrees or less, Turn and reposition approx.  every two hours(pillow and wedges)    Nutrition Interventions: Document food/fluid/supplement intake, Discuss nutritional consult with provider    Friction and Shear Interventions: Apply protective barrier, creams and emollients, HOB 30 degrees or less, Lift sheet                Problem: Patient Education: Go to Patient Education Activity  Goal: Patient/Family Education  Outcome: Progressing Towards Goal     Problem: Discharge Planning  Goal: *Discharge to safe environment  Outcome: Progressing Towards Goal     Problem: Nutrition Deficit  Goal: *Optimize nutritional status  Outcome: Progressing Towards Goal     Problem: Patient Education: Go to Patient Education Activity  Goal: Patient/Family Education  Outcome: Progressing Towards Goal

## 2020-07-21 NOTE — PROGRESS NOTES
Problem: Ventilator Management  Goal: *Adequate oxygenation and ventilation  Outcome: Progressing Towards Goal   VENTILATOR CARE PLAN    Problem: Ventilator Management  Goal: *Adequate oxygenation/ ventilation/ and extubation      Patient:        Ez Kirk     80 y.o.   male     7/21/2020  8:19 AM  Patient Active Problem List   Diagnosis Code    Diabetes ([facility] E11.9    SBO (small bowel obstruction) ([facility] K56.609    HTN (hypertension) I10    MARCI (acute kidney injury) [facility] N17.9    Troponin level elevated R79.89    Paroxysmal atrial fibrillation (HCC) I48.0    Aspiration pneumonia (HCC) J69.0    Hypoglycemia C26.4    Metabolic encephalopathy R52.62    Acute respiratory failure with hypoxia and hypercapnia (HCC) J96.01, J96.02       SBO (small bowel obstruction) ([facility] [K56.609]  Acute respiratory failure with hypoxia and hypercapnia ([facility] [J96.01, J96.02]    Reason patient intubated: reintubated 7/17     Ventilator day: 5     Ventilator settings: ACVC+ 16 / 500 / +8 / 30%. ETT Size/Placement: Size 8 ETT and 25 at the lip. ABG:  Date:7/21/2020  Lab Results   Component Value Date/Time    PHI 7.20 (LL) 07/21/2020 05:22 AM    PCO2I 41.8 07/21/2020 05:22 AM    PO2I 110 (H) 07/21/2020 05:22 AM    HCO3I 16.2 (L) 07/21/2020 05:22 AM    FIO2I 0.30 07/21/2020 05:22 AM       Chest X-ray:  Date:7/21/2020  Results from Hospital Encounter encounter on 07/16/20   XR CHEST PORT    Narrative EXAM: XR CHEST PORT    CLINICAL INDICATION/HISTORY: Intubated  -Additional: None    COMPARISON: One day prior    TECHNIQUE: Portable frontal view of the chest    _______________    FINDINGS:    SUPPORT DEVICES: Right IJV central venous catheter, ET and enteric tubes  unchanged. Sigifredo Dotson HEART AND MEDIASTINUM: Cardiomegaly. LUNGS AND PLEURAL SPACES: Calcified pleural plaques. Left basilar opacity,  effusion/atelectasis. _______________      Impression IMPRESSION:    Calcified pleural plaques.  Left basilar opacity, effusion/atelectasis. Lab Test:  Date:7/21/2020  WBC:   Lab Results   Component Value Date/Time    WBC 7.7 07/21/2020 03:30 AM   HGB:   Lab Results   Component Value Date/Time    HGB 8.0 (L) 07/21/2020 03:30 AM    PLTS:   Lab Results   Component Value Date/Time    PLATELET 544 17/94/5670 03:30 AM       SaO2%/flow: @MHFQHBX4(8)@    Vital Signs:   Patient Vitals for the past 8 hrs:   Temp Pulse Resp BP SpO2   07/21/20 0814 -- 90 16 -- 100 %   07/21/20 0800 -- 91 17 138/62 99 %   07/21/20 0700 -- 91 16 143/84 99 %   07/21/20 0600 98.1 °F (36.7 °C) 80 16 136/55 100 %   07/21/20 0500 98.1 °F (36.7 °C) 84 16 141/63 99 %   07/21/20 0444 -- 82 16 -- 99 %   07/21/20 0400 98.1 °F (36.7 °C) 87 16 141/62 98 %   07/21/20 0300 98 °F (36.7 °C) 84 16 130/58 99 %   07/21/20 0200 98 °F (36.7 °C) 83 16 139/66 99 %   07/21/20 0100 98 °F (36.7 °C) 71 16 126/59 100 %   07/21/20 0038 -- 73 16 -- 99 %       Wean Screen Pass (Yes or No): yes  Wean Screen Reason for Failure:  Duration of Weaning Trial:  Additional Comments: Will Wean daily. PLAN OF CARE: SBT daily. GOAL: extubation after dialysis.

## 2020-07-21 NOTE — PROGRESS NOTES
RENAL PROGRESS NOTE        Armando Bains         Assessment/Plan:   · MARCI (ischemic atn in a setting of pneumonia/sepsis/resp failure). Oliguric, scr is up sharply. Given trajectory of patient's illness quick spontaneous improvement of renal function is unlikely and initiation of acute dialysis is indicated. I called the wife Linda Espino yesterday, she consented after we discussed risks and benefits of acute dialysis. Lt femoral temporary dialysis catheter was placed by icu team.   · Pneumonia/resp failure. On abx. · S/p  exploratory laparotomy with extensive lysis of adhesions, small bowel resection x2 on 7/7. No sbo per surgery. TF started. · Septic shock. On abx, off pressors. · Hyperphosphatemia. Monitor. Dialysis should address. Subjective:  Patient complaints off: intubated, sedated, off pressors. Tolerates tf.      Patient Active Problem List   Diagnosis Code    Diabetes ([facility] E11.9    SBO (small bowel obstruction) ([facility] K56.609    HTN (hypertension) I10    MARCI (acute kidney injury) ([facility] N17.9    Troponin level elevated R79.89    Paroxysmal atrial fibrillation (HCC) I48.0    Aspiration pneumonia ([facility] J69.0    Hypoglycemia G34.3    Metabolic encephalopathy N06.77    Acute respiratory failure with hypoxia and hypercapnia (HCC) J96.01, J96.02       Current Facility-Administered Medications   Medication Dose Route Frequency Provider Last Rate Last Dose    insulin glargine (LANTUS) injection 5 Units  5 Units SubCUTAneous DAILY Charissa Ridge H, DO   5 Units at 07/21/20 0900    acetaminophen (TYLENOL) solution 650 mg  650 mg Oral Q6H PRN Maureen Galicia MD   650 mg at 07/18/20 0438    fentaNYL (PF) 1,500 mcg/30 mL (50 mcg/mL) infusion  0-200 mcg/hr IntraVENous TITRATE Monique Mtz MD 4 mL/hr at 07/21/20 0944 200 mcg/hr at 07/21/20 0944    piperacillin-tazobactam (ZOSYN) 3.375 g in 0.9% sodium chloride ###EXTENDED 4 HOUR INFUSION###  3.375 g IntraVENous Q12H Jenny Butt MD 25 mL/hr at 07/21/20 0324 3.375 g at 07/21/20 0324    lactated Ringers infusion  50 mL/hr IntraVENous CONTINUOUS Jenny Butt MD 50 mL/hr at 07/20/20 1156 50 mL/hr at 07/20/20 1156    albuterol-ipratropium (DUO-NEB) 2.5 MG-0.5 MG/3 ML  3 mL Nebulization QID RT Maddison James MD   3 mL at 07/21/20 0813    aspirin chewable tablet 81 mg  81 mg Oral DAILY Maddison James MD   81 mg at 07/21/20 0900    atorvastatin (LIPITOR) tablet 80 mg  80 mg Oral DAILY Maddison James MD   80 mg at 07/21/20 0900    heparin (porcine) injection 5,000 Units  5,000 Units SubCUTAneous Q8H Maddison James MD   5,000 Units at 07/21/20 0331    dextrose 10% infusion 125-250 mL  125-250 mL IntraVENous PRN Maddison James MD        [facility]) injection 0.4 mg  0.4 mg IntraVENous PRN Maddison James MD       [address] [place] [address] insulin lispro (HUMALOG) injection   SubCUTAneous Q6H Maddison James MD   Stopped at 07/19/20 0000    glucose chewable tablet 16 g  4 Tab Oral PRN Maddison James MD        glucagon (GLUCAGEN) injection 1 mg  1 mg IntraMUSCular PRN Maddison James MD        methylPREDNISolone (PF) (SOLU-MEDROL) injection 40 mg  40 mg IntraVENous Q12H Marie Torres MD   40 mg at 07/21/20 0900    midazolam (VERSED) injection 1 mg  1 mg IntraVENous Q3H PRN Marie Torres MD   1 mg at 07/21/20 0900    fentaNYL citrate (PF) injection 50 mcg  50 mcg IntraVENous Q4H PRN Marie Torres MD        lidocaine (XYLOCAINE) 2 % jelly   Mucous Membrane PRN Maddison James MD        sodium chloride (NS) flush 5-10 mL  5-10 mL IntraVENous PRN Maddison James MD   10 mL at 07/16/20 1832    sodium chloride (NS) flush 5-40 mL  5-40 mL IntraVENous PRN Marie Torres MD        famotidine (PF) (PEPCID) 20 mg in 0.9% sodium chloride 10 mL injection  20 mg IntraVENous Q12H Dilan Valerio MD   20 mg at 07/21/20 0900    ondansetron (ZOFRAN) injection 4 mg  4 mg IntraVENous Q6H PRN Dilan Valerio MD           Objective  Vitals:    07/21/20 0814 07/21/20 0900 07/21/20 1000 07/21/20 1100   BP:  132/67 124/51 130/56   Pulse: 90 84 71 73   Resp: 16 16 16 16   Temp:       SpO2: 100% 100% 100% 99%   Weight:       Height:             Intake/Output Summary (Last 24 hours) at 7/21/2020 1109  Last data filed at 7/21/2020 0800  Gross per 24 hour   Intake 1705.43 ml   Output 22 ml   Net 1683.43 ml           Admission weight: Weight: 82.6 kg (182 lb) (07/16/20 1505)  Last Weight Metrics:  Weight Loss Metrics 7/21/2020 7/13/2020 1/31/2019   Today's Wt 198 lb 9.1 oz 183 lb 13.8 oz 181 lb 9 oz   BMI 31.1 kg/m2 28.8 kg/m2 28.44 kg/m2             Physical Assessment:     General: intubated, sedated. ET tube in place. Neck: No jvd. LUNGS: diminished air entry at the bases, bl exp rhonchi. CVS EXM: S1, S2  RRR. Abdomen: less distended, appears to be tender in the periumbilical area. Lower Extremities: 1+ bl thigh edema.        Lab    CBC w/Diff Recent Labs     07/21/20  0330 07/20/20  0350 07/19/20  1614 07/19/20  0504   WBC 7.7 9.9 11.5 11.8   RBC 2.49* 2.43* 2.50* 2.26*   HGB 8.0* 7.7* 8.0* 7.2*   HCT 24.7* 24.1* 25.0* 22.5*    285 283 262   GRANS  --  91* 91* 94*   LYMPH  --  5* 6* 4*   EOS  --  0 0 0        Chemistry Recent Labs     07/21/20  0330 07/20/20  0350 07/19/20  0504   * 98 134*    143 142   K 5.2 5.1 4.8   * 115* 115*   CO2 18* 18* 19*   BUN 82* 71* 61*   CREA 7.37* 6.70* 5.45*   CA 7.1* 7.4* 7.1*   AGAP 9 10 8   BUCR 11* 11* 11*   AP  --   --  82   TP  --   --  4.9*   ALB 1.8* 1.8* 1.6*   GLOB  --   --  3.3   AGRAT  --   --  0.5*   PHOS 8.1* 7.1* 5.9*         No results found for: IRON, FE, TIBC, IBCT, PSAT, FERR   Lab Results   Component Value Date/Time    Calcium 7.1 (L) 07/21/2020 03:30 AM    Phosphorus 8.1 (H) 07/21/2020 03:30 AM Eliz Breen M.D.   [facility]  Phone

## 2020-07-21 NOTE — PROGRESS NOTES
Internal Medicine Progress Note    Patient's Name: Marsha Lozoya  Admit Date: 7/16/2020  Length of Stay: 5      Assessment/Plan     Active Hospital Problems    Diagnosis Date Noted    [facility] 07/16/2020    Hypoglycemia 36/31/5667    Metabolic encephalopathy 71/57/3029    Acute respiratory failure with hypoxia and hypercapnia (HCC) 07/16/2020    Paroxysmal atrial fibrillation ([facility] 07/09/2020    Diabetes ([facility] 07/03/2020    SBO (small bowel obstruction) ([facility] 07/03/2020    HTN (hypertension) 07/03/2020    Troponin level elevated 07/03/2020    MARCI (acute kidney injury) ([facility] 07/03/2020   Sepsis with Septic Shock, present on admission, secondary to RLL PNA    - Vent mgmt per ICU  - Vent bundle  - WBCs WNL, afebrile  - Cont IV zosyn per ID  - Trend cultures  - Cr cont to trend up  - Dialysis cath placed with plans to start dialysis today  - Appreciate surg, no concern for issues at this time  - Cont NGT  - Appreciate pulm/CC  - Cont acceptable home medications for chronic conditions   - DVT protocol    I have personally reviewed all pertinent labs and films that have officially resulted over the last 24 hours. I have personally checked for all pending labs that are awaiting final results.     Subjective     Pt s/e @ bedside  No major events overnight  Remains intubated and sedated  Tolerating wean trials per resp    Objective     Visit Vitals  /56   Pulse 100   Temp 98.2 °F (36.8 °C)   Resp 16   Ht 5' 7\" (1.702 m)   Wt 90.1 kg (198 lb 9.1 oz)   SpO2 96%   BMI 31.10 kg/m²       Physical Exam:  General Appearance: Intubated and sedated  HENT: normocephalic/atraumatic, + ETT  Neck: No JVD, supple  Lungs: B/L rhonchi with normal respiratory effort  CV: RRR, no m/r/g  Abdomen: soft, non-tender, normal bowel sounds  Extremities: no cyanosis, no peripheral edema  Neuro: No focal deficits, motor/sensory intact  Skin: Normal color, intact  Lymphatics: No cervical or supraclavicular lymphadenopathy    Intake and Output:  Current Shift:  07/21 0701 - 07/21 1900  In: 515.7 [I.V.:385.7]  Out: -   Last three shifts:  07/19 1901 - 07/21 0700  In: 3240.9 [I.V.:2640.9]  Out: 142 [Urine:67]    Lab/Data Reviewed:  CMP:   Lab Results   Component Value Date/Time     07/21/2020 03:30 AM    K 5.2 07/21/2020 03:30 AM     (H) 07/21/2020 03:30 AM    CO2 18 (L) 07/21/2020 03:30 AM    AGAP 9 07/21/2020 03:30 AM     (H) 07/21/2020 03:30 AM    BUN 82 (H) 07/21/2020 03:30 AM    CREA 7.37 (H) 07/21/2020 03:30 AM    GFRAA 9 (L) 07/21/2020 03:30 AM    GFRNA 7 (L) 07/21/2020 03:30 AM    CA 7.1 (L) 07/21/2020 03:30 AM    PHOS 8.1 (H) 07/21/2020 03:30 AM    ALB 1.8 (L) 07/21/2020 03:30 AM     CBC:   Lab Results   Component Value Date/Time    WBC 7.7 07/21/2020 03:30 AM    HGB 8.0 (L) 07/21/2020 03:30 AM    HCT 24.7 (L) 07/21/2020 03:30 AM     07/21/2020 03:30 AM       Imaging Reviewed:  [facility] Chest Port    Result Date: 7/21/2020  EXAM: PORTABLE  FRONTAL CHEST RADIOGRAPH INDICATION: Intubated. History of hypertension and diabetes. COMPARISON: 7/20/2020. TECHNIQUE: Portable frontal view of the chest _______________ FINDINGS: SUPPORT DEVICES: -Enteric tube terminates approximately 3 cm above the juan francisco. -Single enteric tube courses along the esophagus, tip terminates below the inferior margin of the image. -Right internal jugular venous catheter tip terminates in the SVC. -EKG leads overlie the patient. HEART AND MEDIASTINUM: Stable heart size. Atherosclerotic calcifications present. Mild central pulmonary vascular congestion. LUNGS: Hazy perihilar opacities extending to both lower lobes. PLEURAL SPACES:No large pneumothorax. No large pleural effusion. Pleural calcifications again noted predominantly on the left. BONY THORAX AND SOFT TISSUES: No acute abnormality. _______________     IMPRESSION: 1. Lines and devices appear stably positioned, as detailed.  2.  Similar bilateral lower lung opacities may represent pulmonary edema, atelectasis, though superimposed infection cannot be excluded. Recommend continued attention on follow-up.       Medications Reviewed:  Current Facility-Administered Medications   Medication Dose Route Frequency    0.9% sodium chloride infusion  50 mL/hr IntraVENous DIALYSIS PRN    heparin (porcine) 1,000 unit/mL injection 1,000 Units  1,000 Units InterCATHeter DIALYSIS PRN    insulin glargine (LANTUS) injection 5 Units  5 Units SubCUTAneous DAILY    acetaminophen (TYLENOL) solution 650 mg  650 mg Oral Q6H PRN    fentaNYL (PF) 1,500 mcg/30 mL (50 mcg/mL) infusion  0-200 mcg/hr IntraVENous TITRATE    piperacillin-tazobactam (ZOSYN) 3.375 g in 0.9% sodium chloride ###EXTENDED 4 HOUR INFUSION###  3.375 g IntraVENous Q12H    albuterol-ipratropium (DUO-NEB) 2.5 MG-0.5 MG/3 ML  3 mL Nebulization QID RT    aspirin chewable tablet 81 mg  81 mg Oral DAILY    atorvastatin (LIPITOR) tablet 80 mg  80 mg Oral DAILY    heparin (porcine) injection 5,000 Units  5,000 Units SubCUTAneous Q8H    dextrose 10% infusion 125-250 mL  125-250 mL IntraVENous PRN    naloxone (NARCAN) injection 0.4 mg  0.4 mg IntraVENous PRN    insulin lispro (HUMALOG) injection   SubCUTAneous Q6H    glucose chewable tablet 16 g  4 Tab Oral PRN    glucagon (GLUCAGEN) injection 1 mg  1 mg IntraMUSCular PRN    methylPREDNISolone (PF) (SOLU-MEDROL) injection 40 mg  40 mg IntraVENous Q12H    midazolam (VERSED) injection 1 mg  1 mg IntraVENous Q3H PRN    fentaNYL citrate (PF) injection 50 mcg  50 mcg IntraVENous Q4H PRN    lidocaine (XYLOCAINE) 2 % jelly   Mucous Membrane PRN    sodium chloride (NS) flush 5-10 mL  5-10 mL IntraVENous PRN    sodium chloride (NS) flush 5-40 mL  5-40 mL IntraVENous PRN    famotidine (PF) (PEPCID) 20 mg in 0.9% sodium chloride 10 mL injection  20 mg IntraVENous Q12H    ondansetron (ZOFRAN) injection 4 mg  4 mg IntraVENous Q6H PRN           Alethea Shock, DO  Internal Medicine, Hospitalist  Pager: [facility]

## 2020-07-21 NOTE — PROGRESS NOTES
Problem: Diabetes Self-Management  Goal: *Disease process and treatment process  Description: Define diabetes and identify own type of diabetes; list 3 options for treating diabetes. Outcome: Progressing Towards Goal  Goal: *Incorporating nutritional management into lifestyle  Description: Describe effect of type, amount and timing of food on blood glucose; list 3 methods for planning meals. Outcome: Progressing Towards Goal  Goal: *Incorporating physical activity into lifestyle  Description: State effect of exercise on blood glucose levels. Outcome: Progressing Towards Goal  Goal: *Developing strategies to promote health/change behavior  Description: Define the ABC's of diabetes; identify appropriate screenings, schedule and personal plan for screenings. Outcome: Progressing Towards Goal  Goal: *Using medications safely  Description: State effect of diabetes medications on diabetes; name diabetes medication taking, action and side effects. Outcome: Progressing Towards Goal  Goal: *Monitoring blood glucose, interpreting and using results  Description: Identify recommended blood glucose targets  and personal targets. Outcome: Progressing Towards Goal  Goal: *Prevention, detection, treatment of acute complications  Description: List symptoms of hyper- and hypoglycemia; describe how to treat low blood sugar and actions for lowering  high blood glucose level. Outcome: Progressing Towards Goal  Goal: *Prevention, detection and treatment of chronic complications  Description: Define the natural course of diabetes and describe the relationship of blood glucose levels to long term complications of diabetes.   Outcome: Progressing Towards Goal  Goal: *Developing strategies to address psychosocial issues  Description: Describe feelings about living with diabetes; identify support needed and support network  Outcome: Progressing Towards Goal  Goal: *Insulin pump training  Outcome: Progressing Towards Goal  Goal: *Sick day guidelines  Outcome: Progressing Towards Goal  Goal: *Patient Specific Goal (EDIT GOAL, INSERT TEXT)  Outcome: Progressing Towards Goal     Problem: Patient Education: Go to Patient Education Activity  Goal: Patient/Family Education  Outcome: Progressing Towards Goal     Problem: Ventilator Management  Goal: *Adequate oxygenation and ventilation  Outcome: Progressing Towards Goal  Goal: *Patient maintains clear airway/free of aspiration  Outcome: Progressing Towards Goal  Goal: *Absence of infection signs and symptoms  Outcome: Progressing Towards Goal  Goal: *Normal spontaneous ventilation  Outcome: Progressing Towards Goal     Problem: Patient Education: Go to Patient Education Activity  Goal: Patient/Family Education  Outcome: Progressing Towards Goal     Problem: Non-Violent Restraints  Goal: *Removal from restraints as soon as assessed to be safe  Outcome: Progressing Towards Goal  Goal: *No harm/injury to patient while restraints in use  Outcome: Progressing Towards Goal  Goal: *Patient's dignity will be maintained  Outcome: Progressing Towards Goal  Goal: *Patient Specific Goal (EDIT GOAL, INSERT TEXT)  Outcome: Progressing Towards Goal  Goal: Non-violent Restaints:Standard Interventions  Outcome: Progressing Towards Goal  Goal: Non-violent Restraints:Patient Interventions  Outcome: Progressing Towards Goal  Goal: Patient/Family Education  Outcome: Progressing Towards Goal     Problem: Falls - Risk of  Goal: *Absence of Falls  Description: Document Jose Fall Risk and appropriate interventions in the flowsheet.   Outcome: Progressing Towards Goal  Note: Fall Risk Interventions:       Mentation Interventions: Adequate sleep, hydration, pain control, Bed/chair exit alarm, Door open when patient unattended    Medication Interventions: Assess postural VS orthostatic hypotension, Bed/chair exit alarm    Elimination Interventions: Bed/chair exit alarm, Call light in reach, Toileting schedule/hourly rounds Problem: Patient Education: Go to Patient Education Activity  Goal: Patient/Family Education  Outcome: Progressing Towards Goal     Problem: Pressure Injury - Risk of  Goal: *Prevention of pressure injury  Description: Document Francis Scale and appropriate interventions in the flowsheet.   Outcome: Progressing Towards Goal     Problem: Patient Education: Go to Patient Education Activity  Goal: Patient/Family Education  Outcome: Progressing Towards Goal     Problem: Discharge Planning  Goal: *Discharge to safe environment  Outcome: Progressing Towards Goal     Problem: Nutrition Deficit  Goal: *Optimize nutritional status  Outcome: Progressing Towards Goal     Problem: Patient Education: Go to Patient Education Activity  Goal: Patient/Family Education  Outcome: Progressing Towards Goal

## 2020-07-21 NOTE — PROGRESS NOTES
1900; Bedside and Verbal shift change report given to ,me  (oncoming nurse) by Lynsey Dyer (offgoing nurse). Report included the following information SBAR, Kardex, Procedure Summary, Intake/Output, MAR, Accordion and Quality Measures. Drips verified, Assessment completed, ET and OG tube placed confirmed      2200: pt complaining of pain, fent increased     0000: pt much more awake and restless, PRN given    0400: reassess no changes noted    0700: Bedside and Verbal shift change report given to [employer] (oncoming nurse) by Alessandra Flores  (offgoing nurse). Report included the following information SBAR, Kardex, ED Summary, Intake/Output, MAR, Accordion and Quality Measures.

## 2020-07-22 NOTE — PROGRESS NOTES
Pharmacy Monitoring/Intervention - Renal Dosing    This medication has been renally adjusted per protocol from famotidine 20mg q12h to famotidine 20mg daily for SUP     Recent Labs     20  0415 20  0330 20  0350   CREA 4.88* 7.37* 6.70*   BUN 43* 82* 71*   WBC 9.4 7.7 9.9     Temp (24hrs), Av.2 °F (36.8 °C), Min:97.5 °F (36.4 °C), Max:98.8 °F (37.1 °C)      Estimated Creatinine Clearance: 12.4 mL/min (A) (based on SCr of 4.88 mg/dL (H)). Estimated Creatinine Clearance (using IBW): 10.5 mL/min    NOTE: This information is to be used to assist in clinical efficiency but is not a substitute for clinical judgement. Pharmacy will follow daily and adjust medications as appropriate for renal function and/or serum levels. Thank you,  Maulik Degroot.  Fern Pacheco, Asif  [facility] Clinical Pharmacy Coordinator  [phone]

## 2020-07-22 NOTE — PROGRESS NOTES
Chart reviewed. Plan remains transfer back to [facility]. when medically stable & bed available. If will need out-pt dialysis will need chair. Will cont to follow. Esther Rouse RN,ext [phone].

## 2020-07-22 NOTE — PROGRESS NOTES
ICU PROGRESS NOTE:                                              HPI:    Mr. Tamika Vaz is an 80year-old male patient with a Past Medical History of DM2, HNT, atrial fibrillation and SBO with Surgery on 7/03/20, who was readmitted on 7/16/20 with another episode of SBO, which this time was successfully treatment with conservative management. Patient developed MARCI this admission and today he is on HD.       7/22/2020   See above. Patient was extubated on 7/21/20 at 4 PM. Respiratory wise, he did very well. He later that day became agitated and two stiches from his surgical wound broke loose. He removed lines. Finally, Geodon 30 mg IM took care of his agitation. He remains extubated. Patient Active Problem List   Diagnosis Code    Diabetes ([facility] E11.9    SBO (small bowel obstruction) ([facility] K56.609    HTN (hypertension) I10    MARCI (acute kidney injury) ([facility] N17.9    Troponin level elevated R79.89    Paroxysmal atrial fibrillation (HCC) I48.0    Aspiration pneumonia ([facility] J69.0    Hypoglycemia T32.6    Metabolic encephalopathy J55.15    Acute respiratory failure with hypoxia and hypercapnia (HCC) J96.01, J96.02     Medication Reviewed: Allergies   Allergen Reactions    Iodinated Contrast Media Swelling    Lisinopril Swelling      Past Medical History:   Diagnosis Date    Diabetes ([facility]     Hypertension       Prior to Admission medications    Medication Sig Start Date End Date Taking? Authorizing Provider   metoprolol tartrate (LOPRESSOR) 25 mg tablet Take 1 Tab by mouth every twelve (12) hours. 7/13/20   Marlene Birch PA   aspirin 81 mg chewable tablet Take 1 Tab by mouth daily. 7/13/20   Marlene Birch PA   Cetirizine 10 mg cap Take  by mouth. Other, MD Kiah   benzonatate (TESSALON) 100 mg capsule Take 100 mg by mouth three (3) times daily as needed for Cough. Kiah Chaves MD   atorvastatin (LIPITOR) 80 mg tablet Take 80 mg by mouth daily.     Provider, Drea   glipiZIDE (GLUCOTROL) 10 mg tablet Take 10 mg by mouth two (2) times a day. Provider, Historical   hydroCHLOROthiazide (HYDRODIURIL) 25 mg tablet Take 25 mg by mouth daily. Provider, Historical   albuterol (PROVENTIL HFA) 90 mcg/actuation inhaler Take 1 Puff by inhalation daily. Provider, Historical     Current Facility-Administered Medications   Medication Dose Route Frequency    LORazepam (ATIVAN) 2 mg/mL injection        [START ON 7/23/2020] famotidine (PF) (PEPCID) 20 mg in 0.9% sodium chloride 10 mL injection  20 mg IntraVENous DAILY    dexmedeTOMidine (PRECEDEX) 400 mcg in 0.9% sodium chloride 100 mL infusion  0.2-0.7 mcg/kg/hr IntraVENous TITRATE    lactulose (CHRONULAC) 10 gram/15 mL solution 60 mL  60 mL Oral BID    insulin glargine (LANTUS) injection 5 Units  5 Units SubCUTAneous DAILY    piperacillin-tazobactam (ZOSYN) 3.375 g in 0.9% sodium chloride ###EXTENDED 4 HOUR INFUSION###  3.375 g IntraVENous Q12H    albuterol-ipratropium (DUO-NEB) 2.5 MG-0.5 MG/3 ML  3 mL Nebulization QID RT    aspirin chewable tablet 81 mg  81 mg Oral DAILY    atorvastatin (LIPITOR) tablet 80 mg  80 mg Oral DAILY    heparin (porcine) injection 5,000 Units  5,000 Units SubCUTAneous Q8H    insulin lispro (HUMALOG) injection   SubCUTAneous Q6H    methylPREDNISolone (PF) (SOLU-MEDROL) injection 40 mg  40 mg IntraVENous Q12H       Lines: All central lines examined by me. No signs of erythema, induration, discharge.      Central Venous Catheter:  Triple Lumen 07/16/20 Right Subclavian (Active)   Central Line Being Utilized Yes 07/18/20 0400   Criteria for Appropriate Use Hemodynamically unstable, requiring monitoring lines, vasopressors, or volume resuscitation 07/18/20 0400   Site Assessment Clean, dry, & intact 07/18/20 0400   Infiltration Assessment 0 07/18/20 0400   Affected Extremity/Extremities Color distal to insertion site pink (or appropriate for race) 07/18/20 0400   Date of Last Dressing Change 07/17/20 07/18/20 0400   Dressing Status Clean, dry, & intact 20 0400   Dressing Type Tape;Transparent 20 0400   Action Taken Open ports on tubing capped 20 0400   Proximal Hub Color/Line Status White;Capped 20 0400   Positive Blood Return (Medial Site) Yes 20 0400   Medial Hub Color/Line Status Blue; Infusing 20 0400   Positive Blood Return (Lateral Site) Yes 20 0400   Distal Hub Color/Line Status Brown; Infusing 20 0400   Positive Blood Return (Site #3) Yes 20 0400   Alcohol Cap Used Yes 20 0400      Drain(s):  Orogastric Tube 20 (Active)   Site Assessment Clean, dry, & intact 20 0400   Securement Device Tape 20 0400   G Port Status Clamped 20 0400   External Insertion Guido (cms) 60 cms 20 0400   Drainage Description Green 20 0000     Airway:  Airway - Endotracheal Tube 20 Oral (Active)   Insertion Depth (cm) 25 cm 20 0455   Line Guido Lips 20 0455   Side Secured Centered 20 0455   Cuff Pressure 28 cmH20 20 0455   Site Assessment Clean, dry, & intact 20 0455       Objective:  Vital Signs:    Visit Vitals  /58   Pulse (!) 113   Temp 98.8 °F (37.1 °C)   Resp 19   Ht 5' 7\" (1.702 m)   Wt 94.8 kg (209 lb)   SpO2 99%   BMI 32.73 kg/m²      O2 Device: Nasal cannula  O2 Flow Rate (L/min): 4 l/min  Temp (24hrs), Av.3 °F (36.8 °C), Min:97.5 °F (36.4 °C), Max:98.8 °F (37.1 °C)      Intake/Output:   Last shift:      701 - 1900  In: 1.6 [I.V.:1.6]  Out: -     Last 3 shifts: 1901 - 700  In: 1543.2 [I.V.:993.2]  Out: 510 [Urine:10]      Intake/Output Summary (Last 24 hours) at 2020 1300  Last data filed at 2020 1100  Gross per 24 hour   Intake 1.58 ml   Output 500 ml   Net -498.42 ml       Last 3 Recorded Weights in this Encounter    20 0520 20 0634 20 0819   Weight: 90.1 kg (198 lb 9.6 oz) 90.1 kg (198 lb 9.1 oz) 94.8 kg (209 lb)       Ventilator Settings:  Mode Rate Tidal Volume Pressure FiO2 PEEP  Pressure support, Spontaneous   500 ml  7 cm H2O 36 % 5 cm H20    Peak airway pressure: 13 cm H2O   Plateau pressure:    Tidal volume:   Minute ventilation: 7.12 l/min  SPO2     ARDS network Guidelines: Lung protective strategy and Plateau pressure goals less than or equal to 30. Physical Exam:     General/Neurology: Alert, conversant, confused. Head: Normocephalic, without obvious abnormality. Eye: Conjunctival pallor is present    Oral: Mucus membranes are moist.    Neck: Supple. Lung: Air entry is decreased, bilateral basal rales are present. Heart: S1 S2 present, no S3, S4 or murmurs. Abdomen/: Soft, non tender, incision line appears to be clean. Extremities: Pedal edema present.       Data:      Recent Results (from the past 24 hour(s))   GLUCOSE, POC    Collection Time: 07/21/20 11:46 PM   Result Value Ref Range    Glucose (POC) 138 (H) 70 - 110 mg/dL   RENAL FUNCTION PANEL    Collection Time: 07/22/20  4:15 AM   Result Value Ref Range    Sodium 141 136 - 145 mmol/L    Potassium 3.6 3.5 - 5.5 mmol/L    Chloride 108 100 - 111 mmol/L    CO2 26 21 - 32 mmol/L    Anion gap 7 3.0 - 18 mmol/L    Glucose 143 (H) 74 - 99 mg/dL    BUN 43 (H) 7.0 - 18 MG/DL    Creatinine 4.88 (H) 0.6 - 1.3 MG/DL    BUN/Creatinine ratio 9 (L) 12 - 20      GFR est AA 14 (L) >60 ml/min/1.73m2    GFR est non-AA 11 (L) >60 ml/min/1.73m2    Calcium 7.3 (L) 8.5 - 10.1 MG/DL    Phosphorus 4.0 2.5 - 4.9 MG/DL    Albumin 2.0 (L) 3.4 - 5.0 g/dL   CBC W/O DIFF    Collection Time: 07/22/20  4:15 AM   Result Value Ref Range    WBC 9.4 4.6 - 13.2 K/uL    RBC 2.16 (L) 4.70 - 5.50 M/uL    HGB 6.9 (L) 13.0 - 16.0 g/dL    HCT 20.5 (L) 36.0 - 48.0 %    MCV 94.9 74.0 - 97.0 FL    MCH 31.9 24.0 - 34.0 PG    MCHC 33.7 31.0 - 37.0 g/dL    RDW 11.7 11.6 - 14.5 %    PLATELET 423 099 - 206 K/uL    MPV 9.9 9.2 - 11.8 FL   GLUCOSE, POC    Collection Time: 07/22/20  6:07 AM   Result Value Ref Range    Glucose (POC) 144 (H) 70 - 110 mg/dL   GLUCOSE, POC    Collection Time: 07/22/20 12:09 PM   Result Value Ref Range    Glucose (POC) 131 (H) 70 - 110 mg/dL         Chemistry   Recent Labs     07/22/20  0415 07/21/20  0330 07/20/20  0350   * 127* 98    141 143   K 3.6 5.2 5.1    114* 115*   CO2 26 18* 18*   BUN 43* 82* 71*   CREA 4.88* 7.37* 6.70*   CA 7.3* 7.1* 7.4*   PHOS 4.0 8.1* 7.1*   AGAP 7 9 10   BUCR 9* 11* 11*   ALB 2.0* 1.8* 1.8*       CBC w/Diff   Recent Labs     07/22/20 0415 07/21/20  0330 07/20/20  0350 07/19/20  1614   WBC 9.4 7.7 9.9 11.5   RBC 2.16* 2.49* 2.43* 2.50*   HGB 6.9* 8.0* 7.7* 8.0*   HCT 20.5* 24.7* 24.1* 25.0*    294 285 283   GRANS  --   --  91* 91*   LYMPH  --   --  5* 6*   EOS  --   --  0 0       ABG    Recent Labs     07/21/20  0522 07/20/20  0438   PHI 7.20* 7.25*   PCO2I 41.8 38.6   PO2I 110* 117*   HCO3I 16.2* 17.0*   FIO2I 0.30 40       Micro     No results for input(s): SDES, CULT in the last 72 hours. No results for input(s): CULT in the last 72 hours. CT (Most Recent)    Results from Hospital Encounter encounter on 07/16/20   CT HEAD WO CONT    Narrative EXAM: CT HEAD W/O CONTRAST    INDICATION: Altered mental status/level of consciousness, unresponsive    COMPARISON: No prior comparison study. TECHNIQUE: Axial CT imaging of the head was performed without intravenous  contrast.  Additional coronal and sagittal reconstructions were performed. One  or more dose reduction techniques were used on this CT: automated exposure  control, adjustment of the mAs and/or kVp according to patient's size, and  iterative reconstruction techniques. The specific techniques utilized on this CT  exam have been documented in the patient's electronic medical record.  Digital  Imaging and Communications in Medicine (DICOM) format image data are available  to nonaffiliated external healthcare facilities or entities on a secure, media  free, reciprocally searchable basis with patient authorization for at least a  12-month period after this study. _______________    FINDINGS:    VENTRICLES/EXTRA-AXIAL SPACES: The ventricles and sulci are mildly enlarged  consistent with diffuse volume loss. No extra-axial fluid collection is present. BRAIN PARENCHYMA: There is no evidence of acute intracranial hemorrhage, mass  effect, midline shift, or herniation. No definite CT evidence of acute cortical  infarct is seen. White matter CT    ORBITS: Right ocular lens is absent most likely from prior cataract surgery. PARANASAL SINUSES: Mucoperiosteal thickening scattered throughout the paranasal  sinuses. Sclerotic wall thickening suggested in the partially visualized  maxillary antra from chronic sinusitis. No air-fluid levels are seen. MASTOID AIR CELLS: Visualized mastoid air cells are clear. OSSEOUS STRUCTURES: No fracture is seen. OTHER: Atherosclerotic calcifications are present.    _______________      Impression IMPRESSION:    1. No acute intracranial hemorrhage, mass effect, midline shift, or herniation. No definite CT evidence of acute cortical infarct is seen. Please note that  noncontrast head CT may be normal in early acute infarct. 2. Mild nonspecific white matter disease likely representing chronic small  vessel changes. Preliminary report provided by on-call radiology resident. XR (Most Recent). CXR reviewed by me and compared with previous CXR   Results from Hospital Encounter encounter on 07/16/20   XR CHEST PORT    Narrative EXAM: PORTABLE  FRONTAL CHEST RADIOGRAPH    INDICATION: Intubated. History of hypertension and diabetes. COMPARISON: 7/20/2020.     TECHNIQUE: Portable frontal view of the chest    _______________    FINDINGS:    SUPPORT DEVICES:   -Enteric tube terminates approximately 3 cm above the juan francisco.  -Single enteric tube courses along the esophagus, tip terminates below the  inferior margin of the image.  -Right internal jugular venous catheter tip terminates in the SVC.  -EKG leads overlie the patient. HEART AND MEDIASTINUM: Stable heart size. Atherosclerotic calcifications  present. Mild central pulmonary vascular congestion. LUNGS: Hazy perihilar opacities extending to both lower lobes. PLEURAL SPACES:No large pneumothorax. No large pleural effusion. Pleural  calcifications again noted predominantly on the left. BONY THORAX AND SOFT TISSUES: No acute abnormality. _______________      Impression IMPRESSION:   1. Lines and devices appear stably positioned, as detailed. 2.  Similar bilateral lower lung opacities may represent pulmonary edema,  atelectasis, though superimposed infection cannot be excluded. Recommend  continued attention on follow-up. Assessment and Plan:    Respiratory:    See above. Extubation was successful. His agitated delirium was unmask by being off sedation and pain medication. We will try to use a benzo free approach for the treatment of delirium. Cardiovascular:    Patient is no longer in shock. He has been of NE and vasopressin for 48 hours. ID:     RLL infiltrate has been empirically covered with PIP/TAZO. We will discontinue antibiotics after 5 days of treatment. Renal:    MARCI soon to be on HD. Nephrology is following. HD is not needed for weaning, but extubation will have to wait until the end of the session because HD disequilibrium syndrome can happen during fist HD and can increase the chances of extubation failure. Endocrine:    Our target glucose is 140 to 180. Control is appropriate. DVT prophylaxis: UFH. Stress ulcer prophylaxis: Famotidine. OTHER:  Glycemic Control. Glucose stabilizer per ICU protocol when on insulin drip. Maintain blood glucose 140-180. Replace electrolytes per ICU electrolyte replacement protocol  Ventilator bundle & Sedation protocol followed.  Daily morning sedation holiday, assessment for readiness for SBT & weaning from ventilator; and then re-titrate if required. Aim to keep peak plateau pressure 36-05IC H2O in ARDS patient. Rocio tube to suction at 20-30 cm H2O, Maintain Rocio tube with 5-10ml air every 4 hours. Chlorhexidine mouth washes and routine oral care every 4 hours. Stress ulcer and DVT prophylaxis. HOB >=30 degree elevation all the time. HOB >=30 degree elevation all the time. Aggressive pulmonary toileting. Incentive spirometry when appropriate. Aspiration precautions. Sepsis bundle and protocol followed. Deescalate antibiotic when appropriate. Vasopressor when appropriate with MAP goal >65 mmHg. Central Line bundle followed, remove when not needed. Large bore IV line or CVP when appropriate. PT/OT eval and treat. OOB/IS when appropriate. Quality Care: Stress ulcer prophylaxis, DVT prophylaxis, HOB elevated, Infection control all reviewed and addressed. Events and notes from last 24 hours reviewed. Care plan discussed with nursing, members of care team.        Critical Care Time:    The services I provided to this patient were to treat and/or prevent clinically significant deterioration that could result in the failure of one or more body systems and/or organ systems due to respiratory distress, hypoxia, cardiac dysrhythmia.     Services included the following:  -reviewing nursing notes and old charts  -vital sign assessments  -direct patient care  -medication orders and management  -interpreting and reviewing diagnostic studies/labs  -re-evaluations  -documentation time     Aggregate critical care time was 32 minutes, which includes only time during which I was engaged in work directly related to the patient's care as described above. During this entire length of time I was immediately available to the patient.  The reason for providing this level of medical care for this critically ill patient was due a critical illness that impaired one or more vital organ systems such that there was a high probability of imminent or life threatening deterioration in the patients condition.  This care involved high complexity decision making to assess, manipulate, and support vital system functions, to treat this degreee vital organ system failure and to prevent further life threatening deterioration of the patients condition    Soila Hollis MD   Intensivist  7/22/2020

## 2020-07-22 NOTE — PROGRESS NOTES
[facility]  (A Division of [address] [place] [address])    Follow-up Note      Date of Admission: 7/16/2020       Date of Note:  7/22/2020    Summary:    79 y/o Pedro Flores male w/ DM, HTN, PAF, s/p recent SB rsxn adm 7/16 w/ abd pain, tenderness, vomiting.  s/p xlap w/ extensive DONNA and SB rsxn x 2 on 7/7 by Dr. Carlos Blanca. Trans to [facility] 7/13,  then diffuse abd pain, tenderness,vomiting so sent back to [facility] 7/16. CTAP: prominent gastric distention, SB dilatation extending to RLQ SB anastamosis concerning for high-grade ileus vs developing SBO. Also new RLL consolidation. In ED became unresponsive, required intubation. NTpro BNP 2,716. Admitted 7/16 on Zosyn,clindamycin, latter stopped 7/17. Initially febrile 102.8 with leukocytosis 21.6 on 7/17, both resolved on zosyn alone. SARS CoV 2 PCR 7/17 negative but worsening creatinine reaching 6.7 7/20, NT pro BNP 19,075. Temporary dialysis catheter placed in L CFV. Interval History:  Yesterdays events reviewed. Successfully extubated but became agitated and partially dehisced abdominal wound.   Now sedated, unresponsive, in restraints       Current Antimicrobials:    Prior Antimicrobials:  Zosyn 7/16 - 6 Clindamycin 7/16 -1       Assessment: Rec / Plan:   Sepsis  - fever, tachycardia, tachypnea, leukocytosis present on admission  - due to pneumonia, ileus   - blcx 7/16 NGTD x 2, 7/18 NGTD x 2  - resolving -> continue zosyn   New RL lung consolidation  - seen on CTAP 7/16  - likely aspiration pneumonia  - well covered with zosyn -> continue zosyn 1 more days   Acute hypoxic respiratory failure  - intubated 7/16 extubated 7/21 -> monitor   Shock  - septic, hypovolemic  - off pressors -> monitor   MARCI  - due to above  - cr 6.7 today  - Temporary dialysis catheter placed today 7/20 -> dialysis per Renal   High grade ileus vs developing SBO  - on CTAP 7/16    SBO due to adhesions  - s/p Ex lap w/ extensive DONNA, SB rsxn x 2 7/7 by Dr. Koffi Nunez    DM    HTN    PAF        Microbiology:       7/16      blcx NG x 2  7/18      blcx NGTD x 2     Lines / Catheters:     piv  LCFV dialysis cath      Patient Active Problem List   Diagnosis Code    Diabetes ([facility] E11.9    [facility] K56.609    HTN (hypertension) I10    MARCI (acute kidney injury) ([facility] N17.9    Troponin level elevated R79.89    Paroxysmal atrial fibrillation (HCC) I48.0    Aspiration pneumonia (HCC) J69.0    Hypoglycemia J49.8    Metabolic encephalopathy W66.76    Acute respiratory failure with hypoxia and hypercapnia (HCC) J96.01, J96.02       Current Facility-Administered Medications   Medication Dose Route Frequency    morphine injection 2-4 mg  2-4 mg IntraVENous Q3H PRN    LORazepam (ATIVAN) injection 1 mg  1 mg IntraVENous Q2H PRN    LORazepam (ATIVAN) 2 mg/mL injection        [START ON 7/23/2020] famotidine (PF) (PEPCID) 20 mg in 0.9% sodium chloride 10 mL injection  20 mg IntraVENous DAILY    ziprasidone (GEODON) 30 mg in sterile water (preservative free) 1.5 mL injection  30 mg IntraMUSCular Q12H PRN    dexmedeTOMidine (PRECEDEX) 400 mcg in 0.9% sodium chloride 100 mL infusion  0.2-0.7 mcg/kg/hr IntraVENous TITRATE    lactulose (CHRONULAC) 10 gram/15 mL solution 60 mL  60 mL Oral BID    0.9% sodium chloride infusion 250 mL  250 mL IntraVENous PRN    0.9% sodium chloride infusion  50 mL/hr IntraVENous DIALYSIS PRN    heparin (porcine) 1,000 unit/mL injection 1,000 Units  1,000 Units InterCATHeter DIALYSIS PRN    haloperidol lactate (HALDOL) injection 2.5 mg  2.5 mg IntraVENous Q6H PRN    insulin glargine (LANTUS) injection 5 Units  5 Units SubCUTAneous DAILY    acetaminophen (TYLENOL) solution 650 mg  650 mg Oral Q6H PRN    piperacillin-tazobactam (ZOSYN) 3.375 g in 0.9% sodium chloride ###EXTENDED 4 HOUR INFUSION###  3.375 g IntraVENous Q12H    albuterol-ipratropium (DUO-NEB) 2.5 MG-0.5 MG/3 ML  3 mL Nebulization QID RT    aspirin chewable tablet 81 mg  81 mg Oral DAILY    atorvastatin (LIPITOR) tablet 80 mg  80 mg Oral DAILY    heparin (porcine) injection 5,000 Units  5,000 Units SubCUTAneous Q8H    dextrose 10% infusion 125-250 mL  125-250 mL IntraVENous PRN    naloxone (NARCAN) injection 0.4 mg  0.4 mg IntraVENous PRN    insulin lispro (HUMALOG) injection   SubCUTAneous Q6H    glucose chewable tablet 16 g  4 Tab Oral PRN    glucagon (GLUCAGEN) injection 1 mg  1 mg IntraMUSCular PRN    methylPREDNISolone (PF) (SOLU-MEDROL) injection 40 mg  40 mg IntraVENous Q12H    lidocaine (XYLOCAINE) 2 % jelly   Mucous Membrane PRN    sodium chloride (NS) flush 5-10 mL  5-10 mL IntraVENous PRN    sodium chloride (NS) flush 5-40 mL  5-40 mL IntraVENous PRN    ondansetron (ZOFRAN) injection 4 mg  4 mg IntraVENous Q6H PRN         Review of Systems - Negative except as in interval history       Objective:    Visit Vitals  /58   Pulse (!) 113   Temp 98.8 °F (37.1 °C)   Resp 19   Ht 5' 7\" (1.702 m)   Wt 94.8 kg (209 lb)   SpO2 99%   BMI 32.73 kg/m²       Temp (24hrs), Av.3 °F (36.8 °C), Min:97.5 °F (36.4 °C), Max:98.8 °F (37.1 °C)      General: Well developed, obese 80 y.o. [place] male in no acute distress. Orotracheally intubated but awake, alert, responsive to questions  ENT: ENT exam normal, no neck nodes or sinus tenderness  Head: normocephalic, without obvious abnormality  Mouth:  not examined, orotracheally intubated  Neck: supple, symmetrical, trachea midline   Cardio:  regular rate and rhythm, S1, S2 normal, no murmur, click, rub or gallop  Chest: inspection normal - no chest wall deformities or tenderness  Lungs: expiratory rhonchi and decreased breath sounds  Abdomen: soft, non-tender. Bowel sounds normal. No masses, no organomegaly.   Extremities:  no redness or tenderness in the calves or thighs, thigh, LE edema 1+       Lab results:    Chemistry  Recent Labs     20  0415 20  0330 20  0350   * 127* 98  141 143   K 3.6 5.2 5.1    114* 115*   CO2 26 18* 18*   BUN 43* 82* 71*   CREA 4.88* 7.37* 6.70*   CA 7.3* 7.1* 7.4*   AGAP 7 9 10   BUCR 9* 11* 11*   ALB 2.0* 1.8* 1.8*       CBC w/ Diff  Recent Labs     07/22/20  0415 07/21/20  0330 07/20/20  0350 07/19/20  1614   WBC 9.4 7.7 9.9 11.5   RBC 2.16* 2.49* 2.43* 2.50*   HGB 6.9* 8.0* 7.7* 8.0*   HCT 20.5* 24.7* 24.1* 25.0*    294 285 283   GRANS  --   --  91* 91*   LYMPH  --   --  5* 6*   EOS  --   --  0 0       Microbiology  All Micro Results     Procedure Component Value Units Date/Time    CULTURE, BLOOD [[id number]] Collected:  07/18/20 1749    Order Status:  Completed Specimen:  Blood Updated:  07/22/20 0521     Special Requests: NO SPECIAL REQUESTS        Culture result: NO GROWTH 4 DAYS       CULTURE, BLOOD [[id number]] Collected:  07/18/20 1745    Order Status:  Completed Specimen:  Blood Updated:  07/22/20 0521     Special Requests: NO SPECIAL REQUESTS        Culture result: NO GROWTH 4 DAYS       CULTURE, BLOOD [[id number]] Collected:  07/16/20 1500    Order Status:  Completed Specimen:  Blood Updated:  07/22/20 0521     Special Requests: PERIPHERAL        Culture result: NO GROWTH 6 DAYS       CULTURE, BLOOD [[id number]] Collected:  07/16/20 1441    Order Status:  Completed Specimen:  Blood Updated:  07/22/20 0521     Special Requests: PERIPHERAL        Culture result: NO GROWTH 6 DAYS                Martina Varela MD, Counts include 234 beds at the [facility]  Infectious Diseases  [phone]   7/22/2020   1:41 PM

## 2020-07-22 NOTE — DIABETES MGMT
NUTRITIONAL  AND GLYCEMIC CONTROL FOLLOW UP/ PLAN OF CARE     Armando Bains           80 y.o.           7/16/2020                 1. SBO (small bowel obstruction) ([address]    2. Cystitis    3. Aspiration pneumonia of right lower lobe, unspecified aspiration pneumonia type ([address]    4. Hypoglycemia    5. Ileus ([address]       INTERVENTIONS/PLAN:   1. Monitor feeding status  2. Continue low dose of Lantus 5 units/day. ASSESSMENT:   Nutritional Status:  Pt is 134% ideal weight;  BMI: 28.7  kg/m2 (overweight weight classification based on prior weight of 83.2 kg - suspect current weight to be falsely high due to edema). Possible po intake pending successful swallow eval   Nutrition Diagnoses: Altered nutrition related labs due to T2DM and MARCI, hyperphosphatemia as evidenced by A1C of 7.4% and GFR - 10, phos - 7.1, pending HD. Inadequate oral food and beverage intake due to recent extubation, awaiting swallow eval as evidenced by NPO status. Diabetes Management:   Receiving steroids. Blood glucose readings in target range. Recent blood glucose:    7/22: 144  7/21:  137, 117, 138, - received 5 units Lantus   Within target range (non-ICU: <140; ICU<180): [x] Yes   []  No  Current Insulin regimen:   Lantus  8 units/day  Corrective lispro, normal insulin sensitivity q 6 hours  Home medication/insulin regimen: per chart, glipizide 10 mg BID  HbA1c: 7.4% - equivalent  to ave Blood Glucose of  162 mg/dl for 2-3 months prior to admission  Adequate glycemic control PTA:  [x] Yes  [] No    SUBJECTIVE/OBJECTIVE:   Information obtained from: chart review, ICU rounds  Pt with history of recent small bowel obstruction status post exploratory laparotomy lysis of adhesions and bowel resection on 7/7/2020 for SBO,  T2DM now admitted with respiratory issues, intubated. 7/20:  Pt starting low rate tube feeding today. Pending HD. Edematous. 7/22:  Pt self extubated 7/21.   Agitation and confusion noted per ICU rounds. Diet: NPO     No data found. Medications: [x]                Reviewed   Pertinent:  Solu Medrol at 40 mg q 12 hours, lactulose      Most Recent POC Glucose:   Recent Labs     07/22/20  0415 07/21/20  0330 07/20/20  0350   * 127* 98         Labs:   Lab Results   Component Value Date/Time    Hemoglobin A1c 7.4 (H) 07/04/2020 02:21 AM     Lab Results   Component Value Date/Time    Sodium 141 07/22/2020 04:15 AM    Potassium 3.6 07/22/2020 04:15 AM    Chloride 108 07/22/2020 04:15 AM    CO2 26 07/22/2020 04:15 AM    Anion gap 7 07/22/2020 04:15 AM    Glucose 143 (H) 07/22/2020 04:15 AM    BUN 43 (H) 07/22/2020 04:15 AM    Creatinine 4.88 (H) 07/22/2020 04:15 AM    Calcium 7.3 (L) 07/22/2020 04:15 AM    Magnesium 2.3 07/19/2020 05:04 AM    Phosphorus 4.0 07/22/2020 04:15 AM    Albumin 2.0 (L) 07/22/2020 04:15 AM       Anthropometrics: IBW : 67.1 kg (148 lb),  , 134% ideal weight; Wt Readings from Last 1 Encounters:   07/22/20 94.8 kg (209 lb)     Last Weight Metrics:  Weight Loss Metrics 7/22/2020 7/13/2020 1/31/2019   Today's Wt 209 lb 183 lb 13.8 oz 181 lb 9 oz   BMI 32.73 kg/m2 28.8 kg/m2 28.44 kg/m2       Ht Readings from Last 1 Encounters:   07/19/20 5' 7\" (1.702 m)     Estimated Nutrition Needs:  2080 Kcals/day, Protein (g): 87 g    Based on: weight from 7/13/20 at prior admission (83.2 kg)    Nutrition Interventions:  None at this time - NPO awaiting swallow evaluation when pt appropriate   Goal:   Blood glucose will be within target range of  mg/dL by 7/23/20. 7/22:  Met and on-going  Provision of adequate nutrition by 7/25/20.       Nutrition Monitoring and Evaluation      []     Monitor po intake on meal rounds  [x]     Continue inpatient monitoring and intervention  []     Other:    Nutrition Risk:  [x]   High     []  Moderate    []  Minimal/Uncompromised    Yevgeniy Forbes RD, CDE   Office:  [facility] Pager:  [phone]

## 2020-07-22 NOTE — PROGRESS NOTES
Patient seen and examined. I was called yesterday because after they removed the staplers the patient self extubated and he was trying to have a bowel movement and during the straining his upper part of the wound has opened partially. It was cleaned and wet-to-dry dressing was applied. Today I evaluated the wound and there is a small opening in the upper part of the wound about 2 to 3 cm with an opening of about half centimeter or less. There is good granulation tissue. I spoke with Janis Potter his nurse and I reassured him that there is no major issues and there is no evidence of any fascial dehiscence and the wound is almost healed. We will just apply wet-to-dry dressing.  Please call for any questions

## 2020-07-22 NOTE — PROGRESS NOTES
Respiratory Therapy Assessment Care Plan    Patient:  Laney Jordan 80 y.o. male 7/22/2020 9:10 AM    SBO (small bowel obstruction) ([facility] [K56.609]  Acute respiratory failure with hypoxia and hypercapnia ([facility] [J96.01, J96.02]      Chest X-RAY:   Results from Hospital Encounter encounter on 07/16/20   XR CHEST PORT    Impression IMPRESSION:   1. Lines and devices appear stably positioned, as detailed. 2.  Similar bilateral lower lung opacities may represent pulmonary edema,  atelectasis, though superimposed infection cannot be excluded. Recommend  continued attention on follow-up. XR CHEST PORT    Impression IMPRESSION:    Calcified pleural plaques. Left basilar opacity, effusion/atelectasis. XR CHEST PORT    Impression IMPRESSION:    No significant change since 7/17/2020.           Vital Signs:     Visit Vitals  BP (!) 89/59   Pulse 97   Temp 98.8 °F (37.1 °C)   Resp 22   Ht 5' 7\" (1.702 m)   Wt 94.8 kg (209 lb)   SpO2 100%   BMI 32.73 kg/m²         Indications for treatment: Acute Respiratory Failure / Post Intubation / Bed bound / Patient confused     Plan of care:  Duoneb with O2        Goal: Lungs to remain clear

## 2020-07-22 NOTE — PROGRESS NOTES
Speech Therapy Note:    SLP acknowledging eval & tx. After chart review and d/w RN, Bobbi Hammans, pt is not appropriate for swallow evaluation at this time. Pt unable to be aroused and/or attend to task. No safe for po trials at this time. Will follow up next day as indicated.     Keegan Pineda M.S., [address] [facility]   Ph: [phone]

## 2020-07-22 NOTE — PROGRESS NOTES
Problem: Diabetes Self-Management  Goal: *Disease process and treatment process  Description: Define diabetes and identify own type of diabetes; list 3 options for treating diabetes. Outcome: Progressing Towards Goal  Goal: *Incorporating nutritional management into lifestyle  Description: Describe effect of type, amount and timing of food on blood glucose; list 3 methods for planning meals. Outcome: Progressing Towards Goal  Goal: *Incorporating physical activity into lifestyle  Description: State effect of exercise on blood glucose levels. Outcome: Progressing Towards Goal  Goal: *Developing strategies to promote health/change behavior  Description: Define the ABC's of diabetes; identify appropriate screenings, schedule and personal plan for screenings. Outcome: Progressing Towards Goal  Goal: *Using medications safely  Description: State effect of diabetes medications on diabetes; name diabetes medication taking, action and side effects. Outcome: Progressing Towards Goal  Goal: *Monitoring blood glucose, interpreting and using results  Description: Identify recommended blood glucose targets  and personal targets. Outcome: Progressing Towards Goal  Goal: *Prevention, detection, treatment of acute complications  Description: List symptoms of hyper- and hypoglycemia; describe how to treat low blood sugar and actions for lowering  high blood glucose level. Outcome: Progressing Towards Goal  Goal: *Prevention, detection and treatment of chronic complications  Description: Define the natural course of diabetes and describe the relationship of blood glucose levels to long term complications of diabetes.   Outcome: Progressing Towards Goal  Goal: *Developing strategies to address psychosocial issues  Description: Describe feelings about living with diabetes; identify support needed and support network  Outcome: Progressing Towards Goal  Goal: *Insulin pump training  Outcome: Progressing Towards Goal  Goal: *Sick day guidelines  Outcome: Progressing Towards Goal  Goal: *Patient Specific Goal (EDIT GOAL, INSERT TEXT)  Outcome: Progressing Towards Goal     Problem: Patient Education: Go to Patient Education Activity  Goal: Patient/Family Education  Outcome: Progressing Towards Goal     Problem: Ventilator Management  Goal: *Adequate oxygenation and ventilation  Outcome: Progressing Towards Goal  Goal: *Patient maintains clear airway/free of aspiration  Outcome: Progressing Towards Goal  Goal: *Absence of infection signs and symptoms  Outcome: Progressing Towards Goal  Goal: *Normal spontaneous ventilation  Outcome: Progressing Towards Goal     Problem: Patient Education: Go to Patient Education Activity  Goal: Patient/Family Education  Outcome: Progressing Towards Goal     Problem: Non-Violent Restraints  Goal: *Removal from restraints as soon as assessed to be safe  Outcome: Progressing Towards Goal  Goal: *No harm/injury to patient while restraints in use  Outcome: Progressing Towards Goal  Goal: *Patient's dignity will be maintained  Outcome: Progressing Towards Goal  Goal: *Patient Specific Goal (EDIT GOAL, INSERT TEXT)  Outcome: Progressing Towards Goal  Goal: Non-violent Restaints:Standard Interventions  Outcome: Progressing Towards Goal  Goal: Non-violent Restraints:Patient Interventions  Outcome: Progressing Towards Goal  Goal: Patient/Family Education  Outcome: Progressing Towards Goal     Problem: Falls - Risk of  Goal: *Absence of Falls  Description: Document Jose Fall Risk and appropriate interventions in the flowsheet.   Outcome: Progressing Towards Goal  Note: Fall Risk Interventions:       Mentation Interventions: Door open when patient unattended, More frequent rounding, Reorient patient, Update white board    Medication Interventions: Assess postural VS orthostatic hypotension, Bed/chair exit alarm    Elimination Interventions: Bed/chair exit alarm, Call light in reach              Problem: Patient Education: Go to Patient Education Activity  Goal: Patient/Family Education  Outcome: Progressing Towards Goal     Problem: Pressure Injury - Risk of  Goal: *Prevention of pressure injury  Description: Document Francis Scale and appropriate interventions in the flowsheet. Outcome: Progressing Towards Goal  Note: Pressure Injury Interventions:  Sensory Interventions: Assess changes in LOC, Assess need for specialty bed, Float heels, Minimize linen layers    Moisture Interventions: Absorbent underpads, Check for incontinence Q2 hours and as needed    Activity Interventions: Pressure redistribution bed/mattress(bed type)    Mobility Interventions: Assess need for specialty bed, HOB 30 degrees or less, Turn and reposition approx.  every two hours(pillow and wedges)    Nutrition Interventions: Document food/fluid/supplement intake, Discuss nutritional consult with provider    Friction and Shear Interventions: Apply protective barrier, creams and emollients, HOB 30 degrees or less, Minimize layers                Problem: Patient Education: Go to Patient Education Activity  Goal: Patient/Family Education  Outcome: Progressing Towards Goal     Problem: Discharge Planning  Goal: *Discharge to safe environment  Outcome: Progressing Towards Goal     Problem: Nutrition Deficit  Goal: *Optimize nutritional status  Outcome: Progressing Towards Goal     Problem: Patient Education: Go to Patient Education Activity  Goal: Patient/Family Education  Outcome: Progressing Towards Goal     Problem: Delirium Treatment  Goal: *Level of consciousness restored to baseline  Outcome: Progressing Towards Goal  Goal: *Level of environmental perceptions restored to baseline  Outcome: Progressing Towards Goal  Goal: *Sensory perception restored to baseline  Outcome: Progressing Towards Goal  Goal: *Emotional stability restored to baseline  Outcome: Progressing Towards Goal  Goal: *Functional assessment restored to baseline  Outcome: Progressing Towards Goal  Goal: *Absence of falls  Outcome: Progressing Towards Goal  Goal: *Will remain free of delirium, CAM Score negative  Outcome: Progressing Towards Goal  Goal: *Cognitive status will be restored to baseline  Outcome: Progressing Towards Goal  Goal: Interventions  Outcome: Progressing Towards Goal     Problem: Patient Education: Go to Patient Education Activity  Goal: Patient/Family Education  Outcome: Progressing Towards Goal

## 2020-07-22 NOTE — PROGRESS NOTES
RENAL PROGRESS NOTE        Armando Bains         Assessment/Plan:   · MARCI (ischemic atn in a setting of pneumonia/sepsis/resp failure). Started on dialysis yesterday, next treatment tomorrow. Overall hoping for eventual resolution of marci. · Pneumonia/resp failure. On abx. Selfextubated 7/21. · S/p  exploratory laparotomy with extensive lysis of adhesions, small bowel resection x2 on 7/7. No sbo per surgery. · Septic shock. On abx, off pressors. · Hyperphosphatemia. Monitor. Dialysis is addressing. Subjective:  Patient complaints off: Patient self extubated yesterday, is on sedatives for agitation. Had first dialysis yesterday, tolerated well.      Patient Active Problem List   Diagnosis Code    Diabetes ([facility] [address] E11.9    SBO (small bowel obstruction) ([facility] [address] K56.609    HTN (hypertension) I10    MARCI (acute kidney injury) ([facility] [address] N17.9    Troponin level elevated R79.89    Paroxysmal atrial fibrillation (HCC) I48.0    Aspiration pneumonia ([facility] [address] J69.0    Hypoglycemia X86.2    Metabolic encephalopathy U99.81    Acute respiratory failure with hypoxia and hypercapnia (HCC) J96.01, J96.02       Current Facility-Administered Medications   Medication Dose Route Frequency Provider Last Rate Last Dose    morphine injection 2-4 mg  2-4 mg IntraVENous Q3H PRN Celso Bernardo E, DO   4 mg at 07/22/20 0237    LORazepam (ATIVAN) injection 1 mg  1 mg IntraVENous Q2H PRN Juliane Vaughan E, DO   1 mg at 07/22/20 0436    LORazepam (ATIVAN) 2 mg/mL injection             [START ON 7/23/2020] famotidine (PF) (PEPCID) 20 mg in 0.9% sodium chloride 10 mL injection  20 mg IntraVENous DAILY Rhianna Stout MD        0.9% sodium chloride infusion  50 mL/hr IntraVENous DIALYSIS PRN Meryl Lewis MD        heparin (porcine) 1,000 unit/mL injection 1,000 Units  1,000 Units InterCATHeter DIALYSIS PRN Junior Franco MD        haloperidol lactate (HALDOL) injection 2.5 mg  2.5 mg IntraVENous Q6H PRN Michael Mayer H, DO   2.5 mg at 07/21/20 2358    insulin glargine (LANTUS) injection 5 Units  5 Units SubCUTAneous DAILY Juliette Sole, DO   5 Units at 07/22/20 0820    acetaminophen (TYLENOL) solution 650 mg  650 mg Oral Q6H PRN Samira Pedraza MD   650 mg at 07/18/20 0438    piperacillin-tazobactam (ZOSYN) 3.375 g in 0.9% sodium chloride ###EXTENDED 4 HOUR INFUSION###  3.375 g IntraVENous Q12H Junior Franco MD 25 mL/hr at 07/22/20 0242 3.375 g at 07/22/20 0242    albuterol-ipratropium (DUO-NEB) 2.5 MG-0.5 MG/3 ML  3 mL Nebulization QID RT Renee Tyler MD   3 mL at 07/21/20 2044    aspirin chewable tablet 81 mg  81 mg Oral DAILY Renee Tyler MD   Stopped at 07/22/20 0900    atorvastatin (LIPITOR) tablet 80 mg  80 mg Oral DAILY Renee Tyler MD   Stopped at 07/22/20 0900    heparin (porcine) injection 5,000 Units  5,000 Units SubCUTAneous Q8H Renee Tyler MD   5,000 Units at 07/21/20 2055    dextrose 10% infusion 125-250 mL  125-250 mL IntraVENous PRN Renee Tyler MD        [facility]) injection 0.4 mg  0.4 mg IntraVENous PRN Renee Tyler MD       [address] [place] [address] insulin lispro (HUMALOG) injection   SubCUTAneous Q6H Renee Tyler MD   Stopped at 07/19/20 0000    glucose chewable tablet 16 g  4 Tab Oral PRN Renee Tyler MD        glucagon (GLUCAGEN) injection 1 mg  1 mg IntraMUSCular PRN Renee Tyler MD        methylPREDNISolone (PF) (SOLU-MEDROL) injection 40 mg  40 mg IntraVENous Q12H Marek Quick MD   40 mg at 07/22/20 0819    lidocaine (XYLOCAINE) 2 % jelly   Mucous Membrane PRN Renee Tyler MD        sodium chloride (NS) flush 5-10 mL  5-10 mL IntraVENous PRN Renee Tyler MD   10 mL at 07/16/20 1832    sodium chloride (NS) flush 5-40 mL  5-40 mL IntraVENous PRN Marek Quick MD       [address] [place] [address] ondansetron (ZOFRAN) injection 4 mg  4 mg IntraVENous Q6H PRN Romina Dover MD           Objective  Vitals:    07/22/20 0700 07/22/20 0701 07/22/20 0753 07/22/20 0819   BP:  (!) 89/59     Pulse: 96 97     Resp: 20 22     Temp:   98.8 °F (37.1 °C)    SpO2:       Weight:    94.8 kg (209 lb)   Height:             Intake/Output Summary (Last 24 hours) at 7/22/2020 0851  Last data filed at 7/21/2020 1715  Gross per 24 hour   Intake 381.73 ml   Output 500 ml   Net -118.27 ml           Admission weight: Weight: 82.6 kg (182 lb) (07/16/20 1505)  Last Weight Metrics:  Weight Loss Metrics 7/22/2020 7/13/2020 1/31/2019   Today's Wt 209 lb 183 lb 13.8 oz 181 lb 9 oz   BMI 32.73 kg/m2 28.8 kg/m2 28.44 kg/m2             Physical Assessment:     General: nad, agitated. Neck: No jvd. LUNGS: diminished air entry at the bases, bl exp rhonchi. CVS EXM: S1, S2  RRR. Abdomen: less distended, slightly  tender in the periumbilical area. Lt femoral temporary dialysis catheter. Lower Extremities: 1+ bl thigh edema. Lab    CBC w/Diff Recent Labs     07/22/20  0415 07/21/20  0330 07/20/20  0350 07/19/20  1614   WBC 9.4 7.7 9.9 11.5   RBC 2.16* 2.49* 2.43* 2.50*   HGB 6.9* 8.0* 7.7* 8.0*   HCT 20.5* 24.7* 24.1* 25.0*    294 285 283   GRANS  --   --  91* 91*   LYMPH  --   --  5* 6*   EOS  --   --  0 0        Chemistry Recent Labs     07/22/20  0415 07/21/20  0330 07/20/20  0350   * 127* 98    141 143   K 3.6 5.2 5.1    114* 115*   CO2 26 18* 18*   BUN 43* 82* 71*   CREA 4.88* 7.37* 6.70*   CA 7.3* 7.1* 7.4*   AGAP 7 9 10   BUCR 9* 11* 11*   ALB 2.0* 1.8* 1.8*   PHOS 4.0 8.1* 7.1*         No results found for: IRON, FE, TIBC, IBCT, PSAT, FERR   Lab Results   Component Value Date/Time    Calcium 7.3 (L) 07/22/2020 04:15 AM    Phosphorus 4.0 07/22/2020 04:15 AM        Sonu Ahumada M.D.   [facility]  Phone

## 2020-07-22 NOTE — PROGRESS NOTES
INTERIM UPDATE - 5685 EST on 7/22/2020    Nursing Staff reports that Patient continues to kick Nursing Staff despite having received Haloperidol yesterday evening, Ziprasidone, and Morphine. Patient is in [employer] and still is able to interfere with his medical therapy and kick at Nurses. Plan:  Lorazepam 1 mg IV q2hrs PRN Agitation x2 doses maximum.

## 2020-07-22 NOTE — PROGRESS NOTES
0700:  Report received from Gus Jewell [facility]. Pt is resting in bed, on 4 l NC.  VSS.     0900: Wet to dry dressing placed on abdominal wound per Dr. Grover Alfaro. No bleeding at this time. 0930: pt is restless, agitated, oriented to self but disoriented to everything else. Pt is visually hallucinating, reaching for things in the air above him. 1000: rounded on pt with care team. Orders for geodon PRN and precedex received. 1400: Pt had large dark brown liquid stool. Incontinence care performed. 1515: 1 PRBC infusing    1530: PRBC Infusion completed, tolerated well.     1900: Pt incontinent of brown liquid stool. Incontinence care performed. Tolerated well. VSS. 1945: Bedside and Verbal shift change report given to Gus Jewell RN (oncoming nurse) by Magdalena Morrow   (offgoing nurse). Report included the following information SBAR, Kardex, Intake/Output, MAR, Recent Results and Cardiac Rhythm NSR.

## 2020-07-22 NOTE — PROGRESS NOTES
Internal Medicine Progress Note    Patient's Name: Snehal Montano  Admit Date: 7/16/2020  Length of Stay: 6      Assessment/Plan     C/Aundrea Bowen 1106 Problems    Diagnosis Date Noted    Aspiration pneumonia ([facility] 07/16/2020    Hypoglycemia 23/41/7918    Metabolic encephalopathy 81/87/4646    Acute respiratory failure with hypoxia and hypercapnia (HCC) 07/16/2020    Paroxysmal atrial fibrillation ([facility] 07/09/2020    Diabetes ([facility] 07/03/2020    SBO (small bowel obstruction) ([facility] 07/03/2020    HTN (hypertension) 07/03/2020    Troponin level elevated 07/03/2020    MARCI (acute kidney injury) ([facility] 07/03/2020   Sepsis with Septic Shock, present on admission, secondary to RLL PNA    - Extubated yest w/o need for reintubation at this time  - WBCs WNL, afebrile  - Cont IV zosyn per ID for 1 more day  - Trend cultures  - Cr down s/p dialysis  - Plan dialysis again seth  - Hgb < 7, will transfuse 1 unit  - No si/sx of active bleeding otherwise  - Appreciate surg, no concern for issues at this time  - Awaiting speech eval  - Appreciate pulm/CC  - Cont acceptable home medications for chronic conditions   - DVT protocol    I have personally reviewed all pertinent labs and films that have officially resulted over the last 24 hours. I have personally checked for all pending labs that are awaiting final results.     Subjective     Pt s/e @ bedside  Had issues with agitation last night  Pulled CVC  Given 30mg IM geodon  Remains confused at this time    Objective     Visit Vitals  /57   Pulse (!) 122   Temp 98.8 °F (37.1 °C)   Resp 27   Ht 5' 7\" (1.702 m)   Wt 94.8 kg (209 lb)   SpO2 100%   BMI 32.73 kg/m²       Physical Exam:  General Appearance: NAD, confused  Neck: No JVD, supple  Lungs: B/L rhonchi with normal respiratory effort  CV: RRR, no m/r/g  Abdomen: soft, non-tender, normal bowel sounds  Extremities: no cyanosis, no peripheral edema    Intake and Output:  Current Shift:  No intake/output data recorded. Last three shifts:  07/20 1901 - 07/22 0700  In: 1543.2 [I.V.:993.2]  Out: 18 [Urine:10]    Lab/Data Reviewed:  CMP:   Lab Results   Component Value Date/Time     07/22/2020 04:15 AM    K 3.6 07/22/2020 04:15 AM     07/22/2020 04:15 AM    CO2 26 07/22/2020 04:15 AM    AGAP 7 07/22/2020 04:15 AM     (H) 07/22/2020 04:15 AM    BUN 43 (H) 07/22/2020 04:15 AM    CREA 4.88 (H) 07/22/2020 04:15 AM    GFRAA 14 (L) 07/22/2020 04:15 AM    GFRNA 11 (L) 07/22/2020 04:15 AM    CA 7.3 (L) 07/22/2020 04:15 AM    PHOS 4.0 07/22/2020 04:15 AM    ALB 2.0 (L) 07/22/2020 04:15 AM     CBC:   Lab Results   Component Value Date/Time    WBC 9.4 07/22/2020 04:15 AM    HGB 6.9 (L) 07/22/2020 04:15 AM    HCT 20.5 (L) 07/22/2020 04:15 AM     07/22/2020 04:15 AM       Imaging Reviewed:  No results found.     Medications Reviewed:  Current Facility-Administered Medications   Medication Dose Route Frequency    morphine injection 2-4 mg  2-4 mg IntraVENous Q3H PRN    LORazepam (ATIVAN) injection 1 mg  1 mg IntraVENous Q2H PRN    LORazepam (ATIVAN) 2 mg/mL injection        [START ON 7/23/2020] famotidine (PF) (PEPCID) 20 mg in 0.9% sodium chloride 10 mL injection  20 mg IntraVENous DAILY    ziprasidone (GEODON) 30 mg in sterile water (preservative free) 1.5 mL injection  30 mg IntraMUSCular Q12H PRN    dexmedeTOMidine (PRECEDEX) 400 mcg in 0.9% sodium chloride 100 mL infusion  0.2-0.7 mcg/kg/hr IntraVENous TITRATE    lactulose (CHRONULAC) 10 gram/15 mL solution 60 mL  60 mL Oral BID    0.9% sodium chloride infusion  50 mL/hr IntraVENous DIALYSIS PRN    heparin (porcine) 1,000 unit/mL injection 1,000 Units  1,000 Units InterCATHeter DIALYSIS PRN    haloperidol lactate (HALDOL) injection 2.5 mg  2.5 mg IntraVENous Q6H PRN    insulin glargine (LANTUS) injection 5 Units  5 Units SubCUTAneous DAILY    acetaminophen (TYLENOL) solution 650 mg  650 mg Oral Q6H PRN    piperacillin-tazobactam (ZOSYN) 3.375 g in 0.9% sodium chloride ###EXTENDED 4 HOUR INFUSION###  3.375 g IntraVENous Q12H    albuterol-ipratropium (DUO-NEB) 2.5 MG-0.5 MG/3 ML  3 mL Nebulization QID RT    aspirin chewable tablet 81 mg  81 mg Oral DAILY    atorvastatin (LIPITOR) tablet 80 mg  80 mg Oral DAILY    heparin (porcine) injection 5,000 Units  5,000 Units SubCUTAneous Q8H    dextrose 10% infusion 125-250 mL  125-250 mL IntraVENous PRN    naloxone (NARCAN) injection 0.4 mg  0.4 mg IntraVENous PRN    insulin lispro (HUMALOG) injection   SubCUTAneous Q6H    glucose chewable tablet 16 g  4 Tab Oral PRN    glucagon (GLUCAGEN) injection 1 mg  1 mg IntraMUSCular PRN    methylPREDNISolone (PF) (SOLU-MEDROL) injection 40 mg  40 mg IntraVENous Q12H    lidocaine (XYLOCAINE) 2 % jelly   Mucous Membrane PRN    sodium chloride (NS) flush 5-10 mL  5-10 mL IntraVENous PRN    sodium chloride (NS) flush 5-40 mL  5-40 mL IntraVENous PRN    ondansetron (ZOFRAN) injection 4 mg  4 mg IntraVENous Q6H PRN           Daksha Corbett DO  Internal Medicine, Hospitalist  Pager: [phone]  [facility]

## 2020-07-23 NOTE — PROGRESS NOTES
INTERIM UPDATE - 0017 EST on 7/23/2020    Nursing Staff reports Hgb returns at 6.6. Plans:  Transfusion an additional unit of PRBCs with IV Famotidine Pre-Treatment CBC in AM.  Nursing Staff is aware. Blood Bank is aware.

## 2020-07-23 NOTE — PROGRESS NOTES
ICU PROGRESS NOTE:                                              HPI:    Mr. Camille Koenig is an 80year-old male patient with a Past Medical History of DM2, HNT, atrial fibrillation and SBO with Surgery on 7/03/20, who was readmitted on 7/16/20 with another episode of SBO, which this time was successfully treatment with conservative management. Patient developed MARCI this admission and he is now on HD with good tolerance. 7/23/2020   See above. Patient was extubated on 7/21/20 at 4 PM. Respiratory wise, he did very well. He later that day became agitated and two stiches from his surgical wound broke loose. He removed lines. Finally, Geodon 30 mg IM took care of his agitation. He remains extubated. Patient Active Problem List   Diagnosis Code    Diabetes ([facility] E11.9    SBO (small bowel obstruction) ([facility] K56.609    HTN (hypertension) I10    MARCI (acute kidney injury) ([facility] N17.9    Troponin level elevated R79.89    Paroxysmal atrial fibrillation (HCC) I48.0    Aspiration pneumonia ([facility] J69.0    Hypoglycemia N10.9    Metabolic encephalopathy D63.01    Acute respiratory failure with hypoxia and hypercapnia (HCC) J96.01, J96.02     Medication Reviewed: Allergies   Allergen Reactions    Iodinated Contrast Media Swelling    Lisinopril Swelling      Past Medical History:   Diagnosis Date    Diabetes ([facility]     Hypertension       Prior to Admission medications    Medication Sig Start Date End Date Taking? Authorizing Provider   metoprolol tartrate (LOPRESSOR) 25 mg tablet Take 1 Tab by mouth every twelve (12) hours. 7/13/20   Giacomo Birch PA   aspirin 81 mg chewable tablet Take 1 Tab by mouth daily. 7/13/20   Giacomo Birch PA   Cetirizine 10 mg cap Take  by mouth. Kiah Chaves MD   benzonatate (TESSALON) 100 mg capsule Take 100 mg by mouth three (3) times daily as needed for Cough. Kiah Chaves MD   atorvastatin (LIPITOR) 80 mg tablet Take 80 mg by mouth daily.     Provider, Historical   glipiZIDE (GLUCOTROL) 10 mg tablet Take 10 mg by mouth two (2) times a day. Provider, Historical   hydroCHLOROthiazide (HYDRODIURIL) 25 mg tablet Take 25 mg by mouth daily. Provider, Historical   albuterol (PROVENTIL HFA) 90 mcg/actuation inhaler Take 1 Puff by inhalation daily. Provider, Historical     Current Facility-Administered Medications   Medication Dose Route Frequency    famotidine (PF) (PEPCID) 20 mg in 0.9% sodium chloride 10 mL injection  20 mg IntraVENous DAILY    dexmedeTOMidine (PRECEDEX) 400 mcg in 0.9% sodium chloride 100 mL infusion  0.2-0.7 mcg/kg/hr IntraVENous TITRATE    lactulose (CHRONULAC) 10 gram/15 mL solution 60 mL  60 mL Oral BID    insulin glargine (LANTUS) injection 5 Units  5 Units SubCUTAneous DAILY    albuterol-ipratropium (DUO-NEB) 2.5 MG-0.5 MG/3 ML  3 mL Nebulization QID RT    aspirin chewable tablet 81 mg  81 mg Oral DAILY    atorvastatin (LIPITOR) tablet 80 mg  80 mg Oral DAILY    heparin (porcine) injection 5,000 Units  5,000 Units SubCUTAneous Q8H    insulin lispro (HUMALOG) injection   SubCUTAneous Q6H    methylPREDNISolone (PF) (SOLU-MEDROL) injection 40 mg  40 mg IntraVENous Q12H       Lines: All central lines examined by me. No signs of erythema, induration, discharge.      Central Venous Catheter:  Triple Lumen 07/16/20 Right Subclavian (Active)   Central Line Being Utilized Yes 07/18/20 0400   Criteria for Appropriate Use Hemodynamically unstable, requiring monitoring lines, vasopressors, or volume resuscitation 07/18/20 0400   Site Assessment Clean, dry, & intact 07/18/20 0400   Infiltration Assessment 0 07/18/20 0400   Affected Extremity/Extremities Color distal to insertion site pink (or appropriate for race) 07/18/20 0400   Date of Last Dressing Change 07/17/20 07/18/20 0400   Dressing Status Clean, dry, & intact 07/18/20 0400   Dressing Type Tape;Transparent 07/18/20 0400   Action Taken Open ports on tubing capped 07/18/20 0400 Proximal Hub Color/Line Status White;Capped 20 0400   Positive Blood Return (Medial Site) Yes 20 0400   Medial Hub Color/Line Status Blue; Infusing 20 0400   Positive Blood Return (Lateral Site) Yes 20 0400   Distal Hub Color/Line Status Brown; Infusing 20 0400   Positive Blood Return (Site #3) Yes 20 0400   Alcohol Cap Used Yes 20 0400      Drain(s):  Orogastric Tube 20 (Active)   Site Assessment Clean, dry, & intact 20 0400   Securement Device Tape 20 0400   G Port Status Clamped 20 0400   External Insertion Guido (cms) 60 cms 20 0400   Drainage Description Green 20 0000     Airway:  Airway - Endotracheal Tube 20 Oral (Active)   Insertion Depth (cm) 25 cm 20 0455   Line Guido Lips 20 0455   Side Secured Centered 20 0455   Cuff Pressure 28 cmH20 20 0455   Site Assessment Clean, dry, & intact 20 0455       Objective:  Vital Signs:    Visit Vitals  BP (!) 172/132   Pulse 93   Temp 97.2 °F (36.2 °C)   Resp 24   Ht 5' 7\" (1.702 m)   Wt 94.8 kg (209 lb)   SpO2 99%   BMI 32.73 kg/m²      O2 Device: Nasal cannula  O2 Flow Rate (L/min): 2 l/min  Temp (24hrs), Av.5 °F (36.4 °C), Min:96.5 °F (35.8 °C), Max:98.3 °F (36.8 °C)      Intake/Output:   Last shift:      701 - 1900  In: 18.2 [I.V.:18.2]  Out: -     Last 3 shifts: 1901 -  07  In: 593.5 [I.V.:138]  Out: 1       Intake/Output Summary (Last 24 hours) at 2020 1219  Last data filed at 2020 0900  Gross per 24 hour   Intake 610.05 ml   Output 1 ml   Net 609.05 ml       Last 3 Recorded Weights in this Encounter    20 0520 20 0634 20 0819   Weight: 90.1 kg (198 lb 9.6 oz) 90.1 kg (198 lb 9.1 oz) 94.8 kg (209 lb)       Ventilator Settings:  Mode Rate Tidal Volume Pressure FiO2 PEEP  Pressure support, Spontaneous   500 ml  7 cm H2O 28 % 5 cm H20    Peak airway pressure: 13 cm H2O   Plateau pressure:    Tidal volume:   Minute ventilation: 7.12 l/min  SPO2     ARDS network Guidelines: Lung protective strategy and Plateau pressure goals less than or equal to 30. Physical Exam:     General/Neurology: Alert, conversant, confused but not agitated. Head: Normocephalic, without obvious abnormality. Eye: Conjunctival pallor is present    Oral: Mucus membranes are moist.    Neck: Supple. Lung: Air entry is decreased, bilateral basal rales are present. Heart: S1 S2 present, no S3, S4 or murmurs. Abdomen/: Soft, non tender, incision line appears to be clean. Extremities: Pedal edema present.       Data:      Recent Results (from the past 24 hour(s))   TYPE + CROSSMATCH    Collection Time: 07/22/20  1:30 PM   Result Value Ref Range    Crossmatch Expiration 07/25/2020     ABO/Rh(D) B POSITIVE     Antibody screen NEG     CALLED TO: [address] 2600 AT 14:58 ON 07/22/2020 BY LIANNA     Unit number [id number]     Blood component type  LR     Unit division 00     Status of unit TRANSFUSED     Crossmatch result Compatible     Unit number [id number]     Blood component type [facility]     Unit division 00     Status of unit ISSUED     Crossmatch result Compatible    GLUCOSE, POC    Collection Time: 07/22/20  6:30 PM   Result Value Ref Range    Glucose (POC) 153 (H) 70 - 110 mg/dL   HGB & HCT    Collection Time: 07/22/20  9:46 PM   Result Value Ref Range    HGB 6.6 (L) 13.0 - 16.0 g/dL    HCT 20.0 (L) 36.0 - 48.0 %   GLUCOSE, POC    Collection Time: 07/22/20 11:38 PM   Result Value Ref Range    Glucose (POC) 160 (H) 70 - 110 mg/dL   RENAL FUNCTION PANEL    Collection Time: 07/23/20  4:08 AM   Result Value Ref Range    Sodium 141 136 - 145 mmol/L    Potassium 4.0 3.5 - 5.5 mmol/L    Chloride 108 100 - 111 mmol/L    CO2 24 21 - 32 mmol/L    Anion gap 9 3.0 - 18 mmol/L    Glucose 141 (H) 74 - 99 mg/dL    BUN 52 (H) 7.0 - 18 MG/DL    Creatinine 5.92 (H) 0.6 - 1.3 MG/DL    BUN/Creatinine ratio 9 (L) 12 - 20      GFR est AA 11 (L) >60 ml/min/1.73m2    GFR est non-AA 9 (L) >60 ml/min/1.73m2    Calcium 7.6 (L) 8.5 - 10.1 MG/DL    Phosphorus 6.4 (H) 2.5 - 4.9 MG/DL    Albumin 2.1 (L) 3.4 - 5.0 g/dL   CBC W/O DIFF    Collection Time: 07/23/20  4:08 AM   Result Value Ref Range    WBC 7.9 4.6 - 13.2 K/uL    RBC 2.66 (L) 4.70 - 5.50 M/uL    HGB 8.0 (L) 13.0 - 16.0 g/dL    HCT 24.0 (L) 36.0 - 48.0 %    MCV 90.2 74.0 - 97.0 FL    MCH 30.1 24.0 - 34.0 PG    MCHC 33.3 31.0 - 37.0 g/dL    RDW 15.3 (H) 11.6 - 14.5 %    PLATELET 135 643 - 898 K/uL    MPV 10.2 9.2 - 11.8 FL   GLUCOSE, POC    Collection Time: 07/23/20  6:17 AM   Result Value Ref Range    Glucose (POC) 155 (H) 70 - 110 mg/dL   GLUCOSE, POC    Collection Time: 07/23/20 12:06 PM   Result Value Ref Range    Glucose (POC) 115 (H) 70 - 110 mg/dL         Chemistry   Recent Labs     07/23/20  0408 07/22/20  0415 07/21/20  0330   * 143* 127*    141 141   K 4.0 3.6 5.2    108 114*   CO2 24 26 18*   BUN 52* 43* 82*   CREA 5.92* 4.88* 7.37*   CA 7.6* 7.3* 7.1*   PHOS 6.4* 4.0 8.1*   AGAP 9 7 9   BUCR 9* 9* 11*   ALB 2.1* 2.0* 1.8*       CBC w/Diff   Recent Labs     07/23/20  0408 07/22/20  2146 07/22/20  0415 07/21/20  0330   WBC 7.9  --  9.4 7.7   RBC 2.66*  --  2.16* 2.49*   HGB 8.0* 6.6* 6.9* 8.0*   HCT 24.0* 20.0* 20.5* 24.7*     --  281 294       ABG    Recent Labs     07/21/20  0522   PHI 7.20*   PCO2I 41.8   PO2I 110*   HCO3I 16.2*   FIO2I 0.30       Micro     No results for input(s): SDES, CULT in the last 72 hours. No results for input(s): CULT in the last 72 hours. CT (Most Recent)    Results from Hospital Encounter encounter on 07/16/20   CT HEAD WO CONT    Narrative EXAM: CT HEAD W/O CONTRAST    INDICATION: Altered mental status/level of consciousness, unresponsive    COMPARISON: No prior comparison study.     TECHNIQUE: Axial CT imaging of the head was performed without intravenous  contrast.  Additional coronal and sagittal reconstructions were performed. One  or more dose reduction techniques were used on this CT: automated exposure  control, adjustment of the mAs and/or kVp according to patient's size, and  iterative reconstruction techniques. The specific techniques utilized on this CT  exam have been documented in the patient's electronic medical record. Digital  Imaging and Communications in Medicine (DICOM) format image data are available  to nonaffiliated external healthcare facilities or entities on a secure, media  free, reciprocally searchable basis with patient authorization for at least a  12-month period after this study. _______________    FINDINGS:    VENTRICLES/EXTRA-AXIAL SPACES: The ventricles and sulci are mildly enlarged  consistent with diffuse volume loss. No extra-axial fluid collection is present. BRAIN PARENCHYMA: There is no evidence of acute intracranial hemorrhage, mass  effect, midline shift, or herniation. No definite CT evidence of acute cortical  infarct is seen. White matter CT    ORBITS: Right ocular lens is absent most likely from prior cataract surgery. PARANASAL SINUSES: Mucoperiosteal thickening scattered throughout the paranasal  sinuses. Sclerotic wall thickening suggested in the partially visualized  maxillary antra from chronic sinusitis. No air-fluid levels are seen. MASTOID AIR CELLS: Visualized mastoid air cells are clear. OSSEOUS STRUCTURES: No fracture is seen. OTHER: Atherosclerotic calcifications are present.    _______________      Impression IMPRESSION:    1. No acute intracranial hemorrhage, mass effect, midline shift, or herniation. No definite CT evidence of acute cortical infarct is seen. Please note that  noncontrast head CT may be normal in early acute infarct. 2. Mild nonspecific white matter disease likely representing chronic small  vessel changes. Preliminary report provided by on-call radiology resident. XR (Most Recent).  CXR reviewed by me and compared with previous CXR   Results from [place] encounter on 07/16/20   XR CHEST PORT    Narrative EXAM: PORTABLE  FRONTAL CHEST RADIOGRAPH    INDICATION: Intubated. History of hypertension and diabetes. COMPARISON: 7/20/2020. TECHNIQUE: Portable frontal view of the chest    _______________    FINDINGS:    SUPPORT DEVICES:   -Enteric tube terminates approximately 3 cm above the juan francisco.  -Single enteric tube courses along the esophagus, tip terminates below the  inferior margin of the image.  -Right internal jugular venous catheter tip terminates in the SVC.  -EKG leads overlie the patient. HEART AND MEDIASTINUM: Stable heart size. Atherosclerotic calcifications  present. Mild central pulmonary vascular congestion. LUNGS: Hazy perihilar opacities extending to both lower lobes. PLEURAL SPACES:No large pneumothorax. No large pleural effusion. Pleural  calcifications again noted predominantly on the left. BONY THORAX AND SOFT TISSUES: No acute abnormality. _______________      Impression IMPRESSION:   1. Lines and devices appear stably positioned, as detailed. 2.  Similar bilateral lower lung opacities may represent pulmonary edema,  atelectasis, though superimposed infection cannot be excluded. Recommend  continued attention on follow-up. Assessment and Plan:    Respiratory:    See above. Extubation was successful. His agitated delirium was unmask by being off sedation and pain medication. We will try to use a benzo free approach for the treatment of delirium. Cardiovascular:    Patient is no longer in shock. He has been of NE and vasopressin for 72 hours. ID:     RLL infiltrate has been empirically covered with PIP/TAZO. We will discontinue antibiotics after 5 days of treatment. Renal:    MARCI soon to be on HD. Nephrology is following.  HD is not needed for weaning, but extubation will have to wait until the end of the session because HD disequilibrium syndrome can happen during fist HD and can increase the chances of extubation failure. Endocrine:    Our target glucose is 140 to 180. Control is appropriate. DVT prophylaxis: UFH. Stress ulcer prophylaxis: Famotidine. OTHER:  Glycemic Control. Glucose stabilizer per ICU protocol when on insulin drip. Maintain blood glucose 140-180. Replace electrolytes per ICU electrolyte replacement protocol  Ventilator bundle & Sedation protocol followed. Daily morning sedation holiday, assessment for readiness for SBT & weaning from ventilator; and then re-titrate if required. Aim to keep peak plateau pressure 60-72NV H2O in ARDS patient. [place] tube to suction at 20-30 cm H2O, Maintain [place] tube with 5-10ml air every 4 hours. Chlorhexidine mouth washes and routine oral care every 4 hours. Stress ulcer and DVT prophylaxis. HOB >=30 degree elevation all the time. HOB >=30 degree elevation all the time. Aggressive pulmonary toileting. Incentive spirometry when appropriate. Aspiration precautions. Sepsis bundle and protocol followed. Deescalate antibiotic when appropriate. Vasopressor when appropriate with MAP goal >65 mmHg. Central Line bundle followed, remove when not needed. Large bore IV line or CVP when appropriate. PT/OT eval and treat. OOB/IS when appropriate. Quality Care: Stress ulcer prophylaxis, DVT prophylaxis, HOB elevated, Infection control all reviewed and addressed. Events and notes from last 24 hours reviewed.  Care plan discussed with nursing, members of care team.        Critical Care Time:    The services I provided to this patient were to treat and/or prevent clinically significant deterioration that could result in the failure of one or more body systems and/or organ systems due to respiratory distress, hypoxia, cardiac dysrhythmia.     Services included the following:  -reviewing nursing notes and old charts  -vital sign assessments  -direct patient care  -medication orders and management  -interpreting and reviewing diagnostic studies/labs  -re-evaluations  -documentation time     Aggregate critical care time was 31 minutes, which includes only time during which I was engaged in work directly related to the patient's care as described above. During this entire length of time I was immediately available to the patient. The reason for providing this level of medical care for this critically ill patient was due a critical illness that impaired one or more vital organ systems such that there was a high probability of imminent or life threatening deterioration in the patients condition.  This care involved high complexity decision making to assess, manipulate, and support vital system functions, to treat this degreee vital organ system failure and to prevent further life threatening deterioration of the patients condition    Lisette Peacock MD   Intensivist  7/23/2020

## 2020-07-23 NOTE — PROGRESS NOTES
Problem: Diabetes Self-Management  Goal: *Disease process and treatment process  Description: Define diabetes and identify own type of diabetes; list 3 options for treating diabetes. Outcome: Progressing Towards Goal  Goal: *Incorporating nutritional management into lifestyle  Description: Describe effect of type, amount and timing of food on blood glucose; list 3 methods for planning meals. Outcome: Progressing Towards Goal  Goal: *Incorporating physical activity into lifestyle  Description: State effect of exercise on blood glucose levels. Outcome: Progressing Towards Goal  Goal: *Developing strategies to promote health/change behavior  Description: Define the ABC's of diabetes; identify appropriate screenings, schedule and personal plan for screenings. Outcome: Progressing Towards Goal  Goal: *Using medications safely  Description: State effect of diabetes medications on diabetes; name diabetes medication taking, action and side effects. Outcome: Progressing Towards Goal  Goal: *Monitoring blood glucose, interpreting and using results  Description: Identify recommended blood glucose targets  and personal targets. Outcome: Progressing Towards Goal  Goal: *Prevention, detection, treatment of acute complications  Description: List symptoms of hyper- and hypoglycemia; describe how to treat low blood sugar and actions for lowering  high blood glucose level. Outcome: Progressing Towards Goal  Goal: *Prevention, detection and treatment of chronic complications  Description: Define the natural course of diabetes and describe the relationship of blood glucose levels to long term complications of diabetes.   Outcome: Progressing Towards Goal  Goal: *Developing strategies to address psychosocial issues  Description: Describe feelings about living with diabetes; identify support needed and support network  Outcome: Progressing Towards Goal  Goal: *Insulin pump training  Outcome: Progressing Towards Goal  Goal: *Sick day guidelines  Outcome: Progressing Towards Goal  Goal: *Patient Specific Goal (EDIT GOAL, INSERT TEXT)  Outcome: Progressing Towards Goal     Problem: Patient Education: Go to Patient Education Activity  Goal: Patient/Family Education  Outcome: Progressing Towards Goal     Problem: Patient Education: Go to Patient Education Activity  Goal: Patient/Family Education  Outcome: Progressing Towards Goal     Problem: Non-Violent Restraints  Goal: *Removal from restraints as soon as assessed to be safe  Outcome: Progressing Towards Goal  Goal: *No harm/injury to patient while restraints in use  Outcome: Progressing Towards Goal  Goal: *Patient's dignity will be maintained  Outcome: Progressing Towards Goal  Goal: *Patient Specific Goal (EDIT GOAL, INSERT TEXT)  Outcome: Progressing Towards Goal  Goal: Non-violent Restaints:Standard Interventions  Outcome: Progressing Towards Goal  Goal: Non-violent Restraints:Patient Interventions  Outcome: Progressing Towards Goal  Goal: Patient/Family Education  Outcome: Progressing Towards Goal     Problem: Falls - Risk of  Goal: *Absence of Falls  Description: Document Jose Fall Risk and appropriate interventions in the flowsheet. Outcome: Progressing Towards Goal  Note: Fall Risk Interventions:       Mentation Interventions: Door open when patient unattended, More frequent rounding, Reorient patient    Medication Interventions: Assess postural VS orthostatic hypotension, Bed/chair exit alarm    Elimination Interventions: Bed/chair exit alarm, Call light in reach              Problem: Patient Education: Go to Patient Education Activity  Goal: Patient/Family Education  Outcome: Progressing Towards Goal     Problem: Pressure Injury - Risk of  Goal: *Prevention of pressure injury  Description: Document Francis Scale and appropriate interventions in the flowsheet.   Outcome: Progressing Towards Goal  Note: Pressure Injury Interventions:  Sensory Interventions: Assess changes in LOC, Assess need for specialty bed    Moisture Interventions: Absorbent underpads, Apply protective barrier, creams and emollients, Internal/External urinary devices    Activity Interventions: Assess need for specialty bed, Pressure redistribution bed/mattress(bed type)    Mobility Interventions: Float heels, Pressure redistribution bed/mattress (bed type)    Nutrition Interventions: Document food/fluid/supplement intake, Discuss nutritional consult with provider    Friction and Shear Interventions: HOB 30 degrees or less, Foam dressings/transparent film/skin sealants, Transferring/repositioning devices                Problem: Patient Education: Go to Patient Education Activity  Goal: Patient/Family Education  Outcome: Progressing Towards Goal     Problem: Discharge Planning  Goal: *Discharge to safe environment  Outcome: Progressing Towards Goal     Problem: Nutrition Deficit  Goal: *Optimize nutritional status  Outcome: Progressing Towards Goal     Problem: Patient Education: Go to Patient Education Activity  Goal: Patient/Family Education  Outcome: Progressing Towards Goal     Problem: Delirium Treatment  Goal: *Level of consciousness restored to baseline  Outcome: Progressing Towards Goal  Goal: *Level of environmental perceptions restored to baseline  Outcome: Progressing Towards Goal  Goal: *Sensory perception restored to baseline  Outcome: Progressing Towards Goal  Goal: *Emotional stability restored to baseline  Outcome: Progressing Towards Goal  Goal: *Functional assessment restored to baseline  Outcome: Progressing Towards Goal  Goal: *Absence of falls  Outcome: Progressing Towards Goal  Goal: *Will remain free of delirium, CAM Score negative  Outcome: Progressing Towards Goal  Goal: *Cognitive status will be restored to baseline  Outcome: Progressing Towards Goal  Goal: Interventions  Outcome: Progressing Towards Goal     Problem: Patient Education: Go to Patient Education Activity  Goal: Patient/Family Education  Outcome: Progressing Towards Goal

## 2020-07-23 NOTE — DIALYSIS
[facility]        ACUTE HEMODIALYSIS FLOW SHEET      HEMODIALY  SIS ORDERS: Physician: Vasiliy Thompson     Dialyzer: revaclear   Duration: 3 hr  BFR: 300   DFR: 800   Dialysate:  Temp 36-37*C  K+   3    Ca+  2.5 Na 140 Bicarb 35   Weight:   Wt Readings from Last 1 Encounters:   07/22/20 94.8 kg (209 lb)     UF Goal: 1000 Access  Needle Gauge     Heparin []  Bolus      Units    [] Hourly       Units    []None      Catheter locking solution Heparin   Pre BP:   100/63    Pulse:     103       Respirations: 25  Temperature:   98.4   Labs: Pre        Post:        [] N/A   Additional Orders(medications, blood products, hypotension management):       [] N/A     [x] Sammieita Consent Verified     CATHETER ACCESS: []N/A   []Right   [x]Left   []IJ     [x]Fem   []transhepatic   [] First use X-ray verified     []Permanent                [] Temporary   [x]No S/S infection  []Redness  []Drainage []Cultured []Swelling []Pain   [x]Medical Aseptic Prep Utilized   []Dressing Changed  [] Biopatch  Date:       []Clotted   []Patent   Flows: []Good  []Poor  []Reversed   If access problem,  notified: []Yes    []N/A  Date:           GRAFT/FISTULA ACCESS:  [x]N/A     []Right     []Left     []UE     []LE   []AVG   []AVF        []Buttonhole    []Medical Aseptic Prep Utilized   []No S/S infection  []Redness  []Drainage []Cultured []Swelling []Pain    Bruit:   [] Strong    [] Weak       Thrill :   [] Strong    [] Weak       Needle Gauge:    Length:     If access problem,  notified: []Yes     []N/A  Date:        Please describe access if present and not used:                            GENERAL ASSESSMENT:      LUNGS:  Rate  SaO2%        [] N/A    [] Clear  [x] Coarse  [] Crackles  [] Wheezing        [] Diminished     Location : []RLL   []LLL    []RUL  []XOCHILT     Cough: []Productive  []Dry  [x]N/A   Respirations:  []Easy  [x]Labored     Therapy:   []RA  [x]NC 2 l/min    Mask: []NRB []Venti       O2%                  []Ventilator  []Intubated  [] Trach  [] BiPaP     CARDIAC: []Regular      [] Irregular   [] Pericardial Rub  [] JVD        [x]  Monitored  [x] Bedside  [] Remotely monitored [] N/A  Rhythm:      EDEMA: [] None  [x]Generalized  [] Pitting [] 1    [] 2    [] 3    [] 4                 [] Facial  [] Pedal  []  UE  [] LE     SKIN:   [x] Warm  [] Hot     [] Cold   [x] Dry     [] Pale   [] Diaphoretic                  [] Flushed  [] Jaundiced  [] Cyanotic  [] Rash  [] Weeping     LOC:    [x] Alert      []Oriented:    [x] Person     [] Place  []Time               [x] Confused  [] Lethargic  [] Medicated  [] Non-responsive     GI / ABDOMEN:  [] Flat    [] Distended    [x] Soft    [] Firm   []  Obese                             [] Diarrhea  [] Bowel Sounds  [] Nausea  [] Vomiting       / URINE ASSESSMENT:[] Voiding   [x] Oliguria  [] Anuria   []  [place]     [] Incontinent    []  Incontinent Brief      []  Bathroom Privileges       PAIN: [x] 0 []1  []2   []3   []4   []5   []6   []7   []8   []9   []10              Scale 0-10  Action/Follow Up:      MOBILITY:  [] Amb    [] Amb/Assist    [x] Bed    [] Wheelchair  [] Stretcher      All Vitals and Treatment Details on Attached [address]: [facility]          Room # 2604/01      [] 1st Time Acute  [] Stat  [] Routine  [] Urgent     [] Acute Room  []  Bedside  [] ICU/CCU  [] ER   Isolation Precautions:   There are currently no Active Isolations      Special Considerations:         [] Blood Consent Verified []N/A     ALLERGIES:   Allergies   Allergen Reactions    Iodinated Contrast Media Swelling    Lisinopril Swelling               Code Status:Full Code        Hepatitis Status:    2nd RN check:                     Lab Results   Component Value Date/Time    Hepatitis B surface Ag <0.10 07/21/2020 03:30 AM    Hepatitis B surface Ab 31.71 07/21/2020 03:30 AM                     Current Labs:   Lab Results   Component Value Date/Time    Sodium 141 07/23/2020 04:08 AM    Potassium 4.0 07/23/2020 04:08 AM    Chloride 108 07/23/2020 04:08 AM    CO2 24 07/23/2020 04:08 AM    Anion gap 9 07/23/2020 04:08 AM    Glucose 141 (H) 07/23/2020 04:08 AM    BUN 52 (H) 07/23/2020 04:08 AM    Creatinine 5.92 (H) 07/23/2020 04:08 AM    BUN/Creatinine ratio 9 (L) 07/23/2020 04:08 AM    GFR est AA 11 (L) 07/23/2020 04:08 AM    GFR est non-AA 9 (L) 07/23/2020 04:08 AM    Calcium 7.6 (L) 07/23/2020 04:08 AM      Lab Results   Component Value Date/Time    WBC 7.9 07/23/2020 04:08 AM    HGB 8.0 (L) 07/23/2020 04:08 AM    HCT 24.0 (L) 07/23/2020 04:08 AM    PLATELET 747 00/59/6474 04:08 AM    MCV 90.2 07/23/2020 04:08 AM                                                                                     DIET: DIET NPO       PRIMARY NURSE REPORT: First initial/Last name/Title      Pre Dialysis: ALEXSANDER sotomayor RN     Time: 1932      EDUCATION:    [] Patient [] Other         Knowledge Basis: [x]None []Minimal [] Substantial   Barriers to learning  []N/A   [] Access Care     [] S&S of infection     [] Fluid Management     []K+     [x]Procedural    []Albumin     [] Medications     [] Tx Options     [] Transplant     [] Diet     [] Other   Teaching Tools:  [x] Explain  [] Demo  [] Handouts [] Video  Patient response:   [x] Verbalized understanding  [] Teach back  [] Return demonstration [] Requires follow up   Inappropriate due to            [x]1830 Time Out/Safety Check  [x]Extracorporeal Circuit Tested for integrity       RO/HEMODIALYSIS MACHINE SAFETY CHECKS  Before each treatment:     Machine Number:                   [facility]                                   [] Unit Machine #  with centralized RO                                  [] Portable Machine #1/RO serial # [id number]                                  [] Portable Machine #2/RO serial # [id number]                                  [] Portable Machine #4/RO serial # [id number]                                                     [address] [place] [address]                                  [] Portable Machine #11/RO serial # [id number]                                   [] Portable Machine #12/RO serial # [id number]                                  [] Portable Machine #13/RO serial #  [id number]      Alarm Test:  Pass time                [x] RO/Machine Log Complete      Temp    36             Dialysate: pH  7.4 Conductivity: Meter   14.3     HD Machine   14                  TCD: 14  Dialyzer Lot # [id number]            Blood Tubing Lot # [id number]          Saline Lot #  -[id number]     CHLORINE TESTING-Before each treatment and every 4 hours    Total Chlorine: [] less than 0.1 ppm  Time: 1840 4 Hr/2nd Check Time:    (if greater than 0.1 ppm from Primary then every 30 minutes from Secondary)     TREATMENT INITIATION  with Dialysis Precautions:   [x] All Connections Secured                 [x] Saline Line Double Clamped   [x] Venous Parameters Set                  [x] Arterial Parameters Set    [x] Prime Given 250ml                          [x]Air Foam Detector Engaged      Treatment Initiation Note: Returned to patient room to complete his treatment after running for 1hr in the am and abruptly stopped due critical patient needing treatment. Patient lying in the bed confused, BP in the 80's bp cuff adjusted and changed to the other arm and result in the 100's. L fem [place] accessed and treatment started with no difficulty. During Treatment Notes: 1915- Bp low in the 80's uf on hold. Medication Dose Volume Route Time [facility] name Title   Albumin  25 gm 100  Odilia Oneill RN         RN         RN           RN                   Post Assessment:   Dialyzer Cleared: [x] Good [] Fair  [] Poor  Blood processed:  14 L  UF Removed  0 Ml  POst BP:   105/51       Pulse: 101        Respirations: 27  Temperature: 98.2 Lungs:     [] Clear      [] Course         [] Crackles    [] Wheezing         [] Diminished   Post Tx Vascular Access:   AVF/AVG: Bleeding stopped   Art  min. José Miguel.   Min   N/A Cardiac:   [] Regular   [] Irregular   [x] Monitor  [] N/A      Rhythm:       Catheter:   Locking solution: Heparin 1:1000   Art. 1.6  José Miguel. 1.6  N/A    Skin:   Pain:    [x] Warm  [x] Dry [] Diaphoretic    [] Flushed    [] Pale [] Cyanotic [x]0  []1  []2   []3  []4   []5   []6   []7   []8   []9   []10     Post Treatment Note:   2009 Early treatment termination due to unstable blood pressure. BP drops in 70's and 80' NS bolus given x2 before termination of treatment.  Dr. Coral solorio       POST TREATMENT PRIMARY NURSE HANDOFF REPORT:     First initial/Last name/Title         Post Dialysis: Denny Mccoy RN Time:  2020     Abbreviations: AVG-arterial venous graft, AVF-arterial venous fistula, IJ-Internal Jugular, Subcl-Subclavian, Fem-Femoral, Tx-treatment, AP/HR-apical heart rate, DFR-dialysate flow rate, BFR-blood flow rate, AP-arterial pressure, -venous pressure, UF-ultrafiltrate, TMP-transmembrane pressure, José Miguel-Venous, Art-Arterial, RO-Reverse Osmosis

## 2020-07-23 NOTE — PROGRESS NOTES
1900: Bedside and Verbal shift change report given to Ivelisse Escobar  (oncoming nurse) by Naomi Jara (offgoing nurse). Report included the following information SBAR, Kardex, Intake/Output, MAR, Accordion and Procedure Verification. Pt resting quietly, alert to self and place. Rashid verified       2000: Assessment completed, Pt had bowel movement, pt cleaned     2100: Spoke with daughter and gave her an update on pt status, all questions were answered     2130: pt bladder scanned just to confirm he doesn't have any urine as he has not peed. 9mls located in the bladder     2200: pt increasingly agitated, kicking and not allowing me to change him, PRN given      2300: Second BM, cleaned    0000: Reassessment completed, no changes noted    0013: H&H returned, Dr Loan [place] called due to it being less than 7. Awaiting a call back     0016: Az returned page, plan to transfuse another unit of blood     0053: Pts wife called to get blood consent, called home phone, no response. Mobile called and received consent, dual verified with [facility]     0100; transfusion started, precedex held due to not being able to get secondary line     0115: Assessment completed, no changes noted HR elevation is r/t agitation     0150: Pt increasingly awake, and getting agitated, PRN given     0305: Transfusion completed     0400: Reassessment completed, no changes noted    0408: Labs drawn     0500: pt bathed, dressing changed per protocol. 0700: Bedside and Verbal shift change report given to [facility] (oncoming nurse) by Ivelisse Escobar  (offgoing nurse). Report included the following information SBAR, Kardex, Accordion, Recent Results, Alarm Parameters  and Quality Measures.

## 2020-07-23 NOTE — PROGRESS NOTES
[facility]  (A Division of [address] [place] [address])    Follow-up Note      Date of Admission: 7/16/2020       Date of Note:  7/23/2020    Summary:    81 y/o Pedro Flores male w/ DM, HTN, PAF, s/p recent SB rsxn adm 7/16 w/ abd pain, tenderness, vomiting.  s/p xlap w/ extensive DONNA and SB rsxn x 2 on 7/7 by Dr. Emily Beyer. Trans to [facility] 7/13,  then diffuse abd pain, tenderness,vomiting so sent back to [facility] 7/16. CTAP: prominent gastric distention, SB dilatation extending to RLQ SB anastamosis concerning for high-grade ileus vs developing SBO. Also new RLL consolidation. In ED became unresponsive, required intubation. NTpro BNP 2,716. Admitted 7/16 on Zosyn,clindamycin, latter stopped 7/17. Initially febrile 102.8 with leukocytosis 21.6 on 7/17, both resolved on zosyn alone. SARS CoV 2 PCR 7/17 negative but worsening creatinine reaching 6.7 7/20, NT pro BNP 19,075. Temporary dialysis catheter placed in L CFV. Interval History:  Remains confused. Afebrile. Getting dialysis in ICU.   Not in respiratory distress       Current Antimicrobials:    Prior Antimicrobials:  None 7/23 - 0 Clindamycin 7/16 -1  Zosyn 7/16 - 7       Assessment: Rec / Plan:   Sepsis  - fever, tachycardia, tachypnea, leukocytosis present on admission  - due to pneumonia, ileus   - blcx 7/16 NGTD x 2, 7/18 NGTD x 2  - resolving -> dc zosyn  -> monitor off abx   New RL lung consolidation  - seen on CTAP 7/16  - likely aspiration pneumonia  - well covered with zosyn -> dc zosyn as above   Acute hypoxic respiratory failure  - intubated 7/16 extubated 7/21 -> monitor   Shock  - septic, hypovolemic  - off pressors -> monitor   MARCI  - due to above  - cr 6.7 today  - Temporary dialysis catheter placed today 7/20 -> dialysis per Renal   High grade ileus vs developing SBO  - on CTAP 7/16    SBO due to adhesions  - s/p Ex lap w/ extensive DONNA, SB rsxn x 2 7/7 by Dr. Emily Beyer    DM    HTN    PAF        Microbiology:    7/16      blcx NG x 2  7/18      blcx NGTD x 2     Lines / Catheters:     piv  LCFV dialysis cath      Patient Active Problem List   Diagnosis Code    Diabetes ([facility] E11.9    SBO (small bowel obstruction) ([facility] K56.609    HTN (hypertension) I10    MARCI (acute kidney injury) ([facility] N17.9    Troponin level elevated R79.89    Paroxysmal atrial fibrillation (HCC) I48.0    Aspiration pneumonia (HCC) J69.0    Hypoglycemia X82.8    Metabolic encephalopathy D16.19    Acute respiratory failure with hypoxia and hypercapnia (HCC) J96.01, J96.02       Current Facility-Administered Medications   Medication Dose Route Frequency    0.9% sodium chloride infusion 250 mL  250 mL IntraVENous PRN    famotidine (PF) (PEPCID) 40 mg in 0.9% sodium chloride 10 mL injection  40 mg IntraVENous ONCE PRN    morphine injection 2-4 mg  2-4 mg IntraVENous Q3H PRN    famotidine (PF) (PEPCID) 20 mg in 0.9% sodium chloride 10 mL injection  20 mg IntraVENous DAILY    ziprasidone (GEODON) 30 mg in sterile water (preservative free) 1.5 mL injection  30 mg IntraMUSCular Q12H PRN    dexmedeTOMidine (PRECEDEX) 400 mcg in 0.9% sodium chloride 100 mL infusion  0.2-0.7 mcg/kg/hr IntraVENous TITRATE    lactulose (CHRONULAC) 10 gram/15 mL solution 60 mL  60 mL Oral BID    0.9% sodium chloride infusion 250 mL  250 mL IntraVENous PRN    0.9% sodium chloride infusion  50 mL/hr IntraVENous DIALYSIS PRN    heparin (porcine) 1,000 unit/mL injection 1,000 Units  1,000 Units InterCATHeter DIALYSIS PRN    haloperidol lactate (HALDOL) injection 2.5 mg  2.5 mg IntraVENous Q6H PRN    insulin glargine (LANTUS) injection 5 Units  5 Units SubCUTAneous DAILY    acetaminophen (TYLENOL) solution 650 mg  650 mg Oral Q6H PRN    piperacillin-tazobactam (ZOSYN) 3.375 g in 0.9% sodium chloride ###EXTENDED 4 HOUR INFUSION###  3.375 g IntraVENous Q12H    albuterol-ipratropium (DUO-NEB) 2.5 MG-0.5 MG/3 ML  3 mL Nebulization QID RT    aspirin chewable tablet 81 mg  81 mg Oral DAILY    atorvastatin (LIPITOR) tablet 80 mg  80 mg Oral DAILY    heparin (porcine) injection 5,000 Units  5,000 Units SubCUTAneous Q8H    dextrose 10% infusion 125-250 mL  125-250 mL IntraVENous PRN    naloxone (NARCAN) injection 0.4 mg  0.4 mg IntraVENous PRN    insulin lispro (HUMALOG) injection   SubCUTAneous Q6H    glucose chewable tablet 16 g  4 Tab Oral PRN    glucagon (GLUCAGEN) injection 1 mg  1 mg IntraMUSCular PRN    methylPREDNISolone (PF) (SOLU-MEDROL) injection 40 mg  40 mg IntraVENous Q12H    lidocaine (XYLOCAINE) 2 % jelly   Mucous Membrane PRN    sodium chloride (NS) flush 5-10 mL  5-10 mL IntraVENous PRN    sodium chloride (NS) flush 5-40 mL  5-40 mL IntraVENous PRN    ondansetron (ZOFRAN) injection 4 mg  4 mg IntraVENous Q6H PRN         Review of Systems - Negative except as in interval history       Objective:    Visit Vitals  /86   Pulse 95   Temp 97.2 °F (36.2 °C)   Resp 19   Ht 5' 7\" (1.702 m)   Wt 94.8 kg (209 lb)   SpO2 100%   BMI 32.73 kg/m²       Temp (24hrs), Av.5 °F (36.4 °C), Min:96.5 °F (35.8 °C), Max:98.3 °F (36.8 °C)      General: Well developed, obese 80 y.o. [place] male in no acute distress. Orotracheally intubated but awake, alert, responsive to questions  ENT: ENT exam normal, no neck nodes or sinus tenderness  Head: normocephalic, without obvious abnormality  Mouth:  not examined, orotracheally intubated  Neck: supple, symmetrical, trachea midline   Cardio:  regular rate and rhythm, S1, S2 normal, no murmur, click, rub or gallop  Chest: inspection normal - no chest wall deformities or tenderness  Lungs: expiratory rhonchi and decreased breath sounds  Abdomen: soft, non-tender. Bowel sounds normal. No masses, no organomegaly.   Extremities:  no redness or tenderness in the calves or thighs, thigh, LE edema 1+       Lab results:    Chemistry  Recent Labs     20  0408 20  0415 20  0330   * 143* 127*    141 141   K 4.0 3.6 5.2    108 114*   CO2 24 26 18*   BUN 52* 43* 82*   CREA 5.92* 4.88* 7.37*   CA 7.6* 7.3* 7.1*   AGAP 9 7 9   BUCR 9* 9* 11*   ALB 2.1* 2.0* 1.8*       CBC w/ Diff  Recent Labs     07/23/20  0408 07/22/20  2146 07/22/20  0415 07/21/20  0330   WBC 7.9  --  9.4 7.7   RBC 2.66*  --  2.16* 2.49*   HGB 8.0* 6.6* 6.9* 8.0*   HCT 24.0* 20.0* 20.5* 24.7*     --  281 294       Microbiology  All Micro Results     Procedure Component Value Units Date/Time    CULTURE, BLOOD [[id number]] Collected:  07/18/20 1749    Order Status:  Completed Specimen:  Blood Updated:  07/23/20 0531     Special Requests: NO SPECIAL REQUESTS        Culture result: NO GROWTH 5 DAYS       CULTURE, BLOOD [[id number]] Collected:  07/18/20 1745    Order Status:  Completed Specimen:  Blood Updated:  07/23/20 0531     Special Requests: NO SPECIAL REQUESTS        Culture result: NO GROWTH 5 DAYS       CULTURE, BLOOD [[id number]] Collected:  07/16/20 1500    Order Status:  Completed Specimen:  Blood Updated:  07/22/20 0521     Special Requests: PERIPHERAL        Culture result: NO GROWTH 6 DAYS       CULTURE, BLOOD [[id number]] Collected:  07/16/20 1441    Order Status:  Completed Specimen:  Blood Updated:  07/22/20 0521     Special Requests: PERIPHERAL        Culture result: NO GROWTH 6 DAYS                Florence Cobos MD, [facility]  Infectious Diseases  [phone]   7/23/2020   1:41 PM

## 2020-07-23 NOTE — DIABETES MGMT
NUTRITION:  Noted pt failed SLP eval.  If tube feeding needed, suggest Nepro, initial rate of 10 ml/hr (432 calories, 19 grams protein, 170 ml free water/day.     Gutierrez Littlejohn RD, CDE   Office:  [address] [place] [address] Pager:  [phone]

## 2020-07-23 NOTE — DIALYSIS
DELIA        ACUTE HEMODIALYSIS FLOW SHEET      HEMODIALYSIS ORDERS: Physician: Stan Awan     Dialyzer: revaclear   Duration: 4 hr  BFR: 300   DFR: 800   Dialysate:  Temp 36-37*C  K+   3    Ca+  2.5 Na 140 Bicarb 35   Weight:   Wt Readings from Last 1 Encounters:   07/22/20 94.8 kg (209 lb)     UF Goal: 1000 Access Udal Needle Gauge     Heparin []  Bolus      Units    [] Hourly       Units    []None      Catheter locking solution Heparin   Pre BP:   97/56    Pulse:     93       Respirations: 22  Temperature:   97.7   Labs: Pre        Post:        [] N/A   Additional Orders(medications, blood products, hypotension management):       [] N/A     [x] Sammieita Consent Verified     CATHETER ACCESS: []N/A   []Right   [x]Left   []IJ     [x]Fem   []transhepatic   [] First use X-ray verified     []Permanent                [] Temporary   [x]No S/S infection  []Redness  []Drainage []Cultured []Swelling []Pain   [x]Medical Aseptic Prep Utilized   []Dressing Changed  [] Biopatch  Date:       []Clotted   []Patent   Flows: []Good  []Poor  []Reversed   If access problem,  notified: []Yes    []N/A  Date:           GRAFT/FISTULA ACCESS:  []N/A     []Right     []Left     []UE     []LE   []AVG   []AVF        []Buttonhole    []Medical Aseptic Prep Utilized   []No S/S infection  []Redness  []Drainage []Cultured []Swelling []Pain    Bruit:   [] Strong    [] Weak       Thrill :   [] Strong    [] Weak       Needle Gauge:    Length:     If access problem,  notified: []Yes     []N/A  Date:        Please describe access if present and not used:                            GENERAL ASSESSMENT:      LUNGS:  Rate  SaO2%        [] N/A    [] Clear  [x] Coarse  [] Crackles  [] Wheezing        [] Diminished     Location : [x]RLL   [x]LLL    []RUL  []XOCHILT     Cough: []Productive  []Dry  [x]N/A   Respirations:  [x]Easy  []Labored     Therapy:   []RA  [x]NC  l/min    Mask: []NRB []Venti       O2%                  []Ventilator  []Intubated  [] Trach  [] BiPaP     CARDIAC: []Regular      [] Irregular   [] Pericardial Rub  [] JVD        [x]  Monitored  [] Bedside  [] Remotely monitored [] N/A  Rhythm:      EDEMA: [] None  [x]Generalized  [] Pitting [] 1    [] 2    [] 3    [] 4                 [x] Facial  [x] Pedal  [x]  UE  [x] LE     SKIN:   [x] Warm  [] Hot     [] Cold   [x] Dry     [] Pale   [] Diaphoretic                  [] Flushed  [] Jaundiced  [] Cyanotic  [] Rash  [] Weeping     LOC:    [x] Alert      []Oriented:    [x] Person     [] Place  []Time               [] Confused  [] Lethargic  [] Medicated  [] Non-responsive     GI / ABDOMEN:  [] Flat    [] Distended    [x] Soft    [] Firm   []  Obese                             [] Diarrhea  [] Bowel Sounds  [] Nausea  [] Vomiting       / URINE ASSESSMENT:[] Voiding   [] Oliguria  [x] Anuria   []  [place]     [] Incontinent    []  Incontinent Brief      []  Bathroom Privileges       PAIN: [x] 0 []1  []2   []3   []4   []5   []6   []7   []8   []9   []10              Scale 0-10  Action/Follow Up:      MOBILITY:  [] Amb    [] Amb/Assist    [x] Bed    [] Wheelchair  [] Stretcher      All Vitals and Treatment Details on Attached [address]: [facility]          Room # 2604/01      [] 1st Time Acute  [] Stat  [] Routine  [] Urgent     [] Acute Room  []  Bedside  [x] ICU/CCU  [] ER   Isolation Precautions:   There are currently no Active Isolations      Special Considerations:         [] Blood Consent Verified []N/A     ALLERGIES:   Allergies   Allergen Reactions    Iodinated Contrast Media Swelling    Lisinopril Swelling               Code Status:Full Code        Hepatitis Status:    2nd RN check:                     Lab Results   Component Value Date/Time    Hepatitis B surface Ag <0.10 07/21/2020 03:30 AM    Hepatitis B surface Ab 31.71 07/21/2020 03:30 AM                     Current Labs:   Lab Results   Component Value Date/Time    Sodium 141 07/23/2020 04:08 AM    Potassium 4.0 07/23/2020 04:08 AM    Chloride 108 07/23/2020 04:08 AM    CO2 24 07/23/2020 04:08 AM    Anion gap 9 07/23/2020 04:08 AM    Glucose 141 (H) 07/23/2020 04:08 AM    BUN 52 (H) 07/23/2020 04:08 AM    Creatinine 5.92 (H) 07/23/2020 04:08 AM    BUN/Creatinine ratio 9 (L) 07/23/2020 04:08 AM    GFR est AA 11 (L) 07/23/2020 04:08 AM    GFR est non-AA 9 (L) 07/23/2020 04:08 AM    Calcium 7.6 (L) 07/23/2020 04:08 AM      Lab Results   Component Value Date/Time    WBC 7.9 07/23/2020 04:08 AM    HGB 8.0 (L) 07/23/2020 04:08 AM    HCT 24.0 (L) 07/23/2020 04:08 AM    PLATELET 382 85/01/5089 04:08 AM    MCV 90.2 07/23/2020 04:08 AM                                                                                     DIET: DIET NPO       PRIMARY NURSE REPORT: First initial/Last name/Title      Pre Dialysis: Avtar Nath RN     Time: [address]      EDUCATION:    [] Patient [] Other         Knowledge Basis: [x]None []Minimal [] Substantial   Barriers to learning  []N/A   [] Access Care     [] S&S of infection     [] Fluid Management     []K+     [x]Procedural    []Albumin     [] Medications     [] Tx Options     [] Transplant     [] Diet     [] Other   Teaching Tools:  [x] Explain  [] Demo  [] Handouts [] Video  Patient response:   [] Verbalized understanding  [] Teach back  [] Return demonstration [x] Requires follow up   Inappropriate due to            [x]1000 Time Out/Safety Check  [x]Extracorporeal Circuit Tested for integrity       RO/HEMODIALYSIS MACHINE SAFETY CHECKS  Before each treatment:     Machine Number:                   [facility]                                  [] Unit Machine #  with centralized RO                                  [] Portable Machine #1/RO serial # [id number]                                  [] Portable Machine #2/RO serial # [id number]                                  [] Portable Machine #4/RO serial # [id number]                                                     [address] [place] [address]                                  [] Portable Machine #11/RO serial # [id number]                                   [] Portable Machine #12/RO serial # [id number]                                  [] Portable Machine #13/RO serial #  [id number]      Alarm Test:  Pass time                [x] RO/Machine Log Complete      Temp    36             Dialysate: pH  7.4 Conductivity: Meter   14.2     HD Machine   14                  TCD: 14  Dialyzer Lot # [id number]            Blood Tubing Lot # [id number]          Saline Lot #  [id number]     CHLORINE TESTING-Before each treatment and every 4 hours    Total Chlorine: [] less than 0.1 ppm  Time: 1025  4 Hr/2nd Check Time:    (if greater than 0.1 ppm from Primary then every 30 minutes from Secondary)     TREATMENT INITIATION  with Dialysis Precautions:   [x] All Connections Secured                 [x] Saline Line Double Clamped   [x] Venous Parameters Set                  [x] Arterial Parameters Set    [x] Prime Given 250ml                          [x]Air Foam Detector Engaged      Treatment Initiation Note:  Received patient in ICU bed awake and confused. Obed soft wrist restrain intact on both arms. VSS. Left femoral trialysis accessed flushes and aspirates well. HD initiated. During Treatment Notes: 8660 Patient tolerating treatment, face and lines visible. Medication Dose Volume Route Time [facility] name Title         RN         RN         RN           RN                   Post Assessment:   Dialyzer Cleared: [x] Good [] Fair  [] Poor  Blood processed:  19.1 L  UF Removed  330 Ml  POst BP:   128/59       Pulse: 92        Respirations: 22  Temperature: 97.6 Lungs:     [] Clear      [x] Course         [] Crackles    [] Wheezing         [] Diminished   Post Tx Vascular Access:   AVF/AVG: Bleeding stopped   Art  min. José Miguel. Min   N/A Cardiac:   [] Regular   [] Irregular   [] Monitor  [] N/A      Rhythm:       Catheter:   Locking solution: Heparin 1:1000   Art.  1.6  José Miguel. 1.6  N/A    Skin:   Pain:    [] Warm  [] Dry [] Diaphoretic    [] Flushed    [] Pale [] Cyanotic []0  []1  []2   []3  []4   []5   []6   []7   []8   []9   []10     Post Treatment Note:   [phone]- Dr. Jori Hodge gave verbal order to terminated treatment and came back later to finish up after running a critical patient. Treatment terminated, patient remains in ICU in stable condition.        POST TREATMENT PRIMARY NURSE HANDOFF REPORT:     First initial/Last name/Title         Post Dialysis: Murali Brar  Time:  1150     Abbreviations: AVG-arterial venous graft, AVF-arterial venous fistula, IJ-Internal Jugular, Subcl-Subclavian, Fem-Femoral, Tx-treatment, AP/HR-apical heart rate, DFR-dialysate flow rate, BFR-blood flow rate, AP-arterial pressure, -venous pressure, UF-ultrafiltrate, TMP-transmembrane pressure, José Miguel-Venous, Art-Arterial, RO-Reverse Osmosis

## 2020-07-23 NOTE — PROGRESS NOTES
Chart reviewed. Plan remains transfer back to [facility]. when medically stable & bed available. If will need out-pt dialysis will need chair. Will cont to follow.   Esther Rouse RN,ext [phone]

## 2020-07-23 NOTE — PROGRESS NOTES
0715 received bedside report from Noam Santana [facility]. Report included the following information SBAR, Kardex, Intake/Output, MAR, Recent Results and Cardiac Rhythm NSR. Pt restless, pulling at lines; increased precedex  0827 Speech therapist at bedside  5507 updated Dr. Hitesh Goyal on pt status; new orders received  1130 pt had small bowel movement; cleaned pt; changed absorbant under pads; pt tolerated well  1153 updated Dr. Joseline Blancas; new orders received  [id number] [phone] [id number] pt removed dressing from trialysis port despite being in restraints; pt agitated and continues to try to pick at lines; applied soft mitt to left hand; admin prn haldol; changed trialysis dressing per protocol; reminded pt not to pull on the line in his groin  1720 repositioned pt in bed; pt tolerated well; VSS  1915 Bedside shift change report given to Alphia Opitz, RN (oncoming nurse) by Emmanuel Bautista RN (offgoing nurse). Report included the following information SBAR, Kardex, Intake/Output, MAR, Recent Results and Cardiac Rhythm NSR/ST.

## 2020-07-23 NOTE — PROGRESS NOTES
Problem: Diabetes Self-Management  Goal: *Disease process and treatment process  Description: Define diabetes and identify own type of diabetes; list 3 options for treating diabetes. Outcome: Progressing Towards Goal  Goal: *Incorporating nutritional management into lifestyle  Description: Describe effect of type, amount and timing of food on blood glucose; list 3 methods for planning meals. Outcome: Progressing Towards Goal  Goal: *Incorporating physical activity into lifestyle  Description: State effect of exercise on blood glucose levels. Outcome: Progressing Towards Goal  Goal: *Developing strategies to promote health/change behavior  Description: Define the ABC's of diabetes; identify appropriate screenings, schedule and personal plan for screenings. Outcome: Progressing Towards Goal  Goal: *Using medications safely  Description: State effect of diabetes medications on diabetes; name diabetes medication taking, action and side effects. Outcome: Progressing Towards Goal  Goal: *Monitoring blood glucose, interpreting and using results  Description: Identify recommended blood glucose targets  and personal targets. Outcome: Progressing Towards Goal  Goal: *Prevention, detection, treatment of acute complications  Description: List symptoms of hyper- and hypoglycemia; describe how to treat low blood sugar and actions for lowering  high blood glucose level. Outcome: Progressing Towards Goal  Goal: *Prevention, detection and treatment of chronic complications  Description: Define the natural course of diabetes and describe the relationship of blood glucose levels to long term complications of diabetes.   Outcome: Progressing Towards Goal  Goal: *Developing strategies to address psychosocial issues  Description: Describe feelings about living with diabetes; identify support needed and support network  Outcome: Progressing Towards Goal  Goal: *Insulin pump training  Outcome: Progressing Towards Goal  Goal: *Sick day guidelines  Outcome: Progressing Towards Goal  Goal: *Patient Specific Goal (EDIT GOAL, INSERT TEXT)  Outcome: Progressing Towards Goal     Problem: Patient Education: Go to Patient Education Activity  Goal: Patient/Family Education  Outcome: Progressing Towards Goal     Problem: Ventilator Management  Goal: *Adequate oxygenation and ventilation  Outcome: Progressing Towards Goal  Goal: *Patient maintains clear airway/free of aspiration  Outcome: Progressing Towards Goal  Goal: *Absence of infection signs and symptoms  Outcome: Progressing Towards Goal  Goal: *Normal spontaneous ventilation  Outcome: Progressing Towards Goal     Problem: Patient Education: Go to Patient Education Activity  Goal: Patient/Family Education  Outcome: Progressing Towards Goal     Problem: Non-Violent Restraints  Goal: *Removal from restraints as soon as assessed to be safe  Outcome: Progressing Towards Goal  Goal: *No harm/injury to patient while restraints in use  Outcome: Progressing Towards Goal  Goal: *Patient's dignity will be maintained  Outcome: Progressing Towards Goal  Goal: *Patient Specific Goal (EDIT GOAL, INSERT TEXT)  Outcome: Progressing Towards Goal  Goal: Non-violent Restaints:Standard Interventions  Outcome: Progressing Towards Goal  Goal: Non-violent Restraints:Patient Interventions  Outcome: Progressing Towards Goal  Goal: Patient/Family Education  Outcome: Progressing Towards Goal     Problem: Falls - Risk of  Goal: *Absence of Falls  Description: Document Jose Fall Risk and appropriate interventions in the flowsheet.   Outcome: Progressing Towards Goal  Note: Fall Risk Interventions:       Mentation Interventions: Door open when patient unattended, Bed/chair exit alarm    Medication Interventions: Bed/chair exit alarm, Evaluate medications/consider consulting pharmacy    Elimination Interventions: Call light in reach, Bed/chair exit alarm              Problem: Patient Education: Go to Patient Education Activity  Goal: Patient/Family Education  Outcome: Progressing Towards Goal     Problem: Pressure Injury - Risk of  Goal: *Prevention of pressure injury  Description: Document Francis Scale and appropriate interventions in the flowsheet.   Outcome: Progressing Towards Goal     Problem: Patient Education: Go to Patient Education Activity  Goal: Patient/Family Education  Outcome: Progressing Towards Goal     Problem: Discharge Planning  Goal: *Discharge to safe environment  Outcome: Progressing Towards Goal     Problem: Nutrition Deficit  Goal: *Optimize nutritional status  Outcome: Progressing Towards Goal     Problem: Patient Education: Go to Patient Education Activity  Goal: Patient/Family Education  Outcome: Progressing Towards Goal     Problem: Delirium Treatment  Goal: *Level of consciousness restored to baseline  Outcome: Progressing Towards Goal  Goal: *Level of environmental perceptions restored to baseline  Outcome: Progressing Towards Goal  Goal: *Sensory perception restored to baseline  Outcome: Progressing Towards Goal  Goal: *Emotional stability restored to baseline  Outcome: Progressing Towards Goal  Goal: *Functional assessment restored to baseline  Outcome: Progressing Towards Goal  Goal: *Absence of falls  Outcome: Progressing Towards Goal  Goal: *Will remain free of delirium, CAM Score negative  Outcome: Progressing Towards Goal  Goal: *Cognitive status will be restored to baseline  Outcome: Progressing Towards Goal  Goal: Interventions  Outcome: Progressing Towards Goal     Problem: Patient Education: Go to Patient Education Activity  Goal: Patient/Family Education  Outcome: Progressing Towards Goal

## 2020-07-23 NOTE — PROGRESS NOTES
RENAL PROGRESS NOTE        Armando Bains         Assessment/Plan:   · MARCI (ischemic atn in a setting of pneumonia/sepsis/resp failure). Started on dialysis 7/21. Will dialyze today and continue tts. Overall hoping for eventual resolution of marci. · Pneumonia/resp failure. Off abx. Extubated 7/21. · S/p  exploratory laparotomy with extensive lysis of adhesions, small bowel resection x2 on 7/7. No sbo per surgery. NPO due to poor mental status. · Hyperphosphatemia. Monitor. Dialysis is addressing. · Anemia. H/H is up with transfusion. Subjective:  Patient complaints off: Lethargic, is agitated on/off.  NPO, faile swallowing eval.     Patient Active Problem List   Diagnosis Code    Diabetes ([facility] E11.9    SBO (small bowel obstruction) ([facility] K56.609    HTN (hypertension) I10    MARCI (acute kidney injury) ([facility] N17.9    Troponin level elevated R79.89    Paroxysmal atrial fibrillation (HCC) I48.0    Aspiration pneumonia ([facility] J69.0    Hypoglycemia P72.3    Metabolic encephalopathy W14.53    Acute respiratory failure with hypoxia and hypercapnia (HCC) J96.01, J96.02       Current Facility-Administered Medications   Medication Dose Route Frequency Provider Last Rate Last Dose    0.9% sodium chloride infusion 250 mL  250 mL IntraVENous PRN Celso Bernardo DO        famotidine (PF) (PEPCID) 40 mg in 0.9% sodium chloride 10 mL injection  40 mg IntraVENous ONCE PRN Celso Bernardo DO        morphine injection 2-4 mg  2-4 mg IntraVENous Q3H PRN Celso Bernardo DO   4 mg at 07/22/20 0237    famotidine (PF) (PEPCID) 20 mg in 0.9% sodium chloride 10 mL injection  20 mg IntraVENous DAILY Abdullahi Rivers MD   20 mg at 07/23/20 0837    ziprasidone (GEODON) 30 mg in sterile water (preservative free) 1.5 mL injection  30 mg IntraMUSCular Q12H PRN Mario العراقي MD   30 mg at 07/22/20 2127    dexmedeTOMidine (PRECEDEX) 400 mcg in 0.9% sodium chloride 100 mL infusion  0.2-0.7 mcg/kg/hr IntraVENous TITRATE Navneet Richmond MD 9.5 mL/hr at 07/23/20 1123 0.4 mcg/kg/hr at 07/23/20 1123    lactulose (CHRONULAC) 10 gram/15 mL solution 60 mL  60 mL Oral BID Navneet Richmond MD   Stopped at 07/22/20 1200    0.9% sodium chloride infusion  50 mL/hr IntraVENous DIALYSIS PRN Cmaille Hassan MD        heparin (porcine) 1,000 unit/mL injection 1,000 Units  1,000 Units InterCATHeter DIALYSIS PRN Camille Hassan MD        haloperidol lactate (HALDOL) injection 2.5 mg  2.5 mg IntraVENous Q6H PRN Lucille BETANCUR, DO   2.5 mg at 07/21/20 2358    insulin glargine (LANTUS) injection 5 Units  5 Units SubCUTAneous DAILY Catalina Barrios, DO   5 Units at 07/23/20 3978    acetaminophen (TYLENOL) solution 650 mg  650 mg Oral Q6H PRN Pelon Donis MD   650 mg at 07/18/20 0438    albuterol-ipratropium (DUO-NEB) 2.5 MG-0.5 MG/3 ML  3 mL Nebulization QID RT Zhanna Crenshaw MD   3 mL at 07/23/20 5203    aspirin chewable tablet 81 mg  81 mg Oral DAILY Zhanna Crenshaw MD   Stopped at 07/22/20 0900    atorvastatin (LIPITOR) tablet 80 mg  80 mg Oral DAILY Zhanna Crenshaw MD   Stopped at 07/22/20 0900    heparin (porcine) injection 5,000 Units  5,000 Units SubCUTAneous Q8H Zhanna Crenshaw MD   5,000 Units at 07/23/20 0331    dextrose 10% infusion 125-250 mL  125-250 mL IntraVENous PRN Zhanna Crenshaw MD        naloxone [facility]) injection 0.4 mg  0.4 mg IntraVENous PRN Zhanna Crenshaw MD       [facility] insulin lispro (HUMALOG) injection   SubCUTAneous Q6H Zhanna Crenshaw MD   2 Units at 07/23/20 6572    glucose chewable tablet 16 g  4 Tab Oral PRN Zhanna Crenshaw MD        glucagon (GLUCAGEN) injection 1 mg  1 mg IntraMUSCular VAHID Crenshaw MD        methylPREDNISolone (PF) (SOLU-MEDROL) injection 40 mg  40 mg IntraVENous Q12H Ben Che MD PALMIRA   40 mg at 07/23/20 0837    lidocaine (XYLOCAINE) 2 % jelly   Mucous Membrane PRN aCio Brito MD        sodium chloride (NS) flush 5-10 mL  5-10 mL IntraVENous PRN Caio Brito MD   10 mL at 07/16/20 1832    sodium chloride (NS) flush 5-40 mL  5-40 mL IntraVENous PRN Jud Gillette MD        ondansetron [facility]) injection 4 mg  4 mg IntraVENous Q6H PRN Jud Gillette MD           Objective  Vitals:    07/23/20 1000 07/23/20 1030 07/23/20 1045 07/23/20 1101   BP: 97/56 125/52 109/86 (!) 172/132   Pulse: 93 85 95 93   Resp: 22 19 19 24   Temp:       SpO2: 100% 100% 100% 99%   Weight:       Height:             Intake/Output Summary (Last 24 hours) at 7/23/2020 1223  Last data filed at 7/23/2020 0900  Gross per 24 hour   Intake 610.05 ml   Output 1 ml   Net 609.05 ml           Admission weight: Weight: 82.6 kg (182 lb) (07/16/20 1505)  Last Weight Metrics:  Weight Loss Metrics 7/22/2020 7/13/2020 1/31/2019   Today's Wt 209 lb 183 lb 13.8 oz 181 lb 9 oz   BMI 32.73 kg/m2 28.8 kg/m2 28.44 kg/m2             Physical Assessment:     General: nad, agitated. Neck: No jvd. LUNGS: diminished air entry at the bases, bl exp rhonchi. CVS EXM: S1, S2  RRR. Abdomen: less distended, slightly  tender in the periumbilical area. Lt femoral temporary dialysis catheter. Lower Extremities: 1+ bl thigh edema.        Lab    CBC w/Diff Recent Labs     07/23/20  0408 07/22/20  2146 07/22/20  0415 07/21/20  0330   WBC 7.9  --  9.4 7.7   RBC 2.66*  --  2.16* 2.49*   HGB 8.0* 6.6* 6.9* 8.0*   HCT 24.0* 20.0* 20.5* 24.7*     --  281 294        Chemistry Recent Labs     07/23/20  0408 07/22/20  0415 07/21/20  0330   * 143* 127*    141 141   K 4.0 3.6 5.2    108 114*   CO2 24 26 18*   BUN 52* 43* 82*   CREA 5.92* 4.88* 7.37*   CA 7.6* 7.3* 7.1*   AGAP 9 7 9   BUCR 9* 9* 11*   ALB 2.1* 2.0* 1.8*   PHOS 6.4* 4.0 8.1*         No results found for: IRON, FE, TIBC, IBCT, PSAT, FERR   Lab Results   Component Value Date/Time    Calcium 7.6 (L) 07/23/2020 04:08 AM    Phosphorus 6.4 (H) 07/23/2020 04:08 AM        Barrie Evans M.D.   [facility]  Phone

## 2020-07-23 NOTE — PROGRESS NOTES
Internal Medicine Progress Note    Patient's Name: Inés Phipps  Admit Date: 7/16/2020  Length of Stay: 7      Assessment/Plan     Active Hospital Problems    Diagnosis Date Noted    Aspiration pneumonia ([facility] 07/16/2020    Hypoglycemia 13/40/7876    Metabolic encephalopathy 55/53/6938    Acute respiratory failure with hypoxia and hypercapnia (HCC) 07/16/2020    Paroxysmal atrial fibrillation ([facility] 07/09/2020    Diabetes ([facility] 07/03/2020    SBO (small bowel obstruction) ([facility] 07/03/2020    HTN (hypertension) 07/03/2020    Troponin level elevated 07/03/2020    MARCI (acute kidney injury) ([facility] 07/03/2020   Sepsis with Septic Shock, present on admission, secondary to RLL PNA    - WBCs WNL, afebrile  - Observe off antibx per ID  - Dialysis per nephro  - Plan dialysis again seth  - Trend H&H  - Transfuse to keep hgb > 7  - No si/sx of active bleeding otherwise  - Appreciate surg, no concern for issues at this time  - Failed speech  - Will place NGT and start TFs until we are able to feed  - Appreciate pulm/CC  - Cont acceptable home medications for chronic conditions   - DVT protocol    I have personally reviewed all pertinent labs and films that have officially resulted over the last 24 hours. I have personally checked for all pending labs that are awaiting final results.     Subjective     Pt s/e @ bedside  No major events overnight other than transfusion  Remains lethargic and confused  Unable to obtain ROS    Objective     Visit Vitals  /73   Pulse 93   Temp 97.2 °F (36.2 °C)   Resp 26   Ht 5' 7\" (1.702 m)   Wt 94.8 kg (209 lb)   SpO2 100%   BMI 32.73 kg/m²       Physical Exam:  General Appearance: NAD, confused  Neck: No JVD, supple  Lungs: B/L rhonchi with normal respiratory effort  CV: RRR, no m/r/g  Abdomen: soft, non-tender, normal bowel sounds  Extremities: no cyanosis, no peripheral edema    Intake and Output:  Current Shift:  07/23 0701 - 07/23 1900  In: 56.2 [I.V.:56.2]  Out: - Last three shifts:  07/21 1901 - 07/23 0700  In: 593.5 [I.V.:138]  Out: 1     Lab/Data Reviewed:  CMP:   Lab Results   Component Value Date/Time     07/23/2020 04:08 AM    K 4.0 07/23/2020 04:08 AM     07/23/2020 04:08 AM    CO2 24 07/23/2020 04:08 AM    AGAP 9 07/23/2020 04:08 AM     (H) 07/23/2020 04:08 AM    BUN 52 (H) 07/23/2020 04:08 AM    CREA 5.92 (H) 07/23/2020 04:08 AM    GFRAA 11 (L) 07/23/2020 04:08 AM    GFRNA 9 (L) 07/23/2020 04:08 AM    CA 7.6 (L) 07/23/2020 04:08 AM    PHOS 6.4 (H) 07/23/2020 04:08 AM    ALB 2.1 (L) 07/23/2020 04:08 AM     CBC:   Lab Results   Component Value Date/Time    WBC 7.9 07/23/2020 04:08 AM    HGB 8.0 (L) 07/23/2020 04:08 AM    HCT 24.0 (L) 07/23/2020 04:08 AM     07/23/2020 04:08 AM       Imaging Reviewed:  No results found.     Medications Reviewed:  Current Facility-Administered Medications   Medication Dose Route Frequency    0.9% sodium chloride infusion 250 mL  250 mL IntraVENous PRN    famotidine (PF) (PEPCID) 40 mg in 0.9% sodium chloride 10 mL injection  40 mg IntraVENous ONCE PRN    morphine injection 2-4 mg  2-4 mg IntraVENous Q3H PRN    famotidine (PF) (PEPCID) 20 mg in 0.9% sodium chloride 10 mL injection  20 mg IntraVENous DAILY    ziprasidone (GEODON) 30 mg in sterile water (preservative free) 1.5 mL injection  30 mg IntraMUSCular Q12H PRN    dexmedeTOMidine (PRECEDEX) 400 mcg in 0.9% sodium chloride 100 mL infusion  0.2-0.7 mcg/kg/hr IntraVENous TITRATE    lactulose (CHRONULAC) 10 gram/15 mL solution 60 mL  60 mL Oral BID    0.9% sodium chloride infusion  50 mL/hr IntraVENous DIALYSIS PRN    heparin (porcine) 1,000 unit/mL injection 1,000 Units  1,000 Units InterCATHeter DIALYSIS PRN    haloperidol lactate (HALDOL) injection 2.5 mg  2.5 mg IntraVENous Q6H PRN    insulin glargine (LANTUS) injection 5 Units  5 Units SubCUTAneous DAILY    acetaminophen (TYLENOL) solution 650 mg  650 mg Oral Q6H PRN    albuterol-ipratropium (DUO-NEB) 2.5 MG-0.5 MG/3 ML  3 mL Nebulization QID RT    aspirin chewable tablet 81 mg  81 mg Oral DAILY    atorvastatin (LIPITOR) tablet 80 mg  80 mg Oral DAILY    heparin (porcine) injection 5,000 Units  5,000 Units SubCUTAneous Q8H    dextrose 10% infusion 125-250 mL  125-250 mL IntraVENous PRN    naloxone (NARCAN) injection 0.4 mg  0.4 mg IntraVENous PRN    insulin lispro (HUMALOG) injection   SubCUTAneous Q6H    glucose chewable tablet 16 g  4 Tab Oral PRN    glucagon (GLUCAGEN) injection 1 mg  1 mg IntraMUSCular PRN    methylPREDNISolone (PF) (SOLU-MEDROL) injection 40 mg  40 mg IntraVENous Q12H    lidocaine (XYLOCAINE) 2 % jelly   Mucous Membrane PRN    sodium chloride (NS) flush 5-10 mL  5-10 mL IntraVENous PRN    sodium chloride (NS) flush 5-40 mL  5-40 mL IntraVENous PRN    ondansetron (ZOFRAN) injection 4 mg  4 mg IntraVENous Q6H PRN           Jewels Shelton DO  Internal Medicine, Hospitalist  Pager: [phone]  [facility]

## 2020-07-23 NOTE — PROGRESS NOTES
Speech Therapy Note:    SLP orders received and attempted at 61 60 57 and 900 however, patient:      [x]  Lethargic, opened eyes briefly but was unable to maintain alertness enough for safe PO trials  []  Refused participation  []  Off the unit  []  NPO for procedure  []  Other:     SLP will f/u later this day or as medically indicated. D/w RN, Fredo Brower.  Thank you for this referral.     Nel Daniels M.S., [address] [facility]  Speech Language Pathologist

## 2020-07-24 NOTE — PROGRESS NOTES
Physical Exam  Skin:           Drip Check:   on Levophed gtt titration in progress. Pt opens eyes with verbal commands. Pale looking. H/H is 4.6/14.2    Unable to obtained PIV. Attempted multiple times. Dr. Milo Duong is made aware. Plan is to give the blood transfusion first .  1000 Levophed is interrupted to give the blood via Dialysis catheter pigtail. First unit of PRBC started. 1140 1st unit completed. 1142 Albumin 50 g IV ; ist bottle of 25 g in 100 ml vial started. 1200 Reassessment done. Pt is opening eyes with verbal stimuli. 1316 2nd unit of PRBC started. Dr. Milo Duong with order to give it fast.    1330 Pt's wife is at bedside visiting ( was allowed by Administration for visitation. 1400 Blood transfusion completed. Pt seems to tolerate it. NGT d/c as ordered by Dr. Milo Duong.    65 Wife at bedside reported that pt pulled out his dialysis catheter. Pt is bleeding on site. Direct pressure applied x 15 mins. No hematoma noted. [id number] Pt's wife went home. 1730 Restless at times. Redirected. Fentanyl 50 mcg IVP given. 1800 Pt is resting at this time. 1857 Pt is climbing out of bed. He removed his chest monitor leads. Pt stated \" he is in a hurry, someone is waiting for him. \"Re orientated and redirected pt. Wrist restraint reapplied. 1920 Bedside and Verbal shift change report given to Kat Rosario (oncoming nurse) by Margo Bai RN   (offgoing nurse).  Report included the following information SBAR, Kardex, Intake/Output, MAR, Recent Results and Cardiac Rhythm SR-ST.

## 2020-07-24 NOTE — PROGRESS NOTES
Patient is unable to communicate at this time.  offered prayer. Chaplains will continue to follow and will provide pastoral care on an as needed/requested basis.     [address] [facility]   Staff [address] [place] [address]   [phone]

## 2020-07-24 NOTE — CONSULTS
GI    I was called about his acute UGI bleed, hypotension and severe acute blood loss anemia. My plan was to proceed with urgent EGD after volume resuscitation but will cancel these plans as spouse has chosen to move him to hospice care. I will be available if needed.     Eugenia Freeman MD

## 2020-07-24 NOTE — PROGRESS NOTES
INTERIM UPDATE - [id number] EST on 7/24/2020    Nursing Staff reports that Patient was administered Ziprasidone, presumptively for his behavior, while in Soft Restraints. Patient's Blood Pressure then dropped to 75/36 mm Hg (MAP 49). Notably, Patient is on Dialysis since 7/21/2020 and, therefore, is not likely making urine and would likely be in greater danger if volume overloaded. Notably, Patient only has Reduced Right Ventricular Systolic Dysfunction on last EKG. Plan:  Initiate IV Norepinephrine gtt. This modality of therapy may only be transiently necessary. HOLD Haloperidol. HOLD Ziprasidone. Hold parameters already on PRN IV Morphine order. Will consult Intensivist this therapeutic agent's course appears to be lengthy.

## 2020-07-24 NOTE — PROGRESS NOTES
Chart reviewed. Noted plan for meeting with family for goals of care, ?end of life. Pt had been @ [facility]. for rehab prior to admit. Will cont to follow for any needs. Esther Rouse RN,ext [phone].

## 2020-07-24 NOTE — PROGRESS NOTES
[facility]  (A Division of [address] [place] [address])    Follow-up Note      Date of Admission: 7/16/2020       Date of Note:  7/24/2020    Summary:    81 y/o Pedro Flores male w/ DM, HTN, PAF, s/p recent SB rsxn adm 7/16 w/ abd pain, tenderness, vomiting.  s/p xlap w/ extensive DONNA and SB rsxn x 2 on 7/7 by Dr. Ilene Damon. Trans to [facility] 7/13,  then diffuse abd pain, tenderness,vomiting so sent back to [facility] 7/16. CTAP: prominent gastric distention, SB dilatation extending to RLQ SB anastamosis concerning for high-grade ileus vs developing SBO. Also new RLL consolidation. In ED became unresponsive, required intubation. NTpro BNP 2,716. Admitted 7/16 on Zosyn,clindamycin, latter stopped 7/17. Initially febrile 102.8 with leukocytosis 21.6 on 7/17, both resolved on zosyn alone. SARS CoV 2 PCR 7/17 negative but worsening creatinine reaching 6.7 7/20, NT pro BNP 19,075. Temporary dialysis catheter placed in L CFV. Now with UGIB Hgb 4.6         Interval History:  Became hypotensive and Hgb dropped to 4.6. NGT placed with coffee ground drainage. Patient is obtunded, not responding to questions or commands. Very pale appearing. Receiving PRBC transfusion at present.        Current Antimicrobials:    Prior Antimicrobials:  None 7/23 - 1 Clindamycin 7/16 -1  Zosyn 7/16 - 7       Assessment: Rec / Plan:   New UGIB  - Coffee-ground drainage per NGT  - Hgb 4.6  - receiving PRBC  - prognosis poor -> transfusion per others   Sepsis  - fever, tachycardia, tachypnea, leukocytosis present on admission  - due to pneumonia, ileus   - blcx 7/16 NGTD x 2, 7/18 NGTD x 2  - resolving -> monitor off abx   New RL lung consolidation  - seen on CTAP 7/16  - likely aspiration pneumonia  - well covered with zosyn -> off abx as above   Acute hypoxic respiratory failure  - intubated 7/16 extubated 7/21 -> monitor   Shock  - septic, hypovolemic  - off pressors -> monitor   MARCI  - due to above  - cr 6.7 today  - Temporary dialysis catheter placed today 7/20 -> dialysis per Renal   High grade ileus vs developing SBO  - on CTAP 7/16    SBO due to adhesions  - s/p Ex lap w/ extensive DONNA, SB rsxn x 2 7/7 by Dr. Tono Stack    DM    HTN    PAF        Microbiology:       7/16      blcx NG x 2  7/18      blcx NGTD x 2     Lines / Catheters:     piv  LCFV dialysis cath      Patient Active Problem List   Diagnosis Code    Diabetes ([facility] E11.9    SBO (small bowel obstruction) ([facility] K56.609    HTN (hypertension) I10    MARCI (acute kidney injury) ([facility] N17.9    Troponin level elevated R79.89    Paroxysmal atrial fibrillation (HCC) I48.0    Aspiration pneumonia (HCC) J69.0    Hypoglycemia U23.5    Metabolic encephalopathy U26.03    Acute respiratory failure with hypoxia and hypercapnia (HCC) J96.01, J96.02       Current Facility-Administered Medications   Medication Dose Route Frequency    NOREPINephrine (LEVOPHED) 8 mg in 0.9% NS 250ml infusion  0.5-30 mcg/min IntraVENous TITRATE    pantoprazole (PROTONIX) 40 mg in 0.9% sodium chloride 10 mL injection  40 mg IntraVENous Q12H    0.9% sodium chloride infusion  75 mL/hr IntraVENous CONTINUOUS    0.9% sodium chloride infusion 250 mL  250 mL IntraVENous PRN    albumin human 25% (BUMINATE) solution 50 g  50 g IntraVENous ONCE    0.9% sodium chloride infusion 250 mL  250 mL IntraVENous PRN    morphine injection 2-4 mg  2-4 mg IntraVENous Q3H PRN    [Held by provider] ziprasidone (GEODON) 30 mg in sterile water (preservative free) 1.5 mL injection  30 mg IntraMUSCular Q12H PRN    dexmedeTOMidine (PRECEDEX) 400 mcg in 0.9% sodium chloride 100 mL infusion  0.2-0.7 mcg/kg/hr IntraVENous TITRATE    lactulose (CHRONULAC) 10 gram/15 mL solution 60 mL  60 mL Oral BID    0.9% sodium chloride infusion  50 mL/hr IntraVENous DIALYSIS PRN    heparin (porcine) 1,000 unit/mL injection 1,000 Units  1,000 Units InterCATHeter DIALYSIS PRN    [Held by provider] haloperidol lactate (HALDOL) injection 2.5 mg  2.5 mg IntraVENous Q6H PRN    insulin glargine (LANTUS) injection 5 Units  5 Units SubCUTAneous DAILY    acetaminophen (TYLENOL) solution 650 mg  650 mg Oral Q6H PRN    albuterol-ipratropium (DUO-NEB) 2.5 MG-0.5 MG/3 ML  3 mL Nebulization QID RT    aspirin chewable tablet 81 mg  81 mg Oral DAILY    atorvastatin (LIPITOR) tablet 80 mg  80 mg Oral DAILY    dextrose 10% infusion 125-250 mL  125-250 mL IntraVENous PRN    naloxone (NARCAN) injection 0.4 mg  0.4 mg IntraVENous PRN    insulin lispro (HUMALOG) injection   SubCUTAneous Q6H    glucose chewable tablet 16 g  4 Tab Oral PRN    glucagon (GLUCAGEN) injection 1 mg  1 mg IntraMUSCular PRN    methylPREDNISolone (PF) (SOLU-MEDROL) injection 40 mg  40 mg IntraVENous Q12H    lidocaine (XYLOCAINE) 2 % jelly   Mucous Membrane PRN    sodium chloride (NS) flush 5-10 mL  5-10 mL IntraVENous PRN    sodium chloride (NS) flush 5-40 mL  5-40 mL IntraVENous PRN    ondansetron (ZOFRAN) injection 4 mg  4 mg IntraVENous Q6H PRN         Review of Systems - Negative except as in interval history       Objective:    Visit Vitals  BP 95/51   Pulse (!) 116   Temp 98.4 °F (36.9 °C)   Resp (!) 32   Ht 5' 7\" (1.702 m)   Wt 93.8 kg (206 lb 14.4 oz)   SpO2 100%   BMI 32.41 kg/m²       Temp (24hrs), Av.2 °F (36.8 °C), Min:97.2 °F (36.2 °C), Max:98.9 °F (37.2 °C)      General: Well developed, obese, very pale 80 y.o. [place] male in obtunded with agonal breathing   ENT: NGT with coffee-ground drainage  Head: normocephalic, without obvious abnormality  Mouth:  not examined  Neck: supple, symmetrical, trachea midline   Cardio:  regular rate and rhythm, S1, S2 normal, no murmur, click, rub or gallop  Chest: inspection normal - no chest wall deformities or tenderness  Lungs: expiratory rhonchi and decreased breath sounds  Abdomen: soft, non-tender. Bowel sounds normal. No masses, no organomegaly.   Extremities:  no redness or tenderness in the calves or thighs, thigh, LE edema 1+       Lab results:    Chemistry  Recent Labs     07/24/20  0355 07/23/20  0408 07/22/20  0415   * 141* 143*    141 141   K 4.4 4.0 3.6    108 108   CO2 26 24 26   BUN 45* 52* 43*   CREA 5.07* 5.92* 4.88*   CA 7.4* 7.6* 7.3*   AGAP 8 9 7   BUCR 9* 9* 9*   ALB 2.2* 2.1* 2.0*       CBC w/ Diff  Recent Labs     07/24/20  0355 07/23/20  0408 07/22/20 2146 07/22/20 0415   WBC 10.2 7.9  --  9.4   RBC 1.56* 2.66*  --  2.16*   HGB 4.6* 8.0* 6.6* 6.9*   HCT 14.2* 24.0* 20.0* 20.5*    193  --  281       Microbiology  All Micro Results     Procedure Component Value Units Date/Time    CULTURE, BLOOD [[id number]] Collected:  07/18/20 1745    Order Status:  Completed Specimen:  Blood Updated:  07/24/20 0513     Special Requests: NO SPECIAL REQUESTS        Culture result: NO GROWTH 6 DAYS       CULTURE, BLOOD [[id number]] Collected:  07/18/20 1749    Order Status:  Completed Specimen:  Blood Updated:  07/24/20 0513     Special Requests: NO SPECIAL REQUESTS        Culture result: NO GROWTH 6 DAYS       CULTURE, BLOOD [[id number]] Collected:  07/16/20 1500    Order Status:  Completed Specimen:  Blood Updated:  07/22/20 0521     Special Requests: PERIPHERAL        Culture result: NO GROWTH 6 DAYS       CULTURE, BLOOD [[id number]] Collected:  07/16/20 1441    Order Status:  Completed Specimen:  Blood Updated:  07/22/20 0521     Special Requests: PERIPHERAL        Culture result: NO GROWTH 6 DAYS                Bette Vaughan MD, [facility]  Infectious Diseases  [phone]   7/24/2020   1:41 PM

## 2020-07-24 NOTE — HOSPICE
Pt/family pursuing hospice:yes   Admission dx approved by Hospice Medical Director: respiratory failure    Anticipated hospital d/c date:7/25/20 Discussion occurred with patient and/or family abou  t preference of hospitalization once admitted to hospice: yes   Reviewed and discussed hospice philosophy and keeping the patient comfortable in their residence: yes (Document additional information below)  Discussion with patient/family regarding around the clock caregiver in place due to patient safety in the home once discharged: yes   Identified caregiver: (Document specifics discussed and/or any caregiver concerns identified).      Narrative of events and/or assessment if applicable:   DME Christian[facility]) via portal [facility] bed w/ full rails and gel overlay, bedside table, 10L oxygen concentrator  scheduled to be delivered tomorrow between 9-11am conf# [id number]    Veronika Sierra, [address] [place] [address], [address] [place] [address], [address] [place] [address] Str.  [phone]  Email: [email or web address]

## 2020-07-24 NOTE — PROGRESS NOTES
Speech Therapy Note:    SLP following up on eval orders. After dw RN, Elier Juarez, pt not appropriate at this time. Pt currently with NG TF in place for nutrition. Also concerns of GI issues. Will d/c orders accordingly. Available for evaluation when appropriate.      Liliya Randolph M.S., [address] [facility]   Ph: [phone]

## 2020-07-24 NOTE — PROGRESS NOTES
Informed by ICU nurse, Delonte Momin, that plan is for hospice. Spoke with Dr. Merritt Doherty & made him aware of above, he placed hospice consult in. Went to ICU, pt's wife @ bedside. Met with pt & wife, she would like him home tonight with hospice, made her aware that due to late hour that it won't likely happen until tomorrow. Asked me to please try. Called hospice & made them aware of above. Received call from hospice, spoke with Rashad Mail, states she can get the equipment in tonight & get him transported home in AM, states takes at least 4hrs once the order is submitted. Transport set up for tomorrow 7/24/2020 on will call. Completed PCS form on unit. Esther Rouse RN,ext [phone].     [phone]:  Noted hospice note stating DME will be delivered in AM, requesting transport set for . Called [facility] & transport set up for 1200. Esther Rouse RN,ext [phone].      Care Management Interventions  Palliative Care Criteria Met (RRAT>21 & CHF Dx)?: No  Mode of Transport at Discharge: BLS  Transition of Care Consult (CM Consult): Carolyn: Yes  Current Support Network: Lives with Spouse, Other(was at [facility], [facility]. for snf when got sick)  Confirm Follow Up Transport: Other (see comment)  The Plan for Transition of Care is Related to the Following Treatment Goals : resolution of acute goals  Discharge Location  Discharge Placement: Home with hospice([place] [address] [employer],[address])

## 2020-07-24 NOTE — HOSPICE
Spoke with Kemal Guzman, pt's wife via phone   Discussed [employer] philosophy, services, criteria, and IDT. Discussed caregiver need for round the clock care with Kemal Guzman  primary caregiver identified as Kemal Guzman  Caregiver concerns identified as n/a   Kemal Guzman states their daughter will be here tonight to assist with care. She is going to move some things out of the spare room to make space for the hospital bed and oxygen. DME will be delivered tomorrow morning between 9-11am.  Transport requested 12-1pm.    Answered all questions. Provided  with 24/7 contact information. Thank you for the referral to [employer]. If we can be of further assistance please contact [phone]. Hospice referral received. Chart review in process. Thank you for the referral to [employer].  If we can be of further assistance please contact [address] [place], [address] [place] [address] [address] [facility], [address] [place] [address] Str.  [phone]  Email: [email or web address]

## 2020-07-24 NOTE — PROGRESS NOTES
RENAL PROGRESS NOTE        Armando Bains         Assessment/Plan:   · MARCI (ischemic atn in a setting of pneumonia/sepsis/resp failure). Started on dialysis 7/21. Dialyzedy yesterday (dialysis was interrupted due to nurse having to respond to emergency and then stopped early due to hypotension). Electrolytes are stable, next dialysis tomorrow if family wants to continue with full spectrum of care (plans for palliative care meeting noted). · Pneumonia/resp failure. Off abx. Extubated 7/21. · S/p  exploratory laparotomy with extensive lysis of adhesions, small bowel resection x2 on 7/7. No sbo per surgery. NPO due to poor mental status. · UGI bleeding this am/hemorrhagic shock. Getting transfused this am. On pressors. · Hyperphosphatemia. Monitor. Dialysis is addressing. Subjective:  Patient complaints off: Lethargic. On levophed. New onset UGI bleeding this am with coffee ground aspirate from ng tube.      Patient Active Problem List   Diagnosis Code    Diabetes ([facility] [address] E11.9    SBO (small bowel obstruction) ([facility] [address] K56.609    HTN (hypertension) I10    MARCI (acute kidney injury) ([facility] [address] N17.9    Troponin level elevated R79.89    Paroxysmal atrial fibrillation (HCC) I48.0    Aspiration pneumonia ([facility] [address] J69.0    Hypoglycemia R40.6    Metabolic encephalopathy P06.17    Acute respiratory failure with hypoxia and hypercapnia (HCC) J96.01, J96.02       Current Facility-Administered Medications   Medication Dose Route Frequency Provider Last Rate Last Dose    NOREPINephrine (LEVOPHED) 8 mg in 0.9% NS 250ml infusion  0.5-30 mcg/min IntraVENous TITRATE Celso Bernardo, DO 15 mL/hr at 07/24/20 0702 8 mcg/min at 07/24/20 0702    pantoprazole (PROTONIX) 40 mg in 0.9% sodium chloride 10 mL injection  40 mg IntraVENous Q12H Ethelecasey BETANCUR, DO   40 mg at 07/24/20 0919    0.9% sodium chloride infusion  75 mL/hr IntraVENous CONTINUOUS Veronica Sinks H, DO   Stopped at 07/24/20 1004    0.9% sodium chloride infusion 250 mL  250 mL IntraVENous PRN Pamela Reading, DO 15 mL/hr at 07/24/20 1004 15 mL/hr at 07/24/20 1004    albumin human 25% (BUMINATE) solution 50 g  50 g IntraVENous Michael Bae MD        0.9% sodium chloride infusion 250 mL  250 mL IntraVENous PRN Temitope Ramon, DO        morphine injection 2-4 mg  2-4 mg IntraVENous Q3H PRN Kasey MISTRY, DO   2 mg at 07/23/20 2321    [Held by provider] ziprasidone (GEODON) 30 mg in sterile water (preservative free) 1.5 mL injection  30 mg IntraMUSCular Q12H PRN Asher Loredo MD   30 mg at 07/24/20 0121    dexmedeTOMidine (PRECEDEX) 400 mcg in 0.9% sodium chloride 100 mL infusion  0.2-0.7 mcg/kg/hr IntraVENous TITRATE Asher Loredo MD   Stopped at 07/24/20 0204    lactulose (CHRONULAC) 10 gram/15 mL solution 60 mL  60 mL Oral BID Asher Loredo MD   Stopped at 07/24/20 0900    0.9% sodium chloride infusion  50 mL/hr IntraVENous DIALYSIS PRN Ginette Taylor MD        heparin (porcine) 1,000 unit/mL injection 1,000 Units  1,000 Units InterCATHeter DIALYSIS PRN Ginette Taylor MD        [Held by provider] haloperidol lactate (HALDOL) injection 2.5 mg  2.5 mg IntraVENous Q6H PRN Veronica Sinks H, DO   2.5 mg at 07/23/20 1427    insulin glargine (LANTUS) injection 5 Units  5 Units SubCUTAneous DAILY Veronica Sinks H, DO   5 Units at 07/24/20 0919    acetaminophen (TYLENOL) solution 650 mg  650 mg Oral Q6H PRN Girish Hernandez MD   650 mg at 07/18/20 0438    albuterol-ipratropium (DUO-NEB) 2.5 MG-0.5 MG/3 ML  3 mL Nebulization QID RT Cheryl Hubbard MD   3 mL at 07/24/20 0941    aspirin chewable tablet 81 mg  81 mg Oral DAILY Cheryl Hubbard MD   Stopped at 07/22/20 0900    atorvastatin (LIPITOR) tablet 80 mg  80 mg Oral DAILY Cheryl Hubbard MD   Stopped at 07/22/20 0900    dextrose 10% infusion 125-250 mL  125-250 mL IntraVENous PRN Aj Mckee MD        naloxone [facility]) injection 0.4 mg  0.4 mg IntraVENous PRN Aj Mckee MD        insulin lispro (HUMALOG) injection   SubCUTAneous Q6H Aj Mckee MD   2 Units at 07/24/20 0659    glucose chewable tablet 16 g  4 Tab Oral PRN Aj Mckee MD        glucagon (GLUCAGEN) injection 1 mg  1 mg IntraMUSCular PRN Aj Mckee MD        methylPREDNISolone (PF) (SOLU-MEDROL) injection 40 mg  40 mg IntraVENous Q12H Esha Avery MD   40 mg at 07/24/20 0920    lidocaine (XYLOCAINE) 2 % jelly   Mucous Membrane PRN Aj Mckee MD        sodium chloride (NS) flush 5-10 mL  5-10 mL IntraVENous PRN Aj Mckee MD   10 mL at 07/16/20 1832    sodium chloride (NS) flush 5-40 mL  5-40 mL IntraVENous PRN Esha Avery MD        ondansetron [facility]) injection 4 mg  4 mg IntraVENous Q6H PRN Esha Avery MD           Objective  Vitals:    07/24/20 1015 07/24/20 1030 07/24/20 1045 07/24/20 1100   BP: (!) 86/45 91/44 (!) 89/44 92/40   Pulse: (!) 120 (!) 123 (!) 120 (!) 107   Resp: 27 27 28 28   Temp: 98.5 °F (36.9 °C) 97.5 °F (36.4 °C)  98.3 °F (36.8 °C)   TempSrc:       SpO2:   100%    Weight:       Height:             Intake/Output Summary (Last 24 hours) at 7/24/2020 1131  Last data filed at 7/24/2020 1000  Gross per 24 hour   Intake 176.06 ml   Output 800 ml   Net -623.94 ml           Admission weight: Weight: 82.6 kg (182 lb) (07/16/20 1505)  Last Weight Metrics:  Weight Loss Metrics 7/24/2020 7/13/2020 1/31/2019   Today's Wt 206 lb 14.4 oz 183 lb 13.8 oz 181 lb 9 oz   BMI 32.41 kg/m2 28.8 kg/m2 28.44 kg/m2             Physical Assessment:     General: tachepneic, lethargic. Neck: No jvd. LUNGS: diminished air entry at the bases, bl exp rhonchi. CVS EXM: S1, S2  RRR. Abdomen: less distended, slightly  tender in the periumbilical area.    Lt femoral temporary dialysis catheter. Lower Extremities: 1+ bl thigh edema. Lab    CBC w/Diff Recent Labs     07/24/20  0355 07/23/20  0408 07/22/20 2146 07/22/20 0415   WBC 10.2 7.9  --  9.4   RBC 1.56* 2.66*  --  2.16*   HGB 4.6* 8.0* 6.6* 6.9*   HCT 14.2* 24.0* 20.0* 20.5*    193  --  281        Chemistry Recent Labs     07/24/20  0355 07/23/20  0408 07/22/20 0415   * 141* 143*    141 141   K 4.4 4.0 3.6    108 108   CO2 26 24 26   BUN 45* 52* 43*   CREA 5.07* 5.92* 4.88*   CA 7.4* 7.6* 7.3*   AGAP 8 9 7   BUCR 9* 9* 9*   ALB 2.2* 2.1* 2.0*   PHOS 6.4* 6.4* 4.0         No results found for: IRON, FE, TIBC, IBCT, PSAT, FERR   Lab Results   Component Value Date/Time    Calcium 7.4 (L) 07/24/2020 03:55 AM    Phosphorus 6.4 (H) 07/24/2020 03:55 AM        Narendra Estrada M.D.   [facility]  Phone

## 2020-07-24 NOTE — PROGRESS NOTES
[facility] Patient is awake, agitated, dialysis at bedside, BP in the lower side, albumin was given by dialysis nurse . BP improved. Will continue to monitor. Patient's daughter Michael Millard enquired about hospice care for her dad and if her mom could come visit even just once for her to be able to visualize how the patient looked. Informed daughter that the hospital is not currently allowing any visitors at this time but will  Discuss the matter with management in the morning. Daughter verbalized understanding. Called patient's wife     Called and talked to Dr. Bear Monday for orders to use NGT. Written orders that may use NGT.     0000> VSS  0120> Patient is so agitated despite precedex drip. Geodon given at this time    0200> Hypotension noted. 0240> Informed Dr. Bear Monday about hypotension. Orders received. 0725> Lab called informing that H/H critically low, redrawn h/h.     0740> Bedside and Verbal shift change report given to Prosper Cantu RN (oncoming nurse) by Suri Palencia RN (offgoing nurse). Report included the following information SBAR, Kardex, Intake/Output, MAR, Recent Results and Cardiac Rhythm Sinus tach. 0755> Critical h/h = 4.6/14.2. Informed Prosper Cantu RN. Paged Dr. Daniel Khanna. Awaiting for call back. 0850> Dr. Daniel Khanna called back. Orders received.  Informed Prosper Cantu RN

## 2020-07-24 NOTE — PROGRESS NOTES
Problem: Diabetes Self-Management  Goal: *Disease process and treatment process  Description: Define diabetes and identify own type of diabetes; list 3 options for treating diabetes. Outcome: Progressing Towards Goal  Goal: *Incorporating nutritional management into lifestyle  Description: Describe effect of type, amount and timing of food on blood glucose; list 3 methods for planning meals. Outcome: Progressing Towards Goal  Goal: *Incorporating physical activity into lifestyle  Description: State effect of exercise on blood glucose levels. Outcome: Progressing Towards Goal  Goal: *Developing strategies to promote health/change behavior  Description: Define the ABC's of diabetes; identify appropriate screenings, schedule and personal plan for screenings. Outcome: Progressing Towards Goal  Goal: *Using medications safely  Description: State effect of diabetes medications on diabetes; name diabetes medication taking, action and side effects. Outcome: Progressing Towards Goal  Goal: *Monitoring blood glucose, interpreting and using results  Description: Identify recommended blood glucose targets  and personal targets. Outcome: Progressing Towards Goal  Goal: *Prevention, detection, treatment of acute complications  Description: List symptoms of hyper- and hypoglycemia; describe how to treat low blood sugar and actions for lowering  high blood glucose level. Outcome: Progressing Towards Goal  Goal: *Prevention, detection and treatment of chronic complications  Description: Define the natural course of diabetes and describe the relationship of blood glucose levels to long term complications of diabetes.   Outcome: Progressing Towards Goal  Goal: *Developing strategies to address psychosocial issues  Description: Describe feelings about living with diabetes; identify support needed and support network  Outcome: Progressing Towards Goal  Goal: *Insulin pump training  Outcome: Progressing Towards Goal  Goal: *Sick day guidelines  Outcome: Progressing Towards Goal  Goal: *Patient Specific Goal (EDIT GOAL, INSERT TEXT)  Outcome: Progressing Towards Goal     Problem: Patient Education: Go to Patient Education Activity  Goal: Patient/Family Education  Outcome: Progressing Towards Goal     Problem: Patient Education: Go to Patient Education Activity  Goal: Patient/Family Education  Outcome: Progressing Towards Goal     Problem: Non-Violent Restraints  Goal: *Removal from restraints as soon as assessed to be safe  Outcome: Progressing Towards Goal  Goal: *No harm/injury to patient while restraints in use  Outcome: Progressing Towards Goal  Goal: *Patient's dignity will be maintained  Outcome: Progressing Towards Goal  Goal: *Patient Specific Goal (EDIT GOAL, INSERT TEXT)  Outcome: Progressing Towards Goal  Goal: Non-violent Restaints:Standard Interventions  Outcome: Progressing Towards Goal  Goal: Non-violent Restraints:Patient Interventions  Outcome: Progressing Towards Goal  Goal: Patient/Family Education  Outcome: Progressing Towards Goal     Problem: Falls - Risk of  Goal: *Absence of Falls  Description: Document Jose Fall Risk and appropriate interventions in the flowsheet.   Outcome: Progressing Towards Goal  Note: Fall Risk Interventions:       Mentation Interventions: Adequate sleep, hydration, pain control, Door open when patient unattended, Familiar objects from home, More frequent rounding, Reorient patient, Room close to nurse's station    Medication Interventions: Bed/chair exit alarm, Evaluate medications/consider consulting pharmacy    Elimination Interventions: Bed/chair exit alarm, Toileting schedule/hourly rounds              Problem: Patient Education: Go to Patient Education Activity  Goal: Patient/Family Education  Outcome: Progressing Towards Goal     Problem: Pressure Injury - Risk of  Goal: *Prevention of pressure injury  Description: Document Francis Scale and appropriate interventions in the flowsheet.   Outcome: Progressing Towards Goal     Problem: Patient Education: Go to Patient Education Activity  Goal: Patient/Family Education  Outcome: Progressing Towards Goal     Problem: Discharge Planning  Goal: *Discharge to safe environment  Outcome: Progressing Towards Goal     Problem: Nutrition Deficit  Goal: *Optimize nutritional status  Outcome: Progressing Towards Goal     Problem: Patient Education: Go to Patient Education Activity  Goal: Patient/Family Education  Outcome: Progressing Towards Goal     Problem: Delirium Treatment  Goal: *Level of consciousness restored to baseline  Outcome: Progressing Towards Goal  Goal: *Level of environmental perceptions restored to baseline  Outcome: Progressing Towards Goal  Goal: *Sensory perception restored to baseline  Outcome: Progressing Towards Goal  Goal: *Emotional stability restored to baseline  Outcome: Progressing Towards Goal  Goal: *Functional assessment restored to baseline  Outcome: Progressing Towards Goal  Goal: *Absence of falls  Outcome: Progressing Towards Goal  Goal: *Will remain free of delirium, CAM Score negative  Outcome: Progressing Towards Goal  Goal: *Cognitive status will be restored to baseline  Outcome: Progressing Towards Goal  Goal: Interventions  Outcome: Progressing Towards Goal     Problem: Patient Education: Go to Patient Education Activity  Goal: Patient/Family Education  Outcome: Progressing Towards Goal

## 2020-07-24 NOTE — PROGRESS NOTES
[address] [facility] [phone]  [facility] [phone]    Palliative spoke with ICU Clinical Coordinator Svetlana Desai. Received update that wife decided after in person visit on converting patient to comfort measures and plans on d/c home with hospice services. No further palliative interventions needed at this time. Will sign off. Thank you for the opportunity to assist in the care of Mr. Giulia Carter.    Debbie Corona MSW, APHSW-C  Palliative Medicine

## 2020-07-24 NOTE — PROGRESS NOTES
ICU PROGRESS NOTE:                                              HPI:    Mr. Earle Patel is an 80year-old male patient with a Past Medical History of DM2, HNT, atrial fibrillation and SBO with Surgery on 7/03/20, who was readmitted on 7/16/20 with another episode of SBO, which this time was successfully treatment with conservative management. Patient developed MARCI this admission and he is now on HD with good tolerance. Today, 7/24/20, patient developed GIB. We are transfusing 2 units of PRBCs and starting him on Pantroprazole 40 mg IV twice daily. There is an end of life discussion planned for today. Depending on the outcome of that conversation, we might call a GI consult. 7/24/2020   See above. Patient was extubated on 7/21/20 at 4 PM. Respiratory wise, he did very well. He later that day became agitated and two stiches from his surgical wound broke loose. He removed lines. Finally, Geodon 30 mg IM took care of his agitation. He remains extubated. Patient Active Problem List   Diagnosis Code    Diabetes ([facility] E11.9    SBO (small bowel obstruction) ([facility] [address] K56.609    HTN (hypertension) I10    MARCI (acute kidney injury) ([facility] [address] N17.9    Troponin level elevated R79.89    Paroxysmal atrial fibrillation (HCC) I48.0    Aspiration pneumonia ([address] J69.0    Hypoglycemia H68.1    Metabolic encephalopathy K91.43    Acute respiratory failure with hypoxia and hypercapnia (HCC) J96.01, J96.02     Medication Reviewed: Allergies   Allergen Reactions    Iodinated Contrast Media Swelling    Lisinopril Swelling      Past Medical History:   Diagnosis Date    Diabetes ([facility] [address]     Hypertension       Prior to Admission medications    Medication Sig Start Date End Date Taking? Authorizing Provider   metoprolol tartrate (LOPRESSOR) 25 mg tablet Take 1 Tab by mouth every twelve (12) hours. 7/13/20   Iris Birch PA   aspirin 81 mg chewable tablet Take 1 Tab by mouth daily.  7/13/20   Iris Birch PA Cetirizine 10 mg cap Take  by mouth. Other, MD Kiah   benzonatate (TESSALON) 100 mg capsule Take 100 mg by mouth three (3) times daily as needed for Cough. Kiah Chaves MD   atorvastatin (LIPITOR) 80 mg tablet Take 80 mg by mouth daily. Provider, Historical   glipiZIDE (GLUCOTROL) 10 mg tablet Take 10 mg by mouth two (2) times a day. Provider, Historical   hydroCHLOROthiazide (HYDRODIURIL) 25 mg tablet Take 25 mg by mouth daily. Provider, Historical   albuterol (PROVENTIL HFA) 90 mcg/actuation inhaler Take 1 Puff by inhalation daily. Provider, Historical     Current Facility-Administered Medications   Medication Dose Route Frequency    NOREPINephrine (LEVOPHED) 8 mg in 0.9% NS 250ml infusion  0.5-30 mcg/min IntraVENous TITRATE    pantoprazole (PROTONIX) 40 mg in 0.9% sodium chloride 10 mL injection  40 mg IntraVENous Q12H    0.9% sodium chloride infusion  75 mL/hr IntraVENous CONTINUOUS    albumin human 25% (BUMINATE) solution 50 g  50 g IntraVENous ONCE    dexmedeTOMidine (PRECEDEX) 400 mcg in 0.9% sodium chloride 100 mL infusion  0.2-0.7 mcg/kg/hr IntraVENous TITRATE    lactulose (CHRONULAC) 10 gram/15 mL solution 60 mL  60 mL Oral BID    insulin glargine (LANTUS) injection 5 Units  5 Units SubCUTAneous DAILY    albuterol-ipratropium (DUO-NEB) 2.5 MG-0.5 MG/3 ML  3 mL Nebulization QID RT    aspirin chewable tablet 81 mg  81 mg Oral DAILY    atorvastatin (LIPITOR) tablet 80 mg  80 mg Oral DAILY    insulin lispro (HUMALOG) injection   SubCUTAneous Q6H    methylPREDNISolone (PF) (SOLU-MEDROL) injection 40 mg  40 mg IntraVENous Q12H       Lines: All central lines examined by me. No signs of erythema, induration, discharge.      Central Venous Catheter:  Triple Lumen 07/16/20 Right Subclavian (Active)   Central Line Being Utilized Yes 07/18/20 0400   Criteria for Appropriate Use Hemodynamically unstable, requiring monitoring lines, vasopressors, or volume resuscitation 07/18/20 0400 Site Assessment Clean, dry, & intact 20 0400   Infiltration Assessment 0 20 0400   Affected Extremity/Extremities Color distal to insertion site pink (or appropriate for race) 20 0400   Date of Last Dressing Change 20 0400   Dressing Status Clean, dry, & intact 20 0400   Dressing Type Tape;Transparent 20 0400   Action Taken Open ports on tubing capped 20 0400   Proximal Hub Color/Line Status White;Capped 20 0400   Positive Blood Return (Medial Site) Yes 20 0400   Medial Hub Color/Line Status Blue; Infusing 20 0400   Positive Blood Return (Lateral Site) Yes 20 0400   Distal Hub Color/Line Status Brown; Infusing 20 0400   Positive Blood Return (Site #3) Yes 20 0400   Alcohol Cap Used Yes 20 0400      Drain(s):  Orogastric Tube 20 (Active)   Site Assessment Clean, dry, & intact 20 0400   Securement Device Tape 20 0400   G Port Status Clamped 20 0400   External Insertion Guido (cms) 60 cms 20 0400   Drainage Description Green 20 0000     Airway:  Airway - Endotracheal Tube 20 Oral (Active)   Insertion Depth (cm) 25 cm 20 0455   Line Guido Lips 20 0455   Side Secured Centered 20 0455   Cuff Pressure 28 cmH20 20 0455   Site Assessment Clean, dry, & intact 20 0455       Objective:  Vital Signs:    Visit Vitals  BP 95/51   Pulse (!) 116   Temp 98.4 °F (36.9 °C)   Resp (!) 32   Ht 5' 7\" (1.702 m)   Wt 93.8 kg (206 lb 14.4 oz)   SpO2 100%   BMI 32.41 kg/m²      O2 Device: Nasal cannula  O2 Flow Rate (L/min): 2 l/min  Temp (24hrs), Av.2 °F (36.8 °C), Min:97.2 °F (36.2 °C), Max:98.9 °F (37.2 °C)      Intake/Output:   Last shift:      No intake/output data recorded.     Last 3 shifts: 1901 -  0700  In: 561.3 [I.V.:349.6]  Out: 801       Intake/Output Summary (Last 24 hours) at 2020 1051  Last data filed at 2020 0700  Gross per 24 hour   Intake 185.56 ml   Output 800 ml   Net -614.44 ml       Last 3 Recorded Weights in this Encounter    07/21/20 0634 07/22/20 0819 07/24/20 0600   Weight: 90.1 kg (198 lb 9.1 oz) 94.8 kg (209 lb) 93.8 kg (206 lb 14.4 oz)       Ventilator Settings:  Mode Rate Tidal Volume Pressure FiO2 PEEP  Pressure support, Spontaneous   500 ml  7 cm H2O 32 % 5 cm H20    Peak airway pressure: 13 cm H2O   Plateau pressure:    Tidal volume:   Minute ventilation: 7.12 l/min  SPO2     ARDS network Guidelines: Lung protective strategy and Plateau pressure goals less than or equal to 30. Physical Exam:     General/Neurology: Alert, conversant, confused but not agitated. Head: Normocephalic, without obvious abnormality. Eye: Conjunctival pallor is present    Oral: Mucus membranes are moist.    Neck: Supple. Lung: Air entry is decreased, bilateral basal rales are present. Heart: S1 S2 present, no S3, S4 or murmurs. Abdomen/: Soft, non tender, incision line appears to be clean. Extremities: Pedal edema present.       Data:      Recent Results (from the past 24 hour(s))   GLUCOSE, POC    Collection Time: 07/23/20 12:06 PM   Result Value Ref Range    Glucose (POC) 115 (H) 70 - 110 mg/dL   GLUCOSE, POC    Collection Time: 07/23/20  5:20 PM   Result Value Ref Range    Glucose (POC) 137 (H) 70 - 110 mg/dL   GLUCOSE, POC    Collection Time: 07/24/20 12:50 AM   Result Value Ref Range    Glucose (POC) 147 (H) 70 - 110 mg/dL   RENAL FUNCTION PANEL    Collection Time: 07/24/20  3:55 AM   Result Value Ref Range    Sodium 144 136 - 145 mmol/L    Potassium 4.4 3.5 - 5.5 mmol/L    Chloride 110 100 - 111 mmol/L    CO2 26 21 - 32 mmol/L    Anion gap 8 3.0 - 18 mmol/L    Glucose 146 (H) 74 - 99 mg/dL    BUN 45 (H) 7.0 - 18 MG/DL    Creatinine 5.07 (H) 0.6 - 1.3 MG/DL    BUN/Creatinine ratio 9 (L) 12 - 20      GFR est AA 13 (L) >60 ml/min/1.73m2    GFR est non-AA 11 (L) >60 ml/min/1.73m2    Calcium 7.4 (L) 8.5 - 10.1 MG/DL    Phosphorus 6.4 (H) 2.5 - 4.9 MG/DL    Albumin 2.2 (L) 3.4 - 5.0 g/dL   CBC W/O DIFF    Collection Time: 07/24/20  3:55 AM   Result Value Ref Range    WBC 10.2 4.6 - 13.2 K/uL    RBC 1.56 (L) 4.70 - 5.50 M/uL    HGB 4.6 (LL) 13.0 - 16.0 g/dL    HCT 14.2 (LL) 36.0 - 48.0 %    MCV 91.0 74.0 - 97.0 FL    MCH 29.5 24.0 - 34.0 PG    MCHC 32.4 31.0 - 37.0 g/dL    RDW 15.7 (H) 11.6 - 14.5 %    PLATELET 679 368 - 464 K/uL    MPV 10.5 9.2 - 11.8 FL   GLUCOSE, POC    Collection Time: 07/24/20  6:41 AM   Result Value Ref Range    Glucose (POC) 171 (H) 70 - 110 mg/dL         Chemistry   Recent Labs     07/24/20  0355 07/23/20  0408 07/22/20  0415   * 141* 143*    141 141   K 4.4 4.0 3.6    108 108   CO2 26 24 26   BUN 45* 52* 43*   CREA 5.07* 5.92* 4.88*   CA 7.4* 7.6* 7.3*   PHOS 6.4* 6.4* 4.0   AGAP 8 9 7   BUCR 9* 9* 9*   ALB 2.2* 2.1* 2.0*       CBC w/Diff   Recent Labs     07/24/20  0355 07/23/20  0408 07/22/20  2146 07/22/20 0415   WBC 10.2 7.9  --  9.4   RBC 1.56* 2.66*  --  2.16*   HGB 4.6* 8.0* 6.6* 6.9*   HCT 14.2* 24.0* 20.0* 20.5*    193  --  281       ABG    No results for input(s): PHI, PHI, POC2, PCO2I, PO2, PO2I, HCO3, HCO3I, FIO2, FIO2I in the last 72 hours. Micro     No results for input(s): SDES, CULT in the last 72 hours. No results for input(s): CULT in the last 72 hours. CT (Most Recent)    Results from Hospital Encounter encounter on 07/16/20   CT HEAD WO CONT    Narrative EXAM: CT HEAD W/O CONTRAST    INDICATION: Altered mental status/level of consciousness, unresponsive    COMPARISON: No prior comparison study. TECHNIQUE: Axial CT imaging of the head was performed without intravenous  contrast.  Additional coronal and sagittal reconstructions were performed. One  or more dose reduction techniques were used on this CT: automated exposure  control, adjustment of the mAs and/or kVp according to patient's size, and  iterative reconstruction techniques.  The specific techniques utilized on this CT  exam have been documented in the patient's electronic medical record. Digital  Imaging and Communications in Medicine (DICOM) format image data are available  to nonaffiliated external healthcare facilities or entities on a secure, media  free, reciprocally searchable basis with patient authorization for at least a  12-month period after this study. _______________    FINDINGS:    VENTRICLES/EXTRA-AXIAL SPACES: The ventricles and sulci are mildly enlarged  consistent with diffuse volume loss. No extra-axial fluid collection is present. BRAIN PARENCHYMA: There is no evidence of acute intracranial hemorrhage, mass  effect, midline shift, or herniation. No definite CT evidence of acute cortical  infarct is seen. White matter CT    ORBITS: Right ocular lens is absent most likely from prior cataract surgery. PARANASAL SINUSES: Mucoperiosteal thickening scattered throughout the paranasal  sinuses. Sclerotic wall thickening suggested in the partially visualized  maxillary antra from chronic sinusitis. No air-fluid levels are seen. MASTOID AIR CELLS: Visualized mastoid air cells are clear. OSSEOUS STRUCTURES: No fracture is seen. OTHER: Atherosclerotic calcifications are present.    _______________      Impression IMPRESSION:    1. No acute intracranial hemorrhage, mass effect, midline shift, or herniation. No definite CT evidence of acute cortical infarct is seen. Please note that  noncontrast head CT may be normal in early acute infarct. 2. Mild nonspecific white matter disease likely representing chronic small  vessel changes. Preliminary report provided by on-call radiology resident. XR (Most Recent). CXR reviewed by me and compared with previous CXR   Results from [place] encounter on 07/16/20   XR ABD (KUB)    Narrative EXAM: XR ABD (KUB)    CLINICAL INDICATION/HISTORY: NGT placement verification - NGT already placed    > Additional: None.     COMPARISON: July 17, 2020    TECHNIQUE: Single supine abdominal radiograph was obtained.    _______________    FINDINGS:    BOWEL GAS PATTERN: Nasogastric tube extends below the field-of-view with the  side-port distal to the GE junction. Dilated loop of bowel in the left and  measures up to 4.2 cm in diameter. No free intraperitoneal air. SOFT TISSUES: Unremarkable. BONES: Unremarkable. ADDITIONAL FINDINGS: None.    _______________      Impression IMPRESSION:    Satisfactory positioning of the nasogastric tube. Dilated loop of bowel in the  left midabdomen, more prominent compared to the prior study suggesting partial  small bowel obstruction or focal ileus. Initial preliminary report was provided to the ED by the on-call radiology  resident. Assessment and Plan:    Respiratory:    See above. Extubation was successful. His agitated delirium was unmask by being off sedation and pain medication. We will try to use a benzo free approach for the treatment of delirium. Cardiovascular:    Patient is no longer in shock. He has been of NE and vasopressin for 72 hours. ID:     RLL infiltrate has been empirically covered with PIP/TAZO. We will discontinue antibiotics after 5 days of treatment. Renal:    MARCI soon to be on HD. Nephrology is following. HD is not needed for weaning, but extubation will have to wait until the end of the session because HD disequilibrium syndrome can happen during fist HD and can increase the chances of extubation failure. Endocrine:    Our target glucose is 140 to 180. Control is appropriate. DVT prophylaxis: UFH. Stress ulcer prophylaxis: Famotidine. OTHER:  Glycemic Control. Glucose stabilizer per ICU protocol when on insulin drip. Maintain blood glucose 140-180. Replace electrolytes per ICU electrolyte replacement protocol  Ventilator bundle & Sedation protocol followed.  Daily morning sedation holiday, assessment for readiness for SBT & weaning from ventilator; and then re-titrate if required. Aim to keep peak plateau pressure [id number] H2O in ARDS patient. [place] tube to suction at 20-30 cm H2O, Maintain [place] tube with 5-10ml air every 4 hours. Chlorhexidine mouth washes and routine oral care every 4 hours. Stress ulcer and DVT prophylaxis. HOB >=30 degree elevation all the time. HOB >=30 degree elevation all the time. Aggressive pulmonary toileting. Incentive spirometry when appropriate. Aspiration precautions. Sepsis bundle and protocol followed. Deescalate antibiotic when appropriate. Vasopressor when appropriate with MAP goal >65 mmHg. Central Line bundle followed, remove when not needed. Large bore IV line or CVP when appropriate. PT/OT eval and treat. OOB/IS when appropriate. Quality Care: Stress ulcer prophylaxis, DVT prophylaxis, HOB elevated, Infection control all reviewed and addressed. Events and notes from last 24 hours reviewed. Care plan discussed with nursing, members of care team.        Critical Care Time:    The services I provided to this patient were to treat and/or prevent clinically significant deterioration that could result in the failure of one or more body systems and/or organ systems due to respiratory distress, hypoxia, cardiac dysrhythmia.     Services included the following:  -reviewing nursing notes and old charts  -vital sign assessments  -direct patient care  -medication orders and management  -interpreting and reviewing diagnostic studies/labs  -re-evaluations  -documentation time     Critical Care Billing Code [id number].         Delwyn Cushing, MD   Intensivist  7/24/2020

## 2020-07-25 NOTE — DISCHARGE SUMMARY
Internal Medicine Discharge Summary Patient: Pina Thompson YOB: 1935 Age:  80 y. o. Admit Date: 7/16/2020 Discharge Date: 7/25/2020 LOS:  LOS: 9 days Discharge To: Hospice Care Consults: General Surgery, Nephrology and Pulmonary/Critical Care, Infectious Disease Admission Diagnoses: SBO (small bowel obstruction) ([place] [address] [W79.676] Acute respiratory failure with hypoxia and hypercapnia (HCC) [J96.01, J96.02] Discharge Diagnoses:   
Problem List as of 7/25/2020 Never Reviewed Codes Class Noted - Resolved Aspiration pneumonia ([place] [address] ICD-10-CM: J69.0 ICD-9-CM: 507.0  7/16/2020 - Present Hypoglycemia ICD-10-CM: E16.2 ICD-9-CM: 251.2  7/16/2020 - Present Metabolic encephalopathy [id number]: G93.41 
ICD-9-CM: 348.31  7/16/2020 - Present Acute respiratory failure with hypoxia and hypercapnia (HCC) ICD-10-CM: J96.01, J96.02 
ICD-9-CM: 518.81  7/16/2020 - Present Paroxysmal atrial fibrillation (HCC) ICD-10-CM: I48.0 ICD-9-CM: 427.31  7/9/2020 - Present Diabetes ([place] [address] ICD-10-CM: E11.9 ICD-9-CM: 250.00  7/3/2020 - Present * (Principal) SBO (small bowel obstruction) ([place] [address] ICD-10-CM: Q08.856 ICD-9-CM: 560.9  7/3/2020 - Present HTN (hypertension) ICD-10-CM: I10 
ICD-9-CM: 401.9  7/3/2020 - Present MARCI (acute kidney injury) ([place] [address] ICD-10-CM: N17.9 ICD-9-CM: 584.9  7/3/2020 - Present Troponin level elevated ICD-10-CM: R79.89 ICD-9-CM: 790.6  7/3/2020 - Present Discharge Condition:  Hospice care Procedures: Dialysis catheter HPI: Mr. Remy Schroeder is a 80 y.o.  male who is admitted for SBO. Mr. Remy Schroeder with past medical history as noted below , presented to the Emergency Department today with history of abdominal pain, increasing and progressive over the last 2 days. Nausea vomiting.   He has history of surgery for SBO by Dr. Elizabeth Gray on July 3. He went to rehab. They sent him here today for the above symptom. CT scan showed possible SBO's/ileus. ER MD talk to the surgeon who recommended admission for further management. Also there was some other abnormal findings in the labs and radiological findings so elected to be admitted by medicine. As stated in the assessment and plan, the patient is absolutely not responding even with a sternal rub and nipple twisting. I got the nurse to check his blood sugar immediately which was 116 while running D10 solution. After further discussion with the ER staff they were suspecting therefore milligrams IV morphine provided to him and Narcan plan to be given. At this stage patient is unresponsive and non-historian. Hospital Course: # Acute hypoxic hypercapnic respiratory failure. Intubated twice , one shortly after admission thought due to AMS due to pain medicine and pneumonia then he self extubate and second on 7/17 when he sustained hypoxia. Vent management per [facility]. Extubated but still requiring oxygen at time of hospice transition 
  
 #  Metabolic encephalopathy. in ER initially thought due to hypoglycemia but after correction of blood sugar he continued to be unresponsive.  Other possibility due to 4 mg IV morphine given for pains by ER staff. Given IV Narcan doses. CT head no acute lesion. Mentation never improved # SBO (small bowel obstruction) .  Admitted to the hospital for further management.  Surgery consulted.  N.p.o.  IV fluid.  Antibiotic. #  Aspiration pneumonia ([facility] [address] (7/16/2020).  CT scan with possible right lower lobe pneumonia.  IV Zosyn.  Maintain oxygenation. Follow chest x-ray , follow blood culture. Repeat blood culture , LA bundle. ID consulted. Completed treatment 
  
# Hypotension. IV pressors as needed. Hold blood pressure medicine . Worsened and required multiple pressors prior to transition to hospice  
 
 #   Diabetes uncontrolled with hypoglycemia. .  Blood sugar improved Hypoglycemia likely due to poor p.o. intake.  Improved in the ER with IV glucose infusion.  We will continue close monitoring . Provide SSI, hypoglycemia protocol and frequent Accu checks.   Provide SSI, hypoglycemia protocol and frequent Accu checks.  Education,  diabetic educator  . Diabetic Diet  
  
 # HTN (hypertension) (7/3/2020).  Control blood pressure. Currently of his home blood pressure medication 
  
# MARCI (acute kidney injury) (, worsening renal function.  IV fluid 
 Monitor Renal function and other labs as indicated. Avoid nephrotoxins , iv Contrast, NSAID. Renally dosing medications. Monitor urine out put.  
 Nephrology consulted and pt transitioned to dialysis Continued to worsen and pt transitioned to hospice 
  
 #  Troponin level elevated .  EKG.  Telemetry.  Serial troponin. 
  
# History of paroxysmal atrial fibrillation ([facility] [address] (7/9/2020).  Telemetry bed 
   
# Right kidney mass.  Need follow-up as an outpatient for further management. Palliative meeting after patient's mentation worsened and failed speech evaluation. His BP worsened and he developed GI bleed. Family elected to transition to hospice care and get him home. Visit Vitals /59 Pulse (!) 116 Temp 98.5 °F (36.9 °C) Resp 30 Ht 5' 7\" (1.702 m) Wt 93.8 kg (206 lb 14.4 oz) SpO2 95% BMI 32.41 kg/m² Labs Prior to Discharge: 
Labs: Results:  
   
Chemistry Recent Labs  
  07/24/20 
0355 07/23/20 
0408 * 141*  141  
K 4.4 4.0  
 108 CO2 26 24 BUN 45* 52* CREA 5.07* 5.92* CA 7.4* 7.6* AGAP 8 9 BUCR 9* 9* ALB 2.2* 2.1*  
  
CBC w/Diff Recent Labs  
  07/24/20 
0355 07/23/20 
0408 07/22/20 
2146 WBC 10.2 7.9  --   
RBC 1.56* 2.66*  --   
HGB 4.6* 8.0* 6.6* HCT 14.2* 24.0* 20.0*  
 193  --   
  
Cardiac Enzymes No results for input(s): CPK, CKND1, JOHN in the last 72 hours. No lab exists for component: Trevor Ort Coagulation No results for input(s): PTP, INR, APTT, INREXT in the last 72 hours. Lipid Panel No results found for: CHOL, CHOLPOCT, CHOLX, CHLST, CHOLV, 148729, HDL, HDLP, LDL, LDLC, DLDLP, 619020, VLDLC, VLDL, TGLX, TRIGL, TRIGP, TGLPOCT, CHHD, CHHDX  
BNP No results for input(s): BNPP in the last 72 hours. Liver Enzymes Recent Labs  
  07/24/20 
0355 ALB 2.2* Thyroid Studies No results found for: T4, T3U, TSH, TSHEXT Significant Imaging: Xr Abd (kub) Result Date: 7/24/2020 EXAM: XR ABD (KUB) CLINICAL INDICATION/HISTORY: NGT placement verification - NGT already placed   > Additional: None. COMPARISON: July 17, 2020 TECHNIQUE: Single supine abdominal radiograph was obtained. _______________ FINDINGS: BOWEL GAS PATTERN: Nasogastric tube extends below the field-of-view with the side-port distal to the GE junction. Dilated loop of bowel in the left and measures up to 4.2 cm in diameter. No free intraperitoneal air. SOFT TISSUES: Unremarkable. BONES: Unremarkable. ADDITIONAL FINDINGS: None. _______________ IMPRESSION: Satisfactory positioning of the nasogastric tube. Dilated loop of bowel in the left midabdomen, more prominent compared to the prior study suggesting partial small bowel obstruction or focal ileus. Initial preliminary report was provided to the ED by the on-call radiology resident. Xr Abd (kub) Result Date: 7/18/2020 EXAM: Frontal view of the abdomen CLINICAL INDICATION/HISTORY: GI tube placement COMPARISON: None. _______________ FINDINGS: NG/OG tube in place with the tip in the central abdomen in the region of the gastric body or antrum. Scattered gas-filled bowel loops identified in the abdomen. Degenerative changes in the spine. _______________ IMPRESSION: 1. NG/OG tube is in satisfactory position. Preliminary report was provided by radiology resident. Xr Abd Flat/ Erect Result Date: 7/7/2020 EXAM: ABDOMINAL RADIOGRAPH CLINICAL INDICATION/HISTORY: Follow up on SBO -Additional: None COMPARISON: 7/4/2020 TECHNIQUE: Abdominal radiographs. _______________ FINDINGS: BOWEL GAS PATTERN: Enteric feeding tube tip within the stomach. Persistent gaseous distention of multiple small bowel loops. Colonic gas. BONES: Unremarkable. OTHER: None. _______________ IMPRESSION: Persistent gaseous distention of multiple small bowel loops. Xr Abd Flat/ Erect Result Date: 7/4/2020 EXAM: XR ABD FLAT/ ERECT CLINICAL INDICATION/HISTORY: Follow up on SBO   > Additional: None. COMPARISON: CT scan dated 7/3/2020 TECHNIQUE: Supine and upright views of the abdomen were obtained. _______________ FINDINGS: BOWEL GAS PATTERN: Nasogastric tube in satisfactory position projecting over the upper abdomen. Multiple dilated loops of small bowel in the midabdomen are noted in keeping with ongoing partial small bowel obstruction. No pneumatosis. No free intraperitoneal air. SOFT TISSUES: Calcification projecting over the right upper quadrant likely gallstone. BONES: Unremarkable. ADDITIONAL FINDINGS: Tortuous thoracic aorta. _______________ IMPRESSION: Satisfactory positioning of the nasogastric tube. Ongoing partial small bowel obstruction. Ct Head Wo Cont Result Date: 7/17/2020 EXAM: CT HEAD W/O CONTRAST INDICATION: Altered mental status/level of consciousness, unresponsive COMPARISON: No prior comparison study. TECHNIQUE: Axial CT imaging of the head was performed without intravenous contrast.  Additional coronal and sagittal reconstructions were performed. One or more dose reduction techniques were used on this CT: automated exposure control, adjustment of the mAs and/or kVp according to patient's size, and iterative reconstruction techniques. The specific techniques utilized on this CT exam have been documented in the patient's electronic medical record.  Digital Imaging and Communications in Medicine (DICOM) format image data are available to nonaffiliated external healthcare facilities or entities on a secure, media free, reciprocally searchable basis with patient authorization for at least a 12-month period after this study. _______________ FINDINGS: VENTRICLES/EXTRA-AXIAL SPACES: The ventricles and sulci are mildly enlarged consistent with diffuse volume loss. No extra-axial fluid collection is present. BRAIN PARENCHYMA: There is no evidence of acute intracranial hemorrhage, mass effect, midline shift, or herniation. No definite CT evidence of acute cortical infarct is seen. White matter CT ORBITS: Right ocular lens is absent most likely from prior cataract surgery. PARANASAL SINUSES: Mucoperiosteal thickening scattered throughout the paranasal sinuses. Sclerotic wall thickening suggested in the partially visualized maxillary antra from chronic sinusitis. No air-fluid levels are seen. MASTOID AIR CELLS: Visualized mastoid air cells are clear. OSSEOUS STRUCTURES: No fracture is seen. OTHER: Atherosclerotic calcifications are present. _______________ IMPRESSION: 1. No acute intracranial hemorrhage, mass effect, midline shift, or herniation. No definite CT evidence of acute cortical infarct is seen. Please note that noncontrast head CT may be normal in early acute infarct. 2. Mild nonspecific white matter disease likely representing chronic small vessel changes. Preliminary report provided by on-call radiology resident. Ct Abd Pelv Wo Cont Result Date: 7/16/2020 EXAM: CT ABD PELV WO CONT CLINICAL INDICATION/HISTORY: abd pain, post-op. Shortness of breath and increased abdominal pain, recent abdominal surgery 7/3/2020. COMPARISON: CT: 7/3/2020. TECHNIQUE:   CT of the abdomen and pelvis without intravenous contrast administration. Coronal and sagittal reformats were generated and reviewed.  One or more dose reduction techniques were used on this CT: automated exposure control, adjustment of the mAs and/or kVp according to patient size, and iterative reconstruction techniques. The specific techniques used on this CT exam have been documented in the patient's electronic medical record. Digital Imaging and Communications in Medicine (DICOM) format image data are available to nonaffiliated external healthcare facilities or entities on a secure, media free, reciprocally searchable basis with patient authorization for at least a 12-month period after this study. _______________ FINDINGS: Suboptimal evaluation given lack of intravenous contrast. LOWER THORAX: Heart is normal size with aortic calcifications. Atherosclerosis including the coronary arteries. Bilateral dependent opacities likely reflect atelectasis. Diffuse bronchial wall thickening and bronchiectasis, similar to prior study, may be sequela of chronic aspiration. Consolidative airspace opacities in the right lower lobe (series 3 image 11) are new since prior study. No pleural effusion. Calcified pleural plaques are again seen in the left lung base. LIVER: Normal contours. No suspicious liver lesions on this unenhanced CT. GALLBLADDER AND BILIARY: Cholelithiasis. No biliary dilation. SPLEEN: Normal. PANCREAS: Normal. ADRENALS: Normal. KIDNEYS: Bilateral fluid density renal cyst largest exophytic from the right upper kidney contains internal septations, incompletely characterized given lack of contrast. Low-attenuation structure in the right lower kidney measures 1.8 x 1.7 cm, 22 HU (series 3 image 91). No perinephric stranding, radiopaque stone or hydronephrosis. BLADDER: Circumferential wall thickening throughout the partially distended bladder. LYMPH NODES: No mesenteric or retroperitoneal lymphadenopathy.  GI TRACT: The stomach is dilated with diffusely dilated proximal small bowel with air-fluid levels that extends to the right lower quadrant side to side small bowel anastomosis, small bowel distal to the anastomosis is decompressed. There is air and fluid within the nondilated colon. Prior partial sigmoidectomy; distal colonic anastomosis appears normal. PELVIC ORGANS: Fiducial markers are again seen in the prostate. VASCULATURE: Normal caliber lower thoracic and abdominal aorta. Atherosclerotic calcifications throughout the aorta and branch vessels. IVC is flattened suggesting hypovolemia. OTHER: No free intraperitoneal air. Small volume. Peritoneal fluid measures higher than simple fluid density. OSSEOUS STRUCTURES:  Moderate degenerative changes in the lumbar spine and both hips. No acute fracture, dislocation, or destructive osseous lesion. _______________ IMPRESSION: 1. Prominent gastric distention and proximal small bowel dilation extending to the right lower quadrant small bowel anastomosis concerning for high-grade ileus versus developing small bowel obstruction. No definite transition point is identified. No pneumatosis or portal venous gas. Recommend surgical consultation. 2.  Free intraperitoneal fluid measures higher than simple fluid density. Limited evaluation for infection, peritonitis or leak given lack of contrast. 3.  New right lower lung consolidation may represent aspiration and/or developing pneumonia. Recommend repeat imaging following completion of appropriate medical therapy. 4.  Bladder wall thickening may be due to underdistention though superimposed infection cannot be excluded. Recommend correlation for cystitis. 5.  Indeterminant 1.8 cm structure in the right lower kidney may represent a proteinaceous or complex cyst however neoplasm cannot be entirely excluded. Contrast enhanced CT or MRI may be used for further characterization as clinically indicated. Findings discussed with Dr. Alyse Rashid by Dr. Traci Nolasco MD, PhD at 4:23 AM on 7/16/2020 Ct Abd Pelv Wo Cont Result Date: 7/3/2020 EXAM: CT of the abdomen and pelvis CLINICAL INDICATION/HISTORY: Abdominal pain.  Left lower quadrant pain. Concern for diverticulitis versus obstruction   > Additional: None. COMPARISON: None. > Reference Exam: None. TECHNIQUE: Axial CT imaging of the abdomen and pelvis was performed without intravenous contrast. Multiplanar reformats were generated. One or more dose reduction techniques were used on this CT: automated exposure control, adjustment of the mAs and/or kVp according to patient size, and iterative reconstruction techniques. The specific techniques used on this CT exam have been documented in the patient's electronic medical record. Digital Imaging and Communications in Medicine (DICOM) format image data are available to nonaffiliated external healthcare facilities or entities on a secure, media free, reciprocally searchable basis with patient authorization for at least a 12-month period after this study. _______________ FINDINGS: LOWER CHEST: There is bronchiectasis and scarring in the bilateral lung bases with scattered subsegmental atelectasis. There are centrilobular nodular opacities in the medial right lung base and to a lesser extent on the left. Additionally there are calcified pleural plaques bilaterally. No pleural effusion. Multivessel coronary artery calcifications are noted. No pericardial effusion. LIVER, BILIARY: Liver is normal. No biliary dilation. Multiple dependent gallstones are noted within the lumen of the gallbladder. PANCREAS: Normal. SPLEEN: Normal. ADRENALS: Normal. KIDNEYS/URETERS/BLADDER: Bilateral renal cysts including a dominant exophytic cyst arising from the upper pole of the right kidney which measures up to 7.4 cm in diameter. No hydronephrosis. LYMPH NODES: No enlarged lymph nodes. GASTROINTESTINAL TRACT: There are multiple dilated and fluid-filled loops of small bowel in the mid to lower abdomen. Dilated loops of small bowel measure up to approximately 4 cm in diameter with scattered air-fluid levels.  The stomach is also distended and fluid-filled. There is an apparent transition point in the left mid abdomen where the dilated loops of small bowel rapidly taper to a decompressed loop of bowel with decompressed distal loops best appreciated on coronal series image 46 and 47 and axial series images 113-108, potentially a subtle intussusception on image 113. No discrete mass is appreciated. Normal appendix. PELVIC ORGANS: Unremarkable. VASCULATURE: Scattered aortic atherosclerosis. BONES: No acute or aggressive osseous abnormalities identified. Degenerative changes are noted throughout the spine with multilevel spondylosis and facet arthrosis. Severe degenerative disc disease is noted throughout the lower thoracic and lumbar spine. Grade 1 anterolisthesis of L4 on L5. OTHER: No ascites. Subcutaneous nodular density along the right anterolateral abdominal wall is noted and to a lesser extent of left of midline, potentially sites of subcutaneous medication injection. _______________ IMPRESSION: 1. Intermediate grade partial small bowel obstruction with an apparent transition point in the left midabdomen where there is a suspected short segment intussusception resulting in proximal dilated loops of small bowel and stomach and distal decompressed loops. No evidence of pneumatosis or free air. 2. Cholelithiasis. 3. 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 Scattered centrilobular nodular opacities suggest superimposed infectious or inflammatory bronchiolitis. Bilateral renal cysts and additional chronic ancillary findings as above. Xr Chest [facility] Result Date: 7/21/2020 EXAM: PORTABLE  FRONTAL CHEST RADIOGRAPH INDICATION: Intubated. History of hypertension and diabetes. COMPARISON: 7/20/2020.  TECHNIQUE: Portable frontal view of the chest _______________ FINDINGS: SUPPORT DEVICES: -Enteric tube terminates approximately 3 cm above the juan francisco. -Single enteric tube courses along the esophagus, tip terminates below the inferior margin of the image. -Right internal jugular venous catheter tip terminates in the SVC. -EKG leads overlie the patient. HEART AND MEDIASTINUM: Stable heart size. Atherosclerotic calcifications present. Mild central pulmonary vascular congestion. LUNGS: Hazy perihilar opacities extending to both lower lobes. PLEURAL SPACES:No large pneumothorax. No large pleural effusion. Pleural calcifications again noted predominantly on the left. BONY THORAX AND SOFT TISSUES: No acute abnormality. _______________ IMPRESSION: 1. Lines and devices appear stably positioned, as detailed. 2.  Similar bilateral lower lung opacities may represent pulmonary edema, atelectasis, though superimposed infection cannot be excluded. Recommend continued attention on follow-up. Xr Chest [facility] Result Date: 7/20/2020 EXAM: XR CHEST PORT CLINICAL INDICATION/HISTORY: Intubated -Additional: None COMPARISON: One day prior TECHNIQUE: Portable frontal view of the chest _______________ FINDINGS: SUPPORT DEVICES: Right IJV central venous catheter, ET and enteric tubes unchanged. Lenard Easton HEART AND MEDIASTINUM: Cardiomegaly. LUNGS AND PLEURAL SPACES: Calcified pleural plaques. Left basilar opacity, effusion/atelectasis. _______________ IMPRESSION: Calcified pleural plaques. Left basilar opacity, effusion/atelectasis. Xr Chest [facility] Result Date: 7/19/2020 EXAM: XR CHEST PORT CLINICAL INDICATION/HISTORY: Pneumonia. -Additional: Acute kidney insufficiency. Pneumonia and respiratory failure. COMPARISON: 7/17/2020 TECHNIQUE: Portable frontal view of the chest _______________ FINDINGS: SUPPORT DEVICES: Tip of the endotracheal tube is 3.4 cm above the juan francisco. Tip of the enteric tube extends below the level of the gastroesophageal junction. HEART AND MEDIASTINUM: Mild cardiomegaly is present. LUNGS AND PLEURAL SPACES: Pleural calcifications are present. BONY THORAX AND SOFT TISSUES: Unremarkable. _______________ IMPRESSION: No significant change since 7/17/2020. Xr Chest [facility] Result Date: 7/18/2020 EXAM: CHEST RADIOGRAPH CLINICAL INDICATION/HISTORY: ETT/OG placement   > Additional: None COMPARISON: 7/16/2020 TECHNIQUE: Portable frontal view of the chest _______________ FINDINGS: SUPPORT DEVICES: Endotracheal tube in place with the tip approximately 2.5 cm above the juan francisco. NG/OG tube traverses below the diaphragm and inferiorly off the film. Right IJ central line in place with the tip in the region of the SVC. HEART AND MEDIASTINUM: Heart size stable. LUNGS AND PLEURAL SPACES: Patchy interstitial and airspace opacities again identified bilaterally, similar to the prior exam. Calcified pleural plaque identified. Possible left pleural effusion. No pneumothorax. BONES AND SOFT TISSUES: Unremarkable. _______________ IMPRESSION: 1. Support tubes and lines as above. 2. Bilateral interstitial and airspace opacities are similar to the prior exam with continued dense consolidation and/or fluid at the left base. Calcific pleural plaques again identified. Preliminary report was provided by radiology resident. Xr Chest [facility] Result Date: 7/17/2020 EXAM: PORTABLE FRONTAL CHEST RADIOGRAPH CLINICAL INDICATION/HISTORY: Central line placement. COMPARISON: 7/16/2020 at 2008 hours TECHNIQUE: Portable frontal view of the chest _______________ FINDINGS: SUPPORT DEVICES: -Right internal jugular venous catheter tip terminates in the distal SVC. -Endotracheal tube is in place with its tip 4.5 cm from the juan francisco. -EKG leads overlie the patient. HEART AND MEDIASTINUM: Normal heart size and mediastinal contours. Atherosclerotic calcifications present. LUNGS: Airspace opacities are again seen in both lungs with air bronchograms in the left lower lobe. PLEURAL SPACES:No large pneumothorax. No large pleural effusion.  Calcified pleural plaques notably in the left lung. BONY THORAX AND SOFT TISSUES: No acute abnormality. _______________ IMPRESSION: 1. Interval right internal jugular venous catheter appears appropriately positioned. No pneumothorax. 2.  Patchy airspace opacities in both lungs concerning for pneumonia, while nonspecific this may be seen in the setting of COVID or other pneumonic process. Xr Chest [facility] Result Date: 7/17/2020 EXAM: PORTABLE FRONTAL CHEST RADIOGRAPH CLINICAL INDICATION/HISTORY: Intubation COMPARISON: 7/3/2020 TECHNIQUE: Portable frontal view of the chest _______________ FINDINGS: SUPPORT DEVICES: -EKG leads overlie the patient. -Endotracheal tube at the juan francisco. HEART AND MEDIASTINUM: Normal heart size and mediastinal contours. Atherosclerotic calcifications present. LUNGS: Patchy opacities are seen in both lower lobes with consolidation and air bronchograms in the left lower lung. PLEURAL SPACES:No large pneumothorax. No large pleural effusion. Calcified pleural plaques notably on the left. BONY THORAX AND SOFT TISSUES: No acute abnormality. _______________ IMPRESSION: New airspace opacities with consolidation and air bronchograms in the left lower lung, while nonspecific this may be seen in the setting of COVID or other pneumonic process. Xr Chest [facility] Result Date: 7/3/2020 EXAM: XR CHEST PORT CLINICAL INDICATION/HISTORY: abd pain   > Additional: None. COMPARISON: None. TECHNIQUE: Portable chest _______________ FINDINGS: SUPPORT DEVICES: None. HEART AND MEDIASTINUM: Normal size and contour. Normal pulmonary vasculature. LUNGS AND PLEURAL SPACES: Bibasilar bronchiectasis. Prominent calcified pleural plaques predominantly along the left lung are noted. No focal consolidation. BONY THORAX AND SOFT TISSUES: No acute osseous abnormality. 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 No consolidation.  
 
Xr Abd Port  1 V 
 
 Result Date: 7/17/2020 Abdomen, single view COMPARISON: CT: 7/16/2020 Radiographs: 7/7/2020 INDICATION: NG tube placement FINDINGS: Supine view of the abdomen obtained. Lines: -Enteric tube courses along the esophagus, tip and side port project over the distal esophagus. -EKG leads overlie the patient. The cardiac mediastinal silhouette appears mildly enlarged. Patchy opacities in the right lower lobe, better characterized on recent prior CT. No visualized pleural effusion. No acute osseous abnormality. Moderate degenerative changes in the lower lumbar spine. Dilated gas-filled loops of small bowel in the lower abdomen measuring up to 3.7 cm. No free intraperitoneal air. No abnormal calcification or soft tissue mass. Surgical clips project over the midline lower abdomen. Impression: 1. NG tube tip and side port project over the esophagus-recommend advancing at least 10 cm with repeat imaging to confirm placement. 2.  Dilated loops of small bowel consistent with small bowel obstruction versus ileus, better characterized on recent prior CT. Ir Guide Insert Picc Over 5 Yrs Result Date: 7/9/2020 PROCEDURE:  Ultrasound & Fluoroscopic Guided Right PICC Line Placement CLINICAL INDICATION/HISTORY: IV access for TPN PERFORMED BY: Renae Jackson PA-C ATTENDING RADIOLOGIST: Claudio Arana MD SUPERVISION: Direct TECHNIQUE: After explaining the procedure to the patient, including the potential risks, benefits, and alternatives, informed written and verbal consent was obtained. A time out was performed to verify appropriate patient, procedure, and site. The procedure was performed following standard maximal barrier technique which includes, cap, mask, sterile gown, sterile gloves, sterile full body drape, hand hygiene with alcohol foam, and 2% chlorhexidine for cutaneous antisepsis. Sterile ultrasound gel and a sterile cover for the US probe was used.  Ultrasound evaluation for potential access sites was performed. The right brachial vein is patent and normally compresses. A permanent recording was created for the patient record. The patient's upper arm was prepped and draped in sterile fashion and 1% Lidocaine was used for local anesthesia. The vein was accessed in the upper arm with a 21 gauge needle under ultrasound guidance. A guide wire was advanced under fluoroscopic control to the superior vena cava. The distance between the superior vena cava and skin puncture site was measured and a 5 [place] triple lumen power PICC was cut to the appropriate length. Overall catheter length was 37 cm. The PICC was advanced to the SVC under fluoroscopic control. The line was flushed with heparinized saline and adhered to the skin with a Statlock device. Final coned AP view of the chest was obtained to document catheter position. The patient tolerated the procedure well and there were no immediate complications. FINDINGS: Final coned AP view of the chest shows good position of the PICC with its tip in the SVC. All ports flushed easily and there was good blood return. Radiation dose (reference air kerma):  11 mGy. Fluoroscopy Time: 0.9 minutes. GUIDANCE: Ultrasound and fluoroscopic guidance was used to position (and confirm the position of) the needle and catheter. Image(s) saved in PACS: Ultrasound and fluoroscopy. IMPRESSION: Successful placement of a triple lumen power PICC line via the right arm. The PICC line is ready for immediate use. Discharge Medications:    
Current Discharge Medication List  
  
START taking these medications Details  
morphine, 10 mg/5 mL, oral solution Take 2.5 mL by mouth every one (1) hour as needed for Pain for up to 3 days. Max Daily Amount: 120 mg. 
Qty: 1 Bottle, Refills: 0 Associated Diagnoses: Hospice care LORazepam (LORazepam IntensoL) concentrated solution Take 0.5 mL by mouth every one (1) hour as needed for Agitation or Anxiety. Max Daily Amount: 24 mg. Qty: 1 Bottle, Refills: 0 Associated Diagnoses: Hospice care  
  
scopolamine (TRANSDERM-SCOP) 1 mg over 3 days pt3d 1 Patch by TransDERmal route every seventy-two (72) hours. Qty: 3 Patch, Refills: 0  
  
naloxone (NARCAN) 4 mg/actuation nasal spray Use 1 spray intranasally, then discard. Repeat with new spray every 2 min as needed for opioid overdose symptoms, alternating nostrils. Qty: 3 Each, Refills: 0 Associated Diagnoses: Hospice care STOP taking these medications  
  
 metoprolol tartrate (LOPRESSOR) 25 mg tablet Comments:  
Reason for Stopping:   
   
 aspirin 81 mg chewable tablet Comments:  
Reason for Stopping:   
   
 Cetirizine 10 mg cap Comments:  
Reason for Stopping:   
   
 benzonatate (TESSALON) 100 mg capsule Comments:  
Reason for Stopping:   
   
 atorvastatin (LIPITOR) 80 mg tablet Comments:  
Reason for Stopping:   
   
 glipiZIDE (GLUCOTROL) 10 mg tablet Comments:  
Reason for Stopping:   
   
 hydroCHLOROthiazide (HYDRODIURIL) 25 mg tablet Comments:  
Reason for Stopping:   
   
 albuterol (PROVENTIL HFA) 90 mcg/actuation inhaler Comments:  
Reason for Stopping:   
   
  
 
 
Activity: Activity as tolerated Diet: Comfort feeding Wound Care: Keep wound clean and dry Follow-up:  
Hospice care Total time spent including time spent on final examination and discharge discussion, discharge documentation and records reviewed and medication reconciliation: > 30 minutes Mary Reyes DO Internal Medicine, Hospitalist 
Pager: [phone] [facility]

## 2020-07-25 NOTE — PROGRESS NOTES
Physical Exam 
Skin: 
 
    
 
1920> Assumed care from [facility], [address] [facility]. Patient is awake and alert, oriented to self. Verbalized being happy to be going home. Informed patient that he has to stay for the night and will go home to be with his wife at noon the next day. Patient smiled and said, \"OK. .\"  
 
2000> Patient seems to be cooperative, soft wrist restraints discontinued at this time to keep patient comfortable. 0000> Patient resting comfortably in bed.  
 
0400> Patient resting at this time. Bedside and Verbal shift change report given to Raymundo Bean (oncoming nurse) by Oli Wiley RN (offgoing nurse). Report included the following information SBAR, Kardex, Intake/Output, MAR and Recent Results.

## 2020-07-25 NOTE — PROGRESS NOTES
Problem: Falls - Risk of 
Goal: *Absence of Falls Description: Document Amber Patches Fall Risk and appropriate interventions in the flowsheet. Outcome: Progressing Towards Goal 
Note: Fall Risk Interventions: 
  
 
Mentation Interventions: Adequate sleep, hydration, pain control, Bed/chair exit alarm, More frequent rounding, Toileting rounds Medication Interventions: Bed/chair exit alarm, Teach patient to arise slowly Elimination Interventions: Bed/chair exit alarm, Call light in reach, Toileting schedule/hourly rounds Problem: Pressure Injury - Risk of 
Goal: *Prevention of pressure injury Description: Document Francis Scale and appropriate interventions in the flowsheet. Outcome: Progressing Towards Goal 
Note: Pressure Injury Interventions: 
Sensory Interventions: Assess changes in LOC Moisture Interventions: Absorbent underpads, Apply protective barrier, creams and emollients, Internal/External urinary devices, Maintain skin hydration (lotion/cream), Minimize layers, Moisture barrier Activity Interventions: Pressure redistribution bed/mattress(bed type) Mobility Interventions: Assess need for specialty bed, Float heels, HOB 30 degrees or less, Turn and reposition approx. every two hours(pillow and wedges) Nutrition Interventions: Discuss nutritional consult with provider Friction and Shear Interventions: HOB 30 degrees or less, Foam dressings/transparent film/skin sealants Problem: Dyspnea Due to End of Life Goal: Demonstrate understanding of and ability to manage respiratory symptoms at end of life Outcome: Progressing Towards Goal 
  
Problem: Imminent death Goal: Collaborate with patient/family/caregiver/interdisciplinary team to minimize and manage end of life symptoms Outcome: Progressing Towards Goal

## 2020-07-25 NOTE — PROGRESS NOTES
0715 received report from Sparkle Miller, [facility]. Report included the following information SBAR, Kardex, Intake/Output, MAR, Recent Results and Cardiac Rhythm ST. Pt on comfort care measures. Pt resting quietly in bed; denies pain at this time. 1040 pt removed dressing from abdominal incision; reapplied wet to dry dressing and reminded pt not to remove the dressing; pt verbalized understanding 1055 pt complained of abdominal pain; admin prn fentanyl and repositioned pt in bed; pt tolerated well 1155 transport arrived on unit 1210 I have reviewed discharge instructions with the patient. Pt discharged with glasses on person, discharge instructions, and prescriptions. Patient armband removed and shredded

## 2020-07-25 NOTE — PROGRESS NOTES
Problem: Discharge Planning Goal: *Discharge to safe environment Outcome: resolved/met Pt dc'd home with hospice. Called spouse, she states dme to be delivered by 10am. If does not arrive by 11 am she will call me. Notified  time 12noon. Notified nurse n unit. Pcs on unit per prev cm. Called spouse, dme is in home and ready Care Management Interventions Palliative Care Criteria Met (RRAT>21 & CHF Dx)?: No 
Mode of Transport at Discharge: BLS Transition of Care Consult (CM Consult): Home Hospice [facility]: Yes Current Support Network: Lives with Spouse, Other(was at [facility], [facility]. for snf when got sick) Confirm Follow Up Transport: Other (see comment) The Plan for Transition of Care is Related to the Following Treatment Goals : resolution of acute goals Discharge Location Discharge Placement: Home with hospice([place] [address])

## 2020-07-25 NOTE — PROGRESS NOTES
ASPIRIN and NSAID PRODUCTS    The following is a list of medications that either contain aspirin or nonsteroidal anti-inflammatory agents (NSAID's). Please do not take these medications.    OVER-THE-COUNTER:  Do not take five (5) days prior to procedure.  Do not take for two (2) weeks after procedure.  · Aspirin (generic):  Brand Names:  Anacin, Dunia, [place], Aspergum, Aspercin, Aspermin, Aspertab, Back-quell, Duradyne, Empirin, Gemnisyn, Genprin, Gensan, Magnaprin, McNess Pain Tab, Momentum, P-A-C, Pain Reliever Tabs, Tri-Pain Caplets, Vanquish Caplet.  · Buffered Aspirin (generic):  Brand Names:  Ascriptin, Bufferin, Ecotrin, Buffaprin, Buffasal, Buffinol, COPE.  · BC Powders/Tablets  · Goody's Powders  · Stanback Powders or Tablets  · Excedrin, Excedrin Extra, Excedrin IB  · Liliane [place] or Bromo-[place]  · Ibuprofen (generic):  Brand Names:  Advil, Nuprin, Motrin IB, Adapin, Genpril, Ibufen 200, Menadol, Midol IB, Dristan Sinus, Ursinus Inlay Tabs, Dimetapp Sinus, Valparin, Haltran Tabs, Yoni's Pills, Graeme.  · Aspirin Suppositories (generic, any strength)  · Naproxen (generic)  Brand Name: Aleve  · Pepto Bismol    PRESCRIPTION:  Brand Name Generic Name    Fiorinal  butalbital, aspirin, caffeine    Naprosyn, Anaprox  naproxen    Voltaren, Cataflam  diclofenac    Feldene  piroxicam    Motrin (Rx), Rufen  ibuprofen    Ansaid  flurbiprofen    Orudis  ketoprofen    Dolobid  diflunisal    Clinoril  sulindac    Indocin  indomethacin    Tolectin  tolmetin    Azdone Tabs  aspirin plus hydrocodone    Percodan  aspirin plus oxycodone    Synalgos  aspirin plus dihydrocodeine    Daypro  oxaprozin    Lodine  etodolac    Meclomen  meclofenamate    Nalfon  fenoprofen    Ponstel (mefenamic acid)     Relafen  nabumetone    Toradol  ketorolac     If you have a headache, backache, arthritis or other painful condition, please take Tylenol, Datril or some other non aspirin-containing product.            Interactions with Herbs  Patient is comfort care , will sign off Please remove dialysis catheter before discharge and Dietary Supplements      Ginkgo biloba    Garlic    Saw palmetto    Alfalfa    American ginseng    Shreya    Anise    Arnica montana    Asafetida    [place] bark    Bilberry    Birch    Black cohosh    Bladderwrack    Bogbean    Boldo    Borage seed oil    Bromelain    Capsicum    Cat's claw    Celery     Chamomile    La Belle    Clove    Coleus    Cordyceps       Dandelion     Danshen    Devil's claw    Dong quai    EPA (eicosapentaenoic    acid, found in fish oils)    Evening primrose oil    Fenugreek    Feverfew    Fish oil    Flaxseed/flax powder (not a    concern with flaxseed oil)    Nicole    Grapefruit juice    Grapeseed    Green tea    Guggul    Gymnestra    Horse chestnut    Horseradish    Licorie root    Lovage root    Male fern    Diaz    Melatonin    Nordihydroguairetic acid    (NDGA)   Omega-3 fatty acids    Onion    Papain    Panax ginseng    Parsley    Passionflower    Poplar    Prickly alia    Propolis    Quassia    Red clover    Reishi    Siberian ginseng    Sweet clover    Rue    Sweet birch    Turmeric    Vitamin E    White willow    Wild carrot    Wild lettuce    Vulcan    Takilma    Scobey

## 2020-07-27 PROBLEM — Z51.5 HOSPICE CARE PATIENT: Status: ACTIVE | Noted: 2020-01-01

## 2020-07-28 NOTE — PROGRESS NOTES
7/28/2020 [id number] signed by attending. Pt discharged home with hospice on 7/25/2020 Copy made for chart Copy made for CM File Yovanny Gomez RN Outcomes Manager 
[phone]

## 2024-01-05 NOTE — PROGRESS NOTES
Internal Medicine Progress Note    Patient's Name: Kayla Arvizu  Admit Date: 7/16/2020  Length of Stay: 6      Assessment/Plan     Active Hospital Problems    Diagnosis Date Noted    Aspiration pneumonia ([facility] 07/16/2020    Hypoglycemia 36/80/5779    Metabolic encephalopathy 37/33/8478    Acute respiratory failure with hypoxia and hypercapnia (HCC) 07/16/2020    [facility] 07/09/2020    Diabetes ([facility] 07/03/2020    SBO (small bowel obstruction) ([facility] 07/03/2020    HTN (hypertension) 07/03/2020    Troponin level elevated 07/03/2020    MARCI (acute kidney injury) ([facility] 07/03/2020   Sepsis with Septic Shock, present on admission, secondary to RLL PNA  Acute blood loss anemia  GI Bleed    - WBCs WNL, afebrile  - Observe off antibx per ID  - Dialysis per nephro  - Plan dialysis again today  - Hgb down to 4.6 today  - Signs of possible UGIB based of GI contents  - Transfuse PRBCs  - Trend H&H  - Transfuse to keep hgb > 7  - Hold heparin  - Consult GI  - Appreciate surg, no concern for issues at this time  - NPO  - Appreciate pulm/CC  - I spoke with wife yesterday and she wanted us to cont being aggressive despite his deteriorating condition, including intubation and cardiac resuscitation  - Cont acceptable home medications for chronic conditions   - DVT protocol    I have personally reviewed all pertinent labs and films that have officially resulted over the last 24 hours. I have personally checked for all pending labs that are awaiting final results.     Subjective     Pt s/e @ bedside  No major events overnight   Remains lethargic and confused  Unable to obtain ROS  Hgb dropped again, but down to 4.6 along with findings of black GI contents    Objective     Visit Vitals  /52   Pulse (!) 114   Temp 97.7 °F (36.5 °C)   Resp 28   Ht 5' 7\" (1.702 m)   Wt 93.8 kg (206 lb 14.4 oz)   SpO2 100%   BMI 32.41 kg/m²       Physical Exam:  General Appearance: NAD, confused/lethargic  Neck: No JVD, supple  Lungs: B/L rhonchi with normal respiratory effort  CV: RRR, no m/r/g  Abdomen: soft, non-tender, normal bowel sounds  Extremities: no cyanosis, + pedal edema    Intake and Output:  Current Shift:  07/24 0701 - 07/24 1900  In: 315   Out: -   Last three shifts:  07/22 1901 - 07/24 0700  In: 561.3 [I.V.:349.6]  Out: 801     Lab/Data Reviewed:  CMP:   Lab Results   Component Value Date/Time     07/24/2020 03:55 AM    K 4.4 07/24/2020 03:55 AM     07/24/2020 03:55 AM    CO2 26 07/24/2020 03:55 AM    AGAP 8 07/24/2020 03:55 AM     (H) 07/24/2020 03:55 AM    BUN 45 (H) 07/24/2020 03:55 AM    CREA 5.07 (H) 07/24/2020 03:55 AM    GFRAA 13 (L) 07/24/2020 03:55 AM    GFRNA 11 (L) 07/24/2020 03:55 AM    CA 7.4 (L) 07/24/2020 03:55 AM    PHOS 6.4 (H) 07/24/2020 03:55 AM    ALB 2.2 (L) 07/24/2020 03:55 AM     CBC:   Lab Results   Component Value Date/Time    WBC 10.2 07/24/2020 03:55 AM    HGB 4.6 (LL) 07/24/2020 03:55 AM    HCT 14.2 (LL) 07/24/2020 03:55 AM     07/24/2020 03:55 AM       Imaging Reviewed:  Cliff Durand Abd (kub)    Result Date: 7/24/2020  EXAM: XR ABD (KUB) CLINICAL INDICATION/HISTORY: NGT placement verification - NGT already placed   > Additional: None. COMPARISON: July 17, 2020 TECHNIQUE: Single supine abdominal radiograph was obtained. _______________ FINDINGS: BOWEL GAS PATTERN: Nasogastric tube extends below the field-of-view with the side-port distal to the GE junction. Dilated loop of bowel in the left and measures up to 4.2 cm in diameter. No free intraperitoneal air. SOFT TISSUES: Unremarkable. BONES: Unremarkable. ADDITIONAL FINDINGS: None. _______________     IMPRESSION: Satisfactory positioning of the nasogastric tube. Dilated loop of bowel in the left midabdomen, more prominent compared to the prior study suggesting partial small bowel obstruction or focal ileus.  Initial preliminary report was provided to the ED by the on-call radiology resident.       Medications Reviewed:  Current Facility-Administered Medications   Medication Dose Route Frequency    NOREPINephrine (LEVOPHED) 8 mg in 0.9% NS 250ml infusion  0.5-30 mcg/min IntraVENous TITRATE    pantoprazole (PROTONIX) 40 mg in 0.9% sodium chloride 10 mL injection  40 mg IntraVENous Q12H    0.9% sodium chloride infusion  75 mL/hr IntraVENous CONTINUOUS    0.9% sodium chloride infusion 250 mL  250 mL IntraVENous PRN    0.9% sodium chloride infusion 250 mL  250 mL IntraVENous PRN    morphine injection 2-4 mg  2-4 mg IntraVENous Q3H PRN    [Held by provider] ziprasidone (GEODON) 30 mg in sterile water (preservative free) 1.5 mL injection  30 mg IntraMUSCular Q12H PRN    dexmedeTOMidine (PRECEDEX) 400 mcg in 0.9% sodium chloride 100 mL infusion  0.2-0.7 mcg/kg/hr IntraVENous TITRATE    lactulose (CHRONULAC) 10 gram/15 mL solution 60 mL  60 mL Oral BID    0.9% sodium chloride infusion  50 mL/hr IntraVENous DIALYSIS PRN    heparin (porcine) 1,000 unit/mL injection 1,000 Units  1,000 Units InterCATHeter DIALYSIS PRN    [Held by provider] haloperidol lactate (HALDOL) injection 2.5 mg  2.5 mg IntraVENous Q6H PRN    insulin glargine (LANTUS) injection 5 Units  5 Units SubCUTAneous DAILY    acetaminophen (TYLENOL) solution 650 mg  650 mg Oral Q6H PRN    albuterol-ipratropium (DUO-NEB) 2.5 MG-0.5 MG/3 ML  3 mL Nebulization QID RT    aspirin chewable tablet 81 mg  81 mg Oral DAILY    atorvastatin (LIPITOR) tablet 80 mg  80 mg Oral DAILY    dextrose 10% infusion 125-250 mL  125-250 mL IntraVENous PRN    naloxone (NARCAN) injection 0.4 mg  0.4 mg IntraVENous PRN    insulin lispro (HUMALOG) injection   SubCUTAneous Q6H    glucose chewable tablet 16 g  4 Tab Oral PRN    glucagon (GLUCAGEN) injection 1 mg  1 mg IntraMUSCular PRN    methylPREDNISolone (PF) (SOLU-MEDROL) injection 40 mg  40 mg IntraVENous Q12H    lidocaine (XYLOCAINE) 2 % jelly   Mucous Membrane PRN    sodium chloride (NS) flush 5-10 mL  5-10 mL IntraVENous PRN    sodium chloride (NS) flush 5-40 mL  5-40 mL IntraVENous PRN    ondansetron (ZOFRAN) injection 4 mg  4 mg IntraVENous Q6H PRN           Arthur Ribeiro DO  Internal Medicine, Hospitalist  Pager: [facility] pelvic pain

## 2025-04-14 NOTE — PROGRESS NOTES
An end-of-life conversation took place in the patient's room, with his wife present. She asked him multiple times: \"I don't know what to do. Should I continue with this or take you to hospice as we did with our son (paraphrasing, with accurate content)\". Then, he went on asking to be able to sit by the bed, to see his family, to have his abdominal pain relieved. .. I pointed to his wife that all his wishes could be easily fulfilled if the main goal of the treatment were absence of pain and time with loved ones, instead to Hemoglobin higher than 7 grams/dl or MAP of 65. It became clear to everyone involved in the conversation that hospice care was the best course of action. Patient is now hospice care. [Mother] : mother

## 2025-05-30 NOTE — PROGRESS NOTES
1900: Bedside and Verbal shift change report given to Jorge L Bailey  (oncoming nurse) by Bonifacio Mckeon RN (offgoing nurse). Report included the following information SBAR, Kardex, Intake/Output, MAR and Quality Measures. 2000: Assessment completed, meds given, pt resting in bed. RT at bedside because pt vent continuously alarming      2146: Spoke with wife, given pt specific code. All questions answered     0000: reassessment completed, no changes noted. Pt resting in bed. Gets very frustrated when you stimulate him    0400: Reassessment completed, no changes noted     0410: Az paged regarding pts Neg COVID result     0430: Az returned page, aware of neg covid but doesn't want to remove precautions until he reviews the chart     0700: Bedside and Verbal shift change report given to [facility] (oncoming nurse) by Jorge L Bailey  (offgoing nurse). Report included the following information SBAR, Kardex, Intake/Output, MAR, Accordion, Recent Results and Quality Measures. All drips verified. Report given to Ander CARSON.     Crit care resident at bedside preparing for central line and A line.

## (undated) DEVICE — SOLUTION IRRIG 1000ML H2O STRL BLT

## (undated) DEVICE — TRAY CATH 16F URIN MTR LTX -- CONVERT TO ITEM 363111

## (undated) DEVICE — SEALER ENDOSCP NANO COAT OPN DIV CRV L JAW LIGASURE IMPACT

## (undated) DEVICE — SUTURE VCRL SZ 3-0 L27IN ABSRB UD L26MM SH 1/2 CIR J416H

## (undated) DEVICE — PREP SKN CHLRAPRP 26ML TNT -- CONVERT TO ITEM 373320

## (undated) DEVICE — INTENDED FOR TISSUE SEPARATION, AND OTHER PROCEDURES THAT REQUIRE A SHARP SURGICAL BLADE TO PUNCTURE OR CUT.: Brand: BARD-PARKER ® CARBON RIB-BACK BLADES

## (undated) DEVICE — TOWEL SURG W16XL26IN BLU NONFENESTRATED DLX ST 2 PER PK

## (undated) DEVICE — DRAPE THER FLUID WARMING 66X44 IN FLAT SLUSH DBL DISC ORS

## (undated) DEVICE — SUT PROL 2-0 30IN CT2 BLU --

## (undated) DEVICE — RELOAD STPL L75MM OPN H3.8MM CLS 1.5MM WIRE DIA0.2MM REG

## (undated) DEVICE — SUTURE VCRL SZ 3-0 L18IN ABSRB UD L26MM SH 1/2 CIR J864D

## (undated) DEVICE — STAPLER INT L75MM CUT LN L73MM STPL LN L77MM BLU B FRM 8

## (undated) DEVICE — SUTURE VCRL SZ 2-0 L12X18IN ABSRB UD POLYGLACTIN 910 BRAID J911T

## (undated) DEVICE — STAPLER SKIN H3.9MM WIRE DIA0.58MM CRWN 6.9MM 35 STPL FIX

## (undated) DEVICE — STERILE POLYISOPRENE POWDER-FREE SURGICAL GLOVES: Brand: PROTEXIS

## (undated) DEVICE — CURVED, LARGE JAW, OPEN SEALER/DIVIDER NANO-COATED: Brand: LIGASURE IMPACT

## (undated) DEVICE — ELECTRODE BLDE L4IN NONINSULATED EDGE

## (undated) DEVICE — SOL IRR NACL 0.9% 500ML POUR --

## (undated) DEVICE — SLEEVE COMPR THGH LEN MED TEAR AWAY GARM SCD EXPRESS [DVT30] [MEDTRONIC COVIDIEN KENDALL]

## (undated) DEVICE — SUTURE PDS II SZ 1 L27IN ABSRB VLT CT-1 L36MM 1/2 CIR Z341H

## (undated) DEVICE — DEPAUL MAJOR PROCEDURE PACK: Brand: MEDLINE INDUSTRIES, INC.

## (undated) DEVICE — INSULATED BLADE ELECTRODE: Brand: EDGE

## (undated) DEVICE — LIGHT HANDLE: Brand: DEVON